# Patient Record
Sex: FEMALE | Race: BLACK OR AFRICAN AMERICAN | NOT HISPANIC OR LATINO | Employment: UNEMPLOYED | URBAN - METROPOLITAN AREA
[De-identification: names, ages, dates, MRNs, and addresses within clinical notes are randomized per-mention and may not be internally consistent; named-entity substitution may affect disease eponyms.]

---

## 2014-06-06 LAB — HM COLONOSCOPY: NORMAL

## 2020-01-30 ENCOUNTER — TELEPHONE (OUTPATIENT)
Dept: FAMILY MEDICINE | Facility: CLINIC | Age: 60
End: 2020-01-30

## 2020-01-30 NOTE — TELEPHONE ENCOUNTER
----- Message from Liat Eamon sent at 1/30/2020  8:46 AM CST -----  Contact: pt  Type: Patient Call Back    Who called:pt    What is the request in detail:pt requesting to speak to nurse regarding rescheduled office. Please call pt    Can the clinic reply by [facility]?    Would the patient rather a call back or a response via [facility]? call    Best call back number:[phone]    Additional Information:

## 2020-01-30 NOTE — TELEPHONE ENCOUNTER
Pt called stating she is new to [facility] and her appointment today had to be rescheduled, she is wanting to know if this happens frequently and if she could see someone else today; I informed her this was emergency for the doctor having to cancel today and unfortunately we don't have anything available at this location today; she verbalized understanding

## 2020-02-07 ENCOUNTER — LAB VISIT (OUTPATIENT)
Dept: LAB | Facility: HOSPITAL | Age: 60
End: 2020-02-07
Attending: FAMILY MEDICINE
Payer: MEDICARE

## 2020-02-07 ENCOUNTER — OFFICE VISIT (OUTPATIENT)
Dept: FAMILY MEDICINE | Facility: CLINIC | Age: 60
End: 2020-02-07
Payer: MEDICARE

## 2020-02-07 VITALS
BODY MASS INDEX: 32.26 KG/M2 | TEMPERATURE: 98 F | DIASTOLIC BLOOD PRESSURE: 88 MMHG | WEIGHT: 188.94 LBS | HEART RATE: 60 BPM | RESPIRATION RATE: 18 BRPM | HEIGHT: 64 IN | SYSTOLIC BLOOD PRESSURE: 128 MMHG

## 2020-02-07 DIAGNOSIS — Z98.890 HISTORY OF LUMBAR DISCECTOMY: ICD-10-CM

## 2020-02-07 DIAGNOSIS — Z13.220 ENCOUNTER FOR LIPID SCREENING FOR CARDIOVASCULAR DISEASE: ICD-10-CM

## 2020-02-07 DIAGNOSIS — Z00.00 ANNUAL PHYSICAL EXAM: Primary | ICD-10-CM

## 2020-02-07 DIAGNOSIS — R79.9 ABNORMAL FINDING OF BLOOD CHEMISTRY, UNSPECIFIED: ICD-10-CM

## 2020-02-07 DIAGNOSIS — L65.9 HAIR LOSS: ICD-10-CM

## 2020-02-07 DIAGNOSIS — Z11.59 NEED FOR HEPATITIS C SCREENING TEST: ICD-10-CM

## 2020-02-07 DIAGNOSIS — Z11.4 ENCOUNTER FOR SCREENING FOR HIV: ICD-10-CM

## 2020-02-07 DIAGNOSIS — Z13.6 ENCOUNTER FOR LIPID SCREENING FOR CARDIOVASCULAR DISEASE: ICD-10-CM

## 2020-02-07 DIAGNOSIS — M48.061 SPINAL STENOSIS OF LUMBAR REGION, UNSPECIFIED WHETHER NEUROGENIC CLAUDICATION PRESENT: ICD-10-CM

## 2020-02-07 LAB
ALBUMIN SERPL BCP-MCNC: 4.2 G/DL (ref 3.5–5.2)
ALP SERPL-CCNC: 79 U/L (ref 55–135)
ALT SERPL W/O P-5'-P-CCNC: 17 U/L (ref 10–44)
ANION GAP SERPL CALC-SCNC: 10 MMOL/L (ref 8–16)
AST SERPL-CCNC: 24 U/L (ref 10–40)
BASOPHILS # BLD AUTO: 0.08 K/UL (ref 0–0.2)
BASOPHILS NFR BLD: 1.6 % (ref 0–1.9)
BILIRUB SERPL-MCNC: 0.6 MG/DL (ref 0.1–1)
BUN SERPL-MCNC: 12 MG/DL (ref 6–20)
CALCIUM SERPL-MCNC: 10.5 MG/DL (ref 8.7–10.5)
CHLORIDE SERPL-SCNC: 106 MMOL/L (ref 95–110)
CHOLEST SERPL-MCNC: 320 MG/DL (ref 120–199)
CHOLEST/HDLC SERPL: 4.6 {RATIO} (ref 2–5)
CO2 SERPL-SCNC: 27 MMOL/L (ref 23–29)
CREAT SERPL-MCNC: 1 MG/DL (ref 0.5–1.4)
DIFFERENTIAL METHOD: ABNORMAL
EOSINOPHIL # BLD AUTO: 0.2 K/UL (ref 0–0.5)
EOSINOPHIL NFR BLD: 4.1 % (ref 0–8)
ERYTHROCYTE [DISTWIDTH] IN BLOOD BY AUTOMATED COUNT: 13.8 % (ref 11.5–14.5)
EST. GFR  (AFRICAN AMERICAN): >60 ML/MIN/1.73 M^2
EST. GFR  (NON AFRICAN AMERICAN): >60 ML/MIN/1.73 M^2
GLUCOSE SERPL-MCNC: 91 MG/DL (ref 70–110)
HCT VFR BLD AUTO: 44.4 % (ref 37–48.5)
HDLC SERPL-MCNC: 69 MG/DL (ref 40–75)
HDLC SERPL: 21.6 % (ref 20–50)
HGB BLD-MCNC: 13.5 G/DL (ref 12–16)
IMM GRANULOCYTES # BLD AUTO: 0 K/UL (ref 0–0.04)
IMM GRANULOCYTES NFR BLD AUTO: 0 % (ref 0–0.5)
LDLC SERPL CALC-MCNC: 226.4 MG/DL (ref 63–159)
LYMPHOCYTES # BLD AUTO: 2.6 K/UL (ref 1–4.8)
LYMPHOCYTES NFR BLD: 49.5 % (ref 18–48)
MCH RBC QN AUTO: 29 PG (ref 27–31)
MCHC RBC AUTO-ENTMCNC: 30.4 G/DL (ref 32–36)
MCV RBC AUTO: 95 FL (ref 82–98)
MONOCYTES # BLD AUTO: 0.3 K/UL (ref 0.3–1)
MONOCYTES NFR BLD: 6.6 % (ref 4–15)
NEUTROPHILS # BLD AUTO: 2 K/UL (ref 1.8–7.7)
NEUTROPHILS NFR BLD: 38.2 % (ref 38–73)
NONHDLC SERPL-MCNC: 251 MG/DL
NRBC BLD-RTO: 0 /100 WBC
PLATELET # BLD AUTO: 265 K/UL (ref 150–350)
PMV BLD AUTO: 12.1 FL (ref 9.2–12.9)
POTASSIUM SERPL-SCNC: 4.4 MMOL/L (ref 3.5–5.1)
PROT SERPL-MCNC: 8.6 G/DL (ref 6–8.4)
RBC # BLD AUTO: 4.66 M/UL (ref 4–5.4)
SODIUM SERPL-SCNC: 143 MMOL/L (ref 136–145)
TRIGL SERPL-MCNC: 123 MG/DL (ref 30–150)
TSH SERPL DL<=0.005 MIU/L-ACNC: 1.63 UIU/ML (ref 0.4–4)
WBC # BLD AUTO: 5.15 K/UL (ref 3.9–12.7)

## 2020-02-07 PROCEDURE — 84443 ASSAY THYROID STIM HORMONE: CPT | Mod: HCNC

## 2020-02-07 PROCEDURE — 86703 HIV-1/HIV-2 1 RESULT ANTBDY: CPT | Mod: HCNC

## 2020-02-07 PROCEDURE — 99386 PREV VISIT NEW AGE 40-64: CPT | Mod: HCNC,S$GLB,, | Performed by: FAMILY MEDICINE

## 2020-02-07 PROCEDURE — 99999 PR PBB SHADOW E&M-EST. PATIENT-LVL IV: CPT | Mod: PBBFAC,HCNC,, | Performed by: FAMILY MEDICINE

## 2020-02-07 PROCEDURE — 80061 LIPID PANEL: CPT | Mod: HCNC

## 2020-02-07 PROCEDURE — 99999 PR PBB SHADOW E&M-EST. PATIENT-LVL IV: ICD-10-PCS | Mod: PBBFAC,HCNC,, | Performed by: FAMILY MEDICINE

## 2020-02-07 PROCEDURE — 80053 COMPREHEN METABOLIC PANEL: CPT | Mod: HCNC

## 2020-02-07 PROCEDURE — 83036 HEMOGLOBIN GLYCOSYLATED A1C: CPT | Mod: HCNC

## 2020-02-07 PROCEDURE — 36415 COLL VENOUS BLD VENIPUNCTURE: CPT | Mod: HCNC,PO

## 2020-02-07 PROCEDURE — 86803 HEPATITIS C AB TEST: CPT | Mod: HCNC

## 2020-02-07 PROCEDURE — 85025 COMPLETE CBC W/AUTO DIFF WBC: CPT | Mod: HCNC

## 2020-02-07 PROCEDURE — 99386 PR PREVENTIVE VISIT,NEW,40-64: ICD-10-PCS | Mod: HCNC,S$GLB,, | Performed by: FAMILY MEDICINE

## 2020-02-07 RX ORDER — PANTOPRAZOLE SODIUM 40 MG/1
40 TABLET, DELAYED RELEASE ORAL
COMMUNITY
Start: 2018-12-20 | End: 2020-02-07 | Stop reason: SDUPTHER

## 2020-02-07 RX ORDER — ACETAMINOPHEN AND PHENYLEPHRINE HCL 325; 5 MG/1; MG/1
TABLET ORAL
COMMUNITY
End: 2022-12-16

## 2020-02-07 RX ORDER — PANTOPRAZOLE SODIUM 40 MG/1
40 TABLET, DELAYED RELEASE ORAL DAILY
Qty: 90 TABLET | Refills: 1 | Status: SHIPPED | OUTPATIENT
Start: 2020-02-07 | End: 2021-04-20

## 2020-02-07 RX ORDER — MELOXICAM 15 MG/1
15 TABLET ORAL DAILY
Qty: 90 TABLET | Refills: 1 | Status: SHIPPED | OUTPATIENT
Start: 2020-02-07 | End: 2020-07-02

## 2020-02-07 RX ORDER — MELOXICAM 15 MG/1
15 TABLET ORAL
COMMUNITY
Start: 2019-04-03 | End: 2020-02-07 | Stop reason: SDUPTHER

## 2020-02-07 RX ORDER — CHOLECALCIFEROL (VITAMIN D3) 10 MCG
TABLET ORAL DAILY
COMMUNITY

## 2020-02-07 RX ORDER — GABAPENTIN 300 MG/1
300 CAPSULE ORAL 3 TIMES DAILY PRN
Qty: 180 CAPSULE | Refills: 1 | Status: SHIPPED | OUTPATIENT
Start: 2020-02-07 | End: 2020-12-08

## 2020-02-07 RX ORDER — GABAPENTIN 300 MG/1
CAPSULE ORAL
COMMUNITY
Start: 2020-01-08 | End: 2020-02-07 | Stop reason: SDUPTHER

## 2020-02-07 NOTE — Clinical Note
Has elevated chol as pt thought she would. Continue fish oil supplements but pt would benefit from statins. Please ask if pt is willing to try the medication again. Otherwise labs are wnl.

## 2020-02-08 LAB
ESTIMATED AVG GLUCOSE: 114 MG/DL (ref 68–131)
HBA1C MFR BLD HPLC: 5.6 % (ref 4–5.6)

## 2020-02-10 LAB
HCV AB SERPL QL IA: NEGATIVE
HIV 1+2 AB+HIV1 P24 AG SERPL QL IA: NEGATIVE

## 2020-02-11 NOTE — PROGRESS NOTES
Subjective:       Patient ID: Jia Ugalde is a 59 y.o. female.    Chief Complaint: Annual Exam      HPI  59-year-old female presents for annual exam.  Complains of hair loss.  States she tried Rogaine and other methods but has not helped.  Has a history of spinal stenosis status post diskectomy.  States the surgery was done and was chest.  Patient states she is on disability and would like a 2nd opinion.  Would like refills on her medications.  Denies any other issues.      Review of Systems   Constitutional: Negative.    HENT: Negative.         Hair loss   Respiratory: Negative.    Cardiovascular: Negative.    Gastrointestinal: Negative.    Endocrine: Negative.    Genitourinary: Negative.    Musculoskeletal: Negative.    Neurological: Negative.    Psychiatric/Behavioral: Negative.           Past Medical History:   Diagnosis Date    GERD (gastroesophageal reflux disease)     Hyperlipidemia      Past Surgical History:   Procedure Laterality Date    BREAST SURGERY      HEMORRHOID SURGERY      HYSTERECTOMY      SPINE SURGERY      TUBAL LIGATION       History reviewed. No pertinent family history.  Social History     Socioeconomic History    Marital status: Single     Spouse name: Not on file    Number of children: Not on file    Years of education: Not on file    Highest education level: Not on file   Occupational History    Not on file   Social Needs    Financial resource strain: Not on file    Food insecurity:     Worry: Not on file     Inability: Not on file    Transportation needs:     Medical: Not on file     Non-medical: Not on file   Tobacco Use    Smoking status: Never Smoker    Smokeless tobacco: Never Used   Substance and Sexual Activity    Alcohol use: Never     Frequency: Never    Drug use: Never    Sexual activity: Not Currently   Lifestyle    Physical activity:     Days per week: Not on file     Minutes per session: Not on file    Stress: Not on file   Relationships    Social  "connections:     Talks on phone: Not on file     Gets together: Not on file     Attends Jain service: Not on file     Active member of club or organization: Not on file     Attends meetings of clubs or organizations: Not on file     Relationship status: Not on file   Other Topics Concern    Not on file   Social History Narrative    Not on file       Current Outpatient Medications:     biotin 10,000 mcg Cap, Take by mouth., Disp: , Rfl:     cholecalciferol, vitamin D3, capsule/tablet, Take by mouth once daily., Disp: , Rfl:     gabapentin (NEURONTIN) 300 MG capsule, Take 1 capsule (300 mg total) by mouth 3 (three) times daily as needed., Disp: 180 capsule, Rfl: 1    meloxicam (MOBIC) 15 MG tablet, Take 1 tablet (15 mg total) by mouth once daily., Disp: 90 tablet, Rfl: 1    multivit/folic acid/vit K1 (ONE-A-DAY WOMEN'S 50 PLUS ORAL), Take by mouth., Disp: , Rfl:     pantoprazole (PROTONIX) 40 MG tablet, Take 1 tablet (40 mg total) by mouth once daily., Disp: 90 tablet, Rfl: 1    TURMERIC ORAL, Take 500 mg by mouth., Disp: , Rfl:    Objective:      Vitals:    02/07/20 1348   BP: 128/88   BP Location: Left arm   Patient Position: Sitting   BP Method: Large (Manual)   Pulse: 60   Resp: 18   Temp: 98.2 °F (36.8 °C)   TempSrc: Oral   Weight: 85.7 kg (188 lb 15 oz)   Height: 5' 4" (1.626 m)       Physical Exam   Constitutional: She is oriented to person, place, and time. No distress.   HENT:   Head: Normocephalic and atraumatic.   Eyes: Conjunctivae are normal.   Neck: Neck supple.   Cardiovascular: Normal rate, regular rhythm and normal heart sounds. Exam reveals no gallop and no friction rub.   No murmur heard.  Pulmonary/Chest: Effort normal and breath sounds normal. She has no wheezes. She has no rales.   Neurological: She is alert and oriented to person, place, and time.   Skin: Skin is warm and dry.   Psychiatric: She has a normal mood and affect. Her behavior is normal. Judgment and thought content " normal.          Assessment:       1. Annual physical exam    2. Need for hepatitis C screening test    3. Encounter for lipid screening for cardiovascular disease    4. History of lumbar discectomy    5. Spinal stenosis of lumbar region, unspecified whether neurogenic claudication present    6. Hair loss    7. Encounter for screening for HIV        Plan:       Annual physical exam  F/u labs    Need for hepatitis C screening test  -     Hepatitis C antibody; Future; Expected date: 02/07/2020    Encounter for lipid screening for cardiovascular disease  - Pt stated he has a hx of HLD but is unable to tolerate statins.  -     Lipid panel; Future; Expected date: 02/07/2020    History of lumbar discectomy  - Referred to neurosurgery  \-     meloxicam (MOBIC) 15 MG tablet; Take 1 tablet (15 mg total) by mouth once daily.  Dispense: 90 tablet; Refill: 1  -     gabapentin (NEURONTIN) 300 MG capsule; Take 1 capsule (300 mg total) by mouth 3 (three) times daily as needed.  Dispense: 180 capsule; Refill: 1  -     pantoprazole (PROTONIX) 40 MG tablet; Take 1 tablet (40 mg total) by mouth once daily.  Dispense: 90 tablet; Refill: 1  -     Ambulatory referral/consult to Neurosurgery; Future; Expected date: 02/14/2020    Spinal stenosis of lumbar region, unspecified whether neurogenic claudication present  -     meloxicam (MOBIC) 15 MG tablet; Take 1 tablet (15 mg total) by mouth once daily.  Dispense: 90 tablet; Refill: 1  -     gabapentin (NEURONTIN) 300 MG capsule; Take 1 capsule (300 mg total) by mouth 3 (three) times daily as needed.  Dispense: 180 capsule; Refill: 1  -     pantoprazole (PROTONIX) 40 MG tablet; Take 1 tablet (40 mg total) by mouth once daily.  Dispense: 90 tablet; Refill: 1  -     Ambulatory referral/consult to Neurosurgery; Future; Expected date: 02/14/2020    Hair loss  -     CBC auto differential; Future; Expected date: 02/07/2020  -     Comprehensive metabolic panel; Future; Expected date: 02/07/2020  -      TSH; Future; Expected date: 02/07/2020  -     Hemoglobin A1c; Future; Expected date: 02/07/2020  -     Ambulatory referral/consult to Dermatology; Future; Expected date: 02/18/2020    Encounter for screening for HIV  -     HIV 1/2 Ag/Ab (4th Gen); Future; Expected date: 02/07/2020    Nevi  Referred to derm    Follow up in about 6 months (around 8/7/2020).            Alexis Washington MD      Patient note was created using [employer].  Any errors in syntax or even information may not have been identified and edited on initial review prior to signing this note.

## 2020-02-12 ENCOUNTER — PATIENT MESSAGE (OUTPATIENT)
Dept: FAMILY MEDICINE | Facility: CLINIC | Age: 60
End: 2020-02-12

## 2020-02-12 ENCOUNTER — TELEPHONE (OUTPATIENT)
Dept: FAMILY MEDICINE | Facility: CLINIC | Age: 60
End: 2020-02-12

## 2020-02-12 NOTE — TELEPHONE ENCOUNTER
Patient notified of results as noted below by MD, patient verbalized understanding. Patient stated she will not take any medication but will continue taking the fish oil.

## 2020-02-12 NOTE — TELEPHONE ENCOUNTER
----- Message from Alexis Washington MD sent at 2/11/2020 10:04 AM CST -----  Has elevated chol as pt thought she would. Continue fish oil supplements but pt would benefit from statins. Please ask if pt is willing to try the medication again. Otherwise labs are wnl.

## 2020-02-14 ENCOUNTER — PATIENT OUTREACH (OUTPATIENT)
Dept: ADMINISTRATIVE | Facility: HOSPITAL | Age: 60
End: 2020-02-14

## 2020-02-14 DIAGNOSIS — Z12.11 COLON CANCER SCREENING: ICD-10-CM

## 2020-02-17 ENCOUNTER — TELEPHONE (OUTPATIENT)
Dept: FAMILY MEDICINE | Facility: CLINIC | Age: 60
End: 2020-02-17

## 2020-02-19 ENCOUNTER — PATIENT OUTREACH (OUTPATIENT)
Dept: ADMINISTRATIVE | Facility: HOSPITAL | Age: 60
End: 2020-02-19

## 2020-03-02 ENCOUNTER — TELEPHONE (OUTPATIENT)
Dept: FAMILY MEDICINE | Facility: CLINIC | Age: 60
End: 2020-03-02

## 2020-03-02 ENCOUNTER — OFFICE VISIT (OUTPATIENT)
Dept: URGENT CARE | Facility: CLINIC | Age: 60
End: 2020-03-02
Payer: MEDICARE

## 2020-03-02 VITALS
SYSTOLIC BLOOD PRESSURE: 136 MMHG | DIASTOLIC BLOOD PRESSURE: 80 MMHG | HEIGHT: 64 IN | HEART RATE: 70 BPM | BODY MASS INDEX: 32.1 KG/M2 | TEMPERATURE: 97 F | OXYGEN SATURATION: 98 % | WEIGHT: 188 LBS

## 2020-03-02 DIAGNOSIS — B96.89 BACTERIAL SINUSITIS: Primary | ICD-10-CM

## 2020-03-02 DIAGNOSIS — J32.9 BACTERIAL SINUSITIS: Primary | ICD-10-CM

## 2020-03-02 PROCEDURE — 99214 PR OFFICE/OUTPT VISIT, EST, LEVL IV, 30-39 MIN: ICD-10-PCS | Mod: S$GLB,,, | Performed by: PHYSICIAN ASSISTANT

## 2020-03-02 PROCEDURE — 99214 OFFICE O/P EST MOD 30 MIN: CPT | Mod: S$GLB,,, | Performed by: PHYSICIAN ASSISTANT

## 2020-03-02 RX ORDER — AMOXICILLIN AND CLAVULANATE POTASSIUM 875; 125 MG/1; MG/1
1 TABLET, FILM COATED ORAL 2 TIMES DAILY
Qty: 14 TABLET | Refills: 0 | Status: SHIPPED | OUTPATIENT
Start: 2020-03-02 | End: 2020-03-09

## 2020-03-02 NOTE — TELEPHONE ENCOUNTER
----- Message from Belgica Crump sent at 3/2/2020  9:56 AM CST -----  Contact: CORDELIA WOO [[id number]]  Name of Who is Calling : CORDELIA WOO [[id number]]    Patient is requesting a call from staff in regards to being seen today for cold symptoms. Next available appointment not until tomorrow and patient would like to be seen today  .....Please contact to further discuss and advise.    Can the clinic reply by [facility] : No    What Number to Call Back :  [phone]

## 2020-03-02 NOTE — PROGRESS NOTES
"Subjective:       Patient ID: Jia Ugalde is a 59 y.o. female.    Vitals:  height is 5' 4" (1.626 m) and weight is 85.3 kg (188 lb). Her temperature is 97.1 °F (36.2 °C). Her blood pressure is 136/80 and her pulse is 70. Her oxygen saturation is 98%.     Chief Complaint: URI    59-year-old female presents complaining of sinus pressure, congestion, postnasal drip, sneezing, sinus headache for the past 7 days and worsening.  She denies any fevers, shortness of breath.  She does report chest congestion and coughing as well.      URI    This is a new problem. The current episode started 1 to 4 weeks ago. The problem has been gradually worsening. There has been no fever. Associated symptoms include congestion, coughing, headaches, sinus pain and sneezing. Pertinent negatives include no ear pain, nausea, rash, sore throat, vomiting or wheezing. Treatments tried: cherie [place]        Constitution: Negative for chills, sweating, fatigue and fever.   HENT: Positive for congestion, postnasal drip, sinus pain and sinus pressure. Negative for ear pain, sore throat and voice change.    Neck: Negative for painful lymph nodes.   Eyes: Negative for eye redness.   Respiratory: Positive for cough. Negative for chest tightness, sputum production, bloody sputum, COPD, shortness of breath, stridor, wheezing and asthma.    Gastrointestinal: Negative for nausea and vomiting.   Musculoskeletal: Negative for muscle ache.   Skin: Negative for rash.   Allergic/Immunologic: Positive for sneezing. Negative for seasonal allergies and asthma.   Neurological: Positive for headaches.   Hematologic/Lymphatic: Negative for swollen lymph nodes.       Objective:      Physical Exam   Constitutional: She is oriented to person, place, and time. She appears well-developed and well-nourished. She is cooperative.  Non-toxic appearance. She does not have a sickly appearance. She does not appear ill. No distress.   HENT:   Head: Normocephalic and " atraumatic.   Right Ear: Hearing, tympanic membrane, external ear and ear canal normal.   Left Ear: Hearing, tympanic membrane, external ear and ear canal normal.   Nose: Mucosal edema and rhinorrhea present. No nasal deformity. No epistaxis. Right sinus exhibits maxillary sinus tenderness and frontal sinus tenderness. Left sinus exhibits maxillary sinus tenderness and frontal sinus tenderness.   Mouth/Throat: Uvula is midline, oropharynx is clear and moist and mucous membranes are normal. No trismus in the jaw. Normal dentition. No uvula swelling. No oropharyngeal exudate, posterior oropharyngeal edema or posterior oropharyngeal erythema.   Eyes: Conjunctivae and lids are normal. No scleral icterus.   Neck: Trachea normal, full passive range of motion without pain and phonation normal. Neck supple. No neck rigidity. No edema and no erythema present.   Cardiovascular: Normal rate, regular rhythm, normal heart sounds, intact distal pulses and normal pulses.   Pulmonary/Chest: Effort normal and breath sounds normal. No respiratory distress. She has no decreased breath sounds. She has no rhonchi.   Abdominal: Normal appearance.   Musculoskeletal: Normal range of motion. She exhibits no edema or deformity.   Neurological: She is alert and oriented to person, place, and time. She exhibits normal muscle tone. Coordination normal.   Skin: Skin is warm, dry, intact, not diaphoretic and not pale.   Psychiatric: She has a normal mood and affect. Her speech is normal and behavior is normal. Judgment and thought content normal. Cognition and memory are normal.   Nursing note and vitals reviewed.        Assessment:       1. Bacterial sinusitis        Plan:       Abx, flonase, mucinex D, return for fevers or worsening  F/u pcp 1-2 wks    Labs reviewed, pertinent imaging reviewed, previous medical records, medical history, surgical history, social history, family history reviewed.      Bacterial sinusitis  -      amoxicillin-clavulanate 875-125mg (AUGMENTIN) 875-125 mg per tablet; Take 1 tablet by mouth 2 (two) times daily. for 7 days  Dispense: 14 tablet; Refill: 0         Patient Instructions     Antibiotics  flonase  mucinex D  Return for any worsening or fevers  Follow up primary care 1-2 wks    Please drink plenty of fluids.  Please get plenty of rest.  Please return here or go to the Emergency Department for any concerns or worsening of condition.  If you were given wait & see antibiotics, please wait 3-5 days before taking them, and only take them if your symptoms have worsened or not improved.  If you do begin taking the antibiotics, please take them to completion.  If you were prescribed antibiotics, please take them to completion.  If you were prescribed a narcotic medication, do not drive or operate heavy equipment or machinery while taking these medications.  If you do not have Hypertension or any history of palpitations, it is ok to take over the counter Sudafed or Mucinex D or Allegra-D or Claritin-D or Zyrtec-D.  If you do take one of the above, it is ok to combine that with plain over the counter Mucinex or Allegra or Claritin or Zyrtec.  If for example you are taking Zyrtec -D, you can combine that with Mucinex, but not Mucinex-D.  If you are taking Mucinex-D, you can combine that with plain Allegra or Claritin or Zyrtec.   If you do have Hypertension or palpitations, it is safe to take Coricidin HBP for relief of sinus symptoms.  We recommend you take over the counter Flonase (Fluticasone) or another nasally inhaled steroid unless you are already taking one.  Nasal irrigation with a saline spray or Netti Pot like device per their directions is also recommended.  If not allergic, please take over the counter Tylenol (Acetaminophen) and/or Motrin (Ibuprofen) as directed for control of pain and/or fever.  Please follow up with your primary care doctor or specialist as needed.    If you  smoke, please stop  smoking.      Acute Bacterial Rhinosinusitis (ABRS)    Acute bacterial rhinosinusitis (ABRS) is an infection of your nasal cavity and sinuses. Its caused by bacteria. Acute means that youve had symptoms for less than 12 weeks.  Understanding your sinuses  The nasal cavity is the large air-filled space behind your nose. The sinuses are a group of spaces formed by the bones of your face. They connect with your nasal cavity. ABRS causes the tissue lining these spaces to become inflamed. Mucus may not drain normally. This leads to facial pain and other symptoms.  What causes ABRS?  ABRS most often follows an upper respiratory infection caused by a virus. Bacteria then infect the lining of your nasal cavity and sinuses. But you can also get ABRS if you have:  · Nasal allergies  · Long-term nasal swelling and congestion not caused by allergies  · Blockage in the nose  Symptoms of ABRS  The symptoms of ABRS may be different for each person, and can include:  · Nasal congestion  · Runny nose  · Fluid draining from the nose down the throat (postnasal drip)  · Headache  · Cough  · Pain in the sinuses  · Thick, colored fluid from the nose (mucus)  · Fever  Diagnosing ABRS  ABRS may be diagnosed if youve had an upper respiratory infection like a cold and cough for longer than 10 to 14 days. Your health care provider will ask about your symptoms and your medical history. The provider will check your vital signs, including your temperature. Youll have a physical exam. The health care provider will check your ears, nose, and throat. You likely wont need any tests. If ABRS comes back, you may have a culture or other tests.  Treatment for ABRS  Treatment may include:  · Antibiotic medicine. This is for symptoms that last for at least 10 to 14 days.  · Nasal corticosteroid medicine. Drops or spray used in the nose can lessen swelling and congestion.  · Over-the-counter pain medicine. This is to lessen sinus pain and  pressure.  · Nasal decongestant medicine. Spray or drops may help to lessen congestion. Do not use them for more than a few days.  · Salt wash (saline irrigation). This can help to loosen mucus.  Possible complications of ABRS  ABRS may come back or become long-term (chronic).  In rare cases, ABRS may cause complications such as:   · Inflamed tissue around the brain and spinal cord (meningitis)  · Inflamed tissue around the eyes (orbital cellulitis)  · Inflamed bones around the sinuses (osteitis)  These problems may need to be treated in a hospital with intravenous (IV) antibiotic medicine or surgery.  When to call the health care provider  Call your health care provider if you have any of the following:  · Symptoms that dont get better, or get worse  · Symptoms that dont get better after 3 to 5 days on antibiotics  · Trouble seeing  · Swelling around your eyes  · Confusion or trouble staying awake   Date Last Reviewed: 3/3/2015  © 6239-8675 [employer]. [address] [place] [address], [place] [address]. All rights reserved. This information is not intended as a substitute for professional medical care. Always follow your healthcare professional's instructions.

## 2020-03-02 NOTE — PATIENT INSTRUCTIONS
Antibiotics  flonase  mucinex D  Return for any worsening or fevers  Follow up primary care 1-2 wks    Please drink plenty of fluids.  Please get plenty of rest.  Please return here or go to the Emergency Department for any concerns or worsening of condition.  If you were given wait & see antibiotics, please wait 3-5 days before taking them, and only take them if your symptoms have worsened or not improved.  If you do begin taking the antibiotics, please take them to completion.  If you were prescribed antibiotics, please take them to completion.  If you were prescribed a narcotic medication, do not drive or operate heavy equipment or machinery while taking these medications.  If you do not have Hypertension or any history of palpitations, it is ok to take over the counter Sudafed or Mucinex D or Allegra-D or Claritin-D or Zyrtec-D.  If you do take one of the above, it is ok to combine that with plain over the counter Mucinex or Allegra or Claritin or Zyrtec.  If for example you are taking Zyrtec -D, you can combine that with Mucinex, but not Mucinex-D.  If you are taking Mucinex-D, you can combine that with plain Allegra or Claritin or Zyrtec.   If you do have Hypertension or palpitations, it is safe to take Coricidin HBP for relief of sinus symptoms.  We recommend you take over the counter Flonase (Fluticasone) or another nasally inhaled steroid unless you are already taking one.  Nasal irrigation with a saline spray or Netti Pot like device per their directions is also recommended.  If not allergic, please take over the counter Tylenol (Acetaminophen) and/or Motrin (Ibuprofen) as directed for control of pain and/or fever.  Please follow up with your primary care doctor or specialist as needed.    If you  smoke, please stop smoking.      Acute Bacterial Rhinosinusitis (ABRS)    Acute bacterial rhinosinusitis (ABRS) is an infection of your nasal cavity and sinuses. Its caused by bacteria. Acute means that youve  had symptoms for less than 12 weeks.  Understanding your sinuses  The nasal cavity is the large air-filled space behind your nose. The sinuses are a group of spaces formed by the bones of your face. They connect with your nasal cavity. ABRS causes the tissue lining these spaces to become inflamed. Mucus may not drain normally. This leads to facial pain and other symptoms.  What causes ABRS?  ABRS most often follows an upper respiratory infection caused by a virus. Bacteria then infect the lining of your nasal cavity and sinuses. But you can also get ABRS if you have:  · Nasal allergies  · Long-term nasal swelling and congestion not caused by allergies  · Blockage in the nose  Symptoms of ABRS  The symptoms of ABRS may be different for each person, and can include:  · Nasal congestion  · Runny nose  · Fluid draining from the nose down the throat (postnasal drip)  · Headache  · Cough  · Pain in the sinuses  · Thick, colored fluid from the nose (mucus)  · Fever  Diagnosing ABRS  ABRS may be diagnosed if youve had an upper respiratory infection like a cold and cough for longer than 10 to 14 days. Your health care provider will ask about your symptoms and your medical history. The provider will check your vital signs, including your temperature. Youll have a physical exam. The health care provider will check your ears, nose, and throat. You likely wont need any tests. If ABRS comes back, you may have a culture or other tests.  Treatment for ABRS  Treatment may include:  · Antibiotic medicine. This is for symptoms that last for at least 10 to 14 days.  · Nasal corticosteroid medicine. Drops or spray used in the nose can lessen swelling and congestion.  · Over-the-counter pain medicine. This is to lessen sinus pain and pressure.  · Nasal decongestant medicine. Spray or drops may help to lessen congestion. Do not use them for more than a few days.  · Salt wash (saline irrigation). This can help to loosen mucus.  Possible  complications of ABRS  ABRS may come back or become long-term (chronic).  In rare cases, ABRS may cause complications such as:   · Inflamed tissue around the brain and spinal cord (meningitis)  · Inflamed tissue around the eyes (orbital cellulitis)  · Inflamed bones around the sinuses (osteitis)  These problems may need to be treated in a hospital with intravenous (IV) antibiotic medicine or surgery.  When to call the health care provider  Call your health care provider if you have any of the following:  · Symptoms that dont get better, or get worse  · Symptoms that dont get better after 3 to 5 days on antibiotics  · Trouble seeing  · Swelling around your eyes  · Confusion or trouble staying awake   Date Last Reviewed: 3/3/2015  © 3295-0189 [employer]. [address] [place] [address], [place] [address]. All rights reserved. This information is not intended as a substitute for professional medical care. Always follow your healthcare professional's instructions.

## 2020-03-02 NOTE — TELEPHONE ENCOUNTER
Pt came in to office and was informed no appointments available but she can be seen at urgent care; pt verbalized understanding

## 2020-03-04 ENCOUNTER — TELEPHONE (OUTPATIENT)
Dept: FAMILY MEDICINE | Facility: CLINIC | Age: 60
End: 2020-03-04

## 2020-03-04 NOTE — TELEPHONE ENCOUNTER
----- Message from Sayra Schwab sent at 3/4/2020  3:12 PM CST -----  Contact: self  Type: Patient Call Back    Who called: self    What is the request in detail:amoxicillin-clavulanate 875-125mg (AUGMENTIN) 875-125 mg per tablet. Patient having watery stools since beginning this medication on Monday. Please call to discuss  Can the clinic reply by [facility]? no    Would the patient rather a call back or a response via [facility]? Call     Best call back number: [phone]

## 2020-03-05 NOTE — TELEPHONE ENCOUNTER
----- Message from Sandra Marie sent at 3/5/2020 12:13 PM CST -----  Contact: Self   Type:  Patient Returning Call    Who Called: Self     Who Left Message for Patient:  Margie     Does the patient know what this is regarding?: yes, regarding her previous call     Would the patient rather a call back or a response via [facility]?  Call     Best Call Back Number: [phone]

## 2020-03-11 ENCOUNTER — OFFICE VISIT (OUTPATIENT)
Dept: FAMILY MEDICINE | Facility: CLINIC | Age: 60
End: 2020-03-11
Payer: MEDICARE

## 2020-03-11 VITALS
TEMPERATURE: 98 F | BODY MASS INDEX: 32.37 KG/M2 | WEIGHT: 189.63 LBS | DIASTOLIC BLOOD PRESSURE: 78 MMHG | HEIGHT: 64 IN | HEART RATE: 90 BPM | SYSTOLIC BLOOD PRESSURE: 130 MMHG

## 2020-03-11 DIAGNOSIS — J06.9 ACUTE URI: Primary | ICD-10-CM

## 2020-03-11 DIAGNOSIS — E78.5 HYPERLIPIDEMIA, UNSPECIFIED HYPERLIPIDEMIA TYPE: ICD-10-CM

## 2020-03-11 PROCEDURE — 3008F PR BODY MASS INDEX (BMI) DOCUMENTED: ICD-10-PCS | Mod: HCNC,CPTII,S$GLB, | Performed by: FAMILY MEDICINE

## 2020-03-11 PROCEDURE — 3008F BODY MASS INDEX DOCD: CPT | Mod: HCNC,CPTII,S$GLB, | Performed by: FAMILY MEDICINE

## 2020-03-11 PROCEDURE — 99999 PR PBB SHADOW E&M-EST. PATIENT-LVL III: ICD-10-PCS | Mod: PBBFAC,HCNC,, | Performed by: FAMILY MEDICINE

## 2020-03-11 PROCEDURE — 99214 OFFICE O/P EST MOD 30 MIN: CPT | Mod: HCNC,S$GLB,, | Performed by: FAMILY MEDICINE

## 2020-03-11 PROCEDURE — 99999 PR PBB SHADOW E&M-EST. PATIENT-LVL III: CPT | Mod: PBBFAC,HCNC,, | Performed by: FAMILY MEDICINE

## 2020-03-11 PROCEDURE — 99214 PR OFFICE/OUTPT VISIT, EST, LEVL IV, 30-39 MIN: ICD-10-PCS | Mod: HCNC,S$GLB,, | Performed by: FAMILY MEDICINE

## 2020-03-11 RX ORDER — METHYLPREDNISOLONE SODIUM SUCCINATE 125 MG/2ML
125 INJECTION INTRAMUSCULAR; INTRAVENOUS
Status: DISCONTINUED | OUTPATIENT
Start: 2020-03-11 | End: 2020-03-11

## 2020-03-11 NOTE — PROGRESS NOTES
Subjective:       Patient ID: Jia Ugalde is a 59 y.o. female.    Chief Complaint: Sinus Problem      HPI  59-year-old female presents for her sinus issues.  Patient was given antibiotics had urgent care.  States she still has congestion, chest pressure, cough, and rhinorrhea.  Denies any fever chills.  States she only took her antibiotics and stopped her Mucinex DM and Flonase.  States she also has a nasal sore in her right nostril.      Review of Systems   Constitutional: Negative.    HENT: Positive for congestion, postnasal drip, rhinorrhea, sinus pressure and sinus pain.    Respiratory: Positive for cough and chest tightness.    Cardiovascular: Negative.    Gastrointestinal: Negative.    Endocrine: Negative.    Genitourinary: Negative.    Musculoskeletal: Negative.    Neurological: Negative.    Psychiatric/Behavioral: Negative.           Past Medical History:   Diagnosis Date    GERD (gastroesophageal reflux disease)     Hyperlipidemia      Past Surgical History:   Procedure Laterality Date    BREAST SURGERY      HEMORRHOID SURGERY      HYSTERECTOMY      SPINE SURGERY      TUBAL LIGATION       History reviewed. No pertinent family history.  Social History     Socioeconomic History    Marital status: Single     Spouse name: Not on file    Number of children: Not on file    Years of education: Not on file    Highest education level: Not on file   Occupational History    Not on file   Social Needs    Financial resource strain: Not on file    Food insecurity:     Worry: Not on file     Inability: Not on file    Transportation needs:     Medical: Not on file     Non-medical: Not on file   Tobacco Use    Smoking status: Never Smoker    Smokeless tobacco: Never Used   Substance and Sexual Activity    Alcohol use: Never     Frequency: Never    Drug use: Never    Sexual activity: Not Currently   Lifestyle    Physical activity:     Days per week: Not on file     Minutes per session: Not on file  "   Stress: Not on file   Relationships    Social connections:     Talks on phone: Not on file     Gets together: Not on file     Attends Jehovah's witness service: Not on file     Active member of club or organization: Not on file     Attends meetings of clubs or organizations: Not on file     Relationship status: Not on file   Other Topics Concern    Not on file   Social History Narrative    Not on file       Current Outpatient Medications:     biotin 10,000 mcg Cap, Take by mouth., Disp: , Rfl:     cholecalciferol, vitamin D3, capsule/tablet, Take by mouth once daily., Disp: , Rfl:     gabapentin (NEURONTIN) 300 MG capsule, Take 1 capsule (300 mg total) by mouth 3 (three) times daily as needed., Disp: 180 capsule, Rfl: 1    meloxicam (MOBIC) 15 MG tablet, Take 1 tablet (15 mg total) by mouth once daily., Disp: 90 tablet, Rfl: 1    multivit/folic acid/vit K1 (ONE-A-DAY WOMEN'S 50 PLUS ORAL), Take by mouth., Disp: , Rfl:     pantoprazole (PROTONIX) 40 MG tablet, Take 1 tablet (40 mg total) by mouth once daily., Disp: 90 tablet, Rfl: 1    TURMERIC ORAL, Take 500 mg by mouth., Disp: , Rfl:   No current facility-administered medications for this visit.    Objective:      Vitals:    03/11/20 1546   BP: 130/78   BP Location: Left arm   Patient Position: Sitting   BP Method: Large (Manual)   Pulse: 90   Temp: 98.2 °F (36.8 °C)   TempSrc: Oral   Weight: 86 kg (189 lb 9.5 oz)   Height: 5' 4" (1.626 m)       Physical Exam   Constitutional: She is oriented to person, place, and time. No distress.   HENT:   Head: Normocephalic and atraumatic.   B/l nasal turbinate swelling. Has a nonbleeding nasal sore on R nostril   Eyes: Conjunctivae are normal.   Neck: Neck supple.   Cardiovascular: Normal rate, regular rhythm and normal heart sounds. Exam reveals no gallop and no friction rub.   No murmur heard.  Pulmonary/Chest: Effort normal and breath sounds normal. She has no wheezes. She has no rales.   Neurological: She is alert " and oriented to person, place, and time.   Skin: Skin is warm and dry.   Psychiatric: She has a normal mood and affect. Her behavior is normal. Judgment and thought content normal.          Assessment:       1. Acute URI    2. Hyperlipidemia, unspecified hyperlipidemia type        Plan:       Acute URI  - Advised pt to call if symptoms last a total of 10-14 days. Advised pt about otc meds to use. Advised pt about honey and saltwater gargling PRN.    Hyperlipidemia, unspecified hyperlipidemia type  -     Lipid panel; Future; Expected date: 03/11/2020      Follow up if symptoms worsen or fail to improve.            Alexis Washington MD      Patient note was created using [employer].  Any errors in syntax or even information may not have been identified and edited on initial review prior to signing this note.

## 2020-03-30 ENCOUNTER — PATIENT MESSAGE (OUTPATIENT)
Dept: FAMILY MEDICINE | Facility: CLINIC | Age: 60
End: 2020-03-30

## 2020-03-30 DIAGNOSIS — J30.9 ALLERGIC RHINITIS, UNSPECIFIED SEASONALITY, UNSPECIFIED TRIGGER: Primary | ICD-10-CM

## 2020-03-31 ENCOUNTER — PATIENT MESSAGE (OUTPATIENT)
Dept: FAMILY MEDICINE | Facility: CLINIC | Age: 60
End: 2020-03-31

## 2020-03-31 DIAGNOSIS — J30.9 ALLERGIC RHINITIS, UNSPECIFIED SEASONALITY, UNSPECIFIED TRIGGER: ICD-10-CM

## 2020-03-31 RX ORDER — FLUTICASONE PROPIONATE 50 MCG
1 SPRAY, SUSPENSION (ML) NASAL DAILY
Qty: 16 G | Refills: 3 | Status: SHIPPED | OUTPATIENT
Start: 2020-03-31 | End: 2020-03-31 | Stop reason: SDUPTHER

## 2020-03-31 NOTE — TELEPHONE ENCOUNTER
----- Message from Liat Knutson sent at 3/31/2020  9:11 AM CDT -----  Contact: natalie ling [phone]  Type: RX Refill Request    Who Called:natalie ling [phone]    Have you contacted your pharmacy:    Refill or New Rx:fluticasone 50 mcg    RX Name and Strength:    How is the patient currently taking it? (ex. 1XDay):    Is this a 30 day or 90 day RX:    Preferred Pharmacy with phone number:    Local or Mail Order:    Ordering Provider:    Would the patient rather a call back or a response via [facility]?     Best Call Back Number:    Additional Information:

## 2020-04-01 RX ORDER — FLUTICASONE PROPIONATE 50 MCG
1 SPRAY, SUSPENSION (ML) NASAL DAILY
Qty: 16 G | Refills: 3 | Status: SHIPPED | OUTPATIENT
Start: 2020-04-01 | End: 2021-04-19 | Stop reason: SDUPTHER

## 2020-05-21 ENCOUNTER — PATIENT OUTREACH (OUTPATIENT)
Dept: ADMINISTRATIVE | Facility: HOSPITAL | Age: 60
End: 2020-05-21

## 2020-06-02 ENCOUNTER — TELEPHONE (OUTPATIENT)
Dept: FAMILY MEDICINE | Facility: CLINIC | Age: 60
End: 2020-06-02

## 2020-06-02 DIAGNOSIS — Z20.822 EXPOSURE TO COVID-19 VIRUS: Primary | ICD-10-CM

## 2020-06-03 ENCOUNTER — LAB VISIT (OUTPATIENT)
Dept: LAB | Facility: HOSPITAL | Age: 60
End: 2020-06-03
Attending: FAMILY MEDICINE
Payer: MEDICARE

## 2020-06-03 DIAGNOSIS — E78.5 HYPERLIPIDEMIA, UNSPECIFIED HYPERLIPIDEMIA TYPE: ICD-10-CM

## 2020-06-03 DIAGNOSIS — Z20.822 EXPOSURE TO COVID-19 VIRUS: ICD-10-CM

## 2020-06-03 LAB
CHOLEST SERPL-MCNC: 317 MG/DL (ref 120–199)
CHOLEST/HDLC SERPL: 4.4 {RATIO} (ref 2–5)
HDLC SERPL-MCNC: 72 MG/DL (ref 40–75)
HDLC SERPL: 22.7 % (ref 20–50)
LDLC SERPL CALC-MCNC: 214.8 MG/DL (ref 63–159)
NONHDLC SERPL-MCNC: 245 MG/DL
SARS-COV-2 IGG SERPLBLD QL IA.RAPID: NEGATIVE
TRIGL SERPL-MCNC: 151 MG/DL (ref 30–150)

## 2020-06-03 PROCEDURE — 36415 COLL VENOUS BLD VENIPUNCTURE: CPT | Mod: HCNC,PO

## 2020-06-03 PROCEDURE — 80061 LIPID PANEL: CPT | Mod: HCNC

## 2020-06-03 PROCEDURE — 86769 SARS-COV-2 COVID-19 ANTIBODY: CPT | Mod: HCNC

## 2020-06-16 ENCOUNTER — TELEPHONE (OUTPATIENT)
Dept: FAMILY MEDICINE | Facility: CLINIC | Age: 60
End: 2020-06-16

## 2020-09-09 DIAGNOSIS — M54.9 DORSALGIA, UNSPECIFIED: ICD-10-CM

## 2020-09-09 DIAGNOSIS — M54.16 LUMBAR RADICULOPATHY: Primary | ICD-10-CM

## 2020-09-18 ENCOUNTER — CLINICAL SUPPORT (OUTPATIENT)
Dept: FAMILY MEDICINE | Facility: CLINIC | Age: 60
End: 2020-09-18
Payer: MEDICARE

## 2020-09-18 DIAGNOSIS — Z23 NEEDS FLU SHOT: Primary | ICD-10-CM

## 2020-09-18 PROCEDURE — 90686 FLU VACCINE (QUAD) GREATER THAN OR EQUAL TO 3YO PRESERVATIVE FREE IM: ICD-10-PCS | Mod: HCNC,S$GLB,, | Performed by: FAMILY MEDICINE

## 2020-09-18 PROCEDURE — 90686 IIV4 VACC NO PRSV 0.5 ML IM: CPT | Mod: HCNC,S$GLB,, | Performed by: FAMILY MEDICINE

## 2020-09-18 PROCEDURE — G0008 FLU VACCINE (QUAD) GREATER THAN OR EQUAL TO 3YO PRESERVATIVE FREE IM: ICD-10-PCS | Mod: HCNC,S$GLB,, | Performed by: FAMILY MEDICINE

## 2020-09-18 PROCEDURE — G0008 ADMIN INFLUENZA VIRUS VAC: HCPCS | Mod: HCNC,S$GLB,, | Performed by: FAMILY MEDICINE

## 2020-10-02 ENCOUNTER — PATIENT OUTREACH (OUTPATIENT)
Dept: ADMINISTRATIVE | Facility: HOSPITAL | Age: 60
End: 2020-10-02

## 2020-10-02 DIAGNOSIS — Z12.31 ENCOUNTER FOR SCREENING MAMMOGRAM FOR MALIGNANT NEOPLASM OF BREAST: Primary | ICD-10-CM

## 2020-10-05 ENCOUNTER — PATIENT MESSAGE (OUTPATIENT)
Dept: ADMINISTRATIVE | Facility: HOSPITAL | Age: 60
End: 2020-10-05

## 2020-10-08 ENCOUNTER — CLINICAL SUPPORT (OUTPATIENT)
Dept: FAMILY MEDICINE | Facility: CLINIC | Age: 60
End: 2020-10-08
Payer: MEDICARE

## 2020-10-08 DIAGNOSIS — Z23 NEED FOR SHINGLES VACCINE: Primary | ICD-10-CM

## 2020-10-08 PROCEDURE — 90471 IMMUNIZATION ADMIN: CPT | Mod: HCNC,S$GLB,, | Performed by: FAMILY MEDICINE

## 2020-10-08 PROCEDURE — 90736 HZV VACCINE LIVE SUBQ: CPT | Mod: HCNC,S$GLB,, | Performed by: FAMILY MEDICINE

## 2020-10-08 PROCEDURE — 90736 ZOSTER VACCINE - LIVE: ICD-10-PCS | Mod: HCNC,S$GLB,, | Performed by: FAMILY MEDICINE

## 2020-10-08 PROCEDURE — 90471 ZOSTER VACCINE - LIVE: ICD-10-PCS | Mod: HCNC,S$GLB,, | Performed by: FAMILY MEDICINE

## 2020-10-08 NOTE — PROGRESS NOTES
Pt received influenza vaccine. Tolerated well. Pt instructed to wait 15mins to be assessed for adverse reactions. verbalized understanding.

## 2020-10-14 ENCOUNTER — PATIENT MESSAGE (OUTPATIENT)
Dept: FAMILY MEDICINE | Facility: CLINIC | Age: 60
End: 2020-10-14

## 2020-10-16 NOTE — TELEPHONE ENCOUNTER
----- Message from Jennifer Rivero sent at 10/16/2020  1:37 PM CDT -----  Type: Patient Call Back       What is the request in detail:  pt returning call     Can the clinic reply by [facility]? No       Would the patient rather a call back or a response via [facility]? Call back       Best call back number: [phone]        Thank you.

## 2020-10-20 ENCOUNTER — LAB VISIT (OUTPATIENT)
Dept: LAB | Facility: HOSPITAL | Age: 60
End: 2020-10-20
Attending: FAMILY MEDICINE
Payer: MEDICARE

## 2020-10-20 ENCOUNTER — OFFICE VISIT (OUTPATIENT)
Dept: FAMILY MEDICINE | Facility: CLINIC | Age: 60
End: 2020-10-20
Payer: MEDICARE

## 2020-10-20 VITALS
BODY MASS INDEX: 33.64 KG/M2 | DIASTOLIC BLOOD PRESSURE: 80 MMHG | HEIGHT: 64 IN | OXYGEN SATURATION: 98 % | SYSTOLIC BLOOD PRESSURE: 120 MMHG | WEIGHT: 197.06 LBS | HEART RATE: 76 BPM | TEMPERATURE: 98 F | RESPIRATION RATE: 17 BRPM

## 2020-10-20 DIAGNOSIS — Z01.818 PREOPERATIVE CLEARANCE: ICD-10-CM

## 2020-10-20 DIAGNOSIS — Z01.818 PREOPERATIVE CLEARANCE: Primary | ICD-10-CM

## 2020-10-20 LAB
ALBUMIN SERPL BCP-MCNC: 4 G/DL (ref 3.5–5.2)
ALP SERPL-CCNC: 68 U/L (ref 55–135)
ALT SERPL W/O P-5'-P-CCNC: 17 U/L (ref 10–44)
ANION GAP SERPL CALC-SCNC: 7 MMOL/L (ref 8–16)
AST SERPL-CCNC: 22 U/L (ref 10–40)
BASOPHILS # BLD AUTO: 0.09 K/UL (ref 0–0.2)
BASOPHILS NFR BLD: 1.7 % (ref 0–1.9)
BILIRUB SERPL-MCNC: 0.4 MG/DL (ref 0.1–1)
BUN SERPL-MCNC: 11 MG/DL (ref 6–20)
CALCIUM SERPL-MCNC: 9.9 MG/DL (ref 8.7–10.5)
CHLORIDE SERPL-SCNC: 104 MMOL/L (ref 95–110)
CO2 SERPL-SCNC: 29 MMOL/L (ref 23–29)
CREAT SERPL-MCNC: 0.9 MG/DL (ref 0.5–1.4)
DIFFERENTIAL METHOD: ABNORMAL
EOSINOPHIL # BLD AUTO: 0.2 K/UL (ref 0–0.5)
EOSINOPHIL NFR BLD: 4.3 % (ref 0–8)
ERYTHROCYTE [DISTWIDTH] IN BLOOD BY AUTOMATED COUNT: 13.9 % (ref 11.5–14.5)
EST. GFR  (AFRICAN AMERICAN): >60 ML/MIN/1.73 M^2
EST. GFR  (NON AFRICAN AMERICAN): >60 ML/MIN/1.73 M^2
GLUCOSE SERPL-MCNC: 84 MG/DL (ref 70–110)
HCT VFR BLD AUTO: 42.3 % (ref 37–48.5)
HGB BLD-MCNC: 13.1 G/DL (ref 12–16)
IMM GRANULOCYTES # BLD AUTO: 0.02 K/UL (ref 0–0.04)
IMM GRANULOCYTES NFR BLD AUTO: 0.4 % (ref 0–0.5)
LYMPHOCYTES # BLD AUTO: 2.7 K/UL (ref 1–4.8)
LYMPHOCYTES NFR BLD: 50.8 % (ref 18–48)
MCH RBC QN AUTO: 29 PG (ref 27–31)
MCHC RBC AUTO-ENTMCNC: 31 G/DL (ref 32–36)
MCV RBC AUTO: 94 FL (ref 82–98)
MONOCYTES # BLD AUTO: 0.4 K/UL (ref 0.3–1)
MONOCYTES NFR BLD: 6.8 % (ref 4–15)
NEUTROPHILS # BLD AUTO: 1.9 K/UL (ref 1.8–7.7)
NEUTROPHILS NFR BLD: 36 % (ref 38–73)
NRBC BLD-RTO: 0 /100 WBC
PLATELET # BLD AUTO: 272 K/UL (ref 150–350)
PMV BLD AUTO: 11.3 FL (ref 9.2–12.9)
POTASSIUM SERPL-SCNC: 4.2 MMOL/L (ref 3.5–5.1)
PROT SERPL-MCNC: 8.2 G/DL (ref 6–8.4)
RBC # BLD AUTO: 4.52 M/UL (ref 4–5.4)
SODIUM SERPL-SCNC: 140 MMOL/L (ref 136–145)
WBC # BLD AUTO: 5.31 K/UL (ref 3.9–12.7)

## 2020-10-20 PROCEDURE — 99214 OFFICE O/P EST MOD 30 MIN: CPT | Mod: HCNC,S$GLB,, | Performed by: FAMILY MEDICINE

## 2020-10-20 PROCEDURE — 93005 EKG 12-LEAD: ICD-10-PCS | Mod: HCNC,S$GLB,, | Performed by: FAMILY MEDICINE

## 2020-10-20 PROCEDURE — 36415 COLL VENOUS BLD VENIPUNCTURE: CPT | Mod: HCNC,PO

## 2020-10-20 PROCEDURE — 85025 COMPLETE CBC W/AUTO DIFF WBC: CPT | Mod: HCNC

## 2020-10-20 PROCEDURE — 3008F PR BODY MASS INDEX (BMI) DOCUMENTED: ICD-10-PCS | Mod: HCNC,CPTII,S$GLB, | Performed by: FAMILY MEDICINE

## 2020-10-20 PROCEDURE — 80053 COMPREHEN METABOLIC PANEL: CPT | Mod: HCNC

## 2020-10-20 PROCEDURE — 93010 ELECTROCARDIOGRAM REPORT: CPT | Mod: HCNC,S$GLB,, | Performed by: INTERNAL MEDICINE

## 2020-10-20 PROCEDURE — 99999 PR PBB SHADOW E&M-EST. PATIENT-LVL III: CPT | Mod: PBBFAC,HCNC,, | Performed by: FAMILY MEDICINE

## 2020-10-20 PROCEDURE — 93010 EKG 12-LEAD: ICD-10-PCS | Mod: HCNC,S$GLB,, | Performed by: INTERNAL MEDICINE

## 2020-10-20 PROCEDURE — 93005 ELECTROCARDIOGRAM TRACING: CPT | Mod: HCNC,S$GLB,, | Performed by: FAMILY MEDICINE

## 2020-10-20 PROCEDURE — 3008F BODY MASS INDEX DOCD: CPT | Mod: HCNC,CPTII,S$GLB, | Performed by: FAMILY MEDICINE

## 2020-10-20 PROCEDURE — 99999 PR PBB SHADOW E&M-EST. PATIENT-LVL III: ICD-10-PCS | Mod: PBBFAC,HCNC,, | Performed by: FAMILY MEDICINE

## 2020-10-20 PROCEDURE — 99214 PR OFFICE/OUTPT VISIT, EST, LEVL IV, 30-39 MIN: ICD-10-PCS | Mod: HCNC,S$GLB,, | Performed by: FAMILY MEDICINE

## 2020-10-20 RX ORDER — BUDESONIDE 1 MG/2ML
INHALANT ORAL
COMMUNITY
Start: 2020-08-25

## 2020-10-20 RX ORDER — CYCLOSPORINE 0.5 MG/ML
EMULSION OPHTHALMIC
COMMUNITY
Start: 2020-08-21 | End: 2021-05-04

## 2020-10-20 RX ORDER — SOD CHLOR,BICARB/SQUEEZ BOTTLE
PACKET, WITH RINSE DEVICE NASAL
COMMUNITY
Start: 2020-08-25

## 2020-10-20 RX ORDER — TOBRAMYCIN INHALATION SOLUTION 300 MG/5ML
INHALANT RESPIRATORY (INHALATION)
COMMUNITY
Start: 2020-08-25

## 2020-10-21 ENCOUNTER — HOSPITAL ENCOUNTER (OUTPATIENT)
Dept: RADIOLOGY | Facility: HOSPITAL | Age: 60
Discharge: HOME | End: 2020-10-21
Attending: FAMILY MEDICINE
Payer: MEDICARE

## 2020-10-21 ENCOUNTER — PATIENT MESSAGE (OUTPATIENT)
Dept: FAMILY MEDICINE | Facility: CLINIC | Age: 60
End: 2020-10-21

## 2020-10-21 ENCOUNTER — TELEPHONE (OUTPATIENT)
Dept: FAMILY MEDICINE | Facility: CLINIC | Age: 60
End: 2020-10-21

## 2020-10-21 VITALS — WEIGHT: 197 LBS | BODY MASS INDEX: 33.63 KG/M2 | HEIGHT: 64 IN

## 2020-10-21 DIAGNOSIS — Z12.31 ENCOUNTER FOR SCREENING MAMMOGRAM FOR MALIGNANT NEOPLASM OF BREAST: ICD-10-CM

## 2020-10-21 PROCEDURE — 77067 SCR MAMMO BI INCL CAD: CPT | Mod: 26,HCNC,, | Performed by: RADIOLOGY

## 2020-10-21 PROCEDURE — 77067 SCR MAMMO BI INCL CAD: CPT | Mod: TC,HCNC

## 2020-10-21 PROCEDURE — 77067 MAMMO DIGITAL SCREENING BILAT WITH TOMO: ICD-10-PCS | Mod: 26,HCNC,, | Performed by: RADIOLOGY

## 2020-10-21 PROCEDURE — 77063 MAMMO DIGITAL SCREENING BILAT WITH TOMO: ICD-10-PCS | Mod: 26,HCNC,, | Performed by: RADIOLOGY

## 2020-10-21 PROCEDURE — 77063 BREAST TOMOSYNTHESIS BI: CPT | Mod: 26,HCNC,, | Performed by: RADIOLOGY

## 2020-10-21 NOTE — TELEPHONE ENCOUNTER
----- Message from Alexis Washington MD sent at 10/21/2020  8:51 AM CDT -----  Please let patient know she is cleared for her procedure.

## 2020-10-21 NOTE — PROGRESS NOTES
Subjective:       Patient ID: Jia Ugalde is a 59 y.o. female.    Chief Complaint: Pre-op Exam (back surgery 11/02/2020)      HPI  59-year-old female presents for preop exam.  Patient states she is going to have a procedure for her back.  Patient states she will a possible cyst drained.  Patient was told she will be under anesthesia.  Denies any previous issues with anesthesia for postop.    Has questions about pneumonia vaccine.  Patient states she was given a pneumonia vaccine about 6-7 years ago.  Is wondering if she needs another pneumonia vaccine.    Review of Systems   Constitutional: Negative.    HENT: Negative.    Respiratory: Negative.    Cardiovascular: Negative.    Gastrointestinal: Negative.    Endocrine: Negative.    Genitourinary: Negative.    Musculoskeletal: Negative.    Neurological: Negative.    Psychiatric/Behavioral: Negative.           Past Medical History:   Diagnosis Date    GERD (gastroesophageal reflux disease)     Hyperlipidemia      Past Surgical History:   Procedure Laterality Date    BREAST BIOPSY      BREAST SURGERY      HEMORRHOID SURGERY      HYSTERECTOMY      SPINE SURGERY      TUBAL LIGATION       No family history on file.  Social History     Socioeconomic History    Marital status: Single     Spouse name: Not on file    Number of children: Not on file    Years of education: Not on file    Highest education level: Not on file   Occupational History    Not on file   Social Needs    Financial resource strain: Not on file    Food insecurity     Worry: Not on file     Inability: Not on file    Transportation needs     Medical: Not on file     Non-medical: Not on file   Tobacco Use    Smoking status: Never Smoker    Smokeless tobacco: Never Used   Substance and Sexual Activity    Alcohol use: Never     Frequency: Never    Drug use: Never    Sexual activity: Not Currently   Lifestyle    Physical activity     Days per week: Not on file     Minutes per session:  Not on file    Stress: Not on file   Relationships    Social connections     Talks on phone: Not on file     Gets together: Not on file     Attends Yazidism service: Not on file     Active member of club or organization: Not on file     Attends meetings of clubs or organizations: Not on file     Relationship status: Not on file   Other Topics Concern    Not on file   Social History Narrative    Not on file       Current Outpatient Medications:     biotin 10,000 mcg Cap, Take by mouth., Disp: , Rfl:     cholecalciferol, vitamin D3, capsule/tablet, Take by mouth once daily., Disp: , Rfl:     gabapentin (NEURONTIN) 300 MG capsule, Take 1 capsule (300 mg total) by mouth 3 (three) times daily as needed., Disp: 180 capsule, Rfl: 1    meloxicam (MOBIC) 15 MG tablet, TAKE 1 TABLET BY MOUTH once DAILY, Disp: 90 tablet, Rfl: 1    multivit/folic acid/vit K1 (ONE-A-DAY WOMEN'S 50 PLUS ORAL), Take by mouth., Disp: , Rfl:     NEILMED SINUS RINSE COMPLETE pkdv, use as directed, Disp: , Rfl:     pantoprazole (PROTONIX) 40 MG tablet, Take 1 tablet (40 mg total) by mouth once daily., Disp: 90 tablet, Rfl: 1    TURMERIC ORAL, Take 500 mg by mouth., Disp: , Rfl:     budesonide 1 mg/2 mL NbSp, EMPTY CONTENTS OF 1 RESPULE INTO NASAL IRRIGATION SYSTEM, ADD DISTILLED WATER, SALT PACK, MIX & IRRIGATE. PERFORM TWICE DAILY, Disp: , Rfl:     fluticasone propionate (FLONASE) 50 mcg/actuation nasal spray, 1 spray (50 mcg total) by Each Nostril route once daily. (Patient not taking: Reported on 10/20/2020), Disp: 16 g, Rfl: 3    RESTASIS 0.05 % ophthalmic emulsion, , Disp: , Rfl:     tobramycin, PF, (OPHELIA) 300 mg/5 mL nebulizer solution, EMPTY CONTENTS OF 1 AMPULE INTO NASAL IRRIGATION SYSTEM, ADD DISTILLED WATER, SALT PACK, MIX & IRRIGATE. PERFORM 2 TIMES DAILY, Disp: , Rfl:    Objective:      Vitals:    10/20/20 1128   BP: 120/80   BP Location: Left arm   Patient Position: Sitting   BP Method: Large (Manual)   Pulse: 76   Resp:  "17   Temp: 98.3 °F (36.8 °C)   TempSrc: Oral   SpO2: 98%   Weight: 89.4 kg (197 lb 1.5 oz)   Height: 5' 4" (1.626 m)       Physical Exam  Constitutional:       General: She is not in acute distress.  HENT:      Head: Normocephalic and atraumatic.   Eyes:      Conjunctiva/sclera: Conjunctivae normal.   Neck:      Musculoskeletal: Neck supple.   Cardiovascular:      Rate and Rhythm: Normal rate and regular rhythm.      Heart sounds: Normal heart sounds. No murmur. No friction rub. No gallop.    Pulmonary:      Effort: Pulmonary effort is normal.      Breath sounds: Normal breath sounds. No wheezing or rales.   Skin:     General: Skin is warm and dry.   Neurological:      Mental Status: She is alert and oriented to person, place, and time.   Psychiatric:         Behavior: Behavior normal.         Thought Content: Thought content normal.         Judgment: Judgment normal.            Assessment:       1. Preoperative clearance        Plan:       Preoperative clearance  -     IN OFFICE EKG 12-LEAD (to Muse)  -     CBC auto differential; Future; Expected date: 10/20/2020  -     Comprehensive Metabolic Panel; Future; Expected date: 10/20/2020    pt is cleared for her procedure.    Not due for pneumonia vaccines until 67.            Patient note was created using [employer].  Any errors in syntax or even information may not have been identified and edited on initial review prior to signing this note.    "

## 2020-10-23 ENCOUNTER — TELEPHONE (OUTPATIENT)
Dept: FAMILY MEDICINE | Facility: CLINIC | Age: 60
End: 2020-10-23

## 2020-10-23 DIAGNOSIS — Z20.822 COVID-19 RULED OUT: Primary | ICD-10-CM

## 2020-10-23 DIAGNOSIS — Z01.812 ENCOUNTER FOR SCREENING LABORATORY TESTING FOR COVID-19 VIRUS IN ASYMPTOMATIC PATIENT: ICD-10-CM

## 2020-10-23 DIAGNOSIS — Z11.52 ENCOUNTER FOR SCREENING LABORATORY TESTING FOR COVID-19 VIRUS IN ASYMPTOMATIC PATIENT: ICD-10-CM

## 2020-10-23 NOTE — TELEPHONE ENCOUNTER
----- Message from Catalina Solomon sent at 10/23/2020  2:13 PM CDT -----  Regarding: Self/  [phone]  Type: Patient Call Back    Who called:  Patient    What is the request in detail:  Pt is having surgery at \[facility]\"" on the [facility], she needing orders for rapid covid orders placed in the system.  She's needing test done Tues or Wed.   She would like staff to give her a call.  Thank you    Would the patient rather a call back or a response via [facility]?   Call back    Best call back number:  [phone] (home)     Thank you

## 2020-10-25 ENCOUNTER — PATIENT MESSAGE (OUTPATIENT)
Dept: FAMILY MEDICINE | Facility: CLINIC | Age: 60
End: 2020-10-25

## 2020-10-27 ENCOUNTER — LAB VISIT (OUTPATIENT)
Dept: FAMILY MEDICINE | Facility: CLINIC | Age: 60
End: 2020-10-27
Payer: MEDICARE

## 2020-10-27 DIAGNOSIS — Z11.52 ENCOUNTER FOR SCREENING LABORATORY TESTING FOR COVID-19 VIRUS IN ASYMPTOMATIC PATIENT: ICD-10-CM

## 2020-10-27 DIAGNOSIS — Z01.812 ENCOUNTER FOR SCREENING LABORATORY TESTING FOR COVID-19 VIRUS IN ASYMPTOMATIC PATIENT: ICD-10-CM

## 2020-10-27 PROCEDURE — U0003 INFECTIOUS AGENT DETECTION BY NUCLEIC ACID (DNA OR RNA); SEVERE ACUTE RESPIRATORY SYNDROME CORONAVIRUS 2 (SARS-COV-2) (CORONAVIRUS DISEASE [COVID-19]), AMPLIFIED PROBE TECHNIQUE, MAKING USE OF HIGH THROUGHPUT TECHNOLOGIES AS DESCRIBED BY CMS-2020-01-R: HCPCS | Mod: HCNC

## 2020-10-28 ENCOUNTER — TELEPHONE (OUTPATIENT)
Dept: FAMILY MEDICINE | Facility: CLINIC | Age: 60
End: 2020-10-28

## 2020-10-28 LAB — SARS-COV-2 RNA RESP QL NAA+PROBE: NOT DETECTED

## 2020-10-28 NOTE — TELEPHONE ENCOUNTER
----- Message from Octavio Young sent at 10/28/2020 11:30 AM CDT -----  Type:  Test Results    Who Called:  pt     Name of Test (Lab/Mammo/Etc): Covid    Date of Test: 10-    Ordering Provider:     Where the test was performed: [facility]     Would the patient rather a call back or a response via [facility]? Call Back     Best Call Back Number: [phone]    Additional Information:  Pt [facility] test [phone],   and phone [phone]    For Clinical Team:Has the provider reviewed the results?

## 2020-11-10 ENCOUNTER — HOSPITAL ENCOUNTER (EMERGENCY)
Facility: HOSPITAL | Age: 60
Discharge: HOME | End: 2020-11-10
Attending: EMERGENCY MEDICINE
Payer: MEDICARE

## 2020-11-10 VITALS
OXYGEN SATURATION: 99 % | RESPIRATION RATE: 20 BRPM | BODY MASS INDEX: 32.1 KG/M2 | TEMPERATURE: 100 F | SYSTOLIC BLOOD PRESSURE: 149 MMHG | DIASTOLIC BLOOD PRESSURE: 73 MMHG | HEART RATE: 98 BPM | WEIGHT: 187 LBS

## 2020-11-10 DIAGNOSIS — R60.0 PERIPHERAL EDEMA: Primary | ICD-10-CM

## 2020-11-10 DIAGNOSIS — N30.01 ACUTE CYSTITIS WITH HEMATURIA: ICD-10-CM

## 2020-11-10 LAB
BILIRUBIN, POC UA: NEGATIVE
BLOOD, POC UA: ABNORMAL
CLARITY, POC UA: ABNORMAL
COLOR, POC UA: YELLOW
GLUCOSE, POC UA: NEGATIVE
KETONES, POC UA: ABNORMAL
LEUKOCYTE EST, POC UA: ABNORMAL
NITRITE, POC UA: NEGATIVE
PH UR STRIP: 7 [PH]
PROTEIN, POC UA: ABNORMAL
SPECIFIC GRAVITY, POC UA: 1.01
UROBILINOGEN, POC UA: 0.2 E.U./DL

## 2020-11-10 PROCEDURE — 81003 URINALYSIS AUTO W/O SCOPE: CPT | Mod: HCNC,ER

## 2020-11-10 PROCEDURE — 99284 EMERGENCY DEPT VISIT MOD MDM: CPT | Mod: 25,HCNC,ER

## 2020-11-10 RX ORDER — NITROFURANTOIN 25; 75 MG/1; MG/1
100 CAPSULE ORAL 2 TIMES DAILY
Qty: 14 CAPSULE | Refills: 0 | Status: SHIPPED | OUTPATIENT
Start: 2020-11-10 | End: 2020-11-17

## 2020-11-11 NOTE — ED PROVIDER NOTES
"Encounter Date: 11/10/2020    SCRIBE #1 NOTE: I, Sandy Hall , am scribing for, and in the presence of,  Dr. Renteria . I have scribed the following portions of the note - Other sections scribed: HPI, ROS, PE .       History     Chief Complaint   Patient presents with    Leg Swelling     Pt states," I had Sx a week ago. I am having leg swelling in both my legs. My Dr. wants me checked for a DVT."     Jia Ugalde is a 59 y.o. female who presents to the ED complaining of bilateral leg swelling. Patient states she had a laminectomy on 11/2/2020. T2-T4 was removed due sciatica. Patient had a fever upon arrival to the ED today. Patient denies cough, urinary symptoms, sore throat, nasal congestion, or pain at the site of her incision. Patient contacted her doctor. He instructed her to be evaluated for possible DVT.  There are no other complaints at this time.     The history is provided by the patient. No  was used.     Review of patient's allergies indicates:  No Known Allergies  Past Medical History:   Diagnosis Date    GERD (gastroesophageal reflux disease)     Hyperlipidemia      Past Surgical History:   Procedure Laterality Date    BREAST BIOPSY      BREAST SURGERY      HEMORRHOID SURGERY      HYSTERECTOMY      SPINE SURGERY      TUBAL LIGATION       History reviewed. No pertinent family history.  Social History     Tobacco Use    Smoking status: Never Smoker    Smokeless tobacco: Never Used   Substance Use Topics    Alcohol use: Never     Frequency: Never    Drug use: Never     Review of Systems   Constitutional: Positive for fever.   HENT: Negative.  Negative for congestion and sore throat.    Eyes: Negative.    Respiratory: Negative.  Negative for cough and shortness of breath.    Cardiovascular: Positive for leg swelling. Negative for chest pain.   Gastrointestinal: Negative.  Negative for nausea and vomiting.   Endocrine: Negative.    Genitourinary: Negative.  Negative for " dysuria.   Musculoskeletal: Negative.  Negative for myalgias.   Skin: Negative.  Negative for rash.   Allergic/Immunologic: Negative.    Neurological: Negative.  Negative for headaches.   Hematological: Negative.  Negative for adenopathy.   Psychiatric/Behavioral: Negative.  Negative for behavioral problems.   All other systems reviewed and are negative.      Physical Exam     Initial Vitals [11/10/20 1714]   BP Pulse Resp Temp SpO2   (!) 153/78 107 16 100.3 °F (37.9 °C) 100 %      MAP       --         Physical Exam    Nursing note and vitals reviewed.  Constitutional: She appears well-developed and well-nourished.   HENT:   Head: Normocephalic and atraumatic.   Right Ear: External ear normal.   Left Ear: External ear normal.   Nose: Nose normal.   Eyes: Conjunctivae are normal.   Neck: Normal range of motion. Neck supple.   Cardiovascular: Normal rate and intact distal pulses.   Pulmonary/Chest: Effort normal. No respiratory distress.   Abdominal: Soft. There is no abdominal tenderness.   Musculoskeletal: Normal range of motion. Edema (minimal swelling to the BLE, non-pittiing) present.   Neurological: She is alert and oriented to person, place, and time.   Skin: Skin is warm and dry. Capillary refill takes less than 2 seconds.   Psychiatric: She has a normal mood and affect. Her behavior is normal.         ED Course   Procedures  Labs Reviewed   POCT URINALYSIS W/O SCOPE - Abnormal; Notable for the following components:       Result Value    Ketones, UA 2+ (*)     Blood, UA 1+ (*)     Protein, UA Trace (*)     Leukocytes, UA Trace (*)     All other components within normal limits   POCT URINALYSIS W/O SCOPE          Imaging Results          US Lower Extremity Veins Bilateral (Final result)  Result time 11/10/20 19:18:59    Final result by Jeniffer Welsh MD (11/10/20 19:18:59)                 Impression:      No evidence of deep venous thrombosis in either lower extremity.      Electronically signed by: Jeniffer  Blaise  Date:    11/10/2020  Time:    19:18             Narrative:    EXAMINATION:  US LOWER EXTREMITY VEINS BILATERAL    CLINICAL HISTORY:  leg swelling;    TECHNIQUE:  Duplex and color flow Doppler and dynamic compression was performed of the bilateral lower extremity veins was performed.    COMPARISON:  None    FINDINGS:  Right thigh veins: The common femoral, femoral, popliteal, upper greater saphenous, and deep femoral veins are patent and free of thrombus. The veins are normally compressible and have normal phasic flow and augmentation response.    Right calf veins: The visualized calf veins are patent.    Left thigh veins: The common femoral, femoral, popliteal, upper greater saphenous, and deep femoral veins are patent and free of thrombus. The veins are normally compressible and have normal phasic flow and augmentation response.    Left calf veins: The visualized calf veins are patent.    Miscellaneous: Is no popliteal cyst bilaterally.                                 Medical Decision Making:   History:   Old Medical Records: I decided to obtain old medical records.  Clinical Tests:   Lab Tests: Ordered and Reviewed  Radiological Study: Ordered and Reviewed            Scribe Attestation:   Scribe #1: I performed the above scribed service and the documentation accurately describes the services I performed. I attest to the accuracy of the note.    This document was produced by a scribe under my direction and in my presence. I agree with the content of the note and have made any necessary edits.     Keron Renteria MD    11/11/2020 12:29 AM                    Clinical Impression:     ICD-10-CM ICD-9-CM   1. Peripheral edema  R60.9 782.3   2. Acute cystitis with hematuria  N30.01 595.0                    1. Peripheral edema    2. Acute cystitis with hematuria                 ED Disposition Condition    Discharge Stable        ED Prescriptions     Medication Sig Dispense Start Date End Date Auth. Provider     nitrofurantoin, macrocrystal-monohydrate, (MACROBID) 100 MG capsule Take 1 capsule (100 mg total) by mouth 2 (two) times daily. for 7 days 14 capsule 11/10/2020 11/17/2020 Keron Renteria MD        Follow-up Information     Follow up With Specialties Details Why Contact Info    Your orthopedic surgeon  Schedule an appointment as soon as possible for a visit in 2 days                                         Keron Renteria MD  11/11/20 0029

## 2020-11-14 ENCOUNTER — NURSE TRIAGE (OUTPATIENT)
Dept: ADMINISTRATIVE | Facility: CLINIC | Age: 60
End: 2020-11-14

## 2020-11-15 NOTE — TELEPHONE ENCOUNTER
Reason for Disposition   New or worsening leg (calf, thigh) pain   [1] Thigh or calf pain AND [2] only 1 side AND [3] present > 1 hour    Additional Information   Negative: Sounds like a life-threatening emergency to the triager   Negative: Chest pain   Negative: Difficulty breathing   Negative: Acting confused (e.g., disoriented, slurred speech) or excessively sleepy   Negative: [1] Discomfort (pain, burning or stinging) when passing urine AND [2] male   Negative: [1] Discomfort (pain, burning or stinging) when passing urine AND [2] female   Negative: Constipation   Negative: Looks like a broken bone or dislocated joint (e.g., crooked or deformed)   Negative: Sounds like a life-threatening emergency to the triager   Negative: Followed a leg injury   Negative: Leg swelling is main symptom   Negative: Back pain radiating (shooting) into leg(s)   Negative: Knee pain is main symptom   Negative: Ankle pain is main symptom   Negative: Pregnant   Negative: Postpartum (from 0 to 6 weeks after delivery)   Negative: Chest pain   Negative: Difficulty breathing   Negative: Entire foot is cool or blue in comparison to other side   Negative: Unable to walk   Negative: [1] Red area or streak AND [2] fever   Negative: [1] Swollen joint AND [2] fever   Negative: [1] Cast on leg or ankle AND [2] now increased pain   Negative: Patient sounds very sick or weak to the triager   Negative: [1] SEVERE pain (e.g., excruciating, unable to do any normal activities) AND [2] not improved after 2 hours of pain medicine    Protocols used: POST-OP SYMPTOMS AND [id number], LEG PAIN-A-AH    Pt stated she had a Laminectomy by Dr. Greene at [facility] on 11/02/20. Stated she went to the ED on 11/10/20 for left thigh pain. Pt stated she has 7/10 left thigh pain that has not improved since her ED visit. Pt advised to see a MD within 4 hours at ED. Pt also advised to call her Surgeon or the Provider on call. Pt verbalized  understanding.

## 2020-12-02 NOTE — TELEPHONE ENCOUNTER
Patient stated the abx gave her diarrhea asking if she can continue to take it or can she take something else. Please advise, thank you   caffeine

## 2020-12-09 ENCOUNTER — CLINICAL SUPPORT (OUTPATIENT)
Dept: FAMILY MEDICINE | Facility: CLINIC | Age: 60
End: 2020-12-09
Payer: MEDICARE

## 2020-12-09 VITALS — SYSTOLIC BLOOD PRESSURE: 124 MMHG | DIASTOLIC BLOOD PRESSURE: 82 MMHG | HEART RATE: 76 BPM

## 2020-12-09 DIAGNOSIS — M48.061 SPINAL STENOSIS OF LUMBAR REGION, UNSPECIFIED WHETHER NEUROGENIC CLAUDICATION PRESENT: ICD-10-CM

## 2020-12-09 DIAGNOSIS — Z98.890 HISTORY OF LUMBAR DISCECTOMY: ICD-10-CM

## 2020-12-09 DIAGNOSIS — Z23 NEED FOR SHINGLES VACCINE: Primary | ICD-10-CM

## 2020-12-09 PROCEDURE — 90750 ZOSTER RECOMBINANT VACCINE: ICD-10-PCS | Mod: HCNC,S$GLB,, | Performed by: FAMILY MEDICINE

## 2020-12-09 PROCEDURE — 90471 ZOSTER RECOMBINANT VACCINE: ICD-10-PCS | Mod: HCNC,S$GLB,, | Performed by: FAMILY MEDICINE

## 2020-12-09 PROCEDURE — 99999 PR PBB SHADOW E&M-EST. PATIENT-LVL I: ICD-10-PCS | Mod: PBBFAC,HCNC,,

## 2020-12-09 PROCEDURE — 90471 IMMUNIZATION ADMIN: CPT | Mod: HCNC,S$GLB,, | Performed by: FAMILY MEDICINE

## 2020-12-09 PROCEDURE — 99999 PR PBB SHADOW E&M-EST. PATIENT-LVL I: CPT | Mod: PBBFAC,HCNC,,

## 2020-12-09 PROCEDURE — 90750 HZV VACC RECOMBINANT IM: CPT | Mod: HCNC,S$GLB,, | Performed by: FAMILY MEDICINE

## 2020-12-09 RX ORDER — MELOXICAM 15 MG/1
15 TABLET ORAL DAILY
Qty: 90 TABLET | Refills: 1 | Status: SHIPPED | OUTPATIENT
Start: 2020-12-09 | End: 2021-06-14 | Stop reason: SDUPTHER

## 2020-12-09 NOTE — PROGRESS NOTES
Jia Ugalde 60 y.o. female is here today for Blood Pressure check.   History of HTN no.    Review of patient's allergies indicates:  No Known Allergies  Creatinine   Date Value Ref Range Status   10/20/2020 0.9 0.5 - 1.4 mg/dL Final     Sodium   Date Value Ref Range Status   10/20/2020 140 136 - 145 mmol/L Final     Potassium   Date Value Ref Range Status   10/20/2020 4.2 3.5 - 5.1 mmol/L Final   ]  Patient denies taking blood pressure medications on a regular basis at the same time of the day.     Current Outpatient Medications:     biotin 10,000 mcg Cap, Take by mouth., Disp: , Rfl:     budesonide 1 mg/2 mL NbSp, EMPTY CONTENTS OF 1 RESPULE INTO NASAL IRRIGATION SYSTEM, ADD DISTILLED WATER, SALT PACK, MIX & IRRIGATE. PERFORM TWICE DAILY, Disp: , Rfl:     cholecalciferol, vitamin D3, capsule/tablet, Take by mouth once daily., Disp: , Rfl:     fluticasone propionate (FLONASE) 50 mcg/actuation nasal spray, 1 spray (50 mcg total) by Each Nostril route once daily. (Patient not taking: Reported on 10/20/2020), Disp: 16 g, Rfl: 3    gabapentin (NEURONTIN) 300 MG capsule, TAKE 1 TABLET BY MOUTH 3 TIMES A DAY AS NEEDED, Disp: 180 capsule, Rfl: 1    meloxicam (MOBIC) 15 MG tablet, TAKE 1 TABLET BY MOUTH once DAILY, Disp: 90 tablet, Rfl: 1    multivit/folic acid/vit K1 (ONE-A-DAY WOMEN'S 50 PLUS ORAL), Take by mouth., Disp: , Rfl:     NEILMED SINUS RINSE COMPLETE pkdv, use as directed, Disp: , Rfl:     pantoprazole (PROTONIX) 40 MG tablet, Take 1 tablet (40 mg total) by mouth once daily., Disp: 90 tablet, Rfl: 1    RESTASIS 0.05 % ophthalmic emulsion, , Disp: , Rfl:     tobramycin, PF, (OPHELIA) 300 mg/5 mL nebulizer solution, EMPTY CONTENTS OF 1 AMPULE INTO NASAL IRRIGATION SYSTEM, ADD DISTILLED WATER, SALT PACK, MIX & IRRIGATE. PERFORM 2 TIMES DAILY, Disp: , Rfl:     TURMERIC ORAL, Take 500 mg by mouth., Disp: , Rfl:   Does patient have record of home blood pressure readings no.   Last dose of blood pressure  medication was taken at ? Pt denies taking bp meds.  Patient is asymptomatic.   Complains of ? No compliants.    BP: 124/82 , Pulse: 76 .    Dr. Washington notified.

## 2020-12-09 NOTE — PROGRESS NOTES
Pt tolerated shingles vaccine without difficulty. No adverse reaction noted.      Chandana Arredondo(Attending)

## 2021-01-07 ENCOUNTER — TELEPHONE (OUTPATIENT)
Dept: FAMILY MEDICINE | Facility: CLINIC | Age: 61
End: 2021-01-07

## 2021-01-07 ENCOUNTER — PATIENT MESSAGE (OUTPATIENT)
Dept: FAMILY MEDICINE | Facility: CLINIC | Age: 61
End: 2021-01-07

## 2021-01-07 DIAGNOSIS — Z20.822 EXPOSURE TO COVID-19 VIRUS: ICD-10-CM

## 2021-01-07 DIAGNOSIS — J34.89 RHINORRHEA: Primary | ICD-10-CM

## 2021-01-08 ENCOUNTER — LAB VISIT (OUTPATIENT)
Dept: FAMILY MEDICINE | Facility: CLINIC | Age: 61
End: 2021-01-08
Payer: MEDICARE

## 2021-01-08 DIAGNOSIS — Z20.822 EXPOSURE TO COVID-19 VIRUS: ICD-10-CM

## 2021-01-08 DIAGNOSIS — J34.89 RHINORRHEA: ICD-10-CM

## 2021-01-08 PROCEDURE — U0003 INFECTIOUS AGENT DETECTION BY NUCLEIC ACID (DNA OR RNA); SEVERE ACUTE RESPIRATORY SYNDROME CORONAVIRUS 2 (SARS-COV-2) (CORONAVIRUS DISEASE [COVID-19]), AMPLIFIED PROBE TECHNIQUE, MAKING USE OF HIGH THROUGHPUT TECHNOLOGIES AS DESCRIBED BY CMS-2020-01-R: HCPCS

## 2021-01-09 LAB — SARS-COV-2 RNA RESP QL NAA+PROBE: NOT DETECTED

## 2021-01-15 ENCOUNTER — PATIENT MESSAGE (OUTPATIENT)
Dept: FAMILY MEDICINE | Facility: CLINIC | Age: 61
End: 2021-01-15

## 2021-03-05 ENCOUNTER — TELEPHONE (OUTPATIENT)
Dept: FAMILY MEDICINE | Facility: CLINIC | Age: 61
End: 2021-03-05

## 2021-03-05 DIAGNOSIS — Z12.11 ENCOUNTER FOR COLORECTAL CANCER SCREENING: Primary | ICD-10-CM

## 2021-03-05 DIAGNOSIS — Z12.12 ENCOUNTER FOR COLORECTAL CANCER SCREENING: Primary | ICD-10-CM

## 2021-03-09 ENCOUNTER — PATIENT MESSAGE (OUTPATIENT)
Dept: FAMILY MEDICINE | Facility: CLINIC | Age: 61
End: 2021-03-09

## 2021-03-10 ENCOUNTER — TELEPHONE (OUTPATIENT)
Dept: FAMILY MEDICINE | Facility: CLINIC | Age: 61
End: 2021-03-10

## 2021-03-10 DIAGNOSIS — G44.89 OTHER HEADACHE SYNDROME: ICD-10-CM

## 2021-03-10 RX ORDER — BUTALBITAL, ACETAMINOPHEN AND CAFFEINE 300; 40; 50 MG/1; MG/1; MG/1
CAPSULE ORAL
Qty: 40 CAPSULE | Refills: 4 | Status: SHIPPED | OUTPATIENT
Start: 2021-03-10 | End: 2021-03-12

## 2021-03-12 ENCOUNTER — OFFICE VISIT (OUTPATIENT)
Dept: FAMILY MEDICINE | Facility: CLINIC | Age: 61
End: 2021-03-12
Payer: MEDICARE

## 2021-03-12 ENCOUNTER — LAB VISIT (OUTPATIENT)
Dept: LAB | Facility: HOSPITAL | Age: 61
End: 2021-03-12
Attending: INTERNAL MEDICINE
Payer: MEDICARE

## 2021-03-12 VITALS
SYSTOLIC BLOOD PRESSURE: 120 MMHG | DIASTOLIC BLOOD PRESSURE: 82 MMHG | HEART RATE: 75 BPM | WEIGHT: 197.06 LBS | OXYGEN SATURATION: 97 % | BODY MASS INDEX: 33.83 KG/M2 | TEMPERATURE: 99 F

## 2021-03-12 DIAGNOSIS — Z00.00 ANNUAL PHYSICAL EXAM: ICD-10-CM

## 2021-03-12 DIAGNOSIS — E78.2 MIXED HYPERLIPIDEMIA: ICD-10-CM

## 2021-03-12 DIAGNOSIS — R73.9 ELEVATED BLOOD SUGAR: ICD-10-CM

## 2021-03-12 DIAGNOSIS — R07.1 CHEST PAIN ON BREATHING: ICD-10-CM

## 2021-03-12 DIAGNOSIS — Z00.00 ANNUAL PHYSICAL EXAM: Primary | ICD-10-CM

## 2021-03-12 DIAGNOSIS — G44.89 OTHER HEADACHE SYNDROME: Primary | ICD-10-CM

## 2021-03-12 LAB
ALBUMIN SERPL BCP-MCNC: 3.7 G/DL (ref 3.5–5.2)
ALP SERPL-CCNC: 71 U/L (ref 55–135)
ALT SERPL W/O P-5'-P-CCNC: 14 U/L (ref 10–44)
ANION GAP SERPL CALC-SCNC: 5 MMOL/L (ref 8–16)
AST SERPL-CCNC: 21 U/L (ref 10–40)
BILIRUB SERPL-MCNC: 0.4 MG/DL (ref 0.1–1)
BUN SERPL-MCNC: 14 MG/DL (ref 6–20)
CALCIUM SERPL-MCNC: 9.1 MG/DL (ref 8.7–10.5)
CHLORIDE SERPL-SCNC: 106 MMOL/L (ref 95–110)
CHOLEST SERPL-MCNC: 297 MG/DL (ref 120–199)
CHOLEST/HDLC SERPL: 4.6 {RATIO} (ref 2–5)
CO2 SERPL-SCNC: 30 MMOL/L (ref 23–29)
CREAT SERPL-MCNC: 1 MG/DL (ref 0.5–1.4)
EST. GFR  (AFRICAN AMERICAN): >60 ML/MIN/1.73 M^2
EST. GFR  (NON AFRICAN AMERICAN): >60 ML/MIN/1.73 M^2
ESTIMATED AVG GLUCOSE: 111 MG/DL (ref 68–131)
GLUCOSE SERPL-MCNC: 98 MG/DL (ref 70–110)
HBA1C MFR BLD: 5.5 % (ref 4–5.6)
HDLC SERPL-MCNC: 65 MG/DL (ref 40–75)
HDLC SERPL: 21.9 % (ref 20–50)
LDLC SERPL CALC-MCNC: 209.6 MG/DL (ref 63–159)
NONHDLC SERPL-MCNC: 232 MG/DL
POTASSIUM SERPL-SCNC: 4.4 MMOL/L (ref 3.5–5.1)
PROT SERPL-MCNC: 7.8 G/DL (ref 6–8.4)
SODIUM SERPL-SCNC: 141 MMOL/L (ref 136–145)
TRIGL SERPL-MCNC: 112 MG/DL (ref 30–150)

## 2021-03-12 PROCEDURE — 80053 COMPREHEN METABOLIC PANEL: CPT | Performed by: INTERNAL MEDICINE

## 2021-03-12 PROCEDURE — 1126F PR PAIN SEVERITY QUANTIFIED, NO PAIN PRESENT: ICD-10-PCS | Mod: S$GLB,,, | Performed by: INTERNAL MEDICINE

## 2021-03-12 PROCEDURE — 99396 PR PREVENTIVE VISIT,EST,40-64: ICD-10-PCS | Mod: S$GLB,,, | Performed by: INTERNAL MEDICINE

## 2021-03-12 PROCEDURE — 1126F AMNT PAIN NOTED NONE PRSNT: CPT | Mod: S$GLB,,, | Performed by: INTERNAL MEDICINE

## 2021-03-12 PROCEDURE — 36415 COLL VENOUS BLD VENIPUNCTURE: CPT | Mod: PO | Performed by: INTERNAL MEDICINE

## 2021-03-12 PROCEDURE — 80061 LIPID PANEL: CPT | Performed by: INTERNAL MEDICINE

## 2021-03-12 PROCEDURE — 3008F BODY MASS INDEX DOCD: CPT | Mod: CPTII,S$GLB,, | Performed by: INTERNAL MEDICINE

## 2021-03-12 PROCEDURE — 99999 PR PBB SHADOW E&M-EST. PATIENT-LVL IV: CPT | Mod: PBBFAC,,, | Performed by: INTERNAL MEDICINE

## 2021-03-12 PROCEDURE — 3008F PR BODY MASS INDEX (BMI) DOCUMENTED: ICD-10-PCS | Mod: CPTII,S$GLB,, | Performed by: INTERNAL MEDICINE

## 2021-03-12 PROCEDURE — 99999 PR PBB SHADOW E&M-EST. PATIENT-LVL IV: ICD-10-PCS | Mod: PBBFAC,,, | Performed by: INTERNAL MEDICINE

## 2021-03-12 PROCEDURE — 83036 HEMOGLOBIN GLYCOSYLATED A1C: CPT | Performed by: INTERNAL MEDICINE

## 2021-03-12 PROCEDURE — 99396 PREV VISIT EST AGE 40-64: CPT | Mod: S$GLB,,, | Performed by: INTERNAL MEDICINE

## 2021-03-12 RX ORDER — BUTALBITAL, ACETAMINOPHEN AND CAFFEINE 50; 325; 40 MG/1; MG/1; MG/1
1 TABLET ORAL 2 TIMES DAILY PRN
Qty: 40 TABLET | Refills: 4 | Status: SHIPPED | OUTPATIENT
Start: 2021-03-12 | End: 2021-04-11

## 2021-04-07 ENCOUNTER — PATIENT OUTREACH (OUTPATIENT)
Dept: ADMINISTRATIVE | Facility: OTHER | Age: 61
End: 2021-04-07

## 2021-04-19 DIAGNOSIS — J30.9 ALLERGIC RHINITIS, UNSPECIFIED SEASONALITY, UNSPECIFIED TRIGGER: ICD-10-CM

## 2021-04-20 RX ORDER — FLUTICASONE PROPIONATE 50 MCG
1 SPRAY, SUSPENSION (ML) NASAL DAILY
Qty: 16 G | Refills: 3 | Status: SHIPPED | OUTPATIENT
Start: 2021-04-20

## 2021-05-04 ENCOUNTER — OFFICE VISIT (OUTPATIENT)
Dept: OBSTETRICS AND GYNECOLOGY | Facility: CLINIC | Age: 61
End: 2021-05-04
Payer: MEDICARE

## 2021-05-04 VITALS
DIASTOLIC BLOOD PRESSURE: 68 MMHG | SYSTOLIC BLOOD PRESSURE: 128 MMHG | BODY MASS INDEX: 34.28 KG/M2 | WEIGHT: 199.75 LBS

## 2021-05-04 DIAGNOSIS — Z00.00 ANNUAL PHYSICAL EXAM: ICD-10-CM

## 2021-05-04 DIAGNOSIS — Z01.419 WELL WOMAN EXAM WITH ROUTINE GYNECOLOGICAL EXAM: Primary | ICD-10-CM

## 2021-05-04 PROCEDURE — G0101 CA SCREEN;PELVIC/BREAST EXAM: HCPCS | Mod: S$GLB,,, | Performed by: OBSTETRICS & GYNECOLOGY

## 2021-05-04 PROCEDURE — G0101 PR CA SCREEN;PELVIC/BREAST EXAM: ICD-10-PCS | Mod: S$GLB,,, | Performed by: OBSTETRICS & GYNECOLOGY

## 2021-05-04 PROCEDURE — 99999 PR PBB SHADOW E&M-EST. PATIENT-LVL III: ICD-10-PCS | Mod: PBBFAC,,, | Performed by: OBSTETRICS & GYNECOLOGY

## 2021-05-04 PROCEDURE — 3008F PR BODY MASS INDEX (BMI) DOCUMENTED: ICD-10-PCS | Mod: CPTII,S$GLB,, | Performed by: OBSTETRICS & GYNECOLOGY

## 2021-05-04 PROCEDURE — 99999 PR PBB SHADOW E&M-EST. PATIENT-LVL III: CPT | Mod: PBBFAC,,, | Performed by: OBSTETRICS & GYNECOLOGY

## 2021-05-04 PROCEDURE — 1126F PR PAIN SEVERITY QUANTIFIED, NO PAIN PRESENT: ICD-10-PCS | Mod: S$GLB,,, | Performed by: OBSTETRICS & GYNECOLOGY

## 2021-05-04 PROCEDURE — 1126F AMNT PAIN NOTED NONE PRSNT: CPT | Mod: S$GLB,,, | Performed by: OBSTETRICS & GYNECOLOGY

## 2021-05-04 PROCEDURE — 3008F BODY MASS INDEX DOCD: CPT | Mod: CPTII,S$GLB,, | Performed by: OBSTETRICS & GYNECOLOGY

## 2021-06-14 DIAGNOSIS — Z98.890 HISTORY OF LUMBAR DISCECTOMY: ICD-10-CM

## 2021-06-14 DIAGNOSIS — M48.061 SPINAL STENOSIS OF LUMBAR REGION, UNSPECIFIED WHETHER NEUROGENIC CLAUDICATION PRESENT: ICD-10-CM

## 2021-06-14 RX ORDER — GABAPENTIN 300 MG/1
CAPSULE ORAL
Qty: 180 CAPSULE | Refills: 0 | Status: SHIPPED | OUTPATIENT
Start: 2021-06-14 | End: 2022-03-22

## 2021-06-14 RX ORDER — MELOXICAM 15 MG/1
15 TABLET ORAL DAILY
Qty: 90 TABLET | Refills: 1 | Status: SHIPPED | OUTPATIENT
Start: 2021-06-14 | End: 2022-03-22

## 2021-06-15 ENCOUNTER — TELEPHONE (OUTPATIENT)
Dept: ENDOSCOPY | Facility: HOSPITAL | Age: 61
End: 2021-06-15

## 2021-07-02 ENCOUNTER — TELEPHONE (OUTPATIENT)
Dept: FAMILY MEDICINE | Facility: CLINIC | Age: 61
End: 2021-07-02

## 2021-08-05 ENCOUNTER — PATIENT MESSAGE (OUTPATIENT)
Dept: FAMILY MEDICINE | Facility: CLINIC | Age: 61
End: 2021-08-05

## 2021-08-26 ENCOUNTER — HOSPITAL ENCOUNTER (OUTPATIENT)
Dept: RADIOLOGY | Facility: HOSPITAL | Age: 61
Discharge: HOME | End: 2021-08-26
Attending: PHYSICIAN ASSISTANT
Payer: MEDICARE

## 2021-08-26 ENCOUNTER — TELEPHONE (OUTPATIENT)
Dept: FAMILY MEDICINE | Facility: CLINIC | Age: 61
End: 2021-08-26

## 2021-08-26 ENCOUNTER — OFFICE VISIT (OUTPATIENT)
Dept: FAMILY MEDICINE | Facility: CLINIC | Age: 61
End: 2021-08-26
Payer: MEDICARE

## 2021-08-26 VITALS
SYSTOLIC BLOOD PRESSURE: 138 MMHG | HEART RATE: 72 BPM | TEMPERATURE: 99 F | HEIGHT: 64 IN | OXYGEN SATURATION: 99 % | DIASTOLIC BLOOD PRESSURE: 88 MMHG | WEIGHT: 202.81 LBS | RESPIRATION RATE: 17 BRPM | BODY MASS INDEX: 34.62 KG/M2

## 2021-08-26 DIAGNOSIS — M54.16 LUMBAR RADICULOPATHY: ICD-10-CM

## 2021-08-26 DIAGNOSIS — M79.604 BILATERAL LOWER EXTREMITY PAIN: ICD-10-CM

## 2021-08-26 DIAGNOSIS — M79.605 BILATERAL LOWER EXTREMITY PAIN: ICD-10-CM

## 2021-08-26 DIAGNOSIS — M51.36 DDD (DEGENERATIVE DISC DISEASE), LUMBAR: ICD-10-CM

## 2021-08-26 DIAGNOSIS — M79.604 BILATERAL LOWER EXTREMITY PAIN: Primary | ICD-10-CM

## 2021-08-26 DIAGNOSIS — M79.605 BILATERAL LOWER EXTREMITY PAIN: Primary | ICD-10-CM

## 2021-08-26 PROCEDURE — 93970 EXTREMITY STUDY: CPT | Mod: TC

## 2021-08-26 PROCEDURE — 1160F RVW MEDS BY RX/DR IN RCRD: CPT | Mod: CPTII,S$GLB,, | Performed by: PHYSICIAN ASSISTANT

## 2021-08-26 PROCEDURE — 99999 PR PBB SHADOW E&M-EST. PATIENT-LVL V: CPT | Mod: PBBFAC,,, | Performed by: PHYSICIAN ASSISTANT

## 2021-08-26 PROCEDURE — 3044F HG A1C LEVEL LT 7.0%: CPT | Mod: CPTII,S$GLB,, | Performed by: PHYSICIAN ASSISTANT

## 2021-08-26 PROCEDURE — 1159F PR MEDICATION LIST DOCUMENTED IN MEDICAL RECORD: ICD-10-PCS | Mod: CPTII,S$GLB,, | Performed by: PHYSICIAN ASSISTANT

## 2021-08-26 PROCEDURE — 3079F DIAST BP 80-89 MM HG: CPT | Mod: CPTII,S$GLB,, | Performed by: PHYSICIAN ASSISTANT

## 2021-08-26 PROCEDURE — 99213 OFFICE O/P EST LOW 20 MIN: CPT | Mod: S$GLB,,, | Performed by: PHYSICIAN ASSISTANT

## 2021-08-26 PROCEDURE — 3008F PR BODY MASS INDEX (BMI) DOCUMENTED: ICD-10-PCS | Mod: CPTII,S$GLB,, | Performed by: PHYSICIAN ASSISTANT

## 2021-08-26 PROCEDURE — 3075F SYST BP GE 130 - 139MM HG: CPT | Mod: CPTII,S$GLB,, | Performed by: PHYSICIAN ASSISTANT

## 2021-08-26 PROCEDURE — 3044F PR MOST RECENT HEMOGLOBIN A1C LEVEL <7.0%: ICD-10-PCS | Mod: CPTII,S$GLB,, | Performed by: PHYSICIAN ASSISTANT

## 2021-08-26 PROCEDURE — 93970 EXTREMITY STUDY: CPT | Mod: 26,,, | Performed by: RADIOLOGY

## 2021-08-26 PROCEDURE — 3008F BODY MASS INDEX DOCD: CPT | Mod: CPTII,S$GLB,, | Performed by: PHYSICIAN ASSISTANT

## 2021-08-26 PROCEDURE — 99213 PR OFFICE/OUTPT VISIT, EST, LEVL III, 20-29 MIN: ICD-10-PCS | Mod: S$GLB,,, | Performed by: PHYSICIAN ASSISTANT

## 2021-08-26 PROCEDURE — 1160F PR REVIEW ALL MEDS BY PRESCRIBER/CLIN PHARMACIST DOCUMENTED: ICD-10-PCS | Mod: CPTII,S$GLB,, | Performed by: PHYSICIAN ASSISTANT

## 2021-08-26 PROCEDURE — 3079F PR MOST RECENT DIASTOLIC BLOOD PRESSURE 80-89 MM HG: ICD-10-PCS | Mod: CPTII,S$GLB,, | Performed by: PHYSICIAN ASSISTANT

## 2021-08-26 PROCEDURE — 3075F PR MOST RECENT SYSTOLIC BLOOD PRESS GE 130-139MM HG: ICD-10-PCS | Mod: CPTII,S$GLB,, | Performed by: PHYSICIAN ASSISTANT

## 2021-08-26 PROCEDURE — 1159F MED LIST DOCD IN RCRD: CPT | Mod: CPTII,S$GLB,, | Performed by: PHYSICIAN ASSISTANT

## 2021-08-26 PROCEDURE — 93970 US LOWER EXTREMITY VEINS BILATERAL: ICD-10-PCS | Mod: 26,,, | Performed by: RADIOLOGY

## 2021-08-26 PROCEDURE — 99999 PR PBB SHADOW E&M-EST. PATIENT-LVL V: ICD-10-PCS | Mod: PBBFAC,,, | Performed by: PHYSICIAN ASSISTANT

## 2021-08-26 RX ORDER — CHLORHEXIDINE GLUCONATE ORAL RINSE 1.2 MG/ML
SOLUTION DENTAL
COMMUNITY
Start: 2021-03-29 | End: 2021-09-22

## 2021-09-08 ENCOUNTER — PATIENT MESSAGE (OUTPATIENT)
Dept: PAIN MEDICINE | Facility: CLINIC | Age: 61
End: 2021-09-08

## 2021-09-08 ENCOUNTER — TELEPHONE (OUTPATIENT)
Dept: PAIN MEDICINE | Facility: CLINIC | Age: 61
End: 2021-09-08

## 2021-09-13 ENCOUNTER — TELEPHONE (OUTPATIENT)
Dept: PAIN MEDICINE | Facility: CLINIC | Age: 61
End: 2021-09-13

## 2021-09-21 ENCOUNTER — PATIENT OUTREACH (OUTPATIENT)
Dept: ADMINISTRATIVE | Facility: OTHER | Age: 61
End: 2021-09-21

## 2021-09-21 ENCOUNTER — TELEPHONE (OUTPATIENT)
Dept: PAIN MEDICINE | Facility: CLINIC | Age: 61
End: 2021-09-21

## 2021-09-22 ENCOUNTER — OFFICE VISIT (OUTPATIENT)
Dept: PAIN MEDICINE | Facility: CLINIC | Age: 61
End: 2021-09-22
Payer: MEDICARE

## 2021-09-22 ENCOUNTER — APPOINTMENT (OUTPATIENT)
Dept: RADIOLOGY | Facility: HOSPITAL | Age: 61
End: 2021-09-22
Attending: PAIN MEDICINE
Payer: MEDICARE

## 2021-09-22 ENCOUNTER — TELEPHONE (OUTPATIENT)
Dept: PAIN MEDICINE | Facility: CLINIC | Age: 61
End: 2021-09-22

## 2021-09-22 VITALS
OXYGEN SATURATION: 99 % | HEIGHT: 64 IN | HEART RATE: 99 BPM | DIASTOLIC BLOOD PRESSURE: 81 MMHG | SYSTOLIC BLOOD PRESSURE: 129 MMHG | BODY MASS INDEX: 34.28 KG/M2 | TEMPERATURE: 97 F | WEIGHT: 200.81 LBS

## 2021-09-22 DIAGNOSIS — M51.36 DDD (DEGENERATIVE DISC DISEASE), LUMBAR: ICD-10-CM

## 2021-09-22 DIAGNOSIS — M48.061 SPINAL STENOSIS OF LUMBAR REGION, UNSPECIFIED WHETHER NEUROGENIC CLAUDICATION PRESENT: ICD-10-CM

## 2021-09-22 DIAGNOSIS — M47.816 LUMBAR SPONDYLOSIS: Primary | ICD-10-CM

## 2021-09-22 DIAGNOSIS — M47.816 LUMBAR SPONDYLOSIS: ICD-10-CM

## 2021-09-22 DIAGNOSIS — M96.1 POSTLAMINECTOMY SYNDROME OF LUMBAR REGION: ICD-10-CM

## 2021-09-22 DIAGNOSIS — M54.16 LUMBAR RADICULOPATHY: ICD-10-CM

## 2021-09-22 PROBLEM — M51.369 DDD (DEGENERATIVE DISC DISEASE), LUMBAR: Status: ACTIVE | Noted: 2021-09-22

## 2021-09-22 PROCEDURE — 3074F SYST BP LT 130 MM HG: CPT | Mod: CPTII,S$GLB,, | Performed by: PAIN MEDICINE

## 2021-09-22 PROCEDURE — 1159F PR MEDICATION LIST DOCUMENTED IN MEDICAL RECORD: ICD-10-PCS | Mod: CPTII,S$GLB,, | Performed by: PAIN MEDICINE

## 2021-09-22 PROCEDURE — 3079F PR MOST RECENT DIASTOLIC BLOOD PRESSURE 80-89 MM HG: ICD-10-PCS | Mod: CPTII,S$GLB,, | Performed by: PAIN MEDICINE

## 2021-09-22 PROCEDURE — 3044F HG A1C LEVEL LT 7.0%: CPT | Mod: CPTII,S$GLB,, | Performed by: PAIN MEDICINE

## 2021-09-22 PROCEDURE — 3008F BODY MASS INDEX DOCD: CPT | Mod: CPTII,S$GLB,, | Performed by: PAIN MEDICINE

## 2021-09-22 PROCEDURE — 3044F PR MOST RECENT HEMOGLOBIN A1C LEVEL <7.0%: ICD-10-PCS | Mod: CPTII,S$GLB,, | Performed by: PAIN MEDICINE

## 2021-09-22 PROCEDURE — 72110 X-RAY EXAM L-2 SPINE 4/>VWS: CPT | Mod: 26,,, | Performed by: INTERNAL MEDICINE

## 2021-09-22 PROCEDURE — 99999 PR PBB SHADOW E&M-EST. PATIENT-LVL IV: ICD-10-PCS | Mod: PBBFAC,,, | Performed by: PAIN MEDICINE

## 2021-09-22 PROCEDURE — 72110 X-RAY EXAM L-2 SPINE 4/>VWS: CPT | Mod: TC,FY,PN

## 2021-09-22 PROCEDURE — 3008F PR BODY MASS INDEX (BMI) DOCUMENTED: ICD-10-PCS | Mod: CPTII,S$GLB,, | Performed by: PAIN MEDICINE

## 2021-09-22 PROCEDURE — 1159F MED LIST DOCD IN RCRD: CPT | Mod: CPTII,S$GLB,, | Performed by: PAIN MEDICINE

## 2021-09-22 PROCEDURE — 72110 XR LUMBAR SPINE AP AND LAT WITH FLEX/EXT: ICD-10-PCS | Mod: 26,,, | Performed by: INTERNAL MEDICINE

## 2021-09-22 PROCEDURE — 1160F RVW MEDS BY RX/DR IN RCRD: CPT | Mod: CPTII,S$GLB,, | Performed by: PAIN MEDICINE

## 2021-09-22 PROCEDURE — 99204 OFFICE O/P NEW MOD 45 MIN: CPT | Mod: S$GLB,,, | Performed by: PAIN MEDICINE

## 2021-09-22 PROCEDURE — 99999 PR PBB SHADOW E&M-EST. PATIENT-LVL IV: CPT | Mod: PBBFAC,,, | Performed by: PAIN MEDICINE

## 2021-09-22 PROCEDURE — 3079F DIAST BP 80-89 MM HG: CPT | Mod: CPTII,S$GLB,, | Performed by: PAIN MEDICINE

## 2021-09-22 PROCEDURE — 99204 PR OFFICE/OUTPT VISIT, NEW, LEVL IV, 45-59 MIN: ICD-10-PCS | Mod: S$GLB,,, | Performed by: PAIN MEDICINE

## 2021-09-22 PROCEDURE — 1160F PR REVIEW ALL MEDS BY PRESCRIBER/CLIN PHARMACIST DOCUMENTED: ICD-10-PCS | Mod: CPTII,S$GLB,, | Performed by: PAIN MEDICINE

## 2021-09-22 PROCEDURE — 3074F PR MOST RECENT SYSTOLIC BLOOD PRESSURE < 130 MM HG: ICD-10-PCS | Mod: CPTII,S$GLB,, | Performed by: PAIN MEDICINE

## 2021-09-23 ENCOUNTER — TELEPHONE (OUTPATIENT)
Dept: PAIN MEDICINE | Facility: CLINIC | Age: 61
End: 2021-09-23

## 2021-10-04 ENCOUNTER — TELEPHONE (OUTPATIENT)
Dept: PAIN MEDICINE | Facility: CLINIC | Age: 61
End: 2021-10-04

## 2021-10-05 ENCOUNTER — OFFICE VISIT (OUTPATIENT)
Dept: PAIN MEDICINE | Facility: CLINIC | Age: 61
End: 2021-10-05
Payer: MEDICARE

## 2021-10-05 VITALS
WEIGHT: 200.13 LBS | BODY MASS INDEX: 34.17 KG/M2 | OXYGEN SATURATION: 100 % | SYSTOLIC BLOOD PRESSURE: 136 MMHG | DIASTOLIC BLOOD PRESSURE: 75 MMHG | TEMPERATURE: 98 F | HEART RATE: 71 BPM | HEIGHT: 64 IN

## 2021-10-05 DIAGNOSIS — M48.062 SPINAL STENOSIS OF LUMBAR REGION WITH NEUROGENIC CLAUDICATION: ICD-10-CM

## 2021-10-05 DIAGNOSIS — M47.816 LUMBAR SPONDYLOSIS: ICD-10-CM

## 2021-10-05 DIAGNOSIS — M51.36 DDD (DEGENERATIVE DISC DISEASE), LUMBAR: Primary | ICD-10-CM

## 2021-10-05 DIAGNOSIS — M54.16 LUMBAR RADICULOPATHY: ICD-10-CM

## 2021-10-05 PROCEDURE — 3044F PR MOST RECENT HEMOGLOBIN A1C LEVEL <7.0%: ICD-10-PCS | Mod: HCNC,CPTII,S$GLB, | Performed by: REGISTERED NURSE

## 2021-10-05 PROCEDURE — 3008F PR BODY MASS INDEX (BMI) DOCUMENTED: ICD-10-PCS | Mod: HCNC,CPTII,S$GLB, | Performed by: REGISTERED NURSE

## 2021-10-05 PROCEDURE — 3078F PR MOST RECENT DIASTOLIC BLOOD PRESSURE < 80 MM HG: ICD-10-PCS | Mod: HCNC,CPTII,S$GLB, | Performed by: REGISTERED NURSE

## 2021-10-05 PROCEDURE — 99214 PR OFFICE/OUTPT VISIT, EST, LEVL IV, 30-39 MIN: ICD-10-PCS | Mod: HCNC,S$GLB,, | Performed by: REGISTERED NURSE

## 2021-10-05 PROCEDURE — 3008F BODY MASS INDEX DOCD: CPT | Mod: HCNC,CPTII,S$GLB, | Performed by: REGISTERED NURSE

## 2021-10-05 PROCEDURE — 99999 PR PBB SHADOW E&M-EST. PATIENT-LVL III: ICD-10-PCS | Mod: PBBFAC,HCNC,, | Performed by: REGISTERED NURSE

## 2021-10-05 PROCEDURE — 99999 PR PBB SHADOW E&M-EST. PATIENT-LVL III: CPT | Mod: PBBFAC,HCNC,, | Performed by: REGISTERED NURSE

## 2021-10-05 PROCEDURE — 3044F HG A1C LEVEL LT 7.0%: CPT | Mod: HCNC,CPTII,S$GLB, | Performed by: REGISTERED NURSE

## 2021-10-05 PROCEDURE — 3075F PR MOST RECENT SYSTOLIC BLOOD PRESS GE 130-139MM HG: ICD-10-PCS | Mod: HCNC,CPTII,S$GLB, | Performed by: REGISTERED NURSE

## 2021-10-05 PROCEDURE — 3075F SYST BP GE 130 - 139MM HG: CPT | Mod: HCNC,CPTII,S$GLB, | Performed by: REGISTERED NURSE

## 2021-10-05 PROCEDURE — 99214 OFFICE O/P EST MOD 30 MIN: CPT | Mod: HCNC,S$GLB,, | Performed by: REGISTERED NURSE

## 2021-10-05 PROCEDURE — 3078F DIAST BP <80 MM HG: CPT | Mod: HCNC,CPTII,S$GLB, | Performed by: REGISTERED NURSE

## 2021-10-20 ENCOUNTER — PATIENT MESSAGE (OUTPATIENT)
Dept: FAMILY MEDICINE | Facility: CLINIC | Age: 61
End: 2021-10-20

## 2021-10-20 ENCOUNTER — PATIENT MESSAGE (OUTPATIENT)
Dept: FAMILY MEDICINE | Facility: CLINIC | Age: 61
End: 2021-10-20
Payer: MEDICARE

## 2021-10-20 ENCOUNTER — PATIENT MESSAGE (OUTPATIENT)
Dept: PAIN MEDICINE | Facility: CLINIC | Age: 61
End: 2021-10-20
Payer: MEDICARE

## 2021-10-20 DIAGNOSIS — Z12.31 ENCOUNTER FOR SCREENING MAMMOGRAM FOR BREAST CANCER: ICD-10-CM

## 2021-10-20 DIAGNOSIS — Z12.12 ENCOUNTER FOR COLORECTAL CANCER SCREENING: Primary | ICD-10-CM

## 2021-10-20 DIAGNOSIS — Z12.11 ENCOUNTER FOR COLORECTAL CANCER SCREENING: Primary | ICD-10-CM

## 2021-11-01 ENCOUNTER — TELEPHONE (OUTPATIENT)
Dept: PAIN MEDICINE | Facility: CLINIC | Age: 61
End: 2021-11-01
Payer: MEDICARE

## 2021-11-02 ENCOUNTER — TELEPHONE (OUTPATIENT)
Dept: PAIN MEDICINE | Facility: CLINIC | Age: 61
End: 2021-11-02
Payer: MEDICARE

## 2021-11-02 ENCOUNTER — TELEPHONE (OUTPATIENT)
Dept: ENDOSCOPY | Facility: HOSPITAL | Age: 61
End: 2021-11-02
Payer: MEDICARE

## 2021-11-02 DIAGNOSIS — Z12.11 SCREENING FOR COLON CANCER: Primary | ICD-10-CM

## 2021-11-04 RX ORDER — SODIUM, POTASSIUM,MAG SULFATES 17.5-3.13G
1 SOLUTION, RECONSTITUTED, ORAL ORAL DAILY
Qty: 1 KIT | Refills: 0 | Status: SHIPPED | OUTPATIENT
Start: 2021-11-04 | End: 2021-11-06

## 2021-11-08 ENCOUNTER — HOSPITAL ENCOUNTER (OUTPATIENT)
Facility: HOSPITAL | Age: 61
Discharge: HOME | End: 2021-11-08
Attending: PAIN MEDICINE | Admitting: PAIN MEDICINE
Payer: MEDICARE

## 2021-11-08 VITALS
DIASTOLIC BLOOD PRESSURE: 74 MMHG | RESPIRATION RATE: 18 BRPM | HEART RATE: 80 BPM | TEMPERATURE: 98 F | SYSTOLIC BLOOD PRESSURE: 127 MMHG | OXYGEN SATURATION: 98 %

## 2021-11-08 DIAGNOSIS — M51.36 DDD (DEGENERATIVE DISC DISEASE), LUMBAR: Primary | ICD-10-CM

## 2021-11-08 PROCEDURE — 63600175 PHARM REV CODE 636 W HCPCS: Mod: HCNC | Performed by: PAIN MEDICINE

## 2021-11-08 PROCEDURE — 64483 NJX AA&/STRD TFRM EPI L/S 1: CPT | Mod: 50,HCNC,, | Performed by: PAIN MEDICINE

## 2021-11-08 PROCEDURE — 64483 NJX AA&/STRD TFRM EPI L/S 1: CPT | Mod: 50,HCNC | Performed by: PAIN MEDICINE

## 2021-11-08 PROCEDURE — 64483 PR EPIDURAL INJ, ANES/STEROID, TRANSFORAMINAL, LUMB/SACR, SNGL LEVL: ICD-10-PCS | Mod: 50,HCNC,, | Performed by: PAIN MEDICINE

## 2021-11-08 PROCEDURE — 25000003 PHARM REV CODE 250: Mod: HCNC

## 2021-11-08 PROCEDURE — 25000003 PHARM REV CODE 250: Mod: HCNC | Performed by: PAIN MEDICINE

## 2021-11-08 PROCEDURE — 25500020 PHARM REV CODE 255: Mod: HCNC | Performed by: PAIN MEDICINE

## 2021-11-08 RX ORDER — LIDOCAINE HYDROCHLORIDE 10 MG/ML
INJECTION, SOLUTION EPIDURAL; INFILTRATION; INTRACAUDAL; PERINEURAL
Status: DISCONTINUED
Start: 2021-11-08 | End: 2021-11-08 | Stop reason: HOSPADM

## 2021-11-08 RX ORDER — DEXAMETHASONE SODIUM PHOSPHATE 10 MG/ML
10 INJECTION INTRAMUSCULAR; INTRAVENOUS ONCE
Status: COMPLETED | OUTPATIENT
Start: 2021-11-08 | End: 2021-11-08

## 2021-11-08 RX ORDER — ALPRAZOLAM 0.5 MG/1
1 TABLET, ORALLY DISINTEGRATING ORAL ONCE AS NEEDED
Status: COMPLETED | OUTPATIENT
Start: 2021-11-08 | End: 2021-11-08

## 2021-11-08 RX ORDER — LIDOCAINE HYDROCHLORIDE 10 MG/ML
1 INJECTION, SOLUTION EPIDURAL; INFILTRATION; INTRACAUDAL; PERINEURAL ONCE
Status: COMPLETED | OUTPATIENT
Start: 2021-11-08 | End: 2021-11-08

## 2021-11-08 RX ORDER — ALPRAZOLAM 0.5 MG/1
TABLET, ORALLY DISINTEGRATING ORAL
Status: COMPLETED
Start: 2021-11-08 | End: 2021-11-08

## 2021-11-08 RX ORDER — LIDOCAINE HYDROCHLORIDE 20 MG/ML
10 INJECTION, SOLUTION INFILTRATION; PERINEURAL ONCE
Status: COMPLETED | OUTPATIENT
Start: 2021-11-08 | End: 2021-11-08

## 2021-11-08 RX ORDER — LIDOCAINE HYDROCHLORIDE 20 MG/ML
INJECTION, SOLUTION INFILTRATION; PERINEURAL
Status: DISCONTINUED
Start: 2021-11-08 | End: 2021-11-08 | Stop reason: HOSPADM

## 2021-11-08 RX ORDER — DEXAMETHASONE SODIUM PHOSPHATE 10 MG/ML
INJECTION INTRAMUSCULAR; INTRAVENOUS
Status: DISCONTINUED
Start: 2021-11-08 | End: 2021-11-08 | Stop reason: HOSPADM

## 2021-11-08 RX ADMIN — ALPRAZOLAM 1 MG: 0.5 TABLET, ORALLY DISINTEGRATING ORAL at 10:11

## 2021-11-09 ENCOUNTER — PATIENT MESSAGE (OUTPATIENT)
Dept: PAIN MEDICINE | Facility: CLINIC | Age: 61
End: 2021-11-09
Payer: MEDICARE

## 2021-11-10 ENCOUNTER — HOSPITAL ENCOUNTER (OUTPATIENT)
Dept: RADIOLOGY | Facility: HOSPITAL | Age: 61
Discharge: HOME | End: 2021-11-10
Attending: FAMILY MEDICINE
Payer: MEDICARE

## 2021-11-10 VITALS — HEIGHT: 64 IN | BODY MASS INDEX: 34.18 KG/M2 | WEIGHT: 200.19 LBS

## 2021-11-10 DIAGNOSIS — Z12.31 ENCOUNTER FOR SCREENING MAMMOGRAM FOR BREAST CANCER: ICD-10-CM

## 2021-11-10 PROCEDURE — 77063 MAMMO DIGITAL SCREENING BILAT WITH TOMO: ICD-10-PCS | Mod: 26,,, | Performed by: RADIOLOGY

## 2021-11-10 PROCEDURE — 77067 MAMMO DIGITAL SCREENING BILAT WITH TOMO: ICD-10-PCS | Mod: 26,,, | Performed by: RADIOLOGY

## 2021-11-10 PROCEDURE — 77063 BREAST TOMOSYNTHESIS BI: CPT | Mod: 26,,, | Performed by: RADIOLOGY

## 2021-11-10 PROCEDURE — 77067 SCR MAMMO BI INCL CAD: CPT | Mod: 26,,, | Performed by: RADIOLOGY

## 2021-11-10 PROCEDURE — 77067 SCR MAMMO BI INCL CAD: CPT | Mod: TC

## 2021-11-12 ENCOUNTER — IMMUNIZATION (OUTPATIENT)
Dept: FAMILY MEDICINE | Facility: CLINIC | Age: 61
End: 2021-11-12
Payer: MEDICARE

## 2021-11-12 DIAGNOSIS — Z23 NEEDS FLU SHOT: Primary | ICD-10-CM

## 2021-11-12 PROCEDURE — 90686 IIV4 VACC NO PRSV 0.5 ML IM: CPT | Mod: HCNC,S$GLB,, | Performed by: FAMILY MEDICINE

## 2021-11-12 PROCEDURE — G0008 FLU VACCINE (QUAD) GREATER THAN OR EQUAL TO 3YO PRESERVATIVE FREE IM: ICD-10-PCS | Mod: HCNC,S$GLB,, | Performed by: FAMILY MEDICINE

## 2021-11-12 PROCEDURE — G0008 ADMIN INFLUENZA VIRUS VAC: HCPCS | Mod: HCNC,S$GLB,, | Performed by: FAMILY MEDICINE

## 2021-11-12 PROCEDURE — 90686 FLU VACCINE (QUAD) GREATER THAN OR EQUAL TO 3YO PRESERVATIVE FREE IM: ICD-10-PCS | Mod: HCNC,S$GLB,, | Performed by: FAMILY MEDICINE

## 2021-12-01 ENCOUNTER — OFFICE VISIT (OUTPATIENT)
Dept: FAMILY MEDICINE | Facility: CLINIC | Age: 61
End: 2021-12-01
Payer: MEDICARE

## 2021-12-01 ENCOUNTER — TELEPHONE (OUTPATIENT)
Dept: FAMILY MEDICINE | Facility: CLINIC | Age: 61
End: 2021-12-01

## 2021-12-01 VITALS
TEMPERATURE: 98 F | SYSTOLIC BLOOD PRESSURE: 130 MMHG | OXYGEN SATURATION: 99 % | BODY MASS INDEX: 33.88 KG/M2 | DIASTOLIC BLOOD PRESSURE: 86 MMHG | HEART RATE: 69 BPM | RESPIRATION RATE: 16 BRPM | WEIGHT: 198.44 LBS | HEIGHT: 64 IN

## 2021-12-01 DIAGNOSIS — E89.40 POST-HYSTERECTOMY MENOPAUSE: ICD-10-CM

## 2021-12-01 DIAGNOSIS — M48.062 SPINAL STENOSIS OF LUMBAR REGION WITH NEUROGENIC CLAUDICATION: Primary | ICD-10-CM

## 2021-12-01 DIAGNOSIS — Z90.710 POST-HYSTERECTOMY MENOPAUSE: ICD-10-CM

## 2021-12-01 DIAGNOSIS — E78.2 MIXED HYPERLIPIDEMIA: ICD-10-CM

## 2021-12-01 PROBLEM — R07.1 CHEST PAIN ON BREATHING: Status: RESOLVED | Noted: 2021-03-12 | Resolved: 2021-12-01

## 2021-12-01 PROBLEM — K64.8 BLEEDING INTERNAL HEMORRHOIDS: Status: ACTIVE | Noted: 2021-12-01

## 2021-12-01 PROBLEM — N63.0 BREAST LUMP OR MASS: Status: ACTIVE | Noted: 2021-12-01

## 2021-12-01 PROCEDURE — 99999 PR PBB SHADOW E&M-EST. PATIENT-LVL V: CPT | Mod: PBBFAC,HCNC,, | Performed by: NURSE PRACTITIONER

## 2021-12-01 PROCEDURE — 99999 PR PBB SHADOW E&M-EST. PATIENT-LVL V: ICD-10-PCS | Mod: PBBFAC,HCNC,, | Performed by: NURSE PRACTITIONER

## 2021-12-01 PROCEDURE — 99214 OFFICE O/P EST MOD 30 MIN: CPT | Mod: HCNC,S$GLB,, | Performed by: NURSE PRACTITIONER

## 2021-12-01 PROCEDURE — 99214 PR OFFICE/OUTPT VISIT, EST, LEVL IV, 30-39 MIN: ICD-10-PCS | Mod: HCNC,S$GLB,, | Performed by: NURSE PRACTITIONER

## 2021-12-01 NOTE — TELEPHONE ENCOUNTER
----- Message from Gaurav Arthur MA sent at 12/1/2021  9:05 AM CST -----  Type:  Patient Returning Call    Who Called: Self    Who Left Message for Patient: LUZ ALBA    Does the patient know what this is regarding?:no    Would the patient rather a call back or a response via [facility]? yes    Best Call Back Number:[phone] (home)

## 2021-12-01 NOTE — TELEPHONE ENCOUNTER
Left message for patient to call regarding appointment. It states mobility chair and bone strengthing meds. Trying to find out if the patient need mobility chair accessment. That need to be a referral to physical medicine.

## 2021-12-02 ENCOUNTER — HOSPITAL ENCOUNTER (OUTPATIENT)
Dept: RADIOLOGY | Facility: CLINIC | Age: 61
Discharge: HOME | End: 2021-12-02
Attending: NURSE PRACTITIONER
Payer: MEDICARE

## 2021-12-02 DIAGNOSIS — E89.40 POST-HYSTERECTOMY MENOPAUSE: ICD-10-CM

## 2021-12-02 DIAGNOSIS — Z90.710 POST-HYSTERECTOMY MENOPAUSE: ICD-10-CM

## 2021-12-02 PROCEDURE — 77080 DXA BONE DENSITY AXIAL: CPT | Mod: TC,HCNC,PO

## 2021-12-02 PROCEDURE — 77080 DEXA BONE DENSITY SPINE HIP: ICD-10-PCS | Mod: 26,HCNC,, | Performed by: INTERNAL MEDICINE

## 2021-12-02 PROCEDURE — 77080 DXA BONE DENSITY AXIAL: CPT | Mod: 26,HCNC,, | Performed by: INTERNAL MEDICINE

## 2021-12-03 ENCOUNTER — TELEPHONE (OUTPATIENT)
Dept: FAMILY MEDICINE | Facility: CLINIC | Age: 61
End: 2021-12-03
Payer: MEDICARE

## 2021-12-03 DIAGNOSIS — E78.00 PURE HYPERCHOLESTEROLEMIA: Primary | ICD-10-CM

## 2021-12-03 RX ORDER — ATORVASTATIN CALCIUM 20 MG/1
20 TABLET, FILM COATED ORAL DAILY
Qty: 90 TABLET | Refills: 3 | Status: SHIPPED | OUTPATIENT
Start: 2021-12-03 | End: 2022-01-12

## 2021-12-08 ENCOUNTER — PATIENT MESSAGE (OUTPATIENT)
Dept: FAMILY MEDICINE | Facility: CLINIC | Age: 61
End: 2021-12-08
Payer: MEDICARE

## 2021-12-08 ENCOUNTER — ANESTHESIA EVENT (OUTPATIENT)
Dept: ENDOSCOPY | Facility: HOSPITAL | Age: 61
End: 2021-12-08
Payer: MEDICARE

## 2021-12-08 ENCOUNTER — HOSPITAL ENCOUNTER (OUTPATIENT)
Facility: HOSPITAL | Age: 61
Discharge: HOME | End: 2021-12-08
Attending: STUDENT IN AN ORGANIZED HEALTH CARE EDUCATION/TRAINING PROGRAM | Admitting: STUDENT IN AN ORGANIZED HEALTH CARE EDUCATION/TRAINING PROGRAM
Payer: MEDICARE

## 2021-12-08 ENCOUNTER — ANESTHESIA (OUTPATIENT)
Dept: ENDOSCOPY | Facility: HOSPITAL | Age: 61
End: 2021-12-08
Payer: MEDICARE

## 2021-12-08 VITALS
OXYGEN SATURATION: 98 % | RESPIRATION RATE: 18 BRPM | TEMPERATURE: 98 F | DIASTOLIC BLOOD PRESSURE: 78 MMHG | HEART RATE: 78 BPM | SYSTOLIC BLOOD PRESSURE: 138 MMHG

## 2021-12-08 DIAGNOSIS — Z12.11 COLON CANCER SCREENING: ICD-10-CM

## 2021-12-08 PROCEDURE — G0105 COLORECTAL SCRN; HI RISK IND: HCPCS | Mod: HCNC | Performed by: STUDENT IN AN ORGANIZED HEALTH CARE EDUCATION/TRAINING PROGRAM

## 2021-12-08 PROCEDURE — G0105 COLORECTAL SCRN; HI RISK IND: ICD-10-PCS | Mod: HCNC,,, | Performed by: STUDENT IN AN ORGANIZED HEALTH CARE EDUCATION/TRAINING PROGRAM

## 2021-12-08 PROCEDURE — G0105 COLORECTAL SCRN; HI RISK IND: HCPCS | Mod: HCNC,,, | Performed by: STUDENT IN AN ORGANIZED HEALTH CARE EDUCATION/TRAINING PROGRAM

## 2021-12-08 PROCEDURE — D9220A PRA ANESTHESIA: Mod: HCNC,ANES,, | Performed by: ANESTHESIOLOGY

## 2021-12-08 PROCEDURE — 25000003 PHARM REV CODE 250: Mod: HCNC | Performed by: NURSE ANESTHETIST, CERTIFIED REGISTERED

## 2021-12-08 PROCEDURE — 63600175 PHARM REV CODE 636 W HCPCS: Mod: HCNC | Performed by: NURSE ANESTHETIST, CERTIFIED REGISTERED

## 2021-12-08 PROCEDURE — D9220A PRA ANESTHESIA: ICD-10-PCS | Mod: HCNC,ANES,, | Performed by: ANESTHESIOLOGY

## 2021-12-08 PROCEDURE — D9220A PRA ANESTHESIA: ICD-10-PCS | Mod: HCNC,CRNA,, | Performed by: NURSE ANESTHETIST, CERTIFIED REGISTERED

## 2021-12-08 PROCEDURE — D9220A PRA ANESTHESIA: Mod: HCNC,CRNA,, | Performed by: NURSE ANESTHETIST, CERTIFIED REGISTERED

## 2021-12-08 PROCEDURE — 37000009 HC ANESTHESIA EA ADD 15 MINS: Mod: HCNC | Performed by: STUDENT IN AN ORGANIZED HEALTH CARE EDUCATION/TRAINING PROGRAM

## 2021-12-08 PROCEDURE — 37000008 HC ANESTHESIA 1ST 15 MINUTES: Mod: HCNC | Performed by: STUDENT IN AN ORGANIZED HEALTH CARE EDUCATION/TRAINING PROGRAM

## 2021-12-08 RX ORDER — SODIUM CHLORIDE 9 MG/ML
INJECTION, SOLUTION INTRAVENOUS ONCE
Status: DISCONTINUED | OUTPATIENT
Start: 2021-12-08 | End: 2021-12-08 | Stop reason: HOSPADM

## 2021-12-08 RX ORDER — PROPOFOL 10 MG/ML
VIAL (ML) INTRAVENOUS
Status: DISCONTINUED | OUTPATIENT
Start: 2021-12-08 | End: 2021-12-08

## 2021-12-08 RX ORDER — PROPOFOL 10 MG/ML
INJECTION, EMULSION INTRAVENOUS
Status: DISCONTINUED
Start: 2021-12-08 | End: 2021-12-08 | Stop reason: HOSPADM

## 2021-12-08 RX ORDER — LIDOCAINE HYDROCHLORIDE 20 MG/ML
INJECTION INTRAVENOUS
Status: DISCONTINUED | OUTPATIENT
Start: 2021-12-08 | End: 2021-12-08

## 2021-12-08 RX ORDER — LIDOCAINE HYDROCHLORIDE 20 MG/ML
INJECTION, SOLUTION EPIDURAL; INFILTRATION; INTRACAUDAL; PERINEURAL
Status: DISCONTINUED
Start: 2021-12-08 | End: 2021-12-08 | Stop reason: HOSPADM

## 2021-12-08 RX ORDER — SODIUM CHLORIDE 9 MG/ML
INJECTION, SOLUTION INTRAVENOUS CONTINUOUS
Status: DISCONTINUED | OUTPATIENT
Start: 2021-12-08 | End: 2021-12-08 | Stop reason: HOSPADM

## 2021-12-08 RX ADMIN — LIDOCAINE HYDROCHLORIDE 60 MG: 20 INJECTION, SOLUTION INTRAVENOUS at 10:12

## 2021-12-08 RX ADMIN — PROPOFOL 20 MG: 10 INJECTION, EMULSION INTRAVENOUS at 10:12

## 2021-12-08 RX ADMIN — PROPOFOL 50 MG: 10 INJECTION, EMULSION INTRAVENOUS at 10:12

## 2021-12-08 RX ADMIN — SODIUM CHLORIDE: 0.9 INJECTION, SOLUTION INTRAVENOUS at 09:12

## 2021-12-08 RX ADMIN — PROPOFOL 80 MG: 10 INJECTION, EMULSION INTRAVENOUS at 10:12

## 2021-12-16 ENCOUNTER — PATIENT MESSAGE (OUTPATIENT)
Dept: FAMILY MEDICINE | Facility: CLINIC | Age: 61
End: 2021-12-16
Payer: MEDICARE

## 2021-12-16 DIAGNOSIS — K64.9 HEMORRHOIDS, UNSPECIFIED HEMORRHOID TYPE: Primary | ICD-10-CM

## 2021-12-17 ENCOUNTER — PATIENT MESSAGE (OUTPATIENT)
Dept: FAMILY MEDICINE | Facility: CLINIC | Age: 61
End: 2021-12-17
Payer: MEDICARE

## 2021-12-17 ENCOUNTER — TELEPHONE (OUTPATIENT)
Dept: FAMILY MEDICINE | Facility: CLINIC | Age: 61
End: 2021-12-17
Payer: MEDICARE

## 2021-12-17 DIAGNOSIS — K64.9 HEMORRHOIDS, UNSPECIFIED HEMORRHOID TYPE: Primary | ICD-10-CM

## 2021-12-17 DIAGNOSIS — K64.9 HEMORRHOIDS, UNSPECIFIED HEMORRHOID TYPE: ICD-10-CM

## 2021-12-17 RX ORDER — HYDROCORTISONE 25 MG/G
CREAM TOPICAL 2 TIMES DAILY
Qty: 30 G | Refills: 1 | Status: SHIPPED | OUTPATIENT
Start: 2021-12-17 | End: 2024-03-01

## 2021-12-21 RX ORDER — HYDROCORTISONE ACETATE 25 MG/1
25 SUPPOSITORY RECTAL 2 TIMES DAILY
Qty: 20 SUPPOSITORY | Refills: 0 | Status: SHIPPED | OUTPATIENT
Start: 2021-12-21 | End: 2021-12-31

## 2021-12-27 ENCOUNTER — PATIENT OUTREACH (OUTPATIENT)
Dept: ADMINISTRATIVE | Facility: OTHER | Age: 61
End: 2021-12-27
Payer: MEDICARE

## 2021-12-28 ENCOUNTER — OFFICE VISIT (OUTPATIENT)
Dept: PAIN MEDICINE | Facility: CLINIC | Age: 61
End: 2021-12-28
Payer: MEDICARE

## 2021-12-28 VITALS
RESPIRATION RATE: 16 BRPM | BODY MASS INDEX: 33.63 KG/M2 | DIASTOLIC BLOOD PRESSURE: 67 MMHG | HEIGHT: 64 IN | SYSTOLIC BLOOD PRESSURE: 143 MMHG | TEMPERATURE: 99 F | OXYGEN SATURATION: 100 % | WEIGHT: 197 LBS | HEART RATE: 64 BPM

## 2021-12-28 DIAGNOSIS — M48.061 SPINAL STENOSIS OF LUMBAR REGION, UNSPECIFIED WHETHER NEUROGENIC CLAUDICATION PRESENT: Primary | ICD-10-CM

## 2021-12-28 DIAGNOSIS — M54.16 LUMBAR RADICULOPATHY: ICD-10-CM

## 2021-12-28 DIAGNOSIS — M51.36 DDD (DEGENERATIVE DISC DISEASE), LUMBAR: ICD-10-CM

## 2021-12-28 PROCEDURE — 3078F DIAST BP <80 MM HG: CPT | Mod: HCNC,CPTII,S$GLB, | Performed by: REGISTERED NURSE

## 2021-12-28 PROCEDURE — 3008F BODY MASS INDEX DOCD: CPT | Mod: HCNC,CPTII,S$GLB, | Performed by: REGISTERED NURSE

## 2021-12-28 PROCEDURE — 3044F PR MOST RECENT HEMOGLOBIN A1C LEVEL <7.0%: ICD-10-PCS | Mod: HCNC,CPTII,S$GLB, | Performed by: REGISTERED NURSE

## 2021-12-28 PROCEDURE — 3008F PR BODY MASS INDEX (BMI) DOCUMENTED: ICD-10-PCS | Mod: HCNC,CPTII,S$GLB, | Performed by: REGISTERED NURSE

## 2021-12-28 PROCEDURE — 3078F PR MOST RECENT DIASTOLIC BLOOD PRESSURE < 80 MM HG: ICD-10-PCS | Mod: HCNC,CPTII,S$GLB, | Performed by: REGISTERED NURSE

## 2021-12-28 PROCEDURE — 99214 OFFICE O/P EST MOD 30 MIN: CPT | Mod: HCNC,S$GLB,, | Performed by: REGISTERED NURSE

## 2021-12-28 PROCEDURE — 1159F MED LIST DOCD IN RCRD: CPT | Mod: HCNC,CPTII,S$GLB, | Performed by: REGISTERED NURSE

## 2021-12-28 PROCEDURE — 1159F PR MEDICATION LIST DOCUMENTED IN MEDICAL RECORD: ICD-10-PCS | Mod: HCNC,CPTII,S$GLB, | Performed by: REGISTERED NURSE

## 2021-12-28 PROCEDURE — 3044F HG A1C LEVEL LT 7.0%: CPT | Mod: HCNC,CPTII,S$GLB, | Performed by: REGISTERED NURSE

## 2021-12-28 PROCEDURE — 3077F PR MOST RECENT SYSTOLIC BLOOD PRESSURE >= 140 MM HG: ICD-10-PCS | Mod: HCNC,CPTII,S$GLB, | Performed by: REGISTERED NURSE

## 2021-12-28 PROCEDURE — 99999 PR PBB SHADOW E&M-EST. PATIENT-LVL V: ICD-10-PCS | Mod: PBBFAC,HCNC,, | Performed by: REGISTERED NURSE

## 2021-12-28 PROCEDURE — 3077F SYST BP >= 140 MM HG: CPT | Mod: HCNC,CPTII,S$GLB, | Performed by: REGISTERED NURSE

## 2021-12-28 PROCEDURE — 99214 PR OFFICE/OUTPT VISIT, EST, LEVL IV, 30-39 MIN: ICD-10-PCS | Mod: HCNC,S$GLB,, | Performed by: REGISTERED NURSE

## 2021-12-28 PROCEDURE — 99999 PR PBB SHADOW E&M-EST. PATIENT-LVL V: CPT | Mod: PBBFAC,HCNC,, | Performed by: REGISTERED NURSE

## 2021-12-30 ENCOUNTER — PATIENT MESSAGE (OUTPATIENT)
Dept: ADMINISTRATIVE | Facility: OTHER | Age: 61
End: 2021-12-30
Payer: MEDICARE

## 2021-12-31 PROBLEM — M54.16 LUMBAR RADICULOPATHY: Status: ACTIVE | Noted: 2021-12-31

## 2022-01-03 ENCOUNTER — PATIENT MESSAGE (OUTPATIENT)
Dept: FAMILY MEDICINE | Facility: CLINIC | Age: 62
End: 2022-01-03
Payer: MEDICARE

## 2022-01-10 ENCOUNTER — TELEPHONE (OUTPATIENT)
Dept: REHABILITATION | Facility: HOSPITAL | Age: 62
End: 2022-01-10
Payer: MEDICARE

## 2022-01-11 NOTE — PROGRESS NOTES
Subjective:      Patient ID: Jia Ugalde is a 61 y.o. female.    Chief Complaint: Leg Pain    Ms Ugalde is a 60 yo female sent in consultation by Desiree ramirez for evaluation for the healthy back lumbar program.  she has had low back pain for 24 years, but worse the past 10 months.  The pain is leg dominant.  The pain is the worst in front of thighs but goes to the back of the legs, but does not go past the knee.  The pain is like a punch that knocks her to her knees, she has a tired pain in the back of the thigh.  There is numbness the medial knee.  The pain can be in both legs but sometimes right and sometimes left.  The pain is intermittent 0-9/10.  It is worse with standing, and walking, getting up from sitting and lying down, climbing stairs, lying on her stomach and the morning.  It is better sitting and lying on her back.  She did not get relief from back surgery.  She had PT in 2018 with no relief.  She has not been to chiropractor recently.  She has had multiple injections over the years.  The last injection helped some.  The goals walking, standing, getting up from a chair.  Pattern 4    Interventional Therapies:   11/08/2021- bilateral L5 TESI     Relevant Surgeries:   2 lumbar fusion surgeries with hardware, 2018, and 11/2020    MRI lumbar 10/2021  1. L2-3 disc herniation slightly larger than prior exam.  Spinal stenosis with left hemilaminotomy at this level.  Moderate bilateral foraminal restriction, left greater than right secondary to disc and facet arthropathy with contact along the undersurface of exiting L2 nerve roots  2.  L3-4 annular bulge with contact on ventral sac and exiting L3 nerve roots.  Progression of foraminal restriction due to progression of facet arthropathy  3.  L4-5 postoperative fusion  4.  L5-S1 2 mm annular bulge and bilateral facet arthropathy right greater than left.  There is contact along undersurface of exiting right L5 nerve root which remains stable    MRI lumbar  2018  The cord ends around L1-L2. There is a somewhat clumped appearance of the nerve roots and a somewhat hazy indistinct appearance of the nerve roots at some levels which could be due to motion artifact or thecal sac narrowing although a superimposed arachnoiditis is conceivable.     Vertebral body heights are maintained.   No evidence of acute fracture. . Patient has undergone interval L4-5 interbody fusion with L4 laminectomies and transvesical screws and interconnecting rods at L4 and L5.   Sagittal alignment is grossly maintained. Suggested slight dextrocurvature centered about L3 which could be partly positional. No subluxation.   There are scattered disc dehydration changes. Subtle posterior annular fissure at L2-L3 may be present.   Edema within the surgical bed along with developing muscle atrophy as expected. No significant loculated fluid is identified. No incidental paraspinal mass or adenopathy. Visible aorta is normal in size.     T12-L1: There is no significant disc bulge or canal or foraminal narrowing.     L1-2: There may be very minor disc bulge without significant thecal sac or foraminal narrowing. Facet and ligament flavum hypertrophy.     L2-3: Mild loss of disc height posteriorly with similar-appearing mild annular disc bulge along with endplate osteophytes and facet arthropathy results and similar mild to moderate left and mild right foraminal narrowing. Grossly similar at least mild thecal sac narrowing with cauda equina crowding due to a combination of diffuse disc bulge with tiny central disc protrusion and posterior epidural fat and facet and ligament flavum hypertrophy similar-appearing configuration to prior. Abutment of the transitioning L3 nerve roots in the recess are possible.     L3-4: Suggested slight interval progressive thecal sac narrowing at this level which is at most mild due to a combination of mild annular disc bulge, ligamentous and facet hypertrophy, and posterior  epidural fat. There may be new subtle abutment of the transitioning L4 nerve roots in the recess although without significant displacement. Mild cauda equina nerve root crowding. Similar mild to moderate left and mild right foraminal narrowing due to combination of degenerative disc changes and facet arthropathy.     L4-5: Interval interbody fusion changes. There is marked improved patency of the thecal sac following interval surgery without evidence for significant residual narrowing. There is obliteration of the neural foraminal fat inferiorly bilaterally which could be due to singly or in combination degenerative disc and facet changes versus scar granulation tissue related to prior surgery. Perineural fibrosis not excluded with assessment limited without contrast. Facet arthrosis.     L5-S1: Moderate loss of disc height as before with disc bulge and endplate osteophytes along with facet arthrosis resulting in similar-appearing mild-to-moderate bilateral foraminal narrowing. There is some slight encroachment with slight abutment of the transitioning S1 nerve roots in the recess more evident on the right similar prior although without significant displacement however. Slight attenuation minimal attenuation of the thecal sac ventrally without significant narrowing.     IMPRESSION:   1.   Interval L4-5 interbody fusion and posterior decompression changes with markedly improved patency of the thecal sac at this level.   2.   Partial obliteration of the neural foraminal fat bilaterally at L4-L5 which could be due to degenerative changes versus perineural granulation tissue or scarring.   3.   Suggested slight progressive thecal sac narrowing at L3-4 over the interim as detailed above. Remaining levels appear essentially similar to prior as discussed in detail by level above.   4.   Question slight clumping of some of the cauda equina nerve roots and slight indistinctness of some of the nerve roots at various levels.  This could be due to a combination of thecal sac restriction and motion artifact although an element of arachnoiditis could be considered in the proper clinical context.   5.   Additional findings as discussed above.          Review of Systems   Constitutional: Negative for weight gain and weight loss.   Cardiovascular: Negative for chest pain.   Respiratory: Negative for shortness of breath.    Musculoskeletal: Positive for back pain (bilateral thigh not back). Negative for joint pain and joint swelling.   Gastrointestinal: Negative for abdominal pain, bowel incontinence, nausea and vomiting.   Genitourinary: Negative for bladder incontinence.   Neurological: Positive for numbness (medial left knee). Negative for paresthesias.         Objective:        General: Jia Vila is well-developed, well-nourished, appears stated age, in no acute distress, alert and oriented to time, place and person.     General    Vitals reviewed.  Constitutional: She is oriented to person, place, and time. She appears well-developed and well-nourished.   HENT:   Head: Normocephalic and atraumatic.   Pulmonary/Chest: Effort normal.   Neurological: She is alert and oriented to person, place, and time.   Psychiatric: She has a normal mood and affect. Her behavior is normal. Judgment and thought content normal.     General Musculoskeletal Exam   Gait: abnormal (flat footed)     Right Ankle/Foot Exam     Tests   Heel Walk: able to perform  Tiptoe Walk: able to perform    Left Ankle/Foot Exam     Tests   Heel Walk: able to perform  Tiptoe Walk: able to perform  Back (L-Spine & T-Spine) / Neck (C-Spine) Exam     Back (L-Spine & T-Spine) Range of Motion   Extension: 20   Flexion: 90   Lateral bend right: 20   Lateral bend left: 20   Rotation right: 40   Rotation left: 40     Spinal Sensation   Right Side Sensation  C-Spine Level: normal   L-Spine Level: normal  S-Spine Level: normal  Left Side Sensation  C-Spine Level: normal  L-Spine Level:  normal  S-Spine Level: normal    Back (L-Spine & T-Spine) Tests   Right Side Tests  Straight leg raise:      Sitting SLR: > 70 degrees      Left Side Tests  Straight leg raise:     Sitting SLR: > 70 degrees          Other She has no scoliosis .  Spinal Kyphosis:  Absent    Comments:  Neg IKER bilaterally  No pain with hip or back ROM      Muscle Strength   Right Upper Extremity   Biceps: 5/5   Deltoid:  5/5  Triceps:  5/5  Wrist extension: 5/5   Finger Flexors:  5/5  Left Upper Extremity  Biceps: 5/5   Deltoid:  5/5  Triceps:  5/5  Wrist extension: 5/5   Finger Flexors:  5/5  Right Lower Extremity   Hip Flexion: 5/5   Quadriceps:  5/5   Anterior tibial:  5/5   EHL:  5/5  Left Lower Extremity   Hip Flexion: 5/5   Quadriceps:  5/5   Anterior tibial:  5/5   EHL:  5/5    Reflexes     Left Side  Biceps:  2+  Triceps:  2+  Brachioradialis:  2+  Achilles:  2+  Left Monroy's Sign:  Absent  Babinski Sign:  absent  Quadriceps:  2+    Right Side   Biceps:  2+  Triceps:  2+  Brachioradialis:  2+  Achilles:  2+  Right Monroy's Sign:  absent  Babinski Sign:  absent  Quadriceps:  2+    Vascular Exam     Right Pulses        Carotid:                  2+    Left Pulses        Carotid:                  2+              Assessment:       1. Spinal stenosis of lumbar region with neurogenic claudication    2. Lumbar radiculopathy    3. DDD (degenerative disc disease), lumbar           Plan:       Orders Placed This Encounter    Ambulatory referral/consult to [facility]     The patient has had a history of low back pain with limitations in there activities of Daily living    Previous treatment has not provided relief.    The situation was discussed at length with the patient.  More than 50% of the total time of 45 minutes was spent in counseling.  We discussed different causes of back pain and different treatment options.  We discussed the importance of stretching and strengthening.  We discussed posture sitting and trying to  sit better and not sit so much.  She feels better sitting.  We discussed the pros and cons of further diagnostic testing, alternative treatment and Medications.  We discussed pain better not pain gone.  We discussed her back/leg pain has been present for a long time    Based on the history, physical exam, and functional index, an active physical therapy program is recommended.  The goal is to restore the patients function and reduce pain.  A program of progressive resistance exercises, biomechanical, and mobility maneuvers, instructions in proper body mechanics, aerobic conditioning and HEP will be utilized. The program will continue as long as making improvements.    An assessment of patients progress will be made at each visit to document change in status.    The patient will be actively involved in there own treatment, and responsible for appointments and home program    The patient's lumbar isometric strength will be tested and they will be placed in a program of isolated strength training based on 50% of their total functional torque and advanced as clinically appropriate.      Directional preference of pain will further influence the patients active rehabilitation program    The patient was instructed there might be an initial increase in discomfort    They are enrolled with fair prognosis  Pattern 4       Follow-up: No follow-ups on file. If there are any questions prior to this, the patient was instructed to contact the office.

## 2022-01-12 ENCOUNTER — CLINICAL SUPPORT (OUTPATIENT)
Dept: REHABILITATION | Facility: HOSPITAL | Age: 62
End: 2022-01-12
Attending: PHYSICAL MEDICINE & REHABILITATION
Payer: MEDICARE

## 2022-01-12 ENCOUNTER — CLINICAL SUPPORT (OUTPATIENT)
Dept: REHABILITATION | Facility: HOSPITAL | Age: 62
End: 2022-01-12
Payer: MEDICARE

## 2022-01-12 VITALS
WEIGHT: 197 LBS | HEART RATE: 60 BPM | DIASTOLIC BLOOD PRESSURE: 70 MMHG | HEIGHT: 64 IN | SYSTOLIC BLOOD PRESSURE: 140 MMHG | BODY MASS INDEX: 33.63 KG/M2

## 2022-01-12 DIAGNOSIS — M54.16 LUMBAR RADICULOPATHY: ICD-10-CM

## 2022-01-12 DIAGNOSIS — M51.36 DDD (DEGENERATIVE DISC DISEASE), LUMBAR: ICD-10-CM

## 2022-01-12 DIAGNOSIS — M48.062 SPINAL STENOSIS OF LUMBAR REGION WITH NEUROGENIC CLAUDICATION: ICD-10-CM

## 2022-01-12 PROCEDURE — 99204 PR OFFICE/OUTPT VISIT, NEW, LEVL IV, 45-59 MIN: ICD-10-PCS | Mod: HCNC,,, | Performed by: PHYSICAL MEDICINE & REHABILITATION

## 2022-01-12 PROCEDURE — 99204 OFFICE O/P NEW MOD 45 MIN: CPT | Mod: HCNC,,, | Performed by: PHYSICAL MEDICINE & REHABILITATION

## 2022-01-12 NOTE — PROGRESS NOTES
Health  met with patient to complete initial outcomes for the Healthy Back lumbar program.  Questions were reviewed with patient and discussed with Dr. Zamora. The patient will meet with Dr. Zamora to determine program enrollment.       Healthy[facility] Questionnaire  1/6/2022   Please select the location of your worst pain:  Back or Legs   Please select the location of any additional pain: (hold down the control key, and click to select multiple responses) Low back, Seat- Left, Seat- Right, Leg- Left, Leg- Right    Did the pain begin after an event, injury, or accident? No   How long has this pain been going on?  24 years   Please indicate how the pain is changing.  Worsening   What is the WORST level of pain that you have experienced in the last week?  9   What is the LEAST level of pain that you have experienced in the past week?  0   If you have any comments about your pain level, please provide below:  I just want this pain to go away so I can walk through the airport, walk through the [facility], just walk without any pain. My pain is at its worst when I am walking or standing up. Im in pain when I stand for more than thirty seconds   What can you NOT do not that you used to be able to do?  Walk pain free   Are your limitations mainly due to your pain?  Yes   What are your additional complaints, if any? Burning, Numbness, Tingling, Weakness   Is there ever a time during the day when your pain stops, even for a brief moment, before returning? Yes   If yes, when your pain stops, does it disappear completely? Is it totally gone? No   Does bending forward make your typical pain worse? No   Morning: Worse during   Afternoon: Same   Evening:  Same   Nighttime: Same   Standing:  Worse   Lying on stomach: Worse   Lying on back: Better   Sitting:  Better   Walking: Worse   Climbing stairs: Worse   Emergency department  Yes   Health care providers (hold down the control key, and click to select multiple  responses) Pain management, Orthopedic, Neurosurgeon, Neurologist, Chiropractor, Physical therapist   Investigations done (hold down the control key, and click to select multiple responses) X-ray, MRI, Bone scan, CAT scan, Lab tests   Procedures (hold down the control key, and click to select multiple responses) Injections, Other   Have you had any surgery on your back?  Yes   Please jose what you are experiencing. (hold down the control key, and click to select multiple responses) Muscle pain in arms or legs   First activity you would like to perform better:  Walking   Second activity you would like to perform better: Standing   Third activity you would like to perform better: Getting up from a chair   What is your occupation?  N/A   What is your highest level of education? College   What is your work status? Disabled   How did you hear about the Healthyback program?  Physician

## 2022-01-26 ENCOUNTER — OFFICE VISIT (OUTPATIENT)
Dept: CARDIOLOGY | Facility: CLINIC | Age: 62
End: 2022-01-26
Payer: MEDICARE

## 2022-01-26 ENCOUNTER — OFFICE VISIT (OUTPATIENT)
Dept: FAMILY MEDICINE | Facility: CLINIC | Age: 62
End: 2022-01-26
Payer: MEDICARE

## 2022-01-26 VITALS
DIASTOLIC BLOOD PRESSURE: 84 MMHG | HEIGHT: 64 IN | WEIGHT: 200.19 LBS | HEART RATE: 69 BPM | TEMPERATURE: 98 F | BODY MASS INDEX: 34.18 KG/M2 | SYSTOLIC BLOOD PRESSURE: 130 MMHG | RESPIRATION RATE: 16 BRPM | OXYGEN SATURATION: 99 %

## 2022-01-26 VITALS
SYSTOLIC BLOOD PRESSURE: 130 MMHG | HEIGHT: 64 IN | WEIGHT: 200 LBS | DIASTOLIC BLOOD PRESSURE: 84 MMHG | BODY MASS INDEX: 34.15 KG/M2

## 2022-01-26 DIAGNOSIS — Z00.00 ANNUAL PHYSICAL EXAM: Primary | ICD-10-CM

## 2022-01-26 DIAGNOSIS — M54.9 UPPER BACK PAIN: ICD-10-CM

## 2022-01-26 DIAGNOSIS — E78.2 MIXED HYPERLIPIDEMIA: ICD-10-CM

## 2022-01-26 DIAGNOSIS — R13.10 DYSPHAGIA, UNSPECIFIED TYPE: ICD-10-CM

## 2022-01-26 DIAGNOSIS — M79.10 MYALGIA DUE TO STATIN: ICD-10-CM

## 2022-01-26 DIAGNOSIS — R07.1 CHEST PAIN ON BREATHING: ICD-10-CM

## 2022-01-26 DIAGNOSIS — R07.89 OTHER CHEST PAIN: Primary | ICD-10-CM

## 2022-01-26 DIAGNOSIS — E66.09 CLASS 1 OBESITY DUE TO EXCESS CALORIES WITH SERIOUS COMORBIDITY AND BODY MASS INDEX (BMI) OF 34.0 TO 34.9 IN ADULT: ICD-10-CM

## 2022-01-26 DIAGNOSIS — T46.6X5A MYALGIA DUE TO STATIN: ICD-10-CM

## 2022-01-26 PROCEDURE — 99999 PR PBB SHADOW E&M-EST. PATIENT-LVL V: CPT | Mod: PBBFAC,HCNC,, | Performed by: INTERNAL MEDICINE

## 2022-01-26 PROCEDURE — 3008F BODY MASS INDEX DOCD: CPT | Mod: CPTII,S$GLB,, | Performed by: INTERNAL MEDICINE

## 2022-01-26 PROCEDURE — 99999 PR PBB SHADOW E&M-EST. PATIENT-LVL IV: CPT | Mod: PBBFAC,,, | Performed by: INTERNAL MEDICINE

## 2022-01-26 PROCEDURE — 3008F PR BODY MASS INDEX (BMI) DOCUMENTED: ICD-10-PCS | Mod: HCNC,CPTII,S$GLB, | Performed by: INTERNAL MEDICINE

## 2022-01-26 PROCEDURE — 3008F BODY MASS INDEX DOCD: CPT | Mod: HCNC,CPTII,S$GLB, | Performed by: INTERNAL MEDICINE

## 2022-01-26 PROCEDURE — 3079F DIAST BP 80-89 MM HG: CPT | Mod: CPTII,S$GLB,, | Performed by: INTERNAL MEDICINE

## 2022-01-26 PROCEDURE — 99204 PR OFFICE/OUTPT VISIT, NEW, LEVL IV, 45-59 MIN: ICD-10-PCS | Mod: S$GLB,,, | Performed by: INTERNAL MEDICINE

## 2022-01-26 PROCEDURE — 1159F MED LIST DOCD IN RCRD: CPT | Mod: CPTII,S$GLB,, | Performed by: INTERNAL MEDICINE

## 2022-01-26 PROCEDURE — 3079F DIAST BP 80-89 MM HG: CPT | Mod: HCNC,CPTII,S$GLB, | Performed by: INTERNAL MEDICINE

## 2022-01-26 PROCEDURE — 99204 OFFICE O/P NEW MOD 45 MIN: CPT | Mod: S$GLB,,, | Performed by: INTERNAL MEDICINE

## 2022-01-26 PROCEDURE — 1159F PR MEDICATION LIST DOCUMENTED IN MEDICAL RECORD: ICD-10-PCS | Mod: HCNC,CPTII,S$GLB, | Performed by: INTERNAL MEDICINE

## 2022-01-26 PROCEDURE — 99999 PR PBB SHADOW E&M-EST. PATIENT-LVL V: ICD-10-PCS | Mod: PBBFAC,HCNC,, | Performed by: INTERNAL MEDICINE

## 2022-01-26 PROCEDURE — 93000 ELECTROCARDIOGRAM COMPLETE: CPT | Mod: S$GLB,,, | Performed by: INTERNAL MEDICINE

## 2022-01-26 PROCEDURE — 3075F SYST BP GE 130 - 139MM HG: CPT | Mod: HCNC,CPTII,S$GLB, | Performed by: INTERNAL MEDICINE

## 2022-01-26 PROCEDURE — 1159F MED LIST DOCD IN RCRD: CPT | Mod: HCNC,CPTII,S$GLB, | Performed by: INTERNAL MEDICINE

## 2022-01-26 PROCEDURE — 3075F PR MOST RECENT SYSTOLIC BLOOD PRESS GE 130-139MM HG: ICD-10-PCS | Mod: HCNC,CPTII,S$GLB, | Performed by: INTERNAL MEDICINE

## 2022-01-26 PROCEDURE — 1160F PR REVIEW ALL MEDS BY PRESCRIBER/CLIN PHARMACIST DOCUMENTED: ICD-10-PCS | Mod: HCNC,CPTII,S$GLB, | Performed by: INTERNAL MEDICINE

## 2022-01-26 PROCEDURE — 1160F RVW MEDS BY RX/DR IN RCRD: CPT | Mod: CPTII,S$GLB,, | Performed by: INTERNAL MEDICINE

## 2022-01-26 PROCEDURE — 99396 PR PREVENTIVE VISIT,EST,40-64: ICD-10-PCS | Mod: HCNC,S$GLB,, | Performed by: INTERNAL MEDICINE

## 2022-01-26 PROCEDURE — 1160F RVW MEDS BY RX/DR IN RCRD: CPT | Mod: HCNC,CPTII,S$GLB, | Performed by: INTERNAL MEDICINE

## 2022-01-26 PROCEDURE — 99214 OFFICE O/P EST MOD 30 MIN: CPT | Mod: PBBFAC | Performed by: INTERNAL MEDICINE

## 2022-01-26 PROCEDURE — 99999 PR PBB SHADOW E&M-EST. PATIENT-LVL IV: ICD-10-PCS | Mod: PBBFAC,,, | Performed by: INTERNAL MEDICINE

## 2022-01-26 PROCEDURE — 93005 ELECTROCARDIOGRAM TRACING: CPT | Mod: PBBFAC | Performed by: INTERNAL MEDICINE

## 2022-01-26 PROCEDURE — 99396 PREV VISIT EST AGE 40-64: CPT | Mod: HCNC,S$GLB,, | Performed by: INTERNAL MEDICINE

## 2022-01-26 PROCEDURE — 3079F PR MOST RECENT DIASTOLIC BLOOD PRESSURE 80-89 MM HG: ICD-10-PCS | Mod: CPTII,S$GLB,, | Performed by: INTERNAL MEDICINE

## 2022-01-26 PROCEDURE — 3079F PR MOST RECENT DIASTOLIC BLOOD PRESSURE 80-89 MM HG: ICD-10-PCS | Mod: HCNC,CPTII,S$GLB, | Performed by: INTERNAL MEDICINE

## 2022-01-26 PROCEDURE — 3008F PR BODY MASS INDEX (BMI) DOCUMENTED: ICD-10-PCS | Mod: CPTII,S$GLB,, | Performed by: INTERNAL MEDICINE

## 2022-01-26 PROCEDURE — 1160F PR REVIEW ALL MEDS BY PRESCRIBER/CLIN PHARMACIST DOCUMENTED: ICD-10-PCS | Mod: CPTII,S$GLB,, | Performed by: INTERNAL MEDICINE

## 2022-01-26 PROCEDURE — 3075F PR MOST RECENT SYSTOLIC BLOOD PRESS GE 130-139MM HG: ICD-10-PCS | Mod: CPTII,S$GLB,, | Performed by: INTERNAL MEDICINE

## 2022-01-26 PROCEDURE — 93000 EKG 12-LEAD: ICD-10-PCS | Mod: S$GLB,,, | Performed by: INTERNAL MEDICINE

## 2022-01-26 PROCEDURE — 1159F PR MEDICATION LIST DOCUMENTED IN MEDICAL RECORD: ICD-10-PCS | Mod: CPTII,S$GLB,, | Performed by: INTERNAL MEDICINE

## 2022-01-26 PROCEDURE — 3075F SYST BP GE 130 - 139MM HG: CPT | Mod: CPTII,S$GLB,, | Performed by: INTERNAL MEDICINE

## 2022-01-26 RX ORDER — ALIROCUMAB 75 MG/ML
75 INJECTION, SOLUTION SUBCUTANEOUS
Qty: 2 EACH | Refills: 11 | OUTPATIENT
Start: 2022-01-26 | End: 2022-02-25 | Stop reason: ALTCHOICE

## 2022-01-26 RX ORDER — FLUOCINONIDE TOPICAL SOLUTION USP, 0.05% 0.5 MG/ML
SOLUTION TOPICAL
COMMUNITY
Start: 2021-12-21

## 2022-01-26 RX ORDER — INFLUENZA VIRUS VACCINE 15; 15; 15; 15 UG/.5ML; UG/.5ML; UG/.5ML; UG/.5ML
SUSPENSION INTRAMUSCULAR
COMMUNITY
Start: 2021-11-12 | End: 2022-02-25 | Stop reason: ALTCHOICE

## 2022-01-26 RX ORDER — PROPOFOL 10 MG/ML
INJECTION, EMULSION INTRAVENOUS
COMMUNITY
Start: 2021-12-08 | End: 2022-02-25 | Stop reason: ALTCHOICE

## 2022-01-26 RX ORDER — IOHEXOL 300 MG/ML
INJECTION, SOLUTION INTRAVENOUS
COMMUNITY
Start: 2021-11-08 | End: 2022-02-25 | Stop reason: ALTCHOICE

## 2022-01-26 RX ORDER — DEXAMETHASONE SODIUM PHOSPHATE 10 MG/ML
INJECTION INTRAMUSCULAR; INTRAVENOUS
COMMUNITY
Start: 2021-11-08 | End: 2022-02-25 | Stop reason: ALTCHOICE

## 2022-01-26 NOTE — PROGRESS NOTES
SUBJECTIVE     Chief Complaint   Patient presents with    Annual Exam    Establish Care       HPI  Jia Ugalde is a 61 y.o. female with multiple medical diagnoses as listed in the medical history and problem list that presents for annual exam. Pt has been doing well since her last visit. She has a good appetite and eats well. She does not exercise. She sleeps for ~8 hours nightly. Pt does take OTC supplements, which are a MVI, fish oil, and turmeric. She does not have any current stressors. Pt is UTD on age appropriate CA screening.    PAST MEDICAL HISTORY:  Past Medical History:   Diagnosis Date    GERD (gastroesophageal reflux disease)     Hyperlipidemia        PAST SURGICAL HISTORY:  Past Surgical History:   Procedure Laterality Date    BREAST BIOPSY      BREAST SURGERY      COLONOSCOPY N/A 12/8/2021    Procedure: COLONOSCOPY;  Surgeon: Caitlin Joseph MD;  Location: [facility];  Service: Endoscopy;  Laterality: N/A;  fully vaccinated-GT    pt states had polyps    EPIDURAL STEROID INJECTION Bilateral 11/8/2021    Procedure: Bilateral L5 Transforaminal Epidural Steroid Injection;  Surgeon: Keron Cristina Jr., MD;  Location: [facility];  Service: Pain Management;  Laterality: Bilateral;  Arrive @ 1030 (requested 10); No ATC or DM; Vacc.    HEMORRHOID SURGERY      HYSTERECTOMY      SPINE SURGERY      TUBAL LIGATION         SOCIAL HISTORY:  Social History     Socioeconomic History    Marital status: Single   Tobacco Use    Smoking status: Never Smoker    Smokeless tobacco: Never Used   Substance and Sexual Activity    Alcohol use: Never    Drug use: Never    Sexual activity: Not Currently       FAMILY HISTORY:  History reviewed. No pertinent family history.    ALLERGIES AND MEDICATIONS: updated and reviewed.  Review of patient's allergies indicates:  No Known Allergies  Current Outpatient Medications   Medication Sig Dispense Refill    biotin 10,000 mcg Cap Take by mouth.      budesonide  1 mg/2 mL NbSp EMPTY CONTENTS OF 1 RESPULE INTO NASAL IRRIGATION SYSTEM, ADD DISTILLED WATER, SALT PACK, MIX & IRRIGATE. PERFORM TWICE DAILY      cholecalciferol, vitamin D3, capsule/tablet Take by mouth once daily.      fluticasone propionate (FLONASE) 50 mcg/actuation nasal spray 1 spray (50 mcg total) by Each Nostril route once daily. 16 g 3    gabapentin (NEURONTIN) 300 MG capsule TAKE 1 TABLET BY MOUTH 3 TIMES A DAY AS NEEDED 180 capsule 0    meloxicam (MOBIC) 15 MG tablet Take 1 tablet (15 mg total) by mouth once daily. 90 tablet 1    multivit/folic acid/vit K1 (ONE-A-DAY WOMEN'S 50 PLUS ORAL) Take by mouth.      NEILMED SINUS RINSE COMPLETE pkdv use as directed      pantoprazole (PROTONIX) 40 MG tablet TAKE 1 TABLET BY MOUTH once DAILY 90 tablet 1    tobramycin, PF, (OPHELIA) 300 mg/5 mL nebulizer solution EMPTY CONTENTS OF 1 AMPULE INTO NASAL IRRIGATION SYSTEM, ADD DISTILLED WATER, SALT PACK, MIX & IRRIGATE. PERFORM 2 TIMES DAILY      TURMERIC ORAL Take 500 mg by mouth.      dexamethasone sodium phosp/PF (DEXAMETHASONE SODIUM PHOS, PF,) 10 mg/mL Soln       FLUARIX QUAD 0309-2921, PF, 60 mcg (15 mcg x 4)/0.5 mL Syrg       fluocinonide (LIDEX) 0.05 % external solution apply 1ml TO SCALP ONCE TO twice daily AS DIRECTED      hydrocortisone (ANUSOL-HC) 2.5 % rectal cream Place rectally 2 (two) times daily. (Patient not taking: Reported on 1/26/2022) 30 g 1    OMNIPAQUE 300 300 mg iodine/mL injection       propofoL (DIPRIVAN) 10 mg/mL infusion        No current facility-administered medications for this visit.       ROS  Review of Systems   Constitutional: Negative for chills and fever.   HENT: Negative for hearing loss and sore throat.    Eyes: Negative for visual disturbance.   Respiratory: Negative for cough and shortness of breath.    Cardiovascular: Positive for chest pain (beneath the L breast with inhalation). Negative for palpitations and leg swelling.   Gastrointestinal: Positive for anal  "bleeding (from hemorrhoids). Negative for abdominal pain, constipation, diarrhea, nausea and vomiting.   Genitourinary: Negative for dysuria, frequency and urgency.   Musculoskeletal: Positive for back pain (upper back with inhalation). Negative for arthralgias, joint swelling and myalgias.   Skin: Negative for rash and wound.   Neurological: Negative for headaches.   Psychiatric/Behavioral: Negative for agitation and confusion. The patient is not nervous/anxious.          OBJECTIVE     Physical Exam  Vitals:    01/26/22 0834   BP: 130/84   Pulse: 69   Resp: 16   Temp: 98.1 °F (36.7 °C)    Body mass index is 34.36 kg/m².  Weight: 90.8 kg (200 lb 2.8 oz)   Height: 5' 4" (162.6 cm)     Physical Exam  Constitutional:       General: She is not in acute distress.     Appearance: She is well-developed and well-nourished.   HENT:      Head: Normocephalic and atraumatic.      Right Ear: External ear normal.      Left Ear: External ear normal.      Nose: Nose normal.      Mouth/Throat:      Mouth: Oropharynx is clear and moist.   Eyes:      General: No scleral icterus.        Right eye: No discharge.         Left eye: No discharge.      Extraocular Movements: EOM normal.      Conjunctiva/sclera: Conjunctivae normal.   Neck:      Vascular: No JVD.      Trachea: No tracheal deviation.   Cardiovascular:      Rate and Rhythm: Normal rate and regular rhythm.      Pulses: Intact distal pulses.      Heart sounds: No murmur heard.  No friction rub. No gallop.    Pulmonary:      Effort: Pulmonary effort is normal. No respiratory distress.      Breath sounds: Normal breath sounds. No wheezing.   Abdominal:      General: Bowel sounds are normal. There is no distension.      Palpations: Abdomen is soft. There is no mass.      Tenderness: There is no abdominal tenderness. There is no guarding or rebound.   Musculoskeletal:         General: No tenderness, deformity or edema. Normal range of motion.      Cervical back: Normal range of " motion and neck supple.   Skin:     General: Skin is warm and dry.      Findings: No erythema or rash.   Neurological:      Mental Status: She is alert and oriented to person, place, and time.      Motor: No abnormal muscle tone.      Coordination: Coordination normal.   Psychiatric:         Mood and Affect: Mood and affect normal.         Behavior: Behavior normal.         Thought Content: Thought content normal.         Judgment: Judgment normal.           Health Maintenance       Date Due Completion Date    TETANUS VACCINE Never done ---    Mammogram 11/10/2022 11/10/2021    DEXA SCAN 12/02/2023 12/2/2021    Lipid Panel 12/02/2026 12/2/2021    Colorectal Cancer Screening 12/08/2028 12/8/2021            ASSESSMENT     61 y.o. female with     1. Annual physical exam    2. Dysphagia, unspecified type    3. Chest pain on breathing    4. Upper back pain    5. Mixed hyperlipidemia        PLAN:     1. Annual physical exam  - Counseled on age appropriate medical preventative services, including age appropriate cancer screenings, over all nutritional health, need for a consistent exercise regimen and an over all push towards maintaining a vigorous and active lifestyle.  Counseled on age appropriate vaccines and discussed upcoming health care needs based on age/gender.  Spent time with patient counseling on need for a good patient/doctor relationship moving forward.  Discussed use of common OTC medications and supplements.  Discussed common dietary aids and use of caffeine and the need for good sleep hygiene and stress management.    2. Dysphagia, unspecified type  - FL Esophagram Complete; Future    3. Chest pain on breathing  - Likely 2/2 pleurisy; advised to use a heating pad and take OTC NSAIDs prn    - Ambulatory referral/consult to Cardiology; Future  - EKG 12-lead; Future; NSR, rate 60, axis 10, normal intervals, no LVH/L atrial enlargement, no ST elevation/depression    4. Upper back pain  - Ambulatory  referral/consult to Cardiology; Future  - EKG 12-lead; Future    5. Mixed hyperlipidemia  - Pt advised on need for cholesterol med given increased risk for cardiovascular disease, but declined at this time  - Ambulatory referral/consult to Cardiology; Future        RTC in 6 months     Reyna Umanzor MD  01/26/2022 8:51 AM        No follow-ups on file.

## 2022-01-26 NOTE — PROGRESS NOTES
CARDIOLOGY CONSULTATION    REASON FOR CONSULT:   Jia Ugalde is a 61 y.o. female who presents for evaluation of chest pain.      HISTORY OF PRESENT ILLNESS:     Jia Ugalde presents for evaluation of chest pain. Referred by Dr. Umanzor.  Over the past few weeks has noted some left-sided chest discomfort.  Located under her left breast.  Worse with inspiration.  Episodes last less than 1 minute.  The EKG shows sinus rhythm.  History of hypercholesterolemia.  Was on Lipitor in the 90s.  Unable to tolerate secondary to myalgias.  Her last LDL was 227. No recent cardiovascular evaluation.    Chest Pain:  Jia Ugalde complains of chest pain. Onset was 4 weeks ago. Symptoms have been stable since that time. The patient's pain is intermittent. The patient describes the pain as pressure and radiates to left upper back. Patient rates pain as a 4/10 in intensity. Associated symptoms are: chest pressure/discomfort and shortness of breath. Aggravating factors are: deep inspiration. Alleviating factors are: none. Patient's cardiac risk factors are: dyslipidemia.  Previous cardiac testing: echocardiogram many years ago.    CARDIOVASCULAR HISTORY:     None    PAST MEDICAL HISTORY:     Past Medical History:   Diagnosis Date    GERD (gastroesophageal reflux disease)     Hyperlipidemia        PAST SURGICAL HISTORY:     Past Surgical History:   Procedure Laterality Date    BREAST BIOPSY      BREAST SURGERY      COLONOSCOPY N/A 12/8/2021    Procedure: COLONOSCOPY;  Surgeon: Caitlin Joseph MD;  Location: [facility] ENDO;  Service: Endoscopy;  Laterality: N/A;  fully vaccinated-GT    pt states had polyps    EPIDURAL STEROID INJECTION Bilateral 11/8/2021    Procedure: Bilateral L5 Transforaminal Epidural Steroid Injection;  Surgeon: Keron Cristina Jr., MD;  Location: [facility];  Service: Pain Management;  Laterality: Bilateral;  Arrive @ 1030 (requested 10); No ATC or DM; Vacc.    HEMORRHOID SURGERY      HYSTERECTOMY       SPINE SURGERY      TUBAL LIGATION         ALLERGIES AND MEDICATION:   Review of patient's allergies indicates:  No Known Allergies     Medication List          Accurate as of January 26, 2022 10:25 AM. If you have any questions, ask your nurse or doctor.            START taking these medications    PRALUENT PEN 75 mg/mL Pnij  Generic drug: alirocumab  Inject 1 mL (75 mg total) into the skin every 14 (fourteen) days.  Started by: Jc Rogers MD        CONTINUE taking these medications    biotin 10,000 mcg Cap     budesonide 1 mg/2 mL Nbsp     cholecalciferol (vitamin D3) capsule/tablet     dexAMETHasone sodium phos (PF) 10 mg/mL Soln     FLUARIX QUAD 2485-9921 (PF) 60 mcg (15 mcg x 4)/0.5 mL Syrg  Generic drug: flu vacc [id number] 6mos up(PF)     fluocinonide 0.05 % external solution  Commonly known as: LIDEX     fluticasone propionate 50 mcg/actuation nasal spray  Commonly known as: FLONASE  1 spray (50 mcg total) by Each Nostril route once daily.     gabapentin 300 MG capsule  Commonly known as: NEURONTIN  TAKE 1 TABLET BY MOUTH 3 TIMES A DAY AS NEEDED     hydrocortisone 2.5 % rectal cream  Commonly known as: ANUSOL-HC  Place rectally 2 (two) times daily.     meloxicam 15 MG tablet  Commonly known as: MOBIC  Take 1 tablet (15 mg total) by mouth once daily.     NEILMED SINUS RINSE COMPLETE Pkdv  Generic drug: sod chlor-bicarb-squeez bottle     OMNIPAQUE 300 300 mg iodine/mL injection  Generic drug: iohexoL     ONE-A-DAY WOMEN'S 50 PLUS ORAL     pantoprazole 40 MG tablet  Commonly known as: PROTONIX  TAKE 1 TABLET BY MOUTH once DAILY     propofoL 10 mg/mL infusion  Commonly known as: DIPRIVAN     tobramycin (PF) 300 mg/5 mL nebulizer solution  Commonly known as: OPHELIA     TURMERIC ORAL           Where to Get Your Medications      Information about where to get these medications is not yet available    Ask your nurse or doctor about these medications  · PRALUENT PEN 75 mg/mL Pnij         SOCIAL HISTORY:  "    Social History     Socioeconomic History    Marital status: Single   Tobacco Use    Smoking status: Never Smoker    Smokeless tobacco: Never Used   Substance and Sexual Activity    Alcohol use: Never    Drug use: Never    Sexual activity: Not Currently       FAMILY HISTORY:   History reviewed. No pertinent family history.    REVIEW OF SYSTEMS:   Review of Systems   Respiratory: Negative for sputum production, shortness of breath and wheezing.    Cardiovascular: Positive for chest pain. Negative for palpitations, orthopnea, claudication, leg swelling and PND.   Gastrointestinal: Negative for abdominal pain, heartburn, nausea and vomiting.   Neurological: Negative for dizziness, speech change, focal weakness, loss of consciousness, weakness and headaches.       PHYSICAL EXAM:     Vitals:    01/26/22 0953   BP: 130/84    Body mass index is 34.33 kg/m².  Weight: 90.7 kg (200 lb)   Height: 5' 4" (162.6 cm)     Physical Exam  Constitutional:       General: She is not in acute distress.     Appearance: She is well-developed and well-nourished. She is not diaphoretic.   HENT:      Head: Normocephalic.   Neck:      Vascular: No carotid bruit or JVD.   Cardiovascular:      Rate and Rhythm: Normal rate and regular rhythm.      Pulses: Normal pulses.      Heart sounds: Normal heart sounds.   Pulmonary:      Effort: Pulmonary effort is normal.      Breath sounds: Normal breath sounds.   Abdominal:      General: Bowel sounds are normal.      Palpations: Abdomen is soft.      Tenderness: There is no abdominal tenderness.   Skin:     General: Skin is warm and dry.   Neurological:      Mental Status: She is alert and oriented to person, place, and time.   Psychiatric:         Mood and Affect: Mood and affect normal.         Speech: Speech normal.         Behavior: Behavior normal.         Thought Content: Thought content normal.         DATA:   EKG: (personally reviewed tracing)  01/26/2022-sinus " rhythm  Laboratory:  CBC:  Recent Labs   Lab 02/07/20  1442 10/20/20  1211 12/02/21  0857   WBC 5.15 5.31 5.49   Hemoglobin 13.5 13.1 12.8   Hematocrit 44.4 42.3 40.5   Platelets 265 272 255       CHEMISTRIES:  Recent Labs   Lab 03/12/21  0836 08/26/21  1005 12/02/21  0857   Glucose 98 99 92   Sodium 141 140 139   Potassium 4.4 4.1 4.2   BUN 14 12 13   Creatinine 1.0 0.9 0.9   eGFR if African American >60 >60.0 >60.0   eGFR if non African American >60 >60.0 >60.0   Calcium 9.1 9.9 9.9   Magnesium  --  1.9  --        CARDIAC BIOMARKERS:  Recent Labs   Lab 08/26/21  1005          COAGS:        LIPIDS/LFTS:  Recent Labs   Lab 02/07/20  1442 06/03/20  0721 10/20/20  1211 03/12/21  0836 12/02/21  0857   Cholesterol  --  317 H  --  297 H 307 H   Triglycerides  --  151 H  --  112 88   HDL  --  72  --  65 62   LDL Cholesterol  --  214.8 H  --  209.6 H 227.4 H   Non-HDL Cholesterol  --  245  --  232 245   AST   < >  --  22 21 22   ALT   < >  --  17 14 17    < > = values in this interval not displayed.       Cardiovascular Testing:      ASSESSMENT:     1. Chest pain  2. Hypercholesterolemia  3. Class 1 obesity  4. Statin myalgia     PLAN:     1. Chest pain:  EKG nonischemic.  Cardiovascular risk factor hypercholesterolemia.  Secondary risk factor obesity.  Exercise stress echocardiogram for evaluation.  2. Hypercholesterolemia:  History of statin myalgia.  . Prescribed PCSK9.    3. Return to clinic 1 month.            Jc Rogers MD, MPH, FACC, [facility]

## 2022-02-02 ENCOUNTER — TELEPHONE (OUTPATIENT)
Dept: FAMILY MEDICINE | Facility: CLINIC | Age: 62
End: 2022-02-02
Payer: MEDICARE

## 2022-02-02 ENCOUNTER — PATIENT MESSAGE (OUTPATIENT)
Dept: FAMILY MEDICINE | Facility: CLINIC | Age: 62
End: 2022-02-02
Payer: MEDICARE

## 2022-02-02 NOTE — TELEPHONE ENCOUNTER
----- Message from Humaira Rivera NP sent at 12/3/2021  9:03 AM CST -----  Cholesterol high, start atorvastatin daily, keep scheduled follow up with

## 2022-02-02 NOTE — TELEPHONE ENCOUNTER
Call placed to Pt,and she states that she didn't  the medication. Pt states that she is not taking a statin medication. That it doesn't work for her. It made her joint ache. That she is not willing to take statin. She states that she went see , and going to be having several test done. Pt transferred to referral team for assistance with scheduling.

## 2022-02-03 ENCOUNTER — CLINICAL SUPPORT (OUTPATIENT)
Dept: REHABILITATION | Facility: HOSPITAL | Age: 62
End: 2022-02-03
Attending: PHYSICAL MEDICINE & REHABILITATION
Payer: MEDICARE

## 2022-02-03 DIAGNOSIS — M48.062 SPINAL STENOSIS OF LUMBAR REGION WITH NEUROGENIC CLAUDICATION: ICD-10-CM

## 2022-02-03 DIAGNOSIS — M51.36 DDD (DEGENERATIVE DISC DISEASE), LUMBAR: ICD-10-CM

## 2022-02-03 DIAGNOSIS — R29.898 DECREASED STRENGTH OF TRUNK AND BACK: ICD-10-CM

## 2022-02-03 DIAGNOSIS — R29.3 BAD POSTURE: ICD-10-CM

## 2022-02-03 DIAGNOSIS — M25.69 DECREASED ROM OF TRUNK AND BACK: ICD-10-CM

## 2022-02-03 PROCEDURE — 97163 PT EVAL HIGH COMPLEX 45 MIN: CPT | Mod: HCNC,PN | Performed by: PHYSICAL MEDICINE & REHABILITATION

## 2022-02-03 PROCEDURE — 97110 THERAPEUTIC EXERCISES: CPT | Mod: HCNC,PN | Performed by: PHYSICAL MEDICINE & REHABILITATION

## 2022-02-03 NOTE — PATIENT INSTRUCTIONS
Slouch Correct          Practice using the postural muscles by slouching and then correcting.  Correct and hold 3 sec.  Slouch again and correct, hold 3 sec.  Do 10 times.  On the last one, over correct into very erect posture and then back off 10 % and maintain this.   Use a lumbar roll when done the exercise to make sure you are sitting tall.   Do this exercise until it is natural for you to sit tall.                Knee-to-Chest Stretch: Bilateral  ( can be done with ball under knees)        With hands behind knees, pull both knees in to chest until a comfortable stretch is felt in lower back and buttocks. Keep back relaxed. Hold __3__ seconds.  Repeat __10__ times per set. Do ___1_ sets per session. Do ___3_ sessions per day.     [email or web address]     Copyright © [employer]. All rights reserved.              Lumbar Rotation (Non-Weight Bearing)        Feet on floor, slowly rock knees from side to side in small, pain-free range of motion. Allow lower back to rotate slightly.  Repeat _10___ times per set. Do __1__ sets per session. Do _3___ sessions per day.     [email or web address]     Copyright © [employer]. All rights reserved.

## 2022-02-03 NOTE — PLAN OF CARE
[facility] - PHYSICAL THERAPY EVALUATION         Name: Jia Ugalde  Clinic Number: [id number]    Therapy Diagnosis:   Encounter Diagnoses   Name Primary?    Spinal stenosis of lumbar region with neurogenic claudication     DDD (degenerative disc disease), lumbar     Bad posture     Decreased ROM of trunk and back     Decreased strength of trunk and back      Physician: Ghada Zamora, *    Physician Orders: PT Eval and Treat    Medical Diagnosis from Referral:   M48.062 (ICD-10-CM) - Spinal stenosis of lumbar region with neurogenic claudication   M51.36 (ICD-10-CM) - DDD (degenerative disc disease), lumbar       Evaluation Date: 2/3/2022  Authorization Period Expiration: 2/22/22  Plan of Care Expiration: 5/2/22  Reassessment Due: 3/3/22  Visit # / Visits authorized: 1/ 1    Time In: 9:00  Time Out: 10:30  Total Billable Time: 90 minutes    Precautions:    2 lumbar fusion surgeries with hardware, 2018, and 11/2020    Pattern of pain determined:  4 PEN leg dominant, intermittent, worse standing/walking, better sitting      Subjective   Date of onset: 2 back surgeries for back pain, last one 2020, and 1 year after she developed leg symptoms, now numb and weak in left leg with shooting pains in legs  History of current condition - Jia reports: Ms Ugalde is a 60 yo female sent in consultation by Desiree ramirez for evaluation for the healthy back lumbar program.  she has had low back pain for 24 years, but worse the past 10 months.     She has back pain but it comes down the front and back of legs into thighs.  Her left knee area is numb since 2019.   She feels the worst pain is in her legs.  The left leg is the worst.  She reports her left knee feels weak.  The pain is the worst in front of thighs but goes to the back of the legs, but does not go past the knee.  There is numbness the medial knee.  The pain can be in both legs but sometimes right and sometimes left.   Pain is worst in left leg.   The pain is intermittent 0-9/10.  It is worse with standing, and walking, getting up from sitting and lying down, climbing stairs, lying on her stomach and the morning.  It is better sitting and lying on her back.   She can sit without pain.  Sitting is good, no problem.   She didn't have leg pain until after her second surgery, about 1 year after surgery she developed leg pain.  She had the back pain which led to surgery and her leg pain started 1 year  after the second surgery in 2020.  She has not been to chiropractor in many years.   She has had multiple injections over the years.  The last injection helped some.  The goals walking, standing, getting up from a chair.  Pattern 4     Interventional Therapies:   11/08/2021- bilateral L5 TESI     Relevant Surgeries:   2 lumbar fusion surgeries with hardware, 2018, and 11/2020     MRI lumbar 10/2021  1. L2-3 disc herniation slightly larger than prior exam.  Spinal stenosis with left hemilaminotomy at this level.  Moderate bilateral foraminal restriction, left greater than right secondary to disc and facet arthropathy with contact along the undersurface of exiting L2 nerve roots  2.  L3-4 annular bulge with contact on ventral sac and exiting L3 nerve roots.  Progression of foraminal restriction due to progression of facet arthropathy  3.  L4-5 postoperative fusion  4.  L5-S1 2 mm annular bulge and bilateral facet arthropathy right greater than left.  There is contact along undersurface of exiting right L5 nerve root which remains stable     MRI lumbar 2018  The cord ends around L1-L2. There is a somewhat clumped appearance of the nerve roots and a somewhat hazy indistinct appearance of the nerve roots at some levels which could be due to motion artifact or thecal sac narrowing although a superimposed arachnoiditis is conceivable.      Vertebral body heights are maintained.   No evidence of acute fracture. . Patient has undergone interval L4-5 interbody fusion with L4  laminectomies and transvesical screws and interconnecting rods at L4 and L5.   Sagittal alignment is grossly maintained. Suggested slight dextrocurvature centered about L3 which could be partly positional. No subluxation.   There are scattered disc dehydration changes. Subtle posterior annular fissure at L2-L3 may be present.   Edema within the surgical bed along with developing muscle atrophy as expected. No significant loculated fluid is identified. No incidental paraspinal mass or adenopathy. Visible aorta is normal in size.      T12-L1: There is no significant disc bulge or canal or foraminal narrowing.      L1-2: There may be very minor disc bulge without significant thecal sac or foraminal narrowing. Facet and ligament flavum hypertrophy.      L2-3: Mild loss of disc height posteriorly with similar-appearing mild annular disc bulge along with endplate osteophytes and facet arthropathy results and similar mild to moderate left and mild right foraminal narrowing. Grossly similar at least mild thecal sac narrowing with cauda equina crowding due to a combination of diffuse disc bulge with tiny central disc protrusion and posterior epidural fat and facet and ligament flavum hypertrophy similar-appearing configuration to prior. Abutment of the transitioning L3 nerve roots in the recess are possible.      L3-4: Suggested slight interval progressive thecal sac narrowing at this level which is at most mild due to a combination of mild annular disc bulge, ligamentous and facet hypertrophy, and posterior epidural fat. There may be new subtle abutment of the transitioning L4 nerve roots in the recess although without significant displacement. Mild cauda equina nerve root crowding. Similar mild to moderate left and mild right foraminal narrowing due to combination of degenerative disc changes and facet arthropathy.      L4-5: Interval interbody fusion changes. There is marked improved patency of the thecal sac following  interval surgery without evidence for significant residual narrowing. There is obliteration of the neural foraminal fat inferiorly bilaterally which could be due to singly or in combination degenerative disc and facet changes versus scar granulation tissue related to prior surgery. Perineural fibrosis not excluded with assessment limited without contrast. Facet arthrosis.      L5-S1: Moderate loss of disc height as before with disc bulge and endplate osteophytes along with facet arthrosis resulting in similar-appearing mild-to-moderate bilateral foraminal narrowing. There is some slight encroachment with slight abutment of the transitioning S1 nerve roots in the recess more evident on the right similar prior although without significant displacement however. Slight attenuation minimal attenuation of the thecal sac ventrally without significant narrowing.      IMPRESSION:   1.   Interval L4-5 interbody fusion and posterior decompression changes with markedly improved patency of the thecal sac at this level.   2.   Partial obliteration of the neural foraminal fat bilaterally at L4-L5 which could be due to degenerative changes versus perineural granulation tissue or scarring.   3.   Suggested slight progressive thecal sac narrowing at L3-4 over the interim as detailed above. Remaining levels appear essentially similar to prior as discussed in detail by level above.   4.   Question slight clumping of some of the cauda equina nerve roots and slight indistinctness of some of the nerve roots at various levels. This could be due to a combination of thecal sac restriction and motion artifact although an element of arachnoiditis could be considered in the proper clinical context.   5.   Additional findings as discussed above.              Pain:  Current  0/10 at best as long as she is sitting,  10/10 at worst  Location: both legs in front and back of legs, numbness left knee and weakness left knee  Description: Aching, Dull,  Burning and Throbbing  Aggravating Factors:  Standing less than 5 min.  Difficulty standing to shower, has to sit in tub, cooking, must sit often, shopping-must have cart, can't go to the mall, housework - has to stop and take time.   Walking more than distance from waiting room to eval room without needing to sit right away 7/10 with that walk, anything on feet, getting up from sitting  Easing Factors: sitting  Or lying down  Disturbed Sleep: sleeping is good    Pattern of pain questions:  1.  Where is your pain the worst? Left leg  2.  Is your pain constant or intermittent? intermittent  3.  Does bending forward make your typical pain worse? no  4.  Since the start of your back pain, has there been a change in your bowel or bladder? no  5.  What can't you do now that you use to be able to do?  Stand/walk/shop/function at home without many breaks  Pts goals: walk with family, use to walk [place], walk for 10-15 min as a starting goal without support      Social History: 1 story, alone  Occupation: on disability  Leisure:  Meet with friends, eat out, not doing this much, sits and watch tv      Prior Level of Function: full  Current Level of Function: very limited  DME owned/used: stationary bike, 2 elliptical (one she can sit on), uses bars in tub, and always gets up using right leg    Medical History:   Past Medical History:   Diagnosis Date    GERD (gastroesophageal reflux disease)     Hyperlipidemia        Surgical History:   Jia Ugalde  has a past surgical history that includes Spine surgery; Hysterectomy; Tubal ligation; Breast surgery; Hemorrhoid surgery; Breast biopsy; Epidural steroid injection (Bilateral, 11/8/2021); and Colonoscopy (N/A, 12/8/2021).    Medications:   Jia Vila has a current medication list which includes the following prescription(s): praluent pen, biotin, budesonide, cholecalciferol (vitamin d3), dexamethasone sodium phos (pf), fluarix quad [id number] (pf), fluocinonide,  fluticasone propionate, gabapentin, hydrocortisone, meloxicam, multivit/folic acid/vit k1, neilmed sinus rinse complete, omnipaque 300, pantoprazole, propofol, tobramycin (pf), and turmeric.    Allergies:   Review of patient's allergies indicates:  No Known Allergies               Red Flag Screening:   Cough  Sneeze  Strain: (--)  Bladder/ bowel: (--)  Falls: (--)  Night pain: (--)  Unexplained weight loss: (--)  General health: good    OBJECTIVE     Postural examination/scapula alignment: Rounded shoulder and Head forward  Sitting: poor  Standing: stands slightly flexed and the longer she stands, leans to left  Correction of posture: better with lumbar roll    MOVEMENT LOSS    ROM Loss   Flexion within functional limits touches toes and good curve reversal, reduced pain in legs   Extension moderate loss   Side glide Right moderate loss   Side glide Left moderate loss   Rotation Right moderate loss   Rotation Left moderate loss     Lower Extremity Strength  Right LE  Left LE    Hip flexion: 4+/5 Hip flexion: 4-/5   Hip extension:  4/5 Hip extension: 4/5   Hip abduction: 4/5 Hip abduction: 4/5   Hip adduction:  4/5 Hip adduction:  4-/5   Knee Flexion 4/5 Knee Flexion 3+/5   Knee Extension 4/5 Knee Extension 3+/5   Ankle dorsiflexion: 4/5 Ankle dorsiflexion: 4-/5   Ankle plantarflexion: 4+/5 Ankle plantarflexion: 4-/5       GAIT:  Assistive Device used: none  Level of Assistance: independent  Patient displays the following gait deviations:  increased base of support.     Functional tests:  Sit to stand 8 times without hands  Single leg stance 2 sec right, 6 sec on left  Standing without hands:  0/10 pain initially and at 2 min back and leg pain 5/10    Hips:  SLR 90 degrees bilat  Mobile hips for flexion and rotation    Special Tests:   Test Name  Test Result   Prone Instability Test (+)   SI Joint Provocation Test (--)   Straight Leg Raise (--)   Neural Tension Test (--)   Crossed Straight Leg Raise (--)   Walking on  toes  able with hand held   Walking on heels  Able with hand held       NEUROLOGICAL SCREENING     Sensory deficit: intact    Reflexes:    Left Right   Patella Tendon absent 2+   Achilles Tendon 2+ 2+   Clonus (--) (--)     REPEATED TEST MOVEMENTS:    Baseline symptoms:  No pain at rest  Repeated Flexion in Standing no effect   Repeated Extension in Standing no effect   Repeated Flexion in lying no effect   Repeated Extension in lying  end range pain  pain during motion  no worse back       [facility] 2/3/2022   Visit Number 1   VAS Pain Rating 7   Lumbar Stretches - Slouch Overcorrection 10   Flexion in Lying 10   Lumbar Extension Seat Pad 0   Femur Restraint 5   Top Dead Center 24   Counterweight 160   Lumbar Flexion 36   Lumbar Extension 0   Lumbar Peak Torque 86   Min Torque 27   Test Percent Below Normative Data 49   Ice - Z Lie (in min.) 10       Baseline Isometric Testing on Med X equipment: Testing administered by PT  Date of testin/3/22  ROM 0-36 deg   Max Peak Torque 86    Min Peak Torque 27    Flex/Ext Ratio 3/1   % below normative data -49 %         Limitation/Restriction for FOTO 2/3/22 Survey    Therapist reviewed FOTO scores for Jia Ugalde on 2/3/2022.   FOTO documents entered into [employer] - see Media section.    Limitation Score: 48% limited  Goal:  <40 % limited             Treatment   Treatment Time In: 10:00 am  Treatment Time Out: 10:30 am  Total Treatment time separate from Evaluation: 30 minutes      Jia Vila received therapeutic exercises to develop/improve posture, lumbar ROM, strength and muscular endurance for 30 minutes including the following exercises:     Med x dynamic exercise and baseline IM test        Written Home Exercises Provided:   Stretches:  Flexion in lie 10 reps and lower trunk rotation 10 reps, she may buy ball, taught her with and without ball  Slouch correct 10 reps  Strengthening:  Bridging 10 reps  Sit to stand 10 reps      yes.  Exercises were reviewed  and Jia was able to demonstrate them prior to the end of the session.  Jia demonstrated good  understanding of the education provided.     See EMR under Patient Instructions for exercises provided 2/3/2022.      Education provided:   - Patient received education regarding proper posture and body mechanics.  Patient was given top [facility] Visit 1 handouts which discuss what to expect in therapy, the purpose and opportunity for health coaching, the program,  wellness when discharged from therapy, back education and care specifically for posture seated, standing, lifting correctly, components of exercise, importance of nutrition and hydration, and importance of sleep.   Information on lumbar rolls provided.  - Steven roll tried, recommended, and purchase information was provided.    - Patient received a handout regarding anticipated muscular soreness following the isometric test and strategies for management were reviewed with patient including stretching, using ice and scheduled rest.   - Patient received education on the [facility] program, purpose of the isometric test, progression of back strengthening as well as wellness approach and systemic strengthening.  Details of the program were discussed.  Reviewed that patient should feel support/pressure from med ex restraints but no pain or discomfort and patient expressed understanding.    Jia received cold pack for 10 minutes to low back.    Assessment   Jia Vila is a 61 y.o. female referred to [facility] with a medical diagnosis of   M48.062 (ICD-10-CM) - Spinal stenosis of lumbar region with neurogenic claudication   M51.36 (ICD-10-CM) - DDD (degenerative disc disease), lumbar     Pt presents with back and leg pain, leg dominant, clear flexion/stenosis  pattern with leg symptoms worse with standing, walking and better sitting.  Her function is significantly limited to standing and walking < 5 min before symptoms start and interfere with  function.   She will benefit from therapy and isolated spinal strengthening to assist with recovery of function for ADL's.  We did discuss health coaching to assist with well being and she will consider this.    Pain Pattern: 4 PEN        Pt prognosis is Good.   Pt will benefit from skilled outpatient Physical Therapy to address the deficits stated above and in the chart below, provide pt/family education, and to maximize pt's level of independence. Based on the above history and physical examination an active physical therapy program is recommended.  Pt will continue to benefit from skilled outpatient physical therapy to address the deficits listed below in the chart, provide pt/family education and to maximize pt's level of independence in the home and community environment. .       Plan of care discussed with patient: Yes  Pt's spiritual, cultural and educational needs considered and patient is agreeable to the plan of care and goals as stated below:     Anticipated Barriers for therapy: 2 back surgeries, significant loss of function    PT Evaluation Completed? Yes    Medical necessity is demonstrated by the following problem list.    Pt presents with the following impairments:     History  Co-morbidities and personal factors that may impact the plan of care Co-morbidities:    Back surgery X2  Personal Factors:    Inactive  Unable to stand/walk   high   Examination  Body Structures and Functions, activity limitations and participation restrictions that may impact the plan of care Body Regions:   back  lower extremities    Body Systems:    ROM  strength  motor control  motor learning    Participation Restrictions:        Activity limitations:   Learning and applying knowledge     General Tasks and Commands     Communication  no deficits    Mobility  lifting and carrying objects  fine hand use (grasping/picking up)  walking  moving around using equipment (WC)  using transportation (bus, train, plane, car)  driving  (bike, car, motorcycle)    Self care  washing oneself (bathing, drying, washing hands)  caring for body parts (brushing teeth, shaving, grooming)  toileting  dressing  looking after one's health    Domestic Life  shopping  cooking  doing house work (cleaning house, washing dishes, laundry)    Interactions/Relationships  no deficits    Life Areas  no deficits    Community and Social Life  no deficits         high   Clinical Presentation unstable clinical presentation with unpredictable characteristics high   Decision Making/ Complexity Score: high       GOALS: Pt is in agreement with the following goals.    Short term goals:  6 weeks or 10 visits   1.  Pt will demonstrate increased lumbar ROM by at least 6 degrees from the initial ROM value with improvements noted in functional ROM and ability to perform ADLs.  (approp and ongoing)  2.  Pt will demonstrate increased MedX average isometric strength value  by 20% from initial test resulting in improved ability to perform bending, lifting, and carrying activities safely, confidently.  (approp and ongoing)  3.  Patient report a reduction in worst pain score by 1-2 points for improved tolerance for 8/10 at worst for standing to cook.  (approp and ongoing)  4.  Pt able to perform HEP correctly with minimal cueing or supervision from therapist to encourage independent management of symptoms. (approp and ongoing)      Long term goals: 10 weeks or 20 visits   1. Pt will demonstrate increased lumbar ROM by at least 12 degrees from initial ROM value, resulting in improved ability to perform functional fwd bending while standing and sitting. (approp and ongoing)  2. Pt will demonstrate increased MedX average isometric strength value  by 35% from initial test resulting in improved ability to perform bending, lifting, and carrying activities safely, confidently.  (approp and ongoing)  3. Pt to demonstrate ability to independently control and reduce their pain through posture  "positioning and mechanical movements throughout a typical day.  (approp and ongoing)  4.  Pt will demonstrate reduced pain and improved functional outcomes as reported on the FOTO by reaching a limitation score of < or = 40% or less in order to demonstrate subjective improvement in pt's condition.    (approp and ongoing)  5. Pt will demonstrate independence with the HEP at discharge  (approp and ongoing)  6.  sit to stand 10 times without hands, Single leg stance 5 sec bilat (approp and ongoing)  7. Stand and walk to cook, and grocery shop with pain <6/10  8. Walk from waiting room to healthy back with  Pain < 4/10, walk 10 min in community (approp and ongoing)      Plan   Outpatient physical therapy 2x week for 10 weeks or 20 visits to include the following:   - Patient education  - Therapeutic exercise  - Manual therapy  - Performance testing   - Neuromuscular Re-education  - Therapeutic activity   - Modalities    Pt may be seen by PTA as part of the rehabilitation team.     Therapist: Paty Rodriguez, PT  2/3/2022    "I certify the need for these services furnished under this plan of treatment and while under my care."    ____________________________________  Physician/Referring Practitioner    _______________  Date of Signature            "

## 2022-02-07 ENCOUNTER — SPECIALTY PHARMACY (OUTPATIENT)
Dept: PHARMACY | Facility: CLINIC | Age: 62
End: 2022-02-07
Payer: MEDICARE

## 2022-02-07 NOTE — TELEPHONE ENCOUNTER
[place]oman, this is Ilene Ibarra with [facility].  We are working on your prescription that your doctor has sent us. We will be working with your insurance to get this approved for you. We will be calling you along the way with updates on your medication.  If you have any questions, you can reach us at [phone].    Welcome call outcome: Patient/caregiver reached

## 2022-02-09 ENCOUNTER — HOSPITAL ENCOUNTER (OUTPATIENT)
Dept: CARDIOLOGY | Facility: HOSPITAL | Age: 62
Discharge: HOME | End: 2022-02-09
Attending: INTERNAL MEDICINE
Payer: MEDICARE

## 2022-02-09 DIAGNOSIS — E78.2 MIXED HYPERLIPIDEMIA: ICD-10-CM

## 2022-02-09 DIAGNOSIS — R07.1 CHEST PAIN ON BREATHING: ICD-10-CM

## 2022-02-09 DIAGNOSIS — R07.89 OTHER CHEST PAIN: ICD-10-CM

## 2022-02-09 LAB
AORTIC ROOT ANNULUS: 2.44 CM
AORTIC VALVE CUSP SEPERATION: 1.89 CM
ASCENDING AORTA: 3.11 CM
AV INDEX (PROSTH): 0.78
AV MEAN GRADIENT: 4 MMHG
AV PEAK GRADIENT: 7 MMHG
AV VALVE AREA: 2.33 CM2
AV VELOCITY RATIO: 0.86
CV ECHO LV RWT: 0.56 CM
CV STRESS BASE HR: 62 BPM
DIASTOLIC BLOOD PRESSURE: 87 MMHG
DOP CALC AO PEAK VEL: 1.33 M/S
DOP CALC AO VTI: 32.7 CM
DOP CALC LVOT AREA: 3 CM2
DOP CALC LVOT DIAMETER: 1.95 CM
DOP CALC LVOT PEAK VEL: 1.14 M/S
DOP CALC LVOT STROKE VOLUME: 76.15 CM3
DOP CALCLVOT PEAK VEL VTI: 25.51 CM
E WAVE DECELERATION TIME: 193.97 MSEC
E/A RATIO: 0.82
E/E' RATIO: 15.23 M/S
ECHO LV POSTERIOR WALL: 1.08 CM (ref 0.6–1.1)
EJECTION FRACTION: 55 %
FRACTIONAL SHORTENING: 37 % (ref 28–44)
INTERVENTRICULAR SEPTUM: 1.09 CM (ref 0.6–1.1)
IVRT: 200.69 MSEC
LA MAJOR: 5 CM
LA MINOR: 4.93 CM
LA WIDTH: 4.19 CM
LEFT ATRIUM SIZE: 4.13 CM
LEFT ATRIUM VOLUME: 73.03 CM3
LEFT INTERNAL DIMENSION IN SYSTOLE: 2.42 CM (ref 2.1–4)
LEFT VENTRICLE DIASTOLIC VOLUME: 63.05 ML
LEFT VENTRICLE SYSTOLIC VOLUME: 20.54 ML
LEFT VENTRICULAR INTERNAL DIMENSION IN DIASTOLE: 3.83 CM (ref 3.5–6)
LEFT VENTRICULAR MASS: 133.57 G
LV LATERAL E/E' RATIO: 12.38 M/S
LV SEPTAL E/E' RATIO: 19.8 M/S
MV PEAK A VEL: 1.21 M/S
MV PEAK E VEL: 0.99 M/S
MV STENOSIS PRESSURE HALF TIME: 56.25 MS
MV VALVE AREA P 1/2 METHOD: 3.91 CM2
OHS CV CPX 1 MINUTE RECOVERY HEART RATE: 134 BPM
OHS CV CPX 85 PERCENT MAX PREDICTED HEART RATE MALE: 131
OHS CV CPX ESTIMATED METS: 6
OHS CV CPX MAX PREDICTED HEART RATE: 154
OHS CV CPX PATIENT IS FEMALE: 1
OHS CV CPX PATIENT IS MALE: 0
OHS CV CPX PEAK DIASTOLIC BLOOD PRESSURE: 118 MMHG
OHS CV CPX PEAK HEAR RATE: 166 BPM
OHS CV CPX PEAK RATE PRESSURE PRODUCT: NORMAL
OHS CV CPX PEAK SYSTOLIC BLOOD PRESSURE: 208 MMHG
OHS CV CPX PERCENT MAX PREDICTED HEART RATE ACHIEVED: 108
OHS CV CPX RATE PRESSURE PRODUCT PRESENTING: 8990
PISA TR MAX VEL: 2.46 M/S
PULM VEIN S/D RATIO: 1.54
PV PEAK D VEL: 0.28 M/S
PV PEAK S VEL: 0.43 M/S
PV PEAK VELOCITY: 1.06 CM/S
RA MAJOR: 4.31 CM
RA PRESSURE: 3 MMHG
RA WIDTH: 3.58 CM
RIGHT VENTRICULAR END-DIASTOLIC DIMENSION: 3.18 CM
RV TISSUE DOPPLER FREE WALL SYSTOLIC VELOCITY 1 (APICAL 4 CHAMBER VIEW): 13.62 CM/S
SINUS: 2.87 CM
STJ: 2.08 CM
STRESS ECHO POST EXERCISE DUR MIN: 4 MINUTES
STRESS ECHO POST EXERCISE DUR SEC: 34 SECONDS
SYSTOLIC BLOOD PRESSURE: 145 MMHG
TDI LATERAL: 0.08 M/S
TDI SEPTAL: 0.05 M/S
TDI: 0.07 M/S
TR MAX PG: 24 MMHG
TRICUSPID ANNULAR PLANE SYSTOLIC EXCURSION: 2.12 CM
TV REST PULMONARY ARTERY PRESSURE: 27 MMHG

## 2022-02-09 PROCEDURE — 93325 DOPPLER ECHO COLOR FLOW MAPG: CPT | Mod: 26,HCNC,, | Performed by: INTERNAL MEDICINE

## 2022-02-09 PROCEDURE — 93351 STRESS TTE COMPLETE: CPT | Mod: HCNC

## 2022-02-09 PROCEDURE — 93320 STRESS ECHO (CUPID ONLY): ICD-10-PCS | Mod: 26,HCNC,, | Performed by: INTERNAL MEDICINE

## 2022-02-09 PROCEDURE — 93320 DOPPLER ECHO COMPLETE: CPT | Mod: 26,HCNC,, | Performed by: INTERNAL MEDICINE

## 2022-02-09 PROCEDURE — 93351 STRESS TTE COMPLETE: CPT | Mod: 26,HCNC,, | Performed by: INTERNAL MEDICINE

## 2022-02-09 PROCEDURE — 93325 STRESS ECHO (CUPID ONLY): ICD-10-PCS | Mod: 26,HCNC,, | Performed by: INTERNAL MEDICINE

## 2022-02-09 PROCEDURE — 93351 STRESS ECHO (CUPID ONLY): ICD-10-PCS | Mod: 26,HCNC,, | Performed by: INTERNAL MEDICINE

## 2022-02-10 ENCOUNTER — HOSPITAL ENCOUNTER (OUTPATIENT)
Dept: RADIOLOGY | Facility: HOSPITAL | Age: 62
Discharge: HOME | End: 2022-02-10
Attending: INTERNAL MEDICINE
Payer: MEDICARE

## 2022-02-10 DIAGNOSIS — R13.10 DYSPHAGIA, UNSPECIFIED TYPE: ICD-10-CM

## 2022-02-10 DIAGNOSIS — K22.4 ESOPHAGEAL DYSMOTILITY: ICD-10-CM

## 2022-02-10 DIAGNOSIS — K21.9 GASTROESOPHAGEAL REFLUX DISEASE WITHOUT ESOPHAGITIS: Primary | ICD-10-CM

## 2022-02-10 PROCEDURE — 74220 FL ESOPHAGRAM COMPLETE: ICD-10-PCS | Mod: 26,HCNC,, | Performed by: RADIOLOGY

## 2022-02-10 PROCEDURE — 74220 X-RAY XM ESOPHAGUS 1CNTRST: CPT | Mod: TC,HCNC

## 2022-02-10 PROCEDURE — 74220 X-RAY XM ESOPHAGUS 1CNTRST: CPT | Mod: 26,HCNC,, | Performed by: RADIOLOGY

## 2022-02-10 PROCEDURE — 25500020 PHARM REV CODE 255: Mod: HCNC | Performed by: INTERNAL MEDICINE

## 2022-02-10 PROCEDURE — A9698 NON-RAD CONTRAST MATERIALNOC: HCPCS | Mod: HCNC | Performed by: INTERNAL MEDICINE

## 2022-02-10 RX ADMIN — BARIUM SULFATE 255 ML: 0.6 SUSPENSION ORAL at 08:02

## 2022-02-10 NOTE — PROGRESS NOTES
[facility]      Name: Jia Ugalde  Clinic Number: [id number]    Therapy Diagnosis:   Encounter Diagnoses   Name Primary?    Bad posture Yes    Decreased ROM of trunk and back     Decreased strength of trunk and back      Physician: Ghada Zamora, *    Visit Date: 2022    Physician Orders: PT Eval and Treat    Medical Diagnosis from Referral:   M48.062 (ICD-10-CM) - Spinal stenosis of lumbar region with neurogenic claudication   M51.36 (ICD-10-CM) - DDD (degenerative disc disease), lumbar         Evaluation Date: 2/3/2022  Authorization Period Expiration: 22  Plan of Care Expiration: 22  Reassessment Due: 3/3/22  Visit # / Visits authorized:       Time In: 9:56 am   Time Out: 10:52 am   Total Billable Time: 47 minutes    Precautions:    2 lumbar fusion surgeries with hardware, , and 2020     Pattern of pain determined:  4 PEN leg dominant, intermittent, worse standing/walking, better sitting         Subjective   Jia Vila reports she is hurting today. Her low back and down the back of her legs.      Patient reports tolerating previous visit fairly. She was in pain the day after  Patient reports their pain to be 6/10 on a 0-10 scale with 0 being no pain and 10 being the worst pain imaginable.  Pain Location:Low back and into posterior LE       Pts goals: walk with family, use to walk in  [place], walk for 10-15 min as a starting goal without support     Objective     Baseline Isometric Testing on Med X equipment: Testing administered by PT  Date of testin/3/22  ROM 0-36 deg   Max Peak Torque 86    Min Peak Torque 27    Flex/Ext Ratio 3/1   % below normative data -49 %            Limitation/Restriction for FOTO 2/3/22 Survey     Therapist reviewed FOTO scores for Jia Ugalde on 2/3/2022.   FOTO documents entered into [employer] - see Media section.     Limitation Score: 48% limited  Goal:  <40 % limited             Treatment    Pt was  "instructed in and performed the following:     Jia Vila received therapeutic exercises to develop/improved posture, cardiovascular endurance, muscular endurance, lumbar/cervical ROM, strength and muscular endurance for 56 minutes including the following exercises:     +treadmill 8' 1.2     +standing extension x 10 (feels good)  SOC x 10   DKTC 10 x 10" hold  LTR x 2'  +Supine hip flexor stretch x 1' ea  +Piriformis 2 x 30"  Bridges 2 x 10  SL clams 2 x 10      [facility] 2/11/2022   Visit Number 2   VAS Pain Rating 6   Treadmill Time (in min.) 8   Speed 1.2   Lumbar Stretches - Slouch Overcorrection 10   Extension in Standing 10   Flexion in Lying 10   Lumbar Extension Seat Pad -   Femur Restraint -   Top Dead Center -   Counterweight -   Lumbar Flexion -   Lumbar Extension -   Lumbar Peak Torque -   Min Torque -   Test Percent Below Normative Data -   Lumbar Weight 43   Repetitions 15   Rating of Perceived Exertion 2   Ice - Z Lie (in min.) -         Peripheral muscle strengthening which included 1 set of 15-20 repetitions at a slow, controlled 10-13 second per rep pace focused on strengthening supporting musculature for improved body mechanics and functional mobility.  Pt and therapist focused on proper form during treatment to ensure optimal strengthening of each targeted muscle group.  Machines were utilized including torso rotation, chest press, rowing, biceps, and triceps. Leg extension, leg curl, hip abd, hip add, and leg press added visit 3         Home Exercises Provided and Patient Education Provided   Stretching: SOC, Standing extension, piriformis, hip flexor, DKTC, LTR  Strengthening: teresa dial  Cardio program (V5):  V 5  Lifting education (V11):v 11  Using Lumbar Roll: Pt reports she has one.     Education provided:   -DOMS  - Program progression    Written Home Exercises Provided: Patient instructed to cont prior HEP.  Exercises were reviewed and Jia was able to demonstrate them prior " to the end of the session.  Jia demonstrated good  understanding of the education provided.       See EMR under Patient Instructions for exercises provided 2/3/2022.    Assessment   Jia tolerated first treatment session well. Reviewed HEP and encouraged pt to use lumbar roll.  MedX was performed at 43ft lbs with 15 reps performed at an exertion rating of 2/10. Recommend an increase in resistance next visit. Introduced UE Matrix strengthening without issue.       Patient is making good progress towards established goals.  Pt will continue to benefit from skilled outpatient physical therapy to address the deficits stated in the impairment chart, provide pt/family education and to maximize pt's level of independence in the home and community environment.     Anticipated Barriers for therapy: 2 back surgeries, significant loss of function     Pt's spiritual, cultural and educational needs considered and pt agreeable to plan of care and goals as stated below:         GOALS: Pt is in agreement with the following goals.     Short term goals:  6 weeks or 10 visits   1.  Pt will demonstrate increased lumbar ROM by at least 6 degrees from the initial ROM value with improvements noted in functional ROM and ability to perform ADLs.  (approp and ongoing)  2.  Pt will demonstrate increased MedX average isometric strength value  by 20% from initial test resulting in improved ability to perform bending, lifting, and carrying activities safely, confidently.  (approp and ongoing)  3.  Patient report a reduction in worst pain score by 1-2 points for improved tolerance for 8/10 at worst for standing to cook.  (approp and ongoing)  4.  Pt able to perform HEP correctly with minimal cueing or supervision from therapist to encourage independent management of symptoms. (approp and ongoing)        Long term goals: 10 weeks or 20 visits   1. Pt will demonstrate increased lumbar ROM by at least 12 degrees from initial ROM value, resulting in  improved ability to perform functional fwd bending while standing and sitting. (approp and ongoing)  2. Pt will demonstrate increased MedX average isometric strength value  by 35% from initial test resulting in improved ability to perform bending, lifting, and carrying activities safely, confidently.  (approp and ongoing)  3. Pt to demonstrate ability to independently control and reduce their pain through posture positioning and mechanical movements throughout a typical day.  (approp and ongoing)  4.  Pt will demonstrate reduced pain and improved functional outcomes as reported on the FOTO by reaching a limitation score of < or = 40% or less in order to demonstrate subjective improvement in pt's condition.    (approp and ongoing)  5. Pt will demonstrate independence with the HEP at discharge  (approp and ongoing)  6.  sit to stand 10 times without hands, Single leg stance 5 sec bilat (approp and ongoing)  7. Stand and walk to cook, and grocery shop with pain <6/10  8. Walk from waiting room to healthy back with  Pain < 4/10, walk 10 min in community (approp and ongoing)      Plan   Continue with established Plan of Care towards established PT goals.     Reno Nash, PTA  02/11/2022

## 2022-02-11 ENCOUNTER — CLINICAL SUPPORT (OUTPATIENT)
Dept: REHABILITATION | Facility: HOSPITAL | Age: 62
End: 2022-02-11
Attending: PHYSICAL MEDICINE & REHABILITATION
Payer: MEDICARE

## 2022-02-11 DIAGNOSIS — R29.3 BAD POSTURE: Primary | ICD-10-CM

## 2022-02-11 DIAGNOSIS — R29.898 DECREASED STRENGTH OF TRUNK AND BACK: ICD-10-CM

## 2022-02-11 DIAGNOSIS — M25.69 DECREASED ROM OF TRUNK AND BACK: ICD-10-CM

## 2022-02-11 PROCEDURE — 97110 THERAPEUTIC EXERCISES: CPT | Mod: HCNC,PN,CQ

## 2022-02-14 ENCOUNTER — PATIENT OUTREACH (OUTPATIENT)
Dept: ADMINISTRATIVE | Facility: OTHER | Age: 62
End: 2022-02-14
Payer: MEDICARE

## 2022-02-14 NOTE — PROGRESS NOTES
Health Maintenance Due   Topic Date Due    TETANUS VACCINE  Never done     Updates were requested from care everywhere.  Chart was reviewed for overdue Proactive [facility] ([facility]) topics (CRS, Breast Cancer Screening, Eye exam)  Health Maintenance has been updated.  [facility] immunization registry triggered.  Immunizations were reconciled.

## 2022-02-14 NOTE — PROGRESS NOTES
[facility]      Name: Jia Ugalde  Clinic Number: [id number]    Therapy Diagnosis:   Encounter Diagnoses   Name Primary?    Bad posture Yes    Decreased ROM of trunk and back     Decreased strength of trunk and back      Physician: Ghada Zamora, *    Visit Date: 2/15/2022    Physician Orders: PT Eval and Treat    Medical Diagnosis from Referral:   M48.062 (ICD-10-CM) - Spinal stenosis of lumbar region with neurogenic claudication   M51.36 (ICD-10-CM) - DDD (degenerative disc disease), lumbar         Evaluation Date: 2/3/2022  Authorization Period Expiration: 22  Plan of Care Expiration: 22  Reassessment Due: 3/3/22  Visit # / Visits authorized: 3/ 20      Time In: 7:00 am   Time Out: 8:18 am   Total Billable Time: 40 minutes    Precautions:    2 lumbar fusion surgeries with hardware, , and 2020     Pattern of pain determined:  4 PEN leg dominant, intermittent, worse standing/walking, better sitting         Subjective   Jia Vila reports she is feeling pretty good. She does have pain in her right leg    Patient reports tolerating previous visit fairly. She was in pain the day after  Patient reports their pain to be 6/10 on a 0-10 scale with 0 being no pain and 10 being the worst pain imaginable.  Pain Location:Low back and into posterior LE       Pts goals: walk with family, [place], walk for 10-15 min as a starting goal without support     Objective     Baseline Isometric Testing on Med X equipment: Testing administered by PT  Date of testin/3/22  ROM 0-36 deg   Max Peak Torque 86    Min Peak Torque 27    Flex/Ext Ratio 3/1   % below normative data -49 %            Limitation/Restriction for FOTO 2/3/22 Survey     Therapist reviewed FOTO scores for Jia Ugalde on 2/3/2022.   FOTO documents entered into [employer] - see Media section.     Limitation Score: 48% limited  Goal:  <40 % limited             Treatment    Pt was  "instructed in and performed the following:     Jia Vila received therapeutic exercises to develop/improved posture, cardiovascular endurance, muscular endurance, lumbar/cervical ROM, strength and muscular endurance for 78 minutes including the following exercises:     treadmill 10' 1.2     standing extension x 10   SOC x 10   DKTC 10 x 10" hold  LTR x 2'  Supine hip flexor stretch x 1' ea  Piriformis 2 x 30"  Bridges 2 x 10 + RTB to increase glut activation  SL clams 2 x 10 RTB  +STS 5lb KB x 12    [facility] 2/15/2022   Visit Number 3   VAS Pain Rating 6   Treadmill Time (in min.) 10   Speed 1.2   Lumbar Stretches - Slouch Overcorrection 10   Extension in Standing 10   Flexion in Lying 10   Lumbar Extension Seat Pad -   Femur Restraint -   Top Dead Center -   Counterweight -   Lumbar Flexion 39   Lumbar Extension -   Lumbar Peak Torque -   Min Torque -   Test Percent Below Normative Data -   Lumbar Weight 47   Repetitions 15   Rating of Perceived Exertion 3   Ice - Z Lie (in min.) -       Peripheral muscle strengthening which included 1 set of 15-20 repetitions at a slow, controlled 10-13 second per rep pace focused on strengthening supporting musculature for improved body mechanics and functional mobility.  Pt and therapist focused on proper form during treatment to ensure optimal strengthening of each targeted muscle group.  Machines were utilized including torso rotation, chest press, rowing, biceps, and triceps. Leg extension, leg curl, hip abd, hip add, and leg press added visit 3         Home Exercises Provided and Patient Education Provided   Stretching: SOC, Standing extension, piriformis, hip flexor, DKTC, LTR  Strengthening: teresa dial  Cardio program (V5):  V 5  Lifting education (V11):v 11  Using Lumbar Roll: Pt reports she has one.     Education provided:   -DOMS  - Program progression    Written Home Exercises Provided: Patient instructed to cont prior HEP.  Exercises were reviewed and " Jia was able to demonstrate them prior to the end of the session.  Jia demonstrated good  understanding of the education provided.       See EMR under Patient Instructions for exercises provided 2/3/2022.    Assessment    Jia tolerated session well. Increased resistance wit glut exercises with appropriate muscle fatigue noted. MedX was performed at 47ft lbs with 15 reps performed at an exertion rating of 3/10. Pt was able to increase flexion from 36 to 39 degrees. Matrix LE strengthening introduced without issue.       Patient is making good progress towards established goals.  Pt will continue to benefit from skilled outpatient physical therapy to address the deficits stated in the impairment chart, provide pt/family education and to maximize pt's level of independence in the home and community environment.     Anticipated Barriers for therapy: 2 back surgeries, significant loss of function     Pt's spiritual, cultural and educational needs considered and pt agreeable to plan of care and goals as stated below:       GOALS: Pt is in agreement with the following goals.     Short term goals:  6 weeks or 10 visits   1.  Pt will demonstrate increased lumbar ROM by at least 6 degrees from the initial ROM value with improvements noted in functional ROM and ability to perform ADLs.  (approp and ongoing)  2.  Pt will demonstrate increased MedX average isometric strength value  by 20% from initial test resulting in improved ability to perform bending, lifting, and carrying activities safely, confidently.  (approp and ongoing)  3.  Patient report a reduction in worst pain score by 1-2 points for improved tolerance for 8/10 at worst for standing to cook.  (approp and ongoing)  4.  Pt able to perform HEP correctly with minimal cueing or supervision from therapist to encourage independent management of symptoms. (approp and ongoing)        Long term goals: 10 weeks or 20 visits   1. Pt will demonstrate increased lumbar ROM by  at least 12 degrees from initial ROM value, resulting in improved ability to perform functional fwd bending while standing and sitting. (approp and ongoing)  2. Pt will demonstrate increased MedX average isometric strength value  by 35% from initial test resulting in improved ability to perform bending, lifting, and carrying activities safely, confidently.  (approp and ongoing)  3. Pt to demonstrate ability to independently control and reduce their pain through posture positioning and mechanical movements throughout a typical day.  (approp and ongoing)  4.  Pt will demonstrate reduced pain and improved functional outcomes as reported on the FOTO by reaching a limitation score of < or = 40% or less in order to demonstrate subjective improvement in pt's condition.    (approp and ongoing)  5. Pt will demonstrate independence with the HEP at discharge  (approp and ongoing)  6.  sit to stand 10 times without hands, Single leg stance 5 sec bilat (approp and ongoing)  7. Stand and walk to cook, and grocery shop with pain <6/10  8. Walk from waiting room to healthy back with  Pain < 4/10, walk 10 min in community (approp and ongoing)      Plan   Continue with established Plan of Care towards established PT goals.     Reno Nash, PTA  02/15/2022

## 2022-02-15 ENCOUNTER — OFFICE VISIT (OUTPATIENT)
Dept: GASTROENTEROLOGY | Facility: CLINIC | Age: 62
End: 2022-02-15
Payer: MEDICARE

## 2022-02-15 ENCOUNTER — CLINICAL SUPPORT (OUTPATIENT)
Dept: REHABILITATION | Facility: HOSPITAL | Age: 62
End: 2022-02-15
Attending: PHYSICAL MEDICINE & REHABILITATION
Payer: MEDICARE

## 2022-02-15 VITALS
HEART RATE: 76 BPM | HEIGHT: 64 IN | WEIGHT: 202.25 LBS | SYSTOLIC BLOOD PRESSURE: 166 MMHG | BODY MASS INDEX: 34.53 KG/M2 | DIASTOLIC BLOOD PRESSURE: 87 MMHG

## 2022-02-15 DIAGNOSIS — K22.4 ESOPHAGEAL DYSMOTILITY: ICD-10-CM

## 2022-02-15 DIAGNOSIS — N93.9 VAGINAL BLEEDING: Primary | ICD-10-CM

## 2022-02-15 DIAGNOSIS — M25.69 DECREASED ROM OF TRUNK AND BACK: ICD-10-CM

## 2022-02-15 DIAGNOSIS — R29.898 DECREASED STRENGTH OF TRUNK AND BACK: ICD-10-CM

## 2022-02-15 DIAGNOSIS — K21.9 GASTROESOPHAGEAL REFLUX DISEASE WITHOUT ESOPHAGITIS: ICD-10-CM

## 2022-02-15 DIAGNOSIS — R29.3 BAD POSTURE: Primary | ICD-10-CM

## 2022-02-15 DIAGNOSIS — R13.10 DYSPHAGIA, UNSPECIFIED TYPE: ICD-10-CM

## 2022-02-15 PROCEDURE — 99999 PR PBB SHADOW E&M-EST. PATIENT-LVL V: ICD-10-PCS | Mod: PBBFAC,,, | Performed by: STUDENT IN AN ORGANIZED HEALTH CARE EDUCATION/TRAINING PROGRAM

## 2022-02-15 PROCEDURE — 97110 THERAPEUTIC EXERCISES: CPT | Mod: HCNC,PN,CQ

## 2022-02-15 PROCEDURE — 3008F PR BODY MASS INDEX (BMI) DOCUMENTED: ICD-10-PCS | Mod: CPTII,S$GLB,, | Performed by: STUDENT IN AN ORGANIZED HEALTH CARE EDUCATION/TRAINING PROGRAM

## 2022-02-15 PROCEDURE — 1159F PR MEDICATION LIST DOCUMENTED IN MEDICAL RECORD: ICD-10-PCS | Mod: CPTII,S$GLB,, | Performed by: STUDENT IN AN ORGANIZED HEALTH CARE EDUCATION/TRAINING PROGRAM

## 2022-02-15 PROCEDURE — 1159F MED LIST DOCD IN RCRD: CPT | Mod: CPTII,S$GLB,, | Performed by: STUDENT IN AN ORGANIZED HEALTH CARE EDUCATION/TRAINING PROGRAM

## 2022-02-15 PROCEDURE — 99215 OFFICE O/P EST HI 40 MIN: CPT | Mod: PBBFAC | Performed by: STUDENT IN AN ORGANIZED HEALTH CARE EDUCATION/TRAINING PROGRAM

## 2022-02-15 PROCEDURE — 99204 OFFICE O/P NEW MOD 45 MIN: CPT | Mod: S$GLB,,, | Performed by: STUDENT IN AN ORGANIZED HEALTH CARE EDUCATION/TRAINING PROGRAM

## 2022-02-15 PROCEDURE — 3008F BODY MASS INDEX DOCD: CPT | Mod: CPTII,S$GLB,, | Performed by: STUDENT IN AN ORGANIZED HEALTH CARE EDUCATION/TRAINING PROGRAM

## 2022-02-15 PROCEDURE — 99204 PR OFFICE/OUTPT VISIT, NEW, LEVL IV, 45-59 MIN: ICD-10-PCS | Mod: S$GLB,,, | Performed by: STUDENT IN AN ORGANIZED HEALTH CARE EDUCATION/TRAINING PROGRAM

## 2022-02-15 PROCEDURE — 99999 PR PBB SHADOW E&M-EST. PATIENT-LVL V: CPT | Mod: PBBFAC,,, | Performed by: STUDENT IN AN ORGANIZED HEALTH CARE EDUCATION/TRAINING PROGRAM

## 2022-02-15 NOTE — PATIENT INSTRUCTIONS
Take pantoprazole 40mg, 30 mins before meals     Take pepcid (famotidine) at bedtime     Stop mobic if possible

## 2022-02-15 NOTE — PROGRESS NOTES
[facility] Consultation Note    Reason for Consult:  Dysphagia     PCP:   Reyna Umanzor   [address] / [place] [id number]    Referring MD:  Reyna Umanzor Md  [address]  [address]  [place] [address]    HPI:  This is a 61 y.o. female here for evaluation of GERD and Dysphagia to pills. Patient has a long history of reflux and was on PPI 40mg daily for a few years. She was recommended to try to come off of this medication due to concerns for long term side effects and currently takes this as needed. She has noted for the past 3-4 weeks worsening burning throat pain, nocturnal regurgitation and trouble swallowing pills. She has not noticed any trouble with solids or liquids. She does not eat a lot of meat and has not noted any issues with course solids. No hematemesis or melena. Weight and appetite are stable. Takes a Rolaids when the symptoms are bad. She has been having to cut her pills in 1/2 for past few weeks or she has issues swallowing them. She has had a few episodes of pain after swallowing her pills which lasted several minutes. Episode of emesis was associated with this which was NBNB. She takes mobic daily at night. She had an esophagram 2/10 which showed evidence of reflux to the distal thoracic esophagus and esophageal dysmotility. Also noted minimal indentations from cervical osteophytes. Most recent blood work from 12/20201 showed a normal TSH, CBC and CMP. She is on PPI 40mg daily and noted to have mobic on her med list. No prior EGDs.     Last cscope 12/2021 for CRC surveillance showed hemorrhoids. Recommended to repeat in 7 years. No FH of gastric or colon cancer. She does admit to prolonged sitting on the toilet on her phone. Denied that stools are hard to pass or require straining. Denied blood in stool. She has noted vaginal bleeding however and is post hysterectomy. She is requesting a referral to GYN.      Of note, she was seen in cardiology  clinic 1/26 for chest pain. She had an exercise stress test 2/10 which showed EF 55% and test was stopped due to fatigue. However, no arrthymias noted. Negative for myocardial ischemia . She states that the pain she has felt on occasion with swallowing pills is different from this pain.     ROS:  Constitutional: No fevers, chills, No weight loss  ENT: No allergies  CV: No chest pain  Pulm: No cough, No shortness of breath  Ophtho: No vision changes  GI: see HPI  Derm: No rash  Heme: No lymphadenopathy, No bruising  MSK: No arthritis  : No dysuria, No hematuria  Endo: No hot or cold intolerance  Neuro: No syncope, No seizure  Psych: No anxiety, No depression    Medical History:  has a past medical history of GERD (gastroesophageal reflux disease) and Hyperlipidemia.    Surgical History:  has a past surgical history that includes Spine surgery; Hysterectomy; Tubal ligation; Breast surgery; Hemorrhoid surgery; Breast biopsy; Epidural steroid injection (Bilateral, 11/8/2021); and Colonoscopy (N/A, 12/8/2021).    Family History: family history is not on file..        Social History:  reports that she has never smoked. She has never used smokeless tobacco. She reports that she does not drink alcohol and does not use drugs.    Review of patient's allergies indicates:  No Known Allergies    Current Outpatient Rx   Medication Sig Dispense Refill    biotin 10,000 mcg Cap Take by mouth.      budesonide 1 mg/2 mL NbSp EMPTY CONTENTS OF 1 RESPULE INTO NASAL IRRIGATION SYSTEM, ADD DISTILLED WATER, SALT PACK, MIX & IRRIGATE. PERFORM TWICE DAILY      cholecalciferol, vitamin D3, capsule/tablet Take by mouth once daily.      fluocinonide (LIDEX) 0.05 % external solution apply 1ml TO SCALP ONCE TO twice daily AS DIRECTED      fluticasone propionate (FLONASE) 50 mcg/actuation nasal spray 1 spray (50 mcg total) by Each Nostril route once daily. 16 g 3    gabapentin (NEURONTIN) 300 MG capsule TAKE 1 TABLET BY MOUTH 3 TIMES A DAY  "AS NEEDED 180 capsule 0    meloxicam (MOBIC) 15 MG tablet Take 1 tablet (15 mg total) by mouth once daily. 90 tablet 1    multivit/folic acid/vit K1 (ONE-A-DAY WOMEN'S 50 PLUS ORAL) Take by mouth.      NEILMED SINUS RINSE COMPLETE pkdv use as directed      pantoprazole (PROTONIX) 40 MG tablet TAKE 1 TABLET BY MOUTH once DAILY 90 tablet 1    tobramycin, PF, (OPHELIA) 300 mg/5 mL nebulizer solution EMPTY CONTENTS OF 1 AMPULE INTO NASAL IRRIGATION SYSTEM, ADD DISTILLED WATER, SALT PACK, MIX & IRRIGATE. PERFORM 2 TIMES DAILY      TURMERIC ORAL Take 500 mg by mouth.      alirocumab (PRALUENT PEN) 75 mg/mL PnIj Inject 1 mL (75 mg total) into the skin every 14 (fourteen) days. (Patient not taking: Reported on 2/15/2022) 2 each 11    dexamethasone sodium phosp/PF (DEXAMETHASONE SODIUM PHOS, PF,) 10 mg/mL Soln       FLUARIX QUAD [id number], PF, 60 mcg (15 mcg x 4)/0.5 mL Syrg       hydrocortisone (ANUSOL-HC) 2.5 % rectal cream Place rectally 2 (two) times daily. (Patient not taking: No sig reported) 30 g 1    OMNIPAQUE 300 300 mg iodine/mL injection       propofoL (DIPRIVAN) 10 mg/mL infusion          Objective Findings:    Vital Signs:  Pulse 76   Ht 5' 4" (1.626 m)   Wt 91.7 kg (202 lb 4.4 oz)   BMI 34.72 kg/m²   Body mass index is 34.72 kg/m².    Physical Exam:  General Appearance: Well appearing in no acute distress  Head:   Normocephalic, without obvious abnormality  Eyes:    No scleral icterus, EOMI  ENT: Neck supple, Lips, mucosa, and tongue normal; teeth and gums normal  Lungs: CTA bilaterally in anterior and posterior fields, no wheezes, no crackles.  Heart:  Regular rate and rhythm, S1, S2 normal, no murmurs heard  Abdomen: Soft, non tender, non distended with positive bowel sounds in all four quadrants. No hepatosplenomegaly, ascites, or mass  Extremities: 2+ pulses, no clubbing, cyanosis or edema  Skin: No rash  Neurologic: CN II-XII intact      Labs:  Lab Results   Component Value Date    WBC 5.49 " 12/02/2021    HGB 12.8 12/02/2021    HCT 40.5 12/02/2021     12/02/2021    CHOL 307 (H) 12/02/2021    TRIG 88 12/02/2021    HDL 62 12/02/2021    ALT 17 12/02/2021    AST 22 12/02/2021     12/02/2021    K 4.2 12/02/2021     12/02/2021    CREATININE 0.9 12/02/2021    BUN 13 12/02/2021    CO2 25 12/02/2021    TSH 2.270 12/02/2021    HGBA1C 5.5 03/12/2021       Imaging:    Esophagram 2/10/22   FINDINGS:  There are small cervical osteophytes minimally indenting the posterior cervical esophagus.  Cervical esophagus otherwise appears grossly unremarkable.  The thoracic esophagus is normal in caliber without evidence for ulceration or stricture formation.  There is mild esophageal dysmotility present with proximal escape of a small amount of contrast material during primary peristalsis with mild tertiary contractions present.  There is no evidence for hiatal hernia.  There was gastroesophageal reflux elicited into the mid and distal thirds of the thoracic esophagus.  The patient swallowed a 12 mm barium tablet without difficulty.     Impression:     Mild esophageal dysmotility.     Small amount of gastroesophageal reflux elicited extending to the mid to distal thirds of the thoracic esophagus.     Minimal indentations upon the posterior cervical esophagus by small cervical osteophytes.     Endoscopy:      Cscope 12/2021   Findings:        Hemorrhoids were found on perianal exam.        The colon (entire examined portion) appeared normal.        Internal hemorrhoids were found during retroflexion. The hemorrhoids        were Grade I (internal hemorrhoids that do not prolapse).     Assessment:    1. Dysphagia to pills   2. GERD   3. NSAID use   4. Hx of colon polyps   5. Hemorrhoids     Patient with worsening reflux symptoms and dysphagia to pills over the past one month in the setting of chronic NSAID use. Likely esophagitis, NSAID related gastropathy. Plan for EGD for further evaluation with biopsies.  Recommended to resume daily PPI use and we discussed appropriate admin. Start pepcid at night. Further plan based on results of EGD. May need manometry. Recent esophagram showed no mass or stenosis but did show reflux and esophageal dysmotility.      Recommendations:    - Resume PPI 40mg daily, taken 30 mins before meals   - Pepcid at night   - Avoid NSAIDs, she will stop taking mobic at night   - EGD with biopsies   - Further plan based on results of above   - Cscope in 2028 for CRC surveillance     Follow up for after endoscopy.      Order summary:  Orders Placed This Encounter    Ambulatory referral/consult to Gynecology    Case Request Endoscopy: EGD (ESOPHAGOGASTRODUODENOSCOPY)         Thank you so much for allowing me to participate in the care of Jia Joseph MD  Gastroenterology   [facility]

## 2022-02-16 ENCOUNTER — PATIENT MESSAGE (OUTPATIENT)
Dept: ENDOSCOPY | Facility: HOSPITAL | Age: 62
End: 2022-02-16
Payer: MEDICARE

## 2022-02-16 ENCOUNTER — TELEPHONE (OUTPATIENT)
Dept: ENDOSCOPY | Facility: HOSPITAL | Age: 62
End: 2022-02-16
Payer: MEDICARE

## 2022-02-16 NOTE — TELEPHONE ENCOUNTER
----- Message from Danelle Hamilton sent at 2/16/2022  2:04 PM CST -----  Type: Patient Call Back    Who called: self    What is the request in detail: patient would like to know if she need to stop taking the gabapentin (NEURONTIN) 300 MG capsule or meloxicam (MOBIC) 15 MG tablet. Please call    Can the clinic reply by [facility]? no     Would the patient rather a call back or a response via [facility]?  call    Best call back number: .[phone] (home)

## 2022-02-17 ENCOUNTER — PATIENT MESSAGE (OUTPATIENT)
Dept: ENDOSCOPY | Facility: HOSPITAL | Age: 62
End: 2022-02-17
Payer: MEDICARE

## 2022-02-17 ENCOUNTER — CLINICAL SUPPORT (OUTPATIENT)
Dept: REHABILITATION | Facility: HOSPITAL | Age: 62
End: 2022-02-17
Attending: PHYSICAL MEDICINE & REHABILITATION
Payer: MEDICARE

## 2022-02-17 DIAGNOSIS — M25.69 DECREASED ROM OF TRUNK AND BACK: ICD-10-CM

## 2022-02-17 DIAGNOSIS — R29.3 BAD POSTURE: Primary | ICD-10-CM

## 2022-02-17 DIAGNOSIS — R29.898 DECREASED STRENGTH OF TRUNK AND BACK: ICD-10-CM

## 2022-02-17 PROCEDURE — 97110 THERAPEUTIC EXERCISES: CPT | Mod: HCNC,PN,CQ

## 2022-02-17 NOTE — TELEPHONE ENCOUNTER
MD Tammi Krishnamurthy MA  Caller: Unspecified (Yesterday,  3:12 PM)  Yes stop mobic but gabapentin is ok

## 2022-02-17 NOTE — TELEPHONE ENCOUNTER
----- Message from Danelle Hamilton sent at 2/16/2022  2:04 PM CST -----  Type: Patient Call Back    Who called: self    What is the request in detail: patient would like to know if she need to stop taking the gabapentin (NEURONTIN) 300 MG capsule or meloxicam (MOBIC) 15 MG tablet. Please call    Can the clinic reply by [facility]? no     Would the patient rather a call back or a response via [facility]?  call    Best call back number: .[phone] (home)

## 2022-02-17 NOTE — PROGRESS NOTES
"[facility]      Name: Jia Ugalde  Clinic Number: [id number]    Therapy Diagnosis:   Encounter Diagnoses   Name Primary?    Bad posture Yes    Decreased ROM of trunk and back     Decreased strength of trunk and back      Physician: Ghada Zamora, *    Visit Date: 2022    Physician Orders: PT Eval and Treat    Medical Diagnosis from Referral:   M48.062 (ICD-10-CM) - Spinal stenosis of lumbar region with neurogenic claudication   M51.36 (ICD-10-CM) - DDD (degenerative disc disease), lumbar         Evaluation Date: 2/3/2022  Authorization Period Expiration: 22  Plan of Care Expiration: 22  Reassessment Due: 3/3/22  Visit # / Visits authorized:       Time In: 0900 am   Time Out: 1030am   Total Billable Time: 90 minutes    Precautions:    2 lumbar fusion surgeries with hardware, , and 2020     Pattern of pain determined:  4 PEN leg dominant, intermittent, worse standing/walking, better sitting         Subjective   Jia Vila reports she's had a "lasting" pain since the start of physical therapy. She describes pain as sore and aching.     Patient reports tolerating previous visit fairly. She was in pain the day after  Patient reports their pain to be 10/10 on a 0-10 scale with 0 being no pain and 10 being the worst pain imaginable.  Pain Location:Low back and into posterior LE       Pts goals: walk with family, use to walk in [place], walk for 10-15 min as a starting goal without support     Objective     Baseline Isometric Testing on Med X equipment: Testing administered by PT  Date of testin/3/22  ROM 0-36 deg   Max Peak Torque 86    Min Peak Torque 27    Flex/Ext Ratio 3/1   % below normative data -49 %            Limitation/Restriction for FOTO 2/3/22 Survey     Therapist reviewed FOTO scores for Jia Ugalde on 2/3/2022.   FOTO documents entered into [employer] - see Media section.     Limitation Score: 48% limited  Goal:  <40 % " "limited             Treatment    Pt was instructed in and performed the following:     Jia Vila received therapeutic exercises to develop/improved posture, cardiovascular endurance, muscular endurance, lumbar/cervical ROM, strength and muscular endurance for 78 minutes including the following exercises:     Treadmill 10' 1.2     Standing extension x 10   SOC x 10   SKTC 10x/ea  DKTC 10 x 10" hold-NP  LTR x 2'  Supine hip flexor stretch w/ strap x 1' ea  Piriformis 2 x 30"  Bridges 2 x 10 + RTB to increase glut activation  SL clams 2 x 10 RTB   STS 5lb KB x 12  [facility] 2/17/2022   Visit Number -   VAS Pain Rating -   Treadmill Time (in min.) -   Speed 1.2   Lumbar Stretches - Slouch Overcorrection 10   Extension in Standing 10   Flexion in Lying 10   Lumbar Extension Seat Pad -   Femur Restraint -   Top Dead Center -   Counterweight -   Lumbar Flexion -   Lumbar Extension -   Lumbar Peak Torque -   Min Torque -   Test Percent Below Normative Data -   Lumbar Weight 47   Repetitions 18   Rating of Perceived Exertion 4   Ice - Z Lie (in min.) 10     Peripheral muscle strengthening which included 1 set of 15-20 repetitions at a slow, controlled 10-13 second per rep pace focused on strengthening supporting musculature for improved body mechanics and functional mobility.  Pt and therapist focused on proper form during treatment to ensure optimal strengthening of each targeted muscle group.  Machines were utilized including torso rotation, chest press, rowing, biceps, and triceps. Leg extension, leg curl, hip abd, hip add, and leg press added visit 3         Home Exercises Provided and Patient Education Provided   Stretching: SOC, Standing extension, piriformis, hip flexor, DKTC, LTR  Strengthening: teresa dial  Cardio program (V5):  V 5  Lifting education (V11):v 11  Using Lumbar Roll: Pt reports she has one.     Education provided:   -DOMS  - Program progression    Written Home Exercises Provided: Patient " instructed to cont prior HEP.  Exercises were reviewed and Jia was able to demonstrate them prior to the end of the session.  Jia demonstrated good  understanding of the education provided.       See EMR under Patient Instructions for exercises provided 2/3/2022.    Assessment    Jia tolerated session well with min to moderate c/o pain. Continued with therex from previous session with the addition of SKTC. MedX was performed at 47ft lbs with 18 reps performed at an exertion rating of 4/10. Pt encouraged to be more compliant with HEP. Will educate pt on and distribute cardio program NV.       Patient is making good progress towards established goals.  Pt will continue to benefit from skilled outpatient physical therapy to address the deficits stated in the impairment chart, provide pt/family education and to maximize pt's level of independence in the home and community environment.     Anticipated Barriers for therapy: 2 back surgeries, significant loss of function     Pt's spiritual, cultural and educational needs considered and pt agreeable to plan of care and goals as stated below:       GOALS: Pt is in agreement with the following goals.     Short term goals:  6 weeks or 10 visits   1.  Pt will demonstrate increased lumbar ROM by at least 6 degrees from the initial ROM value with improvements noted in functional ROM and ability to perform ADLs.  (approp and ongoing)  2.  Pt will demonstrate increased MedX average isometric strength value  by 20% from initial test resulting in improved ability to perform bending, lifting, and carrying activities safely, confidently.  (approp and ongoing)  3.  Patient report a reduction in worst pain score by 1-2 points for improved tolerance for 8/10 at worst for standing to cook.  (approp and ongoing)  4.  Pt able to perform HEP correctly with minimal cueing or supervision from therapist to encourage independent management of symptoms. (approp and ongoing)        Long term  goals: 10 weeks or 20 visits   1. Pt will demonstrate increased lumbar ROM by at least 12 degrees from initial ROM value, resulting in improved ability to perform functional fwd bending while standing and sitting. (approp and ongoing)  2. Pt will demonstrate increased MedX average isometric strength value  by 35% from initial test resulting in improved ability to perform bending, lifting, and carrying activities safely, confidently.  (approp and ongoing)  3. Pt to demonstrate ability to independently control and reduce their pain through posture positioning and mechanical movements throughout a typical day.  (approp and ongoing)  4.  Pt will demonstrate reduced pain and improved functional outcomes as reported on the FOTO by reaching a limitation score of < or = 40% or less in order to demonstrate subjective improvement in pt's condition.    (approp and ongoing)  5. Pt will demonstrate independence with the HEP at discharge  (approp and ongoing)  6.  sit to stand 10 times without hands, Single leg stance 5 sec bilat (approp and ongoing)  7. Stand and walk to cook, and grocery shop with pain <6/10  8. Walk from waiting room to healthy back with  Pain < 4/10, walk 10 min in community (approp and ongoing)      Plan   Continue with established Plan of Care towards established PT goals.     Diana Walsh, PTA  02/17/2022

## 2022-02-17 NOTE — TELEPHONE ENCOUNTER
MD Tammi Krishnamurthy MA  Caller: Unspecified (Yesterday,  3:12 PM)  Yes stop mobic but gabapentin is ok   Patient informed.

## 2022-02-18 ENCOUNTER — ANESTHESIA EVENT (OUTPATIENT)
Dept: ENDOSCOPY | Facility: HOSPITAL | Age: 62
End: 2022-02-18
Payer: MEDICARE

## 2022-02-18 ENCOUNTER — ANESTHESIA (OUTPATIENT)
Dept: ENDOSCOPY | Facility: HOSPITAL | Age: 62
End: 2022-02-18
Payer: MEDICARE

## 2022-02-18 ENCOUNTER — HOSPITAL ENCOUNTER (OUTPATIENT)
Facility: HOSPITAL | Age: 62
Discharge: HOME | End: 2022-02-18
Attending: INTERNAL MEDICINE | Admitting: INTERNAL MEDICINE
Payer: MEDICARE

## 2022-02-18 VITALS
DIASTOLIC BLOOD PRESSURE: 94 MMHG | HEART RATE: 82 BPM | WEIGHT: 200 LBS | TEMPERATURE: 97 F | RESPIRATION RATE: 18 BRPM | OXYGEN SATURATION: 98 % | BODY MASS INDEX: 35.44 KG/M2 | HEIGHT: 63 IN | SYSTOLIC BLOOD PRESSURE: 158 MMHG

## 2022-02-18 DIAGNOSIS — K21.9 GASTROESOPHAGEAL REFLUX DISEASE WITHOUT ESOPHAGITIS: Primary | ICD-10-CM

## 2022-02-18 DIAGNOSIS — R13.10 DYSPHAGIA: ICD-10-CM

## 2022-02-18 PROBLEM — K44.9 HIATAL HERNIA: Status: ACTIVE | Noted: 2022-02-18

## 2022-02-18 LAB
CTP QC/QA: YES
SARS-COV-2 AG RESP QL IA.RAPID: NEGATIVE

## 2022-02-18 PROCEDURE — 27201012 HC FORCEPS, HOT/COLD, DISP: Mod: HCNC | Performed by: INTERNAL MEDICINE

## 2022-02-18 PROCEDURE — 25000003 PHARM REV CODE 250: Mod: HCNC | Performed by: INTERNAL MEDICINE

## 2022-02-18 PROCEDURE — 88305 TISSUE EXAM BY PATHOLOGIST: ICD-10-PCS | Mod: 26,HCNC,, | Performed by: STUDENT IN AN ORGANIZED HEALTH CARE EDUCATION/TRAINING PROGRAM

## 2022-02-18 PROCEDURE — 37000009 HC ANESTHESIA EA ADD 15 MINS: Mod: HCNC | Performed by: INTERNAL MEDICINE

## 2022-02-18 PROCEDURE — 63600175 PHARM REV CODE 636 W HCPCS: Mod: HCNC | Performed by: STUDENT IN AN ORGANIZED HEALTH CARE EDUCATION/TRAINING PROGRAM

## 2022-02-18 PROCEDURE — 88305 TISSUE EXAM BY PATHOLOGIST: CPT | Mod: 26,HCNC,, | Performed by: STUDENT IN AN ORGANIZED HEALTH CARE EDUCATION/TRAINING PROGRAM

## 2022-02-18 PROCEDURE — 25000003 PHARM REV CODE 250: Mod: HCNC | Performed by: STUDENT IN AN ORGANIZED HEALTH CARE EDUCATION/TRAINING PROGRAM

## 2022-02-18 PROCEDURE — 43239 EGD BIOPSY SINGLE/MULTIPLE: CPT | Mod: HCNC,GC,, | Performed by: INTERNAL MEDICINE

## 2022-02-18 PROCEDURE — E9220 PRA ENDO ANESTHESIA: HCPCS | Mod: HCNC,,, | Performed by: STUDENT IN AN ORGANIZED HEALTH CARE EDUCATION/TRAINING PROGRAM

## 2022-02-18 PROCEDURE — 43239 PR EGD, FLEX, W/BIOPSY, SGL/MULTI: ICD-10-PCS | Mod: HCNC,GC,, | Performed by: INTERNAL MEDICINE

## 2022-02-18 PROCEDURE — E9220 PRA ENDO ANESTHESIA: ICD-10-PCS | Mod: HCNC,,, | Performed by: STUDENT IN AN ORGANIZED HEALTH CARE EDUCATION/TRAINING PROGRAM

## 2022-02-18 PROCEDURE — 43239 EGD BIOPSY SINGLE/MULTIPLE: CPT | Mod: HCNC | Performed by: INTERNAL MEDICINE

## 2022-02-18 PROCEDURE — 88305 TISSUE EXAM BY PATHOLOGIST: CPT | Mod: HCNC | Performed by: STUDENT IN AN ORGANIZED HEALTH CARE EDUCATION/TRAINING PROGRAM

## 2022-02-18 PROCEDURE — 37000008 HC ANESTHESIA 1ST 15 MINUTES: Mod: HCNC | Performed by: INTERNAL MEDICINE

## 2022-02-18 RX ORDER — SODIUM CHLORIDE 9 MG/ML
INJECTION, SOLUTION INTRAVENOUS CONTINUOUS
Status: DISCONTINUED | OUTPATIENT
Start: 2022-02-18 | End: 2022-02-18 | Stop reason: HOSPADM

## 2022-02-18 RX ORDER — FENTANYL CITRATE 50 UG/ML
INJECTION, SOLUTION INTRAMUSCULAR; INTRAVENOUS
Status: DISCONTINUED | OUTPATIENT
Start: 2022-02-18 | End: 2022-02-18

## 2022-02-18 RX ORDER — PROPOFOL 10 MG/ML
VIAL (ML) INTRAVENOUS CONTINUOUS PRN
Status: DISCONTINUED | OUTPATIENT
Start: 2022-02-18 | End: 2022-02-18

## 2022-02-18 RX ORDER — PROPOFOL 10 MG/ML
VIAL (ML) INTRAVENOUS
Status: DISCONTINUED | OUTPATIENT
Start: 2022-02-18 | End: 2022-02-18

## 2022-02-18 RX ORDER — LIDOCAINE HCL/PF 100 MG/5ML
SYRINGE (ML) INTRAVENOUS
Status: DISCONTINUED | OUTPATIENT
Start: 2022-02-18 | End: 2022-02-18

## 2022-02-18 RX ADMIN — FENTANYL CITRATE 25 MCG: 50 INJECTION, SOLUTION INTRAMUSCULAR; INTRAVENOUS at 02:02

## 2022-02-18 RX ADMIN — FENTANYL CITRATE 50 MCG: 50 INJECTION, SOLUTION INTRAMUSCULAR; INTRAVENOUS at 02:02

## 2022-02-18 RX ADMIN — Medication 100 MG: at 02:02

## 2022-02-18 RX ADMIN — PROPOFOL 125 MCG/KG/MIN: 10 INJECTION, EMULSION INTRAVENOUS at 02:02

## 2022-02-18 RX ADMIN — PROPOFOL 40 MG: 10 INJECTION, EMULSION INTRAVENOUS at 02:02

## 2022-02-18 RX ADMIN — SODIUM CHLORIDE: 0.9 INJECTION, SOLUTION INTRAVENOUS at 01:02

## 2022-02-18 RX ADMIN — GLYCOPYRROLATE 0.2 MG: 0.2 INJECTION, SOLUTION INTRAMUSCULAR; INTRAVITREAL at 02:02

## 2022-02-18 RX ADMIN — PROPOFOL 20 MG: 10 INJECTION, EMULSION INTRAVENOUS at 02:02

## 2022-02-18 NOTE — ANESTHESIA PREPROCEDURE EVALUATION
02/18/2022  Jia Ugalde is a 61 y.o., female.  Past Medical History:   Diagnosis Date    GERD (gastroesophageal reflux disease)     Hyperlipidemia      Past Surgical History:   Procedure Laterality Date    BREAST BIOPSY      BREAST SURGERY      COLONOSCOPY N/A 12/8/2021    Procedure: COLONOSCOPY;  Surgeon: Caitlin Joseph MD;  Location: [facility] ENDO;  Service: Endoscopy;  Laterality: N/A;  fully vaccinated-GT    pt states had polyps    EPIDURAL STEROID INJECTION Bilateral 11/8/2021    Procedure: Bilateral L5 Transforaminal Epidural Steroid Injection;  Surgeon: Keron Cristina Jr., MD;  Location: [facility] ENDO;  Service: Pain Management;  Laterality: Bilateral;  Arrive @ 1030 (requested 10); No ATC or DM; Vacc.    HEMORRHOID SURGERY      HYSTERECTOMY      SPINE SURGERY      TUBAL LIGATION           Anesthesia Evaluation    I have reviewed the Patient Summary Reports.   I have reviewed the NPO Status.   I have reviewed the Medications.     Review of Systems  Anesthesia Hx:  No problems with previous Anesthesia Denies Hx of Anesthetic complications  Neg history of prior surgery. Denies Family Hx of Anesthesia complications.   Denies Personal Hx of Anesthesia complications.   Social:  Non-Smoker, Social Alcohol Use    Hematology/Oncology:  Hematology Normal   Oncology Normal     EENT/Dental:EENT/Dental Normal   Cardiovascular:   Exercise tolerance: good  Denies Angina. hyperlipidemia        Pulmonary:  Pulmonary Normal    Hepatic/GI:   GERD    Musculoskeletal:   Arthritis     Neurological:   Neuromuscular Disease, DDD, lumbar radiculopathy   Endocrine:  Endocrine Normal    Dermatological:  Skin Normal    Psych:  Psychiatric Normal           Physical Exam  General:  Well nourished    Airway/Jaw/Neck:  Airway Findings: Mouth Opening: Normal Tongue: Normal  General Airway Assessment: Adult  Mallampati:  III  Improves to II with phonation.  TM Distance: Normal, at least 6 cm        Eyes/Ears/Nose:  EYES/EARS/NOSE FINDINGS: Normal   Dental:  Dental Findings: In tact    Chest/Lungs:  Chest/Lungs Clear    Heart/Vascular:  Heart Findings: Normal       Mental Status:  Mental Status Findings: Normal        Anesthesia Plan  Type of Anesthesia, risks & benefits discussed:  Anesthesia Type:  general    Patient's Preference:   Plan Factors:          Intra-op Monitoring Plan: standard ASA monitors  Intra-op Monitoring Plan Comments:   Post Op Pain Control Plan: multimodal analgesia and per primary service following discharge from PACU  Post Op Pain Control Plan Comments:     Induction:   IV  Beta Blocker:  Patient is not currently on a Beta-Blocker (No further documentation required).       Informed Consent: Patient understands risks and agrees with Anesthesia plan.  Questions answered. Anesthesia consent signed with patient.  ASA Score: 2     Day of Surgery Review of History & Physical:    H&P update referred to the surgeon.         Ready For Surgery From Anesthesia Perspective.

## 2022-02-18 NOTE — H&P
Short Stay Endoscopy History and Physical    PCP - Reyna Umanzor MD  Referring Physician - Caitlin Joseph MD  [address] [place] [address]    Procedure - Endoscopy  ASA - per anesthesia  Mallampati - per anesthesia  History of Anesthesia problems - no  Family history Anesthesia problems -  no   Plan of anesthesia - General    HPI  61 y.o. female  Reason for procedure:   Dysphagia  GERD  Esophageal motility on esophagram  EGD with biopsies     ROS:  Constitutional: No fevers, chills, No weight loss  CV: No chest pain  Pulm: No cough, No shortness of breath  GI: see HPI    Medical History:  has a past medical history of GERD (gastroesophageal reflux disease) and Hyperlipidemia.    Surgical History:  has a past surgical history that includes Spine surgery; Hysterectomy; Tubal ligation; Breast surgery; Hemorrhoid surgery; Breast biopsy; Epidural steroid injection (Bilateral, 11/8/2021); and Colonoscopy (N/A, 12/8/2021).    Family History: family history is not on file..    Social History:  reports that she has never smoked. She has never used smokeless tobacco. She reports that she does not drink alcohol and does not use drugs.    Review of patient's allergies indicates:  No Known Allergies    Medications:   Medications Prior to Admission   Medication Sig Dispense Refill Last Dose    alirocumab (PRALUENT PEN) 75 mg/mL PnIj Inject 1 mL (75 mg total) into the skin every 14 (fourteen) days. (Patient not taking: Reported on 2/15/2022) 2 each 11     biotin 10,000 mcg Cap Take by mouth.       budesonide 1 mg/2 mL NbSp EMPTY CONTENTS OF 1 RESPULE INTO NASAL IRRIGATION SYSTEM, ADD DISTILLED WATER, SALT PACK, MIX & IRRIGATE. PERFORM TWICE DAILY       cholecalciferol, vitamin D3, capsule/tablet Take by mouth once daily.       dexamethasone sodium phosp/PF (DEXAMETHASONE SODIUM PHOS, PF,) 10 mg/mL Soln        FLUARIX QUAD 5139-4158, PF, 60 mcg (15 mcg x 4)/0.5 mL Syrg        fluocinonide (LIDEX) 0.05 %  external solution apply 1ml TO SCALP ONCE TO twice daily AS DIRECTED       fluticasone propionate (FLONASE) 50 mcg/actuation nasal spray 1 spray (50 mcg total) by Each Nostril route once daily. 16 g 3     gabapentin (NEURONTIN) 300 MG capsule TAKE 1 TABLET BY MOUTH 3 TIMES A DAY AS NEEDED 180 capsule 0     hydrocortisone (ANUSOL-HC) 2.5 % rectal cream Place rectally 2 (two) times daily. (Patient not taking: No sig reported) 30 g 1     meloxicam (MOBIC) 15 MG tablet Take 1 tablet (15 mg total) by mouth once daily. 90 tablet 1     multivit/folic acid/vit K1 (ONE-A-DAY WOMEN'S 50 PLUS ORAL) Take by mouth.       NEILMED SINUS RINSE COMPLETE pkdv use as directed       OMNIPAQUE 300 300 mg iodine/mL injection        pantoprazole (PROTONIX) 40 MG tablet TAKE 1 TABLET BY MOUTH once DAILY 90 tablet 1     propofoL (DIPRIVAN) 10 mg/mL infusion        tobramycin, PF, (OPHELIA) 300 mg/5 mL nebulizer solution EMPTY CONTENTS OF 1 AMPULE INTO NASAL IRRIGATION SYSTEM, ADD DISTILLED WATER, SALT PACK, MIX & IRRIGATE. PERFORM 2 TIMES DAILY       TURMERIC ORAL Take 500 mg by mouth.          Physical Exam:    Vital Signs: There were no vitals filed for this visit.    General Appearance: Well appearing in no acute distress  Abdomen: Soft, non tender, non distended with normal bowel sounds, no masses    Labs:  Lab Results   Component Value Date    WBC 5.49 12/02/2021    HGB 12.8 12/02/2021    HCT 40.5 12/02/2021     12/02/2021    CHOL 307 (H) 12/02/2021    TRIG 88 12/02/2021    HDL 62 12/02/2021    ALT 17 12/02/2021    AST 22 12/02/2021     12/02/2021    K 4.2 12/02/2021     12/02/2021    CREATININE 0.9 12/02/2021    BUN 13 12/02/2021    CO2 25 12/02/2021    TSH 2.270 12/02/2021    HGBA1C 5.5 03/12/2021       I have explained the risks and benefits of this endoscopic procedure to the patient including but not limited to bleeding, inflammation, infection, perforation, and death.      Jayant Benito MD

## 2022-02-18 NOTE — PROVATION PATIENT INSTRUCTIONS
Discharge Summary/Instructions after an Endoscopic Procedure  Patient Name: Jia Ugalde  Patient MRN: [id number]  Patient YOB: 1960 Friday, February 18, 2022  Lowell Robles MD  Dear patient,  As a result of recent federal legislation (The Federal Cures Act), you may   receive lab or pathology results from your procedure in your [facility]   account before your physician is able to contact you. Your physician or   their representative will relay the results to you with their   recommendations at their soonest availability.  Thank you,  RESTRICTIONS:  During your procedure today, you received medications for sedation.  These   medications may affect your judgment, balance and coordination.  Therefore,   for 24 hours, you have the following restrictions:   - DO NOT drive a car, operate machinery, make legal/financial decisions,   sign important papers or drink alcohol.    ACTIVITY:  Today: no heavy lifting, straining or running due to procedural   sedation/anesthesia.  The following day: return to full activity including work.  DIET:  Eat and drink normally unless instructed otherwise.     TREATMENT FOR COMMON SIDE EFFECTS:  - Mild abdominal pain, nausea, belching, bloating or excessive gas:  rest,   eat lightly and use a heating pad.  - Sore Throat: treat with throat lozenges and/or gargle with warm salt   water.  - Because air was used during the procedure, expelling large amounts of air   from your rectum or belching is normal.  - If a bowel prep was taken, you may not have a bowel movement for 1-3 days.    This is normal.  SYMPTOMS TO WATCH FOR AND REPORT TO YOUR PHYSICIAN:  1. Abdominal pain or bloating, other than gas cramps.  2. Chest pain.  3. Back pain.  4. Signs of infection such as: chills or fever occurring within 24 hours   after the procedure.  5. Rectal bleeding, which would show as bright red, maroon, or black stools.   (A tablespoon of blood from the rectum is not serious, especially if    hemorrhoids are present.)  6. Vomiting.  7. Weakness or dizziness.  GO DIRECTLY TO THE NEAREST EMERGENCY ROOM IF YOU HAVE ANY OF THE FOLLOWING:      Difficulty breathing              Chills and/or fever over 101 F   Persistent vomiting and/or vomiting blood   Severe abdominal pain   Severe chest pain   Black, tarry stools   Bleeding- more than one tablespoon   Any other symptom or condition that you feel may need urgent attention  Your doctor recommends these additional instructions:  If any biopsies were taken, your doctors clinic will contact you in 1 to 2   weeks with any results.  - Patient has a contact number available for emergencies.  The signs and   symptoms of potential delayed complications were discussed with the   patient.  Return to normal activities tomorrow.  Written discharge   instructions were provided to the patient.   - Discharge patient to home.   - Await pathology results.   - Return to referring physician as previously scheduled.   - The findings and recommendations were discussed with the designated   responsible adult.  For questions, problems or results please call your physician - Lowell Robles MD at Work:  [phone].  [facility], EMERGENCY ROOM PHONE NUMBER: [phone]  IF A COMPLICATION OR EMERGENCY SITUATION ARISES AND YOU ARE UNABLE TO REACH   YOUR PHYSICIAN - GO DIRECTLY TO THE EMERGENCY ROOM.  Lowell Robles MD  2/18/2022 2:58:29 PM  This report has been verified and signed electronically.  Dear patient,  As a result of recent federal legislation (The Federal Cures Act), you may   receive lab or pathology results from your procedure in your [facility]   account before your physician is able to contact you. Your physician or   their representative will relay the results to you with their   recommendations at their soonest availability.  Thank you,  PROVATION

## 2022-02-18 NOTE — ANESTHESIA POSTPROCEDURE EVALUATION
Anesthesia Post Evaluation    Patient: Jia Ugalde    Procedure(s) Performed: Procedure(s) (LRB):  EGD (ESOPHAGOGASTRODUODENOSCOPY) (N/A)    Final Anesthesia Type: general      Patient location during evaluation: PACU  Patient participation: Yes- Able to Participate  Level of consciousness: awake and alert  Post-procedure vital signs: reviewed and stable  Pain management: adequate  Airway patency: patent    PONV status at discharge: No PONV  Anesthetic complications: no      Cardiovascular status: blood pressure returned to baseline  Respiratory status: unassisted, room air and spontaneous ventilation  Hydration status: euvolemic  Follow-up not needed.          Vitals Value Taken Time   /94 02/18/22 1528   Temp ** 02/18/22 1613   Pulse 82 02/18/22 1528   Resp 18 02/18/22 1528   SpO2 98 % 02/18/22 1528         No case tracking events are documented in the log.      Pain/Rebekah Score: Rebekah Score: 10 (2/18/2022  3:33 PM)

## 2022-02-18 NOTE — TELEPHONE ENCOUNTER
Patient called for update on praluent rx. She received a letter saying it was denied by her insurance. Informed her that her insurance plan prefers repatha, and that the pharmacist has reached out to her MD to see if it can be changed. Will continue to follow up.

## 2022-02-18 NOTE — TRANSFER OF CARE
"Anesthesia Transfer of Care Note    Patient: Jia Ugalde    Procedure(s) Performed: Procedure(s) (LRB):  EGD (ESOPHAGOGASTRODUODENOSCOPY) (N/A)    Patient location: GI    Anesthesia Type: general    Transport from OR: Transported from OR on room air with adequate spontaneous ventilation    Post pain: adequate analgesia    Post assessment: no apparent anesthetic complications and tolerated procedure well    Post vital signs: stable    Level of consciousness: awake, alert and oriented    Nausea/Vomiting: no nausea/vomiting    Complications: none    Transfer of care protocol was followed      Last vitals:   Visit Vitals  /81   Pulse 93   Temp 36.3 °C (97.3 °F) (Temporal)   Resp 18   Ht 5' 3" (1.6 m)   Wt 90.7 kg (200 lb)   SpO2 98%   Breastfeeding No   BMI 35.43 kg/m²     "

## 2022-02-18 NOTE — DISCHARGE INSTRUCTIONS
Patient Education       Upper GI Endoscopy Discharge Instructions   About this topic   This procedure is done to view your upper gastrointestinal (GI) tract. This includes your throat and food pipe (esophagus). It also includes your stomach and the first part of the small bowel. Some people have this test for problems like coughing or throwing up blood. Other people may be having bad belly pain or blood in their stool. You may be having trouble swallowing or problems with acid reflux.  Doctors often use this test to look for problems like:  · Ulcers  · Cancer or tumor growths  · Internal bleeding  · Swelling  · Inflammation  · Infection  · Alvarez's esophagus  · Gastroesophageal reflux disease or GERD  · Swallowing problems     What care is needed at home?   · Ask your doctor what you need to do when you go home. Make sure you ask questions if you do not understand what the doctor says. This way you will know what you need to do.  · Your throat may feel sore. Your belly may also feel bloated. Your doctor may give you drugs to help with pain.  · Talk to your doctor about when it is safe for you to go back to eating your normal diet and taking your drugs. You can drink fluid once the numbing drugs in your throat wear off.  What follow-up care is needed?   · Your doctor may ask you to make visits to the office to check on your progress. Be sure to keep these visits.  · The results of this test may help your doctor understand what kind of problem you have with your upper GI tract. Together you can make a plan for more care.  What drugs may be needed?   The doctor may order drugs to:  · Help with pain  · Decrease the acid in your stomach  Will physical activity be limited?   You may need to limit your activity for the rest of the day. After that, you will likely be able to go back to your normal activities.  What changes to diet are needed?   Soft foods like pudding or soups may be easier to eat at first. Ask your doctor  if you need to make any changes to your diet.  What problems could happen?   · Painful swallowing  · Upset stomach  · Injury to food pipe   · Throwing up  · Tear in the esophagus  When do I need to call the doctor?   · Signs of infection. These include a fever of 100.4°F (38°C) or higher, chills.  · Very bad belly pain  · Throwing up blood  · Your belly is hard and swollen  · Trouble swallowing or breathing  · Upset stomach and throwing up  · Bloody or black tarry stools  · Cough, shortness of breath, or chest pain  · A crunching feeling under the skin in your neck  · You are not feeling better in 2 to 3 days or you are feeling worse  Teach Back: Helping You Understand   The Teach Back Method helps you understand the information we are giving you. After you talk with the staff, tell them in your own words what you learned. This helps to make sure the staff has described each thing clearly. It also helps to explain things that may have been confusing. Before going home, make sure you can do these:  · I can tell you about my procedure.  · I can tell you what changes I need to make with my diet or drugs.  · I can tell you what I will do if I have very bad belly pain, throwing up blood, or my belly is hard and swollen.  Where can I learn more?   [employer]  https://www.gastro.org/practice-guidance/gi-patient-center/topic/upper-gi-endoscopy   Last Reviewed Date   2021-10-05  Consumer Information Use and Disclaimer   This information is not specific medical advice and does not replace information you receive from your health care provider. This is only a brief summary of general information. It does NOT include all information about conditions, illnesses, injuries, tests, procedures, treatments, therapies, discharge instructions or life-style choices that may apply to you. You must talk with your health care provider for complete information about your health and treatment options. This  information should not be used to decide whether or not to accept your health care providers advice, instructions or recommendations. Only your health care provider has the knowledge and training to provide advice that is right for you.  Copyright   Copyright © 2021 [employer] and its affiliates and/or licensors. All rights reserved.

## 2022-02-21 NOTE — PROGRESS NOTES
[facility]      Name: Jia Uglade  Clinic Number: [id number]    Therapy Diagnosis:   Encounter Diagnoses   Name Primary?    Bad posture Yes    Decreased ROM of trunk and back     Decreased strength of trunk and back      Physician: Ghada Zamora, *    Visit Date: 2022    Physician Orders: PT Eval and Treat    Medical Diagnosis from Referral:   M48.062 (ICD-10-CM) - Spinal stenosis of lumbar region with neurogenic claudication   M51.36 (ICD-10-CM) - DDD (degenerative disc disease), lumbar         Evaluation Date: 2/3/2022  Authorization Period Expiration: 22  Plan of Care Expiration: 22  Reassessment Due: 3/3/22  Visit # / Visits authorized:       Time In: 7:00 am   Time Out: 8:15 am   Total Billable Time: 48 minutes    Precautions:    2 lumbar fusion surgeries with hardware, , and 2020     Pattern of pain determined:  4 PEN leg dominant, intermittent, worse standing/walking, better sitting         Subjective   Jia Vila reports she is feeling pretty good this morning. She did her stretches this morning.     Patient reports tolerating previous visit with some soreness.   Patient reports their pain to be 0/10 on a 0-10 scale with 0 being no pain and 10 being the worst pain imaginable.  Pain Location:Low back and into posterior LE       Pts goals: walk with family, use to walk in [place], walk for 10-15 min as a starting goal without support     Objective     Baseline Isometric Testing on Med X equipment: Testing administered by PT  Date of testin/3/22  ROM 0-36 deg   Max Peak Torque 86    Min Peak Torque 27    Flex/Ext Ratio 3/1   % below normative data -49 %            Limitation/Restriction for FOTO 2/3/22 Survey     Therapist reviewed FOTO scores for Jia Ugalde on 2/3/2022.   FOTO documents entered into [employer] - see Media section.     Limitation Score: 48% limited  Goal:  <40 % limited             Treatment    Pt was  "instructed in and performed the following:     Jia Vila received therapeutic exercises to develop/improved posture, cardiovascular endurance, muscular endurance, lumbar/cervical ROM, strength and muscular endurance for 75 minutes including the following exercises:     Treadmill 10' 2.0    Standing extension x 10   SOC x 10   SKTC 10x/ea  DKTC 10 x 10" hold-NP  LTR x 2'  Supine hip flexor stretch w/ strap x 1' ea  Piriformis 2 x 30"  Bridges 3 x 10 RTB to increase glut activation  SL clams 3 x 10 RTB   STS 7.5lb KB 2 x 10      [facility] 2/22/2022   Visit Number 5   VAS Pain Rating 0   Treadmill Time (in min.) 10   Speed 2   Lumbar Stretches - Slouch Overcorrection 10   Extension in Standing 10   Flexion in Lying 10   Lumbar Extension Seat Pad -   Femur Restraint -   Top Dead Center -   Counterweight -   Lumbar Flexion -   Lumbar Extension -   Lumbar Peak Torque -   Min Torque -   Test Percent Below Normative Data -   Lumbar Weight 47   Repetitions 20   Rating of Perceived Exertion 3   Ice - Z Lie (in min.) 10       Peripheral muscle strengthening which included 1 set of 15-20 repetitions at a slow, controlled 10-13 second per rep pace focused on strengthening supporting musculature for improved body mechanics and functional mobility.  Pt and therapist focused on proper form during treatment to ensure optimal strengthening of each targeted muscle group.  Machines were utilized including torso rotation, chest press, rowing, biceps, and triceps. Leg extension, leg curl, hip abd, hip add, and leg press added visit 3         Home Exercises Provided and Patient Education Provided   Stretching: SOC, Standing extension, piriformis, hip flexor, DKTC, LTR  Strengthening: teresa dial  Cardio program (V5): 2/22/22  Lifting education (V11):v 11  Using Lumbar Roll: Pt reports she has one.     Education provided:       Written Home Exercises Provided: Patient instructed to cont prior HEP.  Exercises were reviewed and " Jia was able to demonstrate them prior to the end of the session.  Jia demonstrated good  understanding of the education provided.       See EMR under Patient Instructions for exercises provided 2/3/2022.    Assessment     Jia tolerated session well today.She reports to session without c/o pain. Increased LE strengthening without issue. Pt was given the cardio handout and pt expressed understanding. MedX was performed at 47ft lbs with 20 reps performed at an exertion rating of 3/10.         Patient is making good progress towards established goals.  Pt will continue to benefit from skilled outpatient physical therapy to address the deficits stated in the impairment chart, provide pt/family education and to maximize pt's level of independence in the home and community environment.     Anticipated Barriers for therapy: 2 back surgeries, significant loss of function     Pt's spiritual, cultural and educational needs considered and pt agreeable to plan of care and goals as stated below:       GOALS: Pt is in agreement with the following goals.     Short term goals:  6 weeks or 10 visits   1.  Pt will demonstrate increased lumbar ROM by at least 6 degrees from the initial ROM value with improvements noted in functional ROM and ability to perform ADLs.  (approp and ongoing)  2.  Pt will demonstrate increased MedX average isometric strength value  by 20% from initial test resulting in improved ability to perform bending, lifting, and carrying activities safely, confidently.  (approp and ongoing)  3.  Patient report a reduction in worst pain score by 1-2 points for improved tolerance for 8/10 at worst for standing to cook.  (approp and ongoing)  4.  Pt able to perform HEP correctly with minimal cueing or supervision from therapist to encourage independent management of symptoms. (approp and ongoing)        Long term goals: 10 weeks or 20 visits   1. Pt will demonstrate increased lumbar ROM by at least 12 degrees from  initial ROM value, resulting in improved ability to perform functional fwd bending while standing and sitting. (approp and ongoing)  2. Pt will demonstrate increased MedX average isometric strength value  by 35% from initial test resulting in improved ability to perform bending, lifting, and carrying activities safely, confidently.  (approp and ongoing)  3. Pt to demonstrate ability to independently control and reduce their pain through posture positioning and mechanical movements throughout a typical day.  (approp and ongoing)  4.  Pt will demonstrate reduced pain and improved functional outcomes as reported on the FOTO by reaching a limitation score of < or = 40% or less in order to demonstrate subjective improvement in pt's condition.    (approp and ongoing)  5. Pt will demonstrate independence with the HEP at discharge  (approp and ongoing)  6.  sit to stand 10 times without hands, Single leg stance 5 sec bilat (approp and ongoing)  7. Stand and walk to cook, and grocery shop with pain <6/10  8. Walk from waiting room to [facility] with  Pain < 4/10, walk 10 min in community (approp and ongoing)      Plan   Continue with established Plan of Care towards established PT goals.     Reno Nash, PTA  02/22/2022

## 2022-02-22 ENCOUNTER — CLINICAL SUPPORT (OUTPATIENT)
Dept: REHABILITATION | Facility: HOSPITAL | Age: 62
End: 2022-02-22
Attending: PHYSICAL MEDICINE & REHABILITATION
Payer: MEDICARE

## 2022-02-22 ENCOUNTER — OFFICE VISIT (OUTPATIENT)
Dept: PAIN MEDICINE | Facility: CLINIC | Age: 62
End: 2022-02-22
Payer: MEDICARE

## 2022-02-22 VITALS
SYSTOLIC BLOOD PRESSURE: 139 MMHG | OXYGEN SATURATION: 100 % | HEART RATE: 68 BPM | TEMPERATURE: 98 F | BODY MASS INDEX: 35.23 KG/M2 | WEIGHT: 198.88 LBS | DIASTOLIC BLOOD PRESSURE: 85 MMHG

## 2022-02-22 DIAGNOSIS — M48.062 SPINAL STENOSIS OF LUMBAR REGION WITH NEUROGENIC CLAUDICATION: Primary | ICD-10-CM

## 2022-02-22 DIAGNOSIS — M54.16 LUMBAR RADICULOPATHY: ICD-10-CM

## 2022-02-22 DIAGNOSIS — R29.898 DECREASED STRENGTH OF TRUNK AND BACK: ICD-10-CM

## 2022-02-22 DIAGNOSIS — M47.816 LUMBAR SPONDYLOSIS: ICD-10-CM

## 2022-02-22 DIAGNOSIS — M51.36 DDD (DEGENERATIVE DISC DISEASE), LUMBAR: ICD-10-CM

## 2022-02-22 DIAGNOSIS — M25.69 DECREASED ROM OF TRUNK AND BACK: ICD-10-CM

## 2022-02-22 DIAGNOSIS — R29.3 BAD POSTURE: Primary | ICD-10-CM

## 2022-02-22 PROCEDURE — 99999 PR PBB SHADOW E&M-EST. PATIENT-LVL IV: ICD-10-PCS | Mod: PBBFAC,,, | Performed by: REGISTERED NURSE

## 2022-02-22 PROCEDURE — 1159F MED LIST DOCD IN RCRD: CPT | Mod: CPTII,S$GLB,, | Performed by: REGISTERED NURSE

## 2022-02-22 PROCEDURE — 99999 PR PBB SHADOW E&M-EST. PATIENT-LVL IV: CPT | Mod: PBBFAC,,, | Performed by: REGISTERED NURSE

## 2022-02-22 PROCEDURE — 3075F SYST BP GE 130 - 139MM HG: CPT | Mod: CPTII,S$GLB,, | Performed by: REGISTERED NURSE

## 2022-02-22 PROCEDURE — 99214 OFFICE O/P EST MOD 30 MIN: CPT | Mod: PBBFAC,PN | Performed by: REGISTERED NURSE

## 2022-02-22 PROCEDURE — 3079F PR MOST RECENT DIASTOLIC BLOOD PRESSURE 80-89 MM HG: ICD-10-PCS | Mod: CPTII,S$GLB,, | Performed by: REGISTERED NURSE

## 2022-02-22 PROCEDURE — 3079F DIAST BP 80-89 MM HG: CPT | Mod: CPTII,S$GLB,, | Performed by: REGISTERED NURSE

## 2022-02-22 PROCEDURE — 3008F PR BODY MASS INDEX (BMI) DOCUMENTED: ICD-10-PCS | Mod: CPTII,S$GLB,, | Performed by: REGISTERED NURSE

## 2022-02-22 PROCEDURE — 3075F PR MOST RECENT SYSTOLIC BLOOD PRESS GE 130-139MM HG: ICD-10-PCS | Mod: CPTII,S$GLB,, | Performed by: REGISTERED NURSE

## 2022-02-22 PROCEDURE — 99214 PR OFFICE/OUTPT VISIT, EST, LEVL IV, 30-39 MIN: ICD-10-PCS | Mod: S$GLB,,, | Performed by: REGISTERED NURSE

## 2022-02-22 PROCEDURE — 3008F BODY MASS INDEX DOCD: CPT | Mod: CPTII,S$GLB,, | Performed by: REGISTERED NURSE

## 2022-02-22 PROCEDURE — 99214 OFFICE O/P EST MOD 30 MIN: CPT | Mod: S$GLB,,, | Performed by: REGISTERED NURSE

## 2022-02-22 PROCEDURE — 97110 THERAPEUTIC EXERCISES: CPT | Mod: HCNC,PN,CQ

## 2022-02-22 PROCEDURE — 1159F PR MEDICATION LIST DOCUMENTED IN MEDICAL RECORD: ICD-10-PCS | Mod: CPTII,S$GLB,, | Performed by: REGISTERED NURSE

## 2022-02-22 NOTE — PROGRESS NOTES
Subjective:     Patient ID: Jia Ugalde is a 61 y.o. female    Chief Complaint: Follow-up, Hip Pain (Bilateral hip), Leg Pain (Bilateral ), and Low-back Pain      Referred by: No ref. provider found      HPI:    Disclaimer: This note was generated using voice recognition software.  There may be typographical errors that were missed during proofreading.        Interval History NP (02/22/22):    Pt returns today for follow up. She reports an improvement in pain since starting [facility]. Stretching in the morning is most helpful. She does continue gabapentin 900 mg nightly without adverse effects. She denies any new or worsening symptoms at this time.     Interval History NP (12/28/21):    Pt returns today for follow up of bilateral L5 TESI. She reports mild-moderate relief from the injection. She continues gabapentin daily and meloxicam only when needed. There are no adverse reactions with these medications. She does not wish to discuss additional interventional options at this time.       Interval History NP (10/5/21):    Pt returns today for follow up and imaging review. Her pain remains unchanged in quality and location. Bilateral lower extremity radicular pain most bothersome at this time. She denies any new or worsening symptoms since last encounter.     Initial Encounter (9/22/21):  Jia Ugalde is a 61 y.o. female who presents today with chronic bilateral low back, hips, thigh pain. The pain has been present for years. She has undergone 2 low back surgeries within the past 3 years. She denies any improvement in her symptoms following surgery. She feels that her pain has actually worsened. The pain is located mainly in the bilateral lower lumbar region and will radiate to the bilateral buttocks and posterior thighs. She denies  Any pain below the knees. She does reports chronic numbness in the medial aspect of her left thigh and weakness of the right knee. She denies any b.b dysfunction. She think  she recently underwent bilateral SIJ injections that were not helpful. She states that pain is only plresent while active. She can only walk for short distances before needing to rest. The pain resolves completely upon sitting. .   This pain is described in detail below.    Physical Therapy: Yes. Performs daily HEP    Non-pharmacologic Treatment: Rest helps         · TENS? No    Pain Medications:         · Currently taking: gabapentin, meloxicam    · Has tried in the past:  Tylenol    · Has not tried: Opioids, Muscle relaxants, TCAs, SNRIs, topical creams    Blood thinners: None    Interventional Therapies:   11/08/2021- bilateral L5 TESI    Relevant Surgeries:   2 lumbar fusion surgeries with hardware    Affecting sleep? Yes    Affecting daily activities? yes    Depressive symptoms? no          · SI/HI? No    Work status: Unemployed    Pain Scores:    Best:       0/10  Worst:     10/10  Usually:   5/10  Today:    0/10    Review of Systems   Constitutional: Negative.    HENT: Negative.    Respiratory: Negative.  Negative for shortness of breath.    Cardiovascular: Negative.  Negative for chest pain and palpitations.   Gastrointestinal: Negative.    Genitourinary: Negative.    Musculoskeletal: Positive for back pain and myalgias.   Skin: Negative.    Neurological: Negative for weakness.   Psychiatric/Behavioral: Negative.         Past Medical History:   Diagnosis Date    GERD (gastroesophageal reflux disease)     Hyperlipidemia        Past Surgical History:   Procedure Laterality Date    BREAST BIOPSY      BREAST SURGERY      COLONOSCOPY N/A 12/8/2021    Procedure: COLONOSCOPY;  Surgeon: Caitlin Joseph MD;  Location: [facility];  Service: Endoscopy;  Laterality: N/A;  fully vaccinated-GT    pt states had polyps    EPIDURAL STEROID INJECTION Bilateral 11/8/2021    Procedure: Bilateral L5 Transforaminal Epidural Steroid Injection;  Surgeon: Keron Cristina Jr., MD;  Location: [facility];  Service: Pain  Management;  Laterality: Bilateral;  Arrive @ 1030 (requested 10); No ATC or DM; Vacc.    ESOPHAGOGASTRODUODENOSCOPY N/A 2/18/2022    Procedure: EGD (ESOPHAGOGASTRODUODENOSCOPY);  Surgeon: Lowell Robles MD;  Location: [address]);  Service: Endoscopy;  Laterality: N/A;  fully vaccinated  Pt requesting earlier date with any scoping MD - ERW  RAPID - add on / prep ins. emailed and reviewed- ERW    HEMORRHOID SURGERY      HYSTERECTOMY      SPINE SURGERY      TUBAL LIGATION         Social History     Socioeconomic History    Marital status: Single   Tobacco Use    Smoking status: Never Smoker    Smokeless tobacco: Never Used   Substance and Sexual Activity    Alcohol use: Never    Drug use: Never    Sexual activity: Not Currently       Review of patient's allergies indicates:  No Known Allergies    Current Outpatient Medications on File Prior to Visit   Medication Sig Dispense Refill    alirocumab (PRALUENT PEN) 75 mg/mL PnIj Inject 1 mL (75 mg total) into the skin every 14 (fourteen) days. 2 each 11    biotin 10,000 mcg Cap Take by mouth.      budesonide 1 mg/2 mL NbSp EMPTY CONTENTS OF 1 RESPULE INTO NASAL IRRIGATION SYSTEM, ADD DISTILLED WATER, SALT PACK, MIX & IRRIGATE. PERFORM TWICE DAILY      cholecalciferol, vitamin D3, capsule/tablet Take by mouth once daily.      dexamethasone sodium phosp/PF (DEXAMETHASONE SODIUM PHOS, PF,) 10 mg/mL Soln       FLUARIX QUAD 0800-0307, PF, 60 mcg (15 mcg x 4)/0.5 mL Syrg       fluocinonide (LIDEX) 0.05 % external solution apply 1ml TO SCALP ONCE TO twice daily AS DIRECTED      fluticasone propionate (FLONASE) 50 mcg/actuation nasal spray 1 spray (50 mcg total) by Each Nostril route once daily. 16 g 3    gabapentin (NEURONTIN) 300 MG capsule TAKE 1 TABLET BY MOUTH 3 TIMES A DAY AS NEEDED 180 capsule 0    hydrocortisone (ANUSOL-HC) 2.5 % rectal cream Place rectally 2 (two) times daily. 30 g 1    meloxicam (MOBIC) 15 MG tablet Take 1 tablet (15 mg total)  by mouth once daily. 90 tablet 1    multivit/folic acid/vit K1 (ONE-A-DAY WOMEN'S 50 PLUS ORAL) Take by mouth.      NEILMED SINUS RINSE COMPLETE pkdv use as directed      OMNIPAQUE 300 300 mg iodine/mL injection       pantoprazole (PROTONIX) 40 MG tablet TAKE 1 TABLET BY MOUTH once DAILY 90 tablet 1    propofoL (DIPRIVAN) 10 mg/mL infusion       tobramycin, PF, (OPHELIA) 300 mg/5 mL nebulizer solution EMPTY CONTENTS OF 1 AMPULE INTO NASAL IRRIGATION SYSTEM, ADD DISTILLED WATER, SALT PACK, MIX & IRRIGATE. PERFORM 2 TIMES DAILY      TURMERIC ORAL Take 500 mg by mouth.       No current facility-administered medications on file prior to visit.       Objective:      /85 (BP Location: Left arm, Patient Position: Sitting, BP Method: Medium (Automatic))   Pulse 68   Temp 98.4 °F (36.9 °C) (Oral)   Wt 90.2 kg (198 lb 14.4 oz)   SpO2 100%   BMI 35.23 kg/m²     Exam:  GEN:  Well developed, well nourished.  No acute distress. Normal pain behavior.  HEENT:  No trauma.  Mucous membranes moist.  Nares patent bilaterally.  PSYCH: Normal affect. Thought content appropriate.  CHEST:  Breathing symmetric.  No audible wheezing.  ABD: Soft, non-distended.  SKIN:  Warm, pink, dry.  No rash on exposed areas.    EXT:  No cyanosis, clubbing, or edema.  No color change or changes in nail or hair growth.  NEURO/MUSCULOSKELETAL:  Fully alert, oriented, and appropriate. Speech normal marin. No cranial nerve deficits.   Gait: Normal.  No trendelenburg sign bilaterally.           Imaging:  Imaging and report uploaded to media.     Assessment:       Encounter Diagnoses   Name Primary?    Spinal stenosis of lumbar region with neurogenic claudication Yes    Lumbar spondylosis     DDD (degenerative disc disease), lumbar     Lumbar radiculopathy          Plan:       Jia Vila was seen today for follow-up, hip pain, leg pain and low-back pain.    Diagnoses and all orders for this visit:    Spinal stenosis of lumbar region with  neurogenic claudication    Lumbar spondylosis    DDD (degenerative disc disease), lumbar    Lumbar radiculopathy        Jia Ugalde is a 61 y.o. female with chronic bilateral low abck and lower extremity pain. Symptoms most consistent with neurogenic claudication. No iamging at this time. No overt signs or symptoms of radiculopathy. Likely has some degree of lumbar facet pain and possibly SIJ pain..    1. Continue PT for ROM, strengthening, stretching and HEP.   2. Continue gabapentin nightly.   3. Return to clinic once [facility] is completed. Will discuss progress with therapy at this time. May also be a candidate for MBB/RFA and SCS trial in the future.           The above plan and management options were discussed at length with patient. Patient is in agreement with the above and verbalized understanding.    KARLIE Leigh, FNP-C  [facility]  Interventional Pain Management

## 2022-02-24 LAB
FINAL PATHOLOGIC DIAGNOSIS: NORMAL
Lab: NORMAL

## 2022-02-24 NOTE — TELEPHONE ENCOUNTER
Provider states that it is okay to switch [employer] per insurance formulary. [employer] PA submitted and approved via [facility]. PA Case: [id number], Status: Approved, Coverage Starts on: 2/23/2022 12:00:00 AM, Coverage Ends on: 8/22/2022 12:00:00 AM. Questions? Contact [phone].

## 2022-02-25 ENCOUNTER — CLINICAL SUPPORT (OUTPATIENT)
Dept: REHABILITATION | Facility: HOSPITAL | Age: 62
End: 2022-02-25
Attending: PHYSICAL MEDICINE & REHABILITATION
Payer: MEDICARE

## 2022-02-25 ENCOUNTER — PATIENT MESSAGE (OUTPATIENT)
Dept: GASTROENTEROLOGY | Facility: CLINIC | Age: 62
End: 2022-02-25
Payer: MEDICARE

## 2022-02-25 ENCOUNTER — SPECIALTY PHARMACY (OUTPATIENT)
Dept: PHARMACY | Facility: CLINIC | Age: 62
End: 2022-02-25
Payer: MEDICARE

## 2022-02-25 ENCOUNTER — OFFICE VISIT (OUTPATIENT)
Dept: CARDIOLOGY | Facility: CLINIC | Age: 62
End: 2022-02-25
Payer: MEDICARE

## 2022-02-25 VITALS
OXYGEN SATURATION: 100 % | WEIGHT: 199.5 LBS | BODY MASS INDEX: 35.34 KG/M2 | SYSTOLIC BLOOD PRESSURE: 177 MMHG | DIASTOLIC BLOOD PRESSURE: 83 MMHG | RESPIRATION RATE: 16 BRPM | HEART RATE: 61 BPM

## 2022-02-25 DIAGNOSIS — E78.2 MIXED HYPERLIPIDEMIA: ICD-10-CM

## 2022-02-25 DIAGNOSIS — R29.3 BAD POSTURE: Primary | ICD-10-CM

## 2022-02-25 DIAGNOSIS — M25.69 DECREASED ROM OF TRUNK AND BACK: ICD-10-CM

## 2022-02-25 DIAGNOSIS — E66.01 SEVERE OBESITY (BMI 35.0-39.9) WITH COMORBIDITY: ICD-10-CM

## 2022-02-25 DIAGNOSIS — M79.10 MYALGIA DUE TO STATIN: ICD-10-CM

## 2022-02-25 DIAGNOSIS — T46.6X5A MYALGIA DUE TO STATIN: ICD-10-CM

## 2022-02-25 DIAGNOSIS — R03.0 ELEVATED BLOOD PRESSURE READING IN OFFICE WITHOUT DIAGNOSIS OF HYPERTENSION: ICD-10-CM

## 2022-02-25 DIAGNOSIS — R29.898 DECREASED STRENGTH OF TRUNK AND BACK: ICD-10-CM

## 2022-02-25 DIAGNOSIS — R07.89 OTHER CHEST PAIN: Primary | ICD-10-CM

## 2022-02-25 DIAGNOSIS — E78.2 MIXED HYPERLIPIDEMIA: Primary | ICD-10-CM

## 2022-02-25 PROCEDURE — 3008F BODY MASS INDEX DOCD: CPT | Mod: CPTII,S$GLB,, | Performed by: INTERNAL MEDICINE

## 2022-02-25 PROCEDURE — 1159F PR MEDICATION LIST DOCUMENTED IN MEDICAL RECORD: ICD-10-PCS | Mod: CPTII,S$GLB,, | Performed by: INTERNAL MEDICINE

## 2022-02-25 PROCEDURE — 3008F PR BODY MASS INDEX (BMI) DOCUMENTED: ICD-10-PCS | Mod: CPTII,S$GLB,, | Performed by: INTERNAL MEDICINE

## 2022-02-25 PROCEDURE — 3079F PR MOST RECENT DIASTOLIC BLOOD PRESSURE 80-89 MM HG: ICD-10-PCS | Mod: CPTII,S$GLB,, | Performed by: INTERNAL MEDICINE

## 2022-02-25 PROCEDURE — 3079F DIAST BP 80-89 MM HG: CPT | Mod: CPTII,S$GLB,, | Performed by: INTERNAL MEDICINE

## 2022-02-25 PROCEDURE — 99214 OFFICE O/P EST MOD 30 MIN: CPT | Mod: S$GLB,,, | Performed by: INTERNAL MEDICINE

## 2022-02-25 PROCEDURE — 99999 PR PBB SHADOW E&M-EST. PATIENT-LVL IV: ICD-10-PCS | Mod: PBBFAC,,, | Performed by: INTERNAL MEDICINE

## 2022-02-25 PROCEDURE — 97110 THERAPEUTIC EXERCISES: CPT | Mod: HCNC,PN

## 2022-02-25 PROCEDURE — 3077F PR MOST RECENT SYSTOLIC BLOOD PRESSURE >= 140 MM HG: ICD-10-PCS | Mod: CPTII,S$GLB,, | Performed by: INTERNAL MEDICINE

## 2022-02-25 PROCEDURE — 3077F SYST BP >= 140 MM HG: CPT | Mod: CPTII,S$GLB,, | Performed by: INTERNAL MEDICINE

## 2022-02-25 PROCEDURE — 99999 PR PBB SHADOW E&M-EST. PATIENT-LVL IV: CPT | Mod: PBBFAC,,, | Performed by: INTERNAL MEDICINE

## 2022-02-25 PROCEDURE — 99214 OFFICE O/P EST MOD 30 MIN: CPT | Mod: PBBFAC | Performed by: INTERNAL MEDICINE

## 2022-02-25 PROCEDURE — 99214 PR OFFICE/OUTPT VISIT, EST, LEVL IV, 30-39 MIN: ICD-10-PCS | Mod: S$GLB,,, | Performed by: INTERNAL MEDICINE

## 2022-02-25 PROCEDURE — 1159F MED LIST DOCD IN RCRD: CPT | Mod: CPTII,S$GLB,, | Performed by: INTERNAL MEDICINE

## 2022-02-25 PROCEDURE — 99499 RISK ADDL DX/OHS AUDIT: ICD-10-PCS | Mod: S$GLB,,, | Performed by: INTERNAL MEDICINE

## 2022-02-25 PROCEDURE — 99499 UNLISTED E&M SERVICE: CPT | Mod: S$GLB,,, | Performed by: INTERNAL MEDICINE

## 2022-02-25 NOTE — PROGRESS NOTES
CARDIOLOGY CLINIC VISIT        HISTORY OF PRESENT ILLNESS:     Jia Ugalde presents for continued care.  Seen 01/26/2022 for evaluation of chest pain.  Patient noted left-sided chest discomfort.  Episodes worse with inspiration.  Usually last less than 1 minute.  Exercise stress echocardiogram showed ejection fraction of 55%.  Normal pulmonary pressure.  No significant valvular abnormalities.  The patient exercised for 4 minutes 34 seconds.  Functional capacity 6 Mets.  Hypertensive response exercise 218/118.  History of statin myopathy.  Last . She has been approved for Repatha.  She states that she had no symptoms during the stress test.  Blood pressure is elevated today.  She says she has values of 120s to 130 systolic at home.    CARDIOVASCULAR HISTORY:     None    PAST MEDICAL HISTORY:     Past Medical History:   Diagnosis Date    GERD (gastroesophageal reflux disease)     Hyperlipidemia        PAST SURGICAL HISTORY:     Past Surgical History:   Procedure Laterality Date    BREAST BIOPSY      BREAST SURGERY      COLONOSCOPY N/A 12/8/2021    Procedure: COLONOSCOPY;  Surgeon: Caitlin Joseph MD;  Location: [facility];  Service: Endoscopy;  Laterality: N/A;  fully vaccinated-GT    pt states had polyps    EPIDURAL STEROID INJECTION Bilateral 11/8/2021    Procedure: Bilateral L5 Transforaminal Epidural Steroid Injection;  Surgeon: Keron Cristina Jr., MD;  Location: [facility];  Service: Pain Management;  Laterality: Bilateral;  Arrive @ 1030 (requested 10); No ATC or DM; Vacc.    ESOPHAGOGASTRODUODENOSCOPY N/A 2/18/2022    Procedure: EGD (ESOPHAGOGASTRODUODENOSCOPY);  Surgeon: Lowell Robles MD;  Location: [facility] ([address] [place] [address]);  Service: Endoscopy;  Laterality: N/A;  fully vaccinated  Pt requesting earlier date with any scoping MD - ERW  RAPID - add on / prep ins. emailed and reviewed- ERW    HEMORRHOID SURGERY      HYSTERECTOMY      SPINE SURGERY      TUBAL LIGATION         ALLERGIES AND  MEDICATION:   Review of patient's allergies indicates:  No Known Allergies     Medication List          Accurate as of February 25, 2022 10:48 AM. If you have any questions, ask your nurse or doctor.            START taking these medications    evolocumab 140 mg/mL Pnij  Commonly known as: REPATHA SURECLICK  Inject 1 mL (140 mg total) into the skin every 14 (fourteen) days.  Started by: Jc Rogers MD        CONTINUE taking these medications    biotin 10,000 mcg Cap     budesonide 1 mg/2 mL Nbsp     cholecalciferol (vitamin D3) capsule/tablet     dexAMETHasone sodium phos (PF) 10 mg/mL Soln     FLUARIX QUAD 2461-1102 (PF) 60 mcg (15 mcg x 4)/0.5 mL Syrg  Generic drug: flu vacc [id number] 6mos up(PF)     fluocinonide 0.05 % external solution  Commonly known as: LIDEX     fluticasone propionate 50 mcg/actuation nasal spray  Commonly known as: FLONASE  1 spray (50 mcg total) by Each Nostril route once daily.     gabapentin 300 MG capsule  Commonly known as: NEURONTIN  TAKE 1 TABLET BY MOUTH 3 TIMES A DAY AS NEEDED     hydrocortisone 2.5 % rectal cream  Commonly known as: ANUSOL-HC  Place rectally 2 (two) times daily.     meloxicam 15 MG tablet  Commonly known as: MOBIC  Take 1 tablet (15 mg total) by mouth once daily.     NEILMED SINUS RINSE COMPLETE Pkdv  Generic drug: sod chlor-bicarb-squeez bottle     OMNIPAQUE 300 300 mg iodine/mL injection  Generic drug: iohexoL     ONE-A-DAY WOMEN'S 50 PLUS ORAL     pantoprazole 40 MG tablet  Commonly known as: PROTONIX  TAKE 1 TABLET BY MOUTH once DAILY     propofoL 10 mg/mL infusion  Commonly known as: DIPRIVAN     tobramycin (PF) 300 mg/5 mL nebulizer solution  Commonly known as: OPHELIA     TURMERIC ORAL        STOP taking these medications    PRALUENT PEN 75 mg/mL Pnij  Generic drug: alirocumab  Stopped by: Jc Rogers MD           Where to Get Your Medications      Information about where to get these medications is not yet available    Ask your nurse or doctor about  these medications  · evolocumab 140 mg/mL Pnij         SOCIAL HISTORY:     Social History     Socioeconomic History    Marital status: Single   Tobacco Use    Smoking status: Never Smoker    Smokeless tobacco: Never Used   Substance and Sexual Activity    Alcohol use: Never    Drug use: Never    Sexual activity: Not Currently       FAMILY HISTORY:     Family History   Problem Relation Age of Onset    Pancreatic cancer Maternal Grandmother     Pancreatic cancer Paternal Grandmother        REVIEW OF SYSTEMS:   Review of Systems   Respiratory: Negative for sputum production, shortness of breath and wheezing.    Cardiovascular: Negative for chest pain, palpitations, orthopnea, claudication, leg swelling and PND.   Gastrointestinal: Negative for abdominal pain, heartburn, nausea and vomiting.   Neurological: Negative for dizziness, speech change, focal weakness, loss of consciousness, weakness and headaches.       PHYSICAL EXAM:     Vitals:    02/25/22 1028   BP: (!) 177/83   Pulse: 61   Resp: 16    Body mass index is 35.34 kg/m².  Weight: 90.5 kg (199 lb 8.3 oz)         Physical Exam  Constitutional:       General: She is not in acute distress.     Appearance: She is well-developed. She is not diaphoretic.   HENT:      Head: Normocephalic.   Neck:      Vascular: No carotid bruit or JVD.   Cardiovascular:      Rate and Rhythm: Normal rate and regular rhythm.      Pulses: Normal pulses.      Heart sounds: Normal heart sounds.   Pulmonary:      Effort: Pulmonary effort is normal.      Breath sounds: Normal breath sounds.   Abdominal:      General: Bowel sounds are normal.      Palpations: Abdomen is soft.      Tenderness: There is no abdominal tenderness.   Skin:     General: Skin is warm and dry.   Neurological:      Mental Status: She is alert and oriented to person, place, and time.   Psychiatric:         Speech: Speech normal.         Behavior: Behavior normal.         Thought Content: Thought content normal.          DATA:   EKG: (personally reviewed tracing)    Laboratory:  CBC:  Recent Labs   Lab 02/07/20  1442 10/20/20  1211 12/02/21  0857   WBC 5.15 5.31 5.49   Hemoglobin 13.5 13.1 12.8   Hematocrit 44.4 42.3 40.5   Platelets 265 272 255       CHEMISTRIES:  Recent Labs   Lab 03/12/21  0836 08/26/21  1005 12/02/21  0857   Glucose 98 99 92   Sodium 141 140 139   Potassium 4.4 4.1 4.2   BUN 14 12 13   Creatinine 1.0 0.9 0.9   eGFR if African American >60 >60.0 >60.0   eGFR if non African American >60 >60.0 >60.0   Calcium 9.1 9.9 9.9   Magnesium  --  1.9  --        CARDIAC BIOMARKERS:  Recent Labs   Lab 08/26/21  1005          COAGS:        LIPIDS/LFTS:  Recent Labs   Lab 06/03/20  0721 10/20/20  1211 03/12/21  0836 12/02/21  0857   Cholesterol 317 H  --  297 H 307 H   Triglycerides 151 H  --  112 88   HDL 72  --  65 62   LDL Cholesterol 214.8 H  --  209.6 H 227.4 H   Non-HDL Cholesterol 245  --  232 245   AST  --  22 21 22   ALT  --  17 14 17       Cardiovascular Testing:    Exercise stress echocardiogram 02/09/2022:    · The estimated ejection fraction is 55%.  · The patient's exercise capacity was below average.  · The test was stopped because the patient experienced fatigue.  · There were no arrhythmias during stress.  · The left ventricle is normal in size with normal systolic function.  · Indeterminate left ventricular diastolic function.  · Normal right ventricular size with normal right ventricular systolic function.  · Moderate left atrial enlargement.  · Mild tricuspid regurgitation.  · Mild right atrial enlargement.  · The estimated PA systolic pressure is 27 mmHg.  · Normal central venous pressure (3 mmHg).  · Mild mitral regurgitation.  · The stress echo portion of this study is negative for myocardial ischemia.  · The ECG portion of this study is negative for myocardial ischemia.      ASSESSMENT:     1. Chest pain  2. Elevated blood pressure without diagnosis of hypertension   3.  Hypercholesterolemia  4. Class 1 obesity  5. Statin myalgia    PLAN:     1. Chest pain:  Exercise stress echocardiogram negative for ischemia.  2. Elevated blood pressure without diagnosis of hypertension:  Monitor.    3. Hypercholesterolemia:   Repatha approved  4. Return to clinic 4 months.           Jc Rogers MD, MPH, FACC, [facility]

## 2022-02-25 NOTE — TELEPHONE ENCOUNTER
Specialty Pharmacy - Initial Clinical Assessment    Specialty Medication Orders Linked to Encounter    Flowsheet Row Most Recent Value   Medication #1 evolocumab (REPATHA SURECLICK) 140 mg/mL PnIj (Order#[id number], Rx#[id number])        Patient Diagnosis   E78.2 - Mixed hyperlipidemia    Subjective    Jia Ugalde is a 61 y.o. female, who is followed by the specialty pharmacy service for management and education.    Recent Encounters     Date Type Provider Description    02/25/2022 Specialty Pharmacy Pedro Castañeda PharmD Initial Clinical Assessment    02/07/2022 Specialty Pharmacy Ilene Ibarra Referral Authorization        Clinical call attempts since last clinical assessment   2/25/2022  9:50 PM - Specialty Pharmacy - Clinical Assessment by Renny Anderson, PharmD     Current Outpatient Medications   Medication Sig    gabapentin (NEURONTIN) 300 MG capsule TAKE 1 TABLET BY MOUTH 3 TIMES A DAY AS NEEDED    meloxicam (MOBIC) 15 MG tablet Take 1 tablet (15 mg total) by mouth once daily.    multivit/folic acid/vit K1 (ONE-A-DAY WOMEN'S 50 PLUS ORAL) Take by mouth.    biotin 10,000 mcg Cap Take by mouth.    budesonide 1 mg/2 mL NbSp EMPTY CONTENTS OF 1 RESPULE INTO NASAL IRRIGATION SYSTEM, ADD DISTILLED WATER, SALT PACK, MIX & IRRIGATE. PERFORM TWICE DAILY    cholecalciferol, vitamin D3, capsule/tablet Take by mouth once daily.    evolocumab (REPATHA SURECLICK) 140 mg/mL PnIj Inject 1 mL (140 mg total) into the skin every 14 (fourteen) days.    fluocinonide (LIDEX) 0.05 % external solution apply 1ml TO SCALP ONCE TO twice daily AS DIRECTED    fluticasone propionate (FLONASE) 50 mcg/actuation nasal spray 1 spray (50 mcg total) by Each Nostril route once daily.    hydrocortisone (ANUSOL-HC) 2.5 % rectal cream Place rectally 2 (two) times daily.    NEILMED SINUS RINSE COMPLETE pkdv use as directed    pantoprazole (PROTONIX) 40 MG tablet TAKE 1 TABLET BY MOUTH once DAILY    tobramycin, PF, (OPHELIA) 300  mg/5 mL nebulizer solution EMPTY CONTENTS OF 1 AMPULE INTO NASAL IRRIGATION SYSTEM, ADD DISTILLED WATER, SALT PACK, MIX & IRRIGATE. PERFORM 2 TIMES DAILY   Last reviewed on 2/25/2022  4:39 PM by Pedro Castañeda, PharmD    Review of patient's allergies indicates:  No Known AllergiesLast reviewed on  2/25/2022 4:34 PM by Pedro Castañeda    Drug Interactions    Drug interactions evaluated: yes  Clinically relevant drug interactions identified: no  Provided the patient with educational material regarding drug interactions: not applicable           Assessment Questions - Documented Responses    Flowsheet Row Most Recent Value   Assessment    Medication Reconciliation completed for patient Yes   During the past 4 weeks, has patient missed any activities due to condition or medication? No   During the past 4 weeks, did patient have any of the following urgent care visits? None   Goals of Therapy Status Discussed (new start)   Status of the patients ability to self-administer: Is Able   All education points have been covered with patient? Yes, supplemental printed education provided   Welcome packet contents reviewed and discussed with patient? Yes   Assesment completed? Yes   Plan Therapy being initiated   Do you need to open a clinical intervention (i-vent)? No   Do you want to schedule first shipment? Yes        Refill Questions - Documented Responses    Flowsheet Row Most Recent Value   Patient Availability and HIPAA Verification    Does patient want to proceed with activity? Yes   HIPAA/medical authority confirmed? Yes   Relationship to patient of person spoken to? Self   Refill Screening Questions    When does the patient need to receive the medication? 03/02/22   Refill Delivery Questions    How will the patient receive the medication? Delivery Guillermina   When does the patient need to receive the medication? 03/02/22   Shipping Address Home   Address in [facility] confirmed and updated if neccessary? Yes  "  Expected Copay ($) 0   Is the patient able to afford the medication copay? Yes   Payment Method zero copay   Days supply of Refill 28   Supplies needed? No supplies needed   Refill activity completed? Yes   Refill activity plan Refill scheduled   Shipment/Pickup Date: 03/02/22          Objective    She has a past medical history of GERD (gastroesophageal reflux disease) and Hyperlipidemia.    Tried/failed medications: Statins    BP Readings from Last 4 Encounters:   02/25/22 (!) 177/83   02/22/22 139/85   02/18/22 (!) 158/94   02/15/22 (!) 166/87     Ht Readings from Last 4 Encounters:   02/18/22 5' 3" (1.6 m)   02/15/22 5' 4" (1.626 m)   01/26/22 5' 4" (1.626 m)   01/26/22 5' 4" (1.626 m)     Wt Readings from Last 4 Encounters:   02/25/22 90.5 kg (199 lb 8.3 oz)   02/22/22 90.2 kg (198 lb 14.4 oz)   02/18/22 90.7 kg (200 lb)   02/15/22 91.7 kg (202 lb 4.4 oz)       The goals of prescribed drug therapy management include:  · Supporting patient to meet the prescriber's medical treatment objectives  · Improving or maintaining quality of life  · Maintaining optimal therapy adherence  · Minimizing and managing side effects      Goals of Therapy Status: Discussed (new start)    Assessment/Plan  Patient plans to start therapy on 03/02/22      Indication, dosage, appropriateness, effectiveness, safety and convenience of her specialty medication(s) were reviewed today.     Patient Education   Patient received education on the following:    Expectations and possible outcomes of therapy   Proper use, timely administration, and missed dose management   Duration of therapy   Side effects, including prevention, minimization, and management   Contraindications and safety precautions   New or changed medications, including prescribe and over the counter medications and supplements   Reviews recommended vaccinations, as appropriate   Storage, safe handling, and disposal      Tasks added this encounter   3/23/2022 - Refill " Call (Auto Added)   Tasks due within next 3 months   No tasks due.     Pedro Castañeda, PharmD  [facility]  [address]  [facility] [address]  Phone: [phone]  Fax: [phone]

## 2022-02-25 NOTE — PROGRESS NOTES
[facility]      Name: Jia Ugalde  Clinic Number: [id number]    Therapy Diagnosis:   Encounter Diagnoses   Name Primary?    Bad posture Yes    Decreased ROM of trunk and back     Decreased strength of trunk and back      Physician: Ghada Zamora, *    Visit Date: 2022    Physician Orders: PT Eval and Treat    Medical Diagnosis from Referral:   M48.062 (ICD-10-CM) - Spinal stenosis of lumbar region with neurogenic claudication   M51.36 (ICD-10-CM) - DDD (degenerative disc disease), lumbar   Evaluation Date: 2/3/2022  Authorization Period Expiration: 22  Plan of Care Expiration: 22  Reassessment Due: 22  Visit # / Visits authorized:       Time In: 0700  Time Out: 0815  Total Billable Time: 75 minutes    Precautions:    2 lumbar fusion surgeries with hardware, , and 2020     Pattern of pain determined:  4 PEN leg dominant, intermittent, worse standing/walking, better sitting         Subjective   Jia Vila reports back is feeling okay this morning. Is having mild pain beneath gluteal folds but other than that is okay. When asked about piriformis and hip flexor stretching, she reports she is doing stretches at home just not those.     Patient reports tolerating previous visit with some soreness.   Patient reports their pain to be 1/10 on a 0-10 scale with 0 being no pain and 10 being the worst pain imaginable.  Pain Location: B buttocks        Pts goals: walk with family, use to walk in [place], walk for 10-15 min as a starting goal without support     Objective     Baseline Isometric Testing on Med X equipment: Testing administered by PT  Date of testin/3/22  ROM 0-36 deg   Max Peak Torque 86    Min Peak Torque 27    Flex/Ext Ratio 3/1   % below normative data -49 %            Limitation/Restriction for FOTO 2/3/22 Survey     Therapist reviewed FOTO scores for Jia Ugalde on 2/3/2022.   FOTO documents entered into EPIC - see  "Media section.     Limitation Score: 48% limited  Goal:  <40 % limited             Treatment    Pt was instructed in and performed the following:     Jia Vila received therapeutic exercises to develop/improved posture, cardiovascular endurance, muscular endurance, lumbar/cervical ROM, strength and muscular endurance for 65 minutes including the following exercises:     Treadmill (2 mph), x10    Standing extension, 5 sec holds x10  +Thoracic ext over chair (1/2 foam beneath shoulder blades)  SOC, x10  +Open books, x10  LTR, x10  SKTC, x10  Supine hip flexor stretch c/ strap, x60" ea   Piriformis, 2x30"  Bridges c/ GTB, 3x10  SL clams c/ GTB, 3x10 ea  STS c/ 7.5# KB, 2x10    [facility] 2/25/2022   Visit Number 6   VAS Pain Rating 1   Treadmill Time (in min.) 10   Speed 2   Lumbar Stretches - Slouch Overcorrection 10   Extension in Standing 10   Flexion in Lying 10   Lumbar Extension Seat Pad -   Femur Restraint -   Top Dead Center -   Counterweight -   Lumbar Flexion 42   Lumbar Extension 0   Lumbar Peak Torque -   Min Torque -   Test Percent Below Normative Data -   Lumbar Weight 50   Repetitions 15   Rating of Perceived Exertion 2   Ice - Z Lie (in min.) 10        Peripheral muscle strengthening which included 1 set of 15-20 repetitions at a slow, controlled 10-13 second per rep pace focused on strengthening supporting musculature for improved body mechanics and functional mobility.  Pt and therapist focused on proper form during treatment to ensure optimal strengthening of each targeted muscle group.  Machines were utilized including torso rotation, chest press, rowing, biceps, and triceps. Leg extension, leg curl, hip abd, hip add, and leg press added visit 3       Home Exercises Provided and Patient Education Provided   Stretching: SOC, standing extension, piriformis, hip flexor, DKTC, LTR  Strengthening: yojana bridges  Cardio program (V5): 2/22/22  Lifting education (V11): v11  Using Lumbar Roll: Pt " reports she has one.     Education provided:   -Rationale for POC and progressions       Written Home Exercises Provided: Patient instructed to cont prior HEP.  Exercises were reviewed and Jia was able to demonstrate them prior to the end of the session.  Jia demonstrated good  understanding of the education provided.       See EMR under Patient Instructions for exercises provided 2/3/2022.    Assessment   Jia tolerated today's session well. Additional exercises to address thoracic mobility incorporated today. MedX resistance increased to 50 ft lbs c/ pt performing 15 reps c/ a reported exertion of 2/10. ROM on MedX was also increased (+3 deg flexion) c/ good response. No exacerbation of pain was noted overall.     Patient is making good progress towards established goals.  Pt will continue to benefit from skilled outpatient physical therapy to address the deficits stated in the impairment chart, provide pt/family education and to maximize pt's level of independence in the home and community environment.     Anticipated Barriers for therapy: 2 back surgeries, significant loss of function     Pt's spiritual, cultural and educational needs considered and pt agreeable to plan of care and goals as stated below:       GOALS: Pt is in agreement with the following goals.     Short term goals:  6 weeks or 10 visits   1.  Pt will demonstrate increased lumbar ROM by at least 6 degrees from the initial ROM value with improvements noted in functional ROM and ability to perform ADLs. GOAL MET 02/25/22  2.  Pt will demonstrate increased MedX average isometric strength value  by 20% from initial test resulting in improved ability to perform bending, lifting, and carrying activities safely, confidently.  (approp and ongoing)  3.  Patient report a reduction in worst pain score by 1-2 points for improved tolerance for 8/10 at worst for standing to cook.  (approp and ongoing)  4.  Pt able to perform HEP correctly with minimal  cueing or supervision from therapist to encourage independent management of symptoms. (approp and ongoing)        Long term goals: 10 weeks or 20 visits   1. Pt will demonstrate increased lumbar ROM by at least 12 degrees from initial ROM value, resulting in improved ability to perform functional fwd bending while standing and sitting. (approp and ongoing)  2. Pt will demonstrate increased MedX average isometric strength value  by 35% from initial test resulting in improved ability to perform bending, lifting, and carrying activities safely, confidently.  (approp and ongoing)  3. Pt to demonstrate ability to independently control and reduce their pain through posture positioning and mechanical movements throughout a typical day.  (approp and ongoing)  4.  Pt will demonstrate reduced pain and improved functional outcomes as reported on the FOTO by reaching a limitation score of < or = 40% or less in order to demonstrate subjective improvement in pt's condition.    (approp and ongoing)  5. Pt will demonstrate independence with the HEP at discharge  (approp and ongoing)  6. Sit to stand 10 times without hands, Single leg stance 5 sec bilat (approp and ongoing)  7. Stand and walk to cook, and grocery shop with pain <6/10  8. Walk from waiting room to healthy back with  Pain < 4/10, walk 10 min in community (approp and ongoing)      Plan   Continue with established Plan of Care towards established PT goals.     Grace Reynolds, PT, DPT  02/25/2022

## 2022-02-25 NOTE — TELEPHONE ENCOUNTER
Incoming call from patient regarding repatha. Informed her PA is approved and script was received from MD. Copay is $4. Patient ok with call back from [facility] for initial consult today. Renny Delgado.

## 2022-03-03 ENCOUNTER — CLINICAL SUPPORT (OUTPATIENT)
Dept: REHABILITATION | Facility: HOSPITAL | Age: 62
End: 2022-03-03
Attending: PHYSICAL MEDICINE & REHABILITATION
Payer: MEDICARE

## 2022-03-03 DIAGNOSIS — R29.3 BAD POSTURE: Primary | ICD-10-CM

## 2022-03-03 DIAGNOSIS — R29.898 DECREASED STRENGTH OF TRUNK AND BACK: ICD-10-CM

## 2022-03-03 DIAGNOSIS — M25.69 DECREASED ROM OF TRUNK AND BACK: ICD-10-CM

## 2022-03-03 PROCEDURE — 97110 THERAPEUTIC EXERCISES: CPT | Mod: PN,CQ

## 2022-03-03 NOTE — PROGRESS NOTES
[facility]      Name: Jia Ugalde  Clinic Number: [id number]    Therapy Diagnosis:   Encounter Diagnoses   Name Primary?    Bad posture Yes    Decreased ROM of trunk and back     Decreased strength of trunk and back      Physician: Ghada Zamora, *    Visit Date: 3/3/2022    Physician Orders: PT Eval and Treat    Medical Diagnosis from Referral:   M48.062 (ICD-10-CM) - Spinal stenosis of lumbar region with neurogenic claudication   M51.36 (ICD-10-CM) - DDD (degenerative disc disease), lumbar   Evaluation Date: 2/3/2022  Authorization Period Expiration: 22  Plan of Care Expiration: 22  Reassessment Due: 22  Visit # / Visits authorized:       Time In: 0838AM  Time Out: 1005AM  Total Billable Time: 80 minutes    Precautions:    2 lumbar fusion surgeries with hardware, , and 2020     Pattern of pain determined:  4 PEN leg dominant, intermittent, worse standing/walking, better sitting         Subjective   Jia Vila reports back and hip pain this morning that's achy in nature.     Patient reports tolerating previous visit with some soreness.   Patient reports their pain to be 1/10 on a 0-10 scale with 0 being no pain and 10 being the worst pain imaginable.  Pain Location: B buttocks        Pts goals: walk with family, use [place], walk for 10-15 min as a starting goal without support     Objective     Baseline Isometric Testing on Med X equipment: Testing administered by PT  Date of testin/3/22  ROM 0-36 deg   Max Peak Torque 86    Min Peak Torque 27    Flex/Ext Ratio 3/1   % below normative data -49 %            Limitation/Restriction for FOTO 2/3/22 Survey     Therapist reviewed FOTO scores for Jia Ugalde on 2/3/2022.   FOTO documents entered into [employer] - see Media section.     Limitation Score: 48% limited  Goal:  <40 % limited             Treatment    Pt was instructed in and performed the following:     Jia Vila  "received therapeutic exercises to develop/improved posture, cardiovascular endurance, muscular endurance, lumbar/cervical ROM, strength and muscular endurance for 80 minutes including the following exercises:     Treadmill (2 mph), x10    Standing extension, 5 sec holds x10  Thoracic ext over chair (1/2 foam beneath shoulder blades)  SOC, x15  Open books, x10  LTR, x10  SKTC, x10  Supine hip flexor stretch c/ strap, 3x30" ea   Piriformis, 2x30"  Bridges c/ GTB, 3x10  SL clams c/ GTB, 3x10 ea  STS c/ 7.5# KB, 2x10    [facility] 3/3/2022   Visit Number 7   VAS Pain Rating 0   Treadmill Time (in min.) 10   Speed 2   Lumbar Stretches - Slouch Overcorrection 15   Extension in Standing 10   Flexion in Lying 10   Lumbar Extension Seat Pad -   Femur Restraint -   Top Dead Center -   Counterweight -   Lumbar Flexion -   Lumbar Extension -   Lumbar Peak Torque -   Min Torque -   Test Percent Below Normative Data -   Lumbar Weight 50   Repetitions 18   Rating of Perceived Exertion 3   Ice - Z Lie (in min.) 10          Peripheral muscle strengthening which included 1 set of 15-20 repetitions at a slow, controlled 10-13 second per rep pace focused on strengthening supporting musculature for improved body mechanics and functional mobility.  Pt and therapist focused on proper form during treatment to ensure optimal strengthening of each targeted muscle group.  Machines were utilized including torso rotation, chest press, rowing, biceps, and triceps. Leg extension, leg curl, hip abd, hip add, and leg press added visit 3       Home Exercises Provided and Patient Education Provided   Stretching: SOC, standing extension, piriformis, hip flexor, DKTC, LTR  Strengthening: teresa dial  Cardio program (V5): 2/22/22  Lifting education (V11): v11  Using Lumbar Roll: Pt reports she has one.     Education provided:   -Rationale for POC and progressions       Written Home Exercises Provided: Patient instructed to cont prior " HEP.  Exercises were reviewed and Jia was able to demonstrate them prior to the end of the session.  Jia demonstrated good  understanding of the education provided.       See EMR under Patient Instructions for exercises provided 2/3/2022.    Assessment   Jia tolerated today's session well with minimal c/o pain. MedX resistance maintained at 50 ft lbs c/ pt performing 18 reps c/ a reported exertion of 3/10. No exacerbation of pain was noted overall. Pt encouraged to incorporated the prescribed HEP into her daily routine as a method of prevention instead of when certain body parts hurt, and pt gave verbal understanding.     Patient is making good progress towards established goals.  Pt will continue to benefit from skilled outpatient physical therapy to address the deficits stated in the impairment chart, provide pt/family education and to maximize pt's level of independence in the home and community environment.     Anticipated Barriers for therapy: 2 back surgeries, significant loss of function     Pt's spiritual, cultural and educational needs considered and pt agreeable to plan of care and goals as stated below:       GOALS: Pt is in agreement with the following goals.     Short term goals:  6 weeks or 10 visits   1.  Pt will demonstrate increased lumbar ROM by at least 6 degrees from the initial ROM value with improvements noted in functional ROM and ability to perform ADLs. GOAL MET 02/25/22  2.  Pt will demonstrate increased MedX average isometric strength value  by 20% from initial test resulting in improved ability to perform bending, lifting, and carrying activities safely, confidently.  (approp and ongoing)  3.  Patient report a reduction in worst pain score by 1-2 points for improved tolerance for 8/10 at worst for standing to cook.  (approp and ongoing)  4.  Pt able to perform HEP correctly with minimal cueing or supervision from therapist to encourage independent management of symptoms. (approp and  ongoing)        Long term goals: 10 weeks or 20 visits   1. Pt will demonstrate increased lumbar ROM by at least 12 degrees from initial ROM value, resulting in improved ability to perform functional fwd bending while standing and sitting. (approp and ongoing)  2. Pt will demonstrate increased MedX average isometric strength value  by 35% from initial test resulting in improved ability to perform bending, lifting, and carrying activities safely, confidently.  (approp and ongoing)  3. Pt to demonstrate ability to independently control and reduce their pain through posture positioning and mechanical movements throughout a typical day.  (approp and ongoing)  4.  Pt will demonstrate reduced pain and improved functional outcomes as reported on the FOTO by reaching a limitation score of < or = 40% or less in order to demonstrate subjective improvement in pt's condition.    (approp and ongoing)  5. Pt will demonstrate independence with the HEP at discharge  (approp and ongoing)  6. Sit to stand 10 times without hands, Single leg stance 5 sec bilat (approp and ongoing)  7. Stand and walk to cook, and grocery shop with pain <6/10  8. Walk from waiting room to healthy back with  Pain < 4/10, walk 10 min in community (approp and ongoing)      Plan   Continue with established Plan of Care towards established PT goals.     Diana Walsh, PTA  03/03/2022

## 2022-03-07 ENCOUNTER — TELEPHONE (OUTPATIENT)
Dept: CARDIOLOGY | Facility: CLINIC | Age: 62
End: 2022-03-07
Payer: MEDICARE

## 2022-03-07 NOTE — TELEPHONE ENCOUNTER
----- Message from Renny Anderson PharmD sent at 2/23/2022  6:14 PM CST -----  Regarding: Repatha  Good evening Dr. Rogers and staff,    I was able to get the prior authorization for Repatha approved for Ms. Ugalde. May you please send a prescription for Repatha to [facility] to replace the previous Praluent prescription. Thank you for your time.     Best regards,    Renny Anderson, Pharm D  Clinical Pharmacist  [facility]  [phone]    Prescription sent on 3/25

## 2022-03-08 ENCOUNTER — CLINICAL SUPPORT (OUTPATIENT)
Dept: REHABILITATION | Facility: HOSPITAL | Age: 62
End: 2022-03-08
Attending: PHYSICAL MEDICINE & REHABILITATION
Payer: MEDICARE

## 2022-03-08 DIAGNOSIS — R29.898 DECREASED STRENGTH OF TRUNK AND BACK: ICD-10-CM

## 2022-03-08 DIAGNOSIS — M25.69 DECREASED ROM OF TRUNK AND BACK: ICD-10-CM

## 2022-03-08 DIAGNOSIS — R29.3 BAD POSTURE: Primary | ICD-10-CM

## 2022-03-08 PROCEDURE — 97110 THERAPEUTIC EXERCISES: CPT | Mod: PN,CQ

## 2022-03-08 NOTE — PROGRESS NOTES
[facility]      Name: Jia Ugalde  Clinic Number: [id number]    Therapy Diagnosis:   Encounter Diagnoses   Name Primary?    Bad posture Yes    Decreased ROM of trunk and back     Decreased strength of trunk and back      Physician: Ghada Zamora, *    Visit Date: 3/8/2022    Physician Orders: PT Eval and Treat    Medical Diagnosis from Referral:   M48.062 (ICD-10-CM) - Spinal stenosis of lumbar region with neurogenic claudication   M51.36 (ICD-10-CM) - DDD (degenerative disc disease), lumbar   Evaluation Date: 2/3/2022  Authorization Period Expiration: 22  Plan of Care Expiration: 22  Reassessment Due: 22  Visit # / Visits authorized:       Time In: 700AM  Time Out: 810 AM  Total Billable Time: 49minutes    Precautions:    2 lumbar fusion surgeries with hardware, , and 2020     Pattern of pain determined:  4 PEN leg dominant, intermittent, worse standing/walking, better sitting         Subjective   Jia Vila reports she is feeling pretty good this morning.     Patient reports tolerating previous visit with some soreness.   Patient reports their pain to be 0/10 on a 0-10 scale with 0 being no pain and 10 being the worst pain imaginable.  Pain Location: B buttocks        Pts goals: walk with family, use to walk in [place], walk for 10-15 min as a starting goal without support     Objective     Baseline Isometric Testing on Med X equipment: Testing administered by PT  Date of testin/3/22  ROM 0-36 deg   Max Peak Torque 86    Min Peak Torque 27    Flex/Ext Ratio 3/1   % below normative data -49 %            Limitation/Restriction for FOTO 2/3/22 Survey     Therapist reviewed FOTO scores for Jia Ugalde on 2/3/2022.   FOTO documents entered into [employer] - see Media section.     Limitation Score: 48% limited  Goal:  <40 % limited             Treatment    Pt was instructed in and performed the following:     Jia Vila amanda  "therapeutic exercises to develop/improved posture, cardiovascular endurance, muscular endurance, lumbar/cervical ROM, strength and muscular endurance for 70 minutes including the following exercises:  Bold Performed    Treadmill (2 mph), x10    Standing extension, 5 sec holds x10  Thoracic ext over chair (1/2 foam beneath shoulder blades) 10 x 3"   SOC, x15  Open books, x10  LTR, x10  SKTC, x10  +Bird dog x 10 (unable on L knee)  +pilldown with march x 20 RTB  Supine hip flexor stretch c/ strap, 3x30" ea   Piriformis, 2x30"  Bridges c/ GTB, 3x10  SL clams c/ GTB, 3x10   STS c/ 7.5# KB, 2x10  +Pallof press RTB x 15 ea    [facility] 3/8/2022   Visit Number 8   VAS Pain Rating 0   Treadmill Time (in min.) 10   Speed 2   Lumbar Stretches - Slouch Overcorrection 15   Extension in Standing 10   Flexion in Lying 10   Lumbar Extension Seat Pad -   Femur Restraint -   Top Dead Center -   Counterweight -   Lumbar Flexion -   Lumbar Extension -   Lumbar Peak Torque -   Min Torque -   Test Percent Below Normative Data -   Lumbar Weight 50   Repetitions 20   Rating of Perceived Exertion 3   Ice - Z Lie (in min.) 10         Peripheral muscle strengthening which included 1 set of 15-20 repetitions at a slow, controlled 10-13 second per rep pace focused on strengthening supporting musculature for improved body mechanics and functional mobility.  Pt and therapist focused on proper form during treatment to ensure optimal strengthening of each targeted muscle group.  Machines were utilized including torso rotation, chest press, rowing, biceps, and triceps. Leg extension, leg curl, hip abd, hip add, and leg press added visit 3       Home Exercises Provided and Patient Education Provided   Stretching: SOC, standing extension, piriformis, hip flexor, DKTC, LTR  Strengthening: teresa dial  Cardio program (V5): 2/22/22  Lifting education (V11): v11  Using Lumbar Roll: Pt reports she has one.     Education provided:   -Rationale " for POC and progressions       Written Home Exercises Provided: Patient instructed to cont prior HEP.  Exercises were reviewed and Jai was able to demonstrate them prior to the end of the session.  Jia demonstrated good  understanding of the education provided.       See EMR under Patient Instructions for exercises provided 2/3/2022.    Assessment   Jia tolerated session well. She reports to session with no c/o pain. Added Core stability exercises with good tolerance. Attempted bird dog with pt unable to perform due to knee numbness. MedX was performed at 50ft lbs with 20 reps performed at an exertion rating of 3/10.       Patient is making good progress towards established goals.  Pt will continue to benefit from skilled outpatient physical therapy to address the deficits stated in the impairment chart, provide pt/family education and to maximize pt's level of independence in the home and community environment.     Anticipated Barriers for therapy: 2 back surgeries, significant loss of function     Pt's spiritual, cultural and educational needs considered and pt agreeable to plan of care and goals as stated below:       GOALS: Pt is in agreement with the following goals.     Short term goals:  6 weeks or 10 visits   1.  Pt will demonstrate increased lumbar ROM by at least 6 degrees from the initial ROM value with improvements noted in functional ROM and ability to perform ADLs. GOAL MET 02/25/22  2.  Pt will demonstrate increased MedX average isometric strength value  by 20% from initial test resulting in improved ability to perform bending, lifting, and carrying activities safely, confidently.  (approp and ongoing)  3.  Patient report a reduction in worst pain score by 1-2 points for improved tolerance for 8/10 at worst for standing to cook.  (approp and ongoing)  4.  Pt able to perform HEP correctly with minimal cueing or supervision from therapist to encourage independent management of symptoms. (approp and  ongoing)        Long term goals: 10 weeks or 20 visits   1. Pt will demonstrate increased lumbar ROM by at least 12 degrees from initial ROM value, resulting in improved ability to perform functional fwd bending while standing and sitting. (approp and ongoing)  2. Pt will demonstrate increased MedX average isometric strength value  by 35% from initial test resulting in improved ability to perform bending, lifting, and carrying activities safely, confidently.  (approp and ongoing)  3. Pt to demonstrate ability to independently control and reduce their pain through posture positioning and mechanical movements throughout a typical day.  (approp and ongoing)  4.  Pt will demonstrate reduced pain and improved functional outcomes as reported on the FOTO by reaching a limitation score of < or = 40% or less in order to demonstrate subjective improvement in pt's condition.    (approp and ongoing)  5. Pt will demonstrate independence with the HEP at discharge  (approp and ongoing)  6. Sit to stand 10 times without hands, Single leg stance 5 sec bilat (approp and ongoing)  7. Stand and walk to cook, and grocery shop with pain <6/10  8. Walk from waiting room to healthy back with  Pain < 4/10, walk 10 min in community (approp and ongoing)      Plan   Continue with established Plan of Care towards established PT goals.     Reno Nash, PTA  03/08/2022

## 2022-03-10 NOTE — PROGRESS NOTES
[facility]      Name: Jia Ugalde  Clinic Number: [id number]    Therapy Diagnosis:   Encounter Diagnoses   Name Primary?    Bad posture Yes    Decreased ROM of trunk and back     Decreased strength of trunk and back      Physician: Ghada Zamora, *    Visit Date: 3/11/2022    Physician Orders: PT Eval and Treat    Medical Diagnosis from Referral:   M48.062 (ICD-10-CM) - Spinal stenosis of lumbar region with neurogenic claudication   M51.36 (ICD-10-CM) - DDD (degenerative disc disease), lumbar   Evaluation Date: 2/3/2022  Authorization Period Expiration: 22  Plan of Care Expiration: 22  Reassessment Due: 22  Visit # / Visits authorized:       Time In: 07  Time Out: 820  Total Billable Time: 80 minutes    Precautions:    2 lumbar fusion surgeries with hardware, , and 2020     Pattern of pain determined:  4 PEN leg dominant, intermittent, worse standing/walking, better sitting         Subjective   Jia Vila reports she is doing okay this morning. Woke up c/ some soreness but feeling a bit better after stretching (lumbar flexion/ext and LTRs performed).     Patient reports tolerating previous visit fine.   Patient reports their pain to be unknown/10 on a 0-10 scale with 0 being no pain and 10 being the worst pain imaginable.  Pain Location: B buttocks        Pts goals: walk with family, use to [place] in [place], walk for 10-15 min as a starting goal without support     Objective     Baseline Isometric Testing on Med X equipment: Testing administered by PT  Date of testin/3/22  ROM 0-36 deg   Max Peak Torque 86    Min Peak Torque 27    Flex/Ext Ratio 3/1   % below normative data -49 %            Limitation/Restriction for FOTO 2/3/22 Survey     Therapist reviewed FOTO scores for Jia Ugalde on 2/3/2022.   FOTO documents entered into [employer] - see Media section.     Limitation Score: 48% limited  Goal:  <40 % limited        "      Treatment    Pt was instructed in and performed the following:     Jia Vila received therapeutic exercises to develop/improved posture, cardiovascular endurance, muscular endurance, lumbar/cervical ROM, strength and muscular endurance for 70 minutes including the following exercises:      Treadmill (2.2 mph), x10 min    Standing extension, 5 sec holds x10   Thoracic ext over chair (1/2 foam beneath shoulder blades), 3 sec holds x10  +Seated flexion stretch (reaching beneath chair), x10  Open books c/ RTB, x10  SKTC, x10  +Lat pulldown c/ march (14# FM), 2x10  +Staggered bridge, 2x10  SL clams c/ BTB, 3x10     [facility] 3/11/2022   Visit Number 9   VAS Pain Rating 2   Treadmill Time (in min.) 10   Speed 2.2   Lumbar Stretches - Slouch Overcorrection -   Extension in Standing 10   Flexion in Lying 10   Lumbar Extension Seat Pad -   Femur Restraint -   Top Dead Center -   Counterweight -   Lumbar Flexion -   Lumbar Extension -   Lumbar Peak Torque -   Min Torque -   Test Percent Below Normative Data -   Lumbar Weight 55   Repetitions 20   Rating of Perceived Exertion 3   Ice - Z Lie (in min.) 10       Peripheral muscle strengthening which included 1 set of 15-20 repetitions at a slow, controlled 10-13 second per rep pace focused on strengthening supporting musculature for improved body mechanics and functional mobility.  Pt and therapist focused on proper form during treatment to ensure optimal strengthening of each targeted muscle group.  Machines were utilized including torso rotation, chest press, rowing, biceps, and triceps. Leg extension, leg curl, hip abd, hip add, and leg press added visit 3       Not performed 3/11/2022 2/2 lack of time:  Supine hip flexor stretch c/ strap, 3x30"   SOC, x15  LTR, x10  STS c/ 7.5# KB, 2x10  Palloff press RTB x 15 ea  Piriformis, 2x30"      Home Exercises Provided and Patient Education Provided   Stretching: SOC, standing extension, piriformis, hip flexor, DKTC, " LTR  Strengthening: teresa dial  Cardio program (V5): 2/22/22  Lifting education (V11): v11  Using Lumbar Roll: Pt reports she has one.     Education provided:   -Rationale for POC and progressions       Written Home Exercises Provided: Patient instructed to cont prior HEP.  Exercises were reviewed and Jia was able to demonstrate them prior to the end of the session.  Jia demonstrated good  understanding of the education provided.       See EMR under Patient Instructions for exercises provided 2/3/2022.    Assessment   Jia performed well in today's session. All stability progressions tolerated without issue nor pain exacerbation. Some cueing required for proper form and understanding. Brief reassessment performed c/ pt demonstrating improved lumbar ROM (+ 6 deg) and lumbar strength. Pt can now tolerate 55 ft lbs on lumbar MedX, performing 20 reps c/ a reported exertion of 3/10. PT reinforced importance of HEP consistency for optimal therapeutic gain. Will continue to progress exercise as functional implicated and appropriate. Midpoint MedX assessment to be performed at next visit.       Patient is making good progress towards established goals.  Pt will continue to benefit from skilled outpatient physical therapy to address the deficits stated in the impairment chart, provide pt/family education and to maximize pt's level of independence in the home and community environment.     Anticipated Barriers for therapy: 2 back surgeries, significant loss of function     Pt's spiritual, cultural and educational needs considered and pt agreeable to plan of care and goals as stated below:       GOALS: Pt is in agreement with the following goals.     Short term goals:  6 weeks or 10 visits   1.  Pt will demonstrate increased lumbar ROM by at least 6 degrees from the initial ROM value with improvements noted in functional ROM and ability to perform ADLs. GOAL MET 02/25/22  2.  Pt will demonstrate increased MedX average  isometric strength value  by 20% from initial test resulting in improved ability to perform bending, lifting, and carrying activities safely, confidently.  (approp and ongoing)  3.  Patient report a reduction in worst pain score by 1-2 points for improved tolerance for 8/10 at worst for standing to cook.  (approp and ongoing)  4.  Pt able to perform HEP correctly with minimal cueing or supervision from therapist to encourage independent management of symptoms. (approp and ongoing)        Long term goals: 10 weeks or 20 visits   1. Pt will demonstrate increased lumbar ROM by at least 12 degrees from initial ROM value, resulting in improved ability to perform functional fwd bending while standing and sitting. (approp and ongoing)  2. Pt will demonstrate increased MedX average isometric strength value  by 35% from initial test resulting in improved ability to perform bending, lifting, and carrying activities safely, confidently.  (approp and ongoing)  3. Pt to demonstrate ability to independently control and reduce their pain through posture positioning and mechanical movements throughout a typical day.  (approp and ongoing)  4.  Pt will demonstrate reduced pain and improved functional outcomes as reported on the FOTO by reaching a limitation score of < or = 40% or less in order to demonstrate subjective improvement in pt's condition.    (approp and ongoing)  5. Pt will demonstrate independence with the HEP at discharge  (approp and ongoing)  6. Sit to stand 10 times without hands, Single leg stance 5 sec bilat (approp and ongoing)  7. Stand and walk to cook, and grocery shop with pain <6/10  8. Walk from waiting room to healthy back with  Pain < 4/10, walk 10 min in community (approp and ongoing)      Plan   Continue with established Plan of Care towards established PT goals.     Grace Reynolds, PT, DPT  03/11/22

## 2022-03-11 ENCOUNTER — PATIENT MESSAGE (OUTPATIENT)
Dept: CARDIOLOGY | Facility: CLINIC | Age: 62
End: 2022-03-11
Payer: MEDICARE

## 2022-03-11 ENCOUNTER — CLINICAL SUPPORT (OUTPATIENT)
Dept: REHABILITATION | Facility: HOSPITAL | Age: 62
End: 2022-03-11
Attending: PHYSICAL MEDICINE & REHABILITATION
Payer: MEDICARE

## 2022-03-11 DIAGNOSIS — M25.69 DECREASED ROM OF TRUNK AND BACK: ICD-10-CM

## 2022-03-11 DIAGNOSIS — R29.3 BAD POSTURE: Primary | ICD-10-CM

## 2022-03-11 DIAGNOSIS — R29.898 DECREASED STRENGTH OF TRUNK AND BACK: ICD-10-CM

## 2022-03-11 PROCEDURE — 97110 THERAPEUTIC EXERCISES: CPT | Mod: PN

## 2022-03-14 NOTE — PROGRESS NOTES
[facility]      Name: Jia Ugalde  Clinic Number: [id number]    Therapy Diagnosis:   Encounter Diagnoses   Name Primary?    Bad posture Yes    Decreased ROM of trunk and back     Decreased strength of trunk and back      Physician: Ghada Zamora, *    Visit Date: 3/15/2022    Physician Orders: PT Eval and Treat    Medical Diagnosis from Referral:   M48.062 (ICD-10-CM) - Spinal stenosis of lumbar region with neurogenic claudication   M51.36 (ICD-10-CM) - DDD (degenerative disc disease), lumbar   Evaluation Date: 2/3/2022  Authorization Period Expiration: 22  Plan of Care Expiration: 22  Reassessment Due: 04/15/22  Visit # / Visits authorized: 10/20      Time In: 915  Time Out: 1000  Total Billable Time:45 minutes    Precautions:    2 lumbar fusion surgeries with hardware, , and 2020     Pattern of pain determined:  4 PEN leg dominant, intermittent, worse standing/walking, better sitting         Subjective   Jia Vila reports she is doing fine. Did not experience any issues following last session.     Patient reports tolerating previous visit fine.   Patient reports their pain to be 3/10 on a 0-10 scale with 0 being no pain and 10 being the worst pain imaginable.  Pain Location: B buttocks        Pts goals: walk with family, use to walk in [place], walk for 10-15 min as a starting goal without support     Objective     Baseline Isometric Testing on Med X equipment: Testing administered by PT  Date of testin/3/22  ROM 0-36 deg   Max Peak Torque 86    Min Peak Torque 27    Flex/Ext Ratio 3/1   % below normative data 49%       Midpoint Isometric Testing on Med X equipment: Testing administered by PT  Date of testin/15/22  ROM 0-42 deg   Max Peak Torque 115   Min Peak Torque 25   Flex/Ext Ratio 3/1   % below normative data 47%        Limitation/Restriction for FOTO 2/3/22 Survey     Therapist reviewed FOTO scores for Jia Ugalde on  "2/3/2022.   FOTO documents entered into [employer] - see Media section.     Limitation Score: 48% limited  Visit 10 score: 56%  Goal:  <40 % limited             Treatment    Pt was instructed in and performed the following:     Jia Vila received therapeutic exercises to develop/improved posture, cardiovascular endurance, muscular endurance, lumbar/cervical ROM, strength and muscular endurance for 43 minutes including the following exercises:      Treadmill (2.2 mph), x10 min    [facility] 3/15/2022   Visit Number 10   VAS Pain Rating 3   Treadmill Time (in min.) 10   Speed 2.2   Lumbar Stretches - Slouch Overcorrection -   Extension in Standing -   Flexion in Lying -   Lumbar Extension Seat Pad 0   Femur Restraint 5   Top Dead Center 24   Counterweight 160   Lumbar Flexion 42   Lumbar Extension 0   Lumbar Peak Torque 115   Min Torque 25   Test Percent Below Normative Data 47   Test Percent Gain in Strength from Initial  12   Lumbar Weight -   Repetitions -   Rating of Perceived Exertion -   Ice - Z Lie (in min.) 10       Peripheral muscle strengthening which included 1 set of 15-20 repetitions at a slow, controlled 10-13 second per rep pace focused on strengthening supporting musculature for improved body mechanics and functional mobility.  Pt and therapist focused on proper form during treatment to ensure optimal strengthening of each targeted muscle group.  Machines were utilized including torso rotation, chest press, rowing, biceps, and triceps. Leg extension, leg curl, hip abd, hip add, and leg press added visit 3       Not performed 2/2 MedX isometric testing:  Standing extension, 5 sec holds x10   Thoracic ext over chair (1/2 foam beneath shoulder blades), 3 sec holds x10  +Seated flexion stretch (reaching beneath chair), x10  Open books c/ RTB, x10  SKTC, x10  +Lat pulldown c/ march (14# FM), 2x10  +Staggered bridge, 2x10  SL clams c/ BTB, 3x10   Supine hip flexor stretch c/ strap, 3x30"   SOC, x15  LTR, " "x10  STS c/ 7.5# KB, 2x10  Palloff press RTB x 15 ea  Piriformis, 2x30"      Home Exercises Provided and Patient Education Provided   Stretching: SOC, standing extension, piriformis, hip flexor, DKTC, LTR  Strengthening: teresa dial  Cardio program (V5): 2/22/22  Lifting education (V11): v11  Using Lumbar Roll: Pt reports she has one.     Education provided:   -Rationale for POC and progressions   -MedX isometric testing: technique and results       Written Home Exercises Provided: Patient instructed to cont prior HEP.  Exercises were reviewed and Jia was able to demonstrate them prior to the end of the session.  Jia demonstrated good  understanding of the education provided.       See EMR under Patient Instructions for exercises provided 2/3/2022.    Assessment   Jia has attended 10 visits of [facility], which included MD evaluation, PT evaluation with isometric testing, and physical therapy tx including HEP instruction, education (including posture and ergonomics), aerobic work, dynamic strengthening on MedX equipment for the spine, and whole body strengthening on MedX equipment with increasing weight loads.  Pt  is demonstrating increased ability to reduce symptoms, improved posture, improved ROM, and improved strength as follows:    -Improved attention to posture  -Improved 6 degrees of ROM,  initially 0-36 degrees upon MedX testing and currently 0-42 degrees  -Improved strength at each test point per MedX isometric testing with 12% average improvement and c/ reduced pain noted by pt      Patient is making fair progress towards established goals. Although pt reports compliance to HEP, she does still require max cueing for recall of exercises and for proper technique/demonstration of exercises given.   Pt will continue to benefit from skilled outpatient physical therapy to address the deficits stated in the impairment chart, provide pt/family education and to maximize pt's level of " independence in the home and community environment.     Anticipated Barriers for therapy: 2 back surgeries, significant loss of function     Pt's spiritual, cultural and educational needs considered and pt agreeable to plan of care and goals as stated below:       GOALS: Pt is in agreement with the following goals.     Short term goals:  6 weeks or 10 visits   1.  Pt will demonstrate increased lumbar ROM by at least 6 degrees from the initial ROM value with improvements noted in functional ROM and ability to perform ADLs. GOAL MET 02/25/22  2.  Pt will demonstrate increased MedX average isometric strength value  by 20% from initial test resulting in improved ability to perform bending, lifting, and carrying activities safely, confidently.  (approp and ongoing)  3.  Patient report a reduction in worst pain score by 1-2 points for improved tolerance for 8/10 at worst for standing to cook.  GOAL MET 03/15/22  4.  Pt able to perform HEP correctly with minimal cueing or supervision from therapist to encourage independent management of symptoms. NOT CONSISTENT        Long term goals: 10 weeks or 20 visits   1. Pt will demonstrate increased lumbar ROM by at least 12 degrees from initial ROM value, resulting in improved ability to perform functional fwd bending while standing and sitting. (approp and ongoing)  2. Pt will demonstrate increased MedX average isometric strength value  by 35% from initial test resulting in improved ability to perform bending, lifting, and carrying activities safely, confidently.  (approp and ongoing)  3. Pt to demonstrate ability to independently control and reduce their pain through posture positioning and mechanical movements throughout a typical day.  (approp and ongoing)  4.  Pt will demonstrate reduced pain and improved functional outcomes as reported on the FOTO by reaching a limitation score of < or = 40% or less in order to demonstrate subjective improvement in pt's condition.    (approp  and ongoing)  5. Pt will demonstrate independence with the HEP at discharge  (approp and ongoing)  6. Sit to stand 10 times without hands, Single leg stance 5 sec bilat (approp and ongoing)  7. Stand and walk to cook, and grocery shop with pain <6/10  8. Walk from waiting room to healthy back with  Pain < 4/10, walk 10 min in community (approp and ongoing)      Plan   Continue with established Plan of Care towards established PT goals.     Grace Reynolds, PT, DPT  03/15/22

## 2022-03-15 ENCOUNTER — CLINICAL SUPPORT (OUTPATIENT)
Dept: REHABILITATION | Facility: HOSPITAL | Age: 62
End: 2022-03-15
Attending: PHYSICAL MEDICINE & REHABILITATION
Payer: MEDICARE

## 2022-03-15 DIAGNOSIS — R29.898 DECREASED STRENGTH OF TRUNK AND BACK: ICD-10-CM

## 2022-03-15 DIAGNOSIS — M25.69 DECREASED ROM OF TRUNK AND BACK: ICD-10-CM

## 2022-03-15 DIAGNOSIS — R29.3 BAD POSTURE: Primary | ICD-10-CM

## 2022-03-15 PROCEDURE — 97110 THERAPEUTIC EXERCISES: CPT | Mod: PN

## 2022-03-21 NOTE — PROGRESS NOTES
"[facility]      Name: Jia Vila [facility]  Clinic Number: [id number]    Therapy Diagnosis:   Encounter Diagnoses   Name Primary?    Bad posture Yes    Decreased ROM of trunk and back     Decreased strength of trunk and back      Physician: Ghada Zamora, *    Visit Date: 3/22/2022    Physician Orders: PT Eval and Treat    Medical Diagnosis from Referral:   M48.062 (ICD-10-CM) - Spinal stenosis of lumbar region with neurogenic claudication   M51.36 (ICD-10-CM) - DDD (degenerative disc disease), lumbar   Evaluation Date: 2/3/2022  Authorization Period Expiration: 22  Plan of Care Expiration: 22  Reassessment Due: 04/15/22  Visit # / Visits authorized:       Time In: 705  Time Out: 810  Total Billable Time: 65 minutes    Precautions:    2 lumbar fusion surgeries with hardware, , and 2020     Pattern of pain determined:  4 PEN leg dominant, intermittent, worse standing/walking, better sitting         Subjective   Jia Vila reports she was a bit stiff and sore upon waking this morning. Has been waking up throughout the night c/ spasms in legs. When asked about performance of given home stretches, pt replies that she does "one big stretch" in the morning and essentially moves her legs throughout their range of motion. When asked specifically about her printed HEP, she states she is not performing these exercises as much as she should.     Patient reports tolerating previous visit fine.   Patient reports their pain to be 3/10 on a 0-10 scale with 0 being no pain and 10 being the worst pain imaginable.  Pain Location: B buttocks        Pts goals: walk with family, use to walk in [place], walk for 10-15 min as a starting goal without support     Objective     Baseline Isometric Testing on Med X equipment: Testing administered by PT  Date of testin/3/22  ROM 0-36 deg   Max Peak Torque 86    Min Peak Torque 27    Flex/Ext Ratio 3/1   % below " "normative data 49%       Midpoint Isometric Testing on Med X equipment: Testing administered by PT  Date of testin/15/22  ROM 0-42 deg   Max Peak Torque 115   Min Peak Torque 25   Flex/Ext Ratio 3/1   % below normative data 47%        Limitation/Restriction for FOTO 2/3/22 Survey     Therapist reviewed FOTO scores for Jia Ugalde on 2/3/2022.   FOTO documents entered into [employer] - see Media section.     Limitation Score: 48% limited  Visit 10 score: 56%  Goal:  <40 % limited             Treatment    Pt was instructed in and performed the following:     Jia Vila received therapeutic exercises to develop/improved posture, cardiovascular endurance, muscular endurance, lumbar/thoracic ROM, strength and muscular endurance for 55 minutes including the following exercises:      Treadmill (2.2 mph), x10 min    Reviewed/modified HEP:  Standing extension, 5 sec holds x15   SOC, x15  SKTC, 5 sec holds x10 ea   Piriformis stretch, 2x45" ea  HSS c/ strap, x30" ea    [facility] 3/22/2022   Visit Number 11   VAS Pain Rating 3   Treadmill Time (in min.) 10   Speed 2.2   Lumbar Stretches - Slouch Overcorrection 15   Extension in Standing 15   Flexion in Lying 10   Lumbar Extension Seat Pad -   Femur Restraint -   Top Dead Center -   Counterweight -   Lumbar Flexion -   Lumbar Extension -   Lumbar Peak Torque -   Min Torque -   Test Percent Below Normative Data -   Test Percent Gain in Strength from Initial  -   Lumbar Weight 60   Repetitions 15   Rating of Perceived Exertion 3   Ice - Z Lie (in min.) -       Peripheral muscle strengthening which included 1 set of 15-20 repetitions at a slow, controlled 10-13 second per rep pace focused on strengthening supporting musculature for improved body mechanics and functional mobility.  Pt and therapist focused on proper form during treatment to ensure optimal strengthening of each targeted muscle group.  Machines were utilized including torso rotation, chest press, rowing, " biceps, and triceps. Leg extension, leg curl, hip abd, hip add, and leg press added visit 3       Home Exercises Provided and Patient Education Provided   Stretching: SOC, standing extension, piriformis stretch, HSS, SKTC  Strengthening: clamshells and bridges deferred for now to encourage improved compliance to HEP  Cardio program (V5): 2/22/22  Lifting education (V11): v11  Using Lumbar Roll: Pt reports she has one.     Education provided:   -Rationale for POC and progressions   -Review of HEP and importance of consistency      Written Home Exercises Provided: Patient instructed to cont prior HEP.  Exercises were reviewed and Jia was able to demonstrate them prior to the end of the session.  Jia demonstrated good  understanding of the education provided.       See EMR under Patient Instructions for exercises provided 2/3/2022.    Assessment   Jia tolerated today's session well. HEP modified and reviewed to encourage improved compliance. Additional printout of exercises given to pt and importance of daily consistency for optimal therapeutic benefit reinforced. Pt verbalized understanding. MedX resistance increased to 60 ft lbs c/ pt performing 15 reps c/ a reported exertion of 3/10. Will continue to review HEP and progress tx as appropriate.       Patient is making fair progress towards established goals.   Pt will continue to benefit from skilled outpatient physical therapy to address the deficits stated in the impairment chart, provide pt/family education and to maximize pt's level of independence in the home and community environment.     Anticipated Barriers for therapy: 2 back surgeries, significant loss of function     Pt's spiritual, cultural and educational needs considered and pt agreeable to plan of care and goals as stated below:       GOALS: Pt is in agreement with the following goals.     Short term goals:  6 weeks or 10 visits   1.  Pt will demonstrate increased lumbar ROM by at least 6 degrees  from the initial ROM value with improvements noted in functional ROM and ability to perform ADLs. GOAL MET 02/25/22  2.  Pt will demonstrate increased MedX average isometric strength value  by 20% from initial test resulting in improved ability to perform bending, lifting, and carrying activities safely, confidently.  (approp and ongoing)  3.  Patient report a reduction in worst pain score by 1-2 points for improved tolerance for 8/10 at worst for standing to cook.  GOAL MET 03/15/22  4.  Pt able to perform HEP correctly with minimal cueing or supervision from therapist to encourage independent management of symptoms. NOT CONSISTENT        Long term goals: 10 weeks or 20 visits   1. Pt will demonstrate increased lumbar ROM by at least 12 degrees from initial ROM value, resulting in improved ability to perform functional fwd bending while standing and sitting. (approp and ongoing)  2. Pt will demonstrate increased MedX average isometric strength value  by 35% from initial test resulting in improved ability to perform bending, lifting, and carrying activities safely, confidently.  (approp and ongoing)  3. Pt to demonstrate ability to independently control and reduce their pain through posture positioning and mechanical movements throughout a typical day.  (approp and ongoing)  4.  Pt will demonstrate reduced pain and improved functional outcomes as reported on the FOTO by reaching a limitation score of < or = 40% or less in order to demonstrate subjective improvement in pt's condition.    (approp and ongoing)  5. Pt will demonstrate independence with the HEP at discharge  (approp and ongoing)  6. Sit to stand 10 times without hands, Single leg stance 5 sec bilat (approp and ongoing)  7. Stand and walk to cook, and grocery shop with pain <6/10  8. Walk from waiting room to healthy back with  Pain < 4/10, walk 10 min in community (approp and ongoing)      Plan   Continue with established Plan of Care towards established  PT goals.     Grace Reynolds, PT, DPT  03/22/22

## 2022-03-22 ENCOUNTER — CLINICAL SUPPORT (OUTPATIENT)
Dept: REHABILITATION | Facility: HOSPITAL | Age: 62
End: 2022-03-22
Attending: PHYSICAL MEDICINE & REHABILITATION
Payer: MEDICARE

## 2022-03-22 ENCOUNTER — PATIENT OUTREACH (OUTPATIENT)
Dept: ADMINISTRATIVE | Facility: OTHER | Age: 62
End: 2022-03-22
Payer: MEDICARE

## 2022-03-22 DIAGNOSIS — R29.3 BAD POSTURE: Primary | ICD-10-CM

## 2022-03-22 DIAGNOSIS — R29.898 DECREASED STRENGTH OF TRUNK AND BACK: ICD-10-CM

## 2022-03-22 DIAGNOSIS — M25.69 DECREASED ROM OF TRUNK AND BACK: ICD-10-CM

## 2022-03-22 PROCEDURE — 97110 THERAPEUTIC EXERCISES: CPT | Mod: PN

## 2022-03-22 NOTE — PROGRESS NOTES
[facility]      Name: Jia Vila [facility]  Clinic Number: [id number]    Therapy Diagnosis:   Encounter Diagnoses   Name Primary?    Bad posture Yes    Decreased ROM of trunk and back     Decreased strength of trunk and back      Physician: Ghada Zamora, *    Visit Date: 3/24/2022    Physician Orders: PT Eval and Treat    Medical Diagnosis from Referral:   M48.062 (ICD-10-CM) - Spinal stenosis of lumbar region with neurogenic claudication   M51.36 (ICD-10-CM) - DDD (degenerative disc disease), lumbar   Evaluation Date: 2/3/2022  Authorization Period Expiration: 22  Plan of Care Expiration: 22  Reassessment Due: 04/15/22  Visit # / Visits authorized:       Time In: 0745AM  Time Out: 0855AM  Total Billable Time:  70 minutes    Precautions:    2 lumbar fusion surgeries with hardware, , and 2020     Pattern of pain determined:  4 PEN leg dominant, intermittent, worse standing/walking, better sitting       Subjective   Jia Vila reports feeling much better compared to previous visit. Her back is okay this morning.     Patient reports tolerating previous visit fine.   Patient reports their pain to be 0/10 on a 0-10 scale with 0 being no pain and 10 being the worst pain imaginable.  Pain Location: B buttocks        Pts goals: walk with family, use to walk in [place], walk for 10-15 min as a starting goal without support     Objective     Baseline Isometric Testing on Med X equipment: Testing administered by PT  Date of testin/3/22  ROM 0-36 deg   Max Peak Torque 86    Min Peak Torque 27    Flex/Ext Ratio 3/1   % below normative data 49%       Midpoint Isometric Testing on Med X equipment: Testing administered by PT  Date of testin/15/22  ROM 0-42 deg   Max Peak Torque 115   Min Peak Torque 25   Flex/Ext Ratio 3/1   % below normative data 47%        Limitation/Restriction for FOTO 2/3/22 Survey     Therapist reviewed FOTO scores for Jia Vila  "[employer] on 2/3/2022.   FOTO documents entered into [employer] - see Media section.     Limitation Score: 48% limited  Visit 10 score: 56%  Goal:  <40 % limited             Treatment    Pt was instructed in and performed the following:     Jia Vila received therapeutic exercises to develop/improved posture, cardiovascular endurance, muscular endurance, lumbar/thoracic ROM, strength and muscular endurance for 55 minutes including the following exercises:      Treadmill (2.2 mph), x10 min    +[employer] Paloff Press 7# 12x/ea  +[employer] march w/ Iso hold 7# 12x/ea   +KB Deadlift 20# w/ 12 in plyo 12x  +SL RDL w/ 4 in cone & Single limb UE support @ stairs 12x/ea  Standing extension, 5 sec holds x15   SOC, x15  SKTC, 5 sec holds x10 ea   +Table Top 90/90 3x20"    NP:Piriformis stretch, 2x45" ea  HSS c/ strap, x30" ea  [facility] 3/24/2022   Visit Number 12   VAS Pain Rating 0   Treadmill Time (in min.) 10   Speed 2.2   Lumbar Stretches - Slouch Overcorrection 15   Extension in Standing 15   Flexion in Lying 10   Lumbar Extension Seat Pad -   Femur Restraint -   Top Dead Center -   Counterweight -   Lumbar Flexion -   Lumbar Extension -   Lumbar Peak Torque -   Min Torque -   Test Percent Below Normative Data -   Test Percent Gain in Strength from Initial  -   Lumbar Weight 60   Repetitions 20   Rating of Perceived Exertion 3   Ice - Z Lie (in min.) 10       Peripheral muscle strengthening which included 1 set of 15-20 repetitions at a slow, controlled 10-13 second per rep pace focused on strengthening supporting musculature for improved body mechanics and functional mobility.  Pt and therapist focused on proper form during treatment to ensure optimal strengthening of each targeted muscle group.  Machines were utilized including torso rotation, chest press, rowing, biceps, and triceps. Leg extension, leg curl, hip abd, hip add, and leg press added visit 3       Home Exercises Provided and Patient Education Provided "   Stretching: SOC, standing extension, piriformis stretch, HSS, SKTC  Strengthening: clamshells and bridges deferred for now to encourage improved compliance to HEP  Cardio program (V5): 2/22/22  Lifting education (V11): v11  Using Lumbar Roll: Pt reports she has one.     Education provided:   -Rationale for POC and progressions   -Review of HEP and importance of consistency      Written Home Exercises Provided: Patient instructed to cont prior HEP.  Exercises were reviewed and Jia was able to demonstrate them prior to the end of the session.  Jia demonstrated good  understanding of the education provided.       See EMR under Patient Instructions for exercises provided 2/3/2022.    Assessment   Jia tolerated today's session well with minimal c/o pain. Added core exercises, as well as exercises to promote proper lifting techniques. Pt performed well with few reports muscles spasms down left leg.  MedX resistance maintained at 60 ft lbs c/ pt performing 20 reps c/ a reported exertion of 3/10. Progress resistance by 10-15% NV per pt tolerance.     Patient is making fair progress towards established goals.   Pt will continue to benefit from skilled outpatient physical therapy to address the deficits stated in the impairment chart, provide pt/family education and to maximize pt's level of independence in the home and community environment.     Anticipated Barriers for therapy: 2 back surgeries, significant loss of function     Pt's spiritual, cultural and educational needs considered and pt agreeable to plan of care and goals as stated below:       GOALS: Pt is in agreement with the following goals.     Short term goals:  6 weeks or 10 visits   1.  Pt will demonstrate increased lumbar ROM by at least 6 degrees from the initial ROM value with improvements noted in functional ROM and ability to perform ADLs. GOAL MET 02/25/22  2.  Pt will demonstrate increased MedX average isometric strength value  by 20% from initial  test resulting in improved ability to perform bending, lifting, and carrying activities safely, confidently.  (approp and ongoing)  3.  Patient report a reduction in worst pain score by 1-2 points for improved tolerance for 8/10 at worst for standing to cook.  GOAL MET 03/15/22  4.  Pt able to perform HEP correctly with minimal cueing or supervision from therapist to encourage independent management of symptoms. NOT CONSISTENT        Long term goals: 10 weeks or 20 visits   1. Pt will demonstrate increased lumbar ROM by at least 12 degrees from initial ROM value, resulting in improved ability to perform functional fwd bending while standing and sitting. (approp and ongoing)  2. Pt will demonstrate increased MedX average isometric strength value  by 35% from initial test resulting in improved ability to perform bending, lifting, and carrying activities safely, confidently.  (approp and ongoing)  3. Pt to demonstrate ability to independently control and reduce their pain through posture positioning and mechanical movements throughout a typical day.  (approp and ongoing)  4.  Pt will demonstrate reduced pain and improved functional outcomes as reported on the FOTO by reaching a limitation score of < or = 40% or less in order to demonstrate subjective improvement in pt's condition.    (approp and ongoing)  5. Pt will demonstrate independence with the HEP at discharge  (approp and ongoing)  6. Sit to stand 10 times without hands, Single leg stance 5 sec bilat (approp and ongoing)  7. Stand and walk to cook, and grocery shop with pain <6/10  8. Walk from waiting room to healthy back with  Pain < 4/10, walk 10 min in community (approp and ongoing)      Plan   Continue with established Plan of Care towards established PT goals.     Diana Walsh PTA, DPT  03/22/22

## 2022-03-24 ENCOUNTER — TELEPHONE (OUTPATIENT)
Dept: REHABILITATION | Facility: HOSPITAL | Age: 62
End: 2022-03-24

## 2022-03-24 ENCOUNTER — OFFICE VISIT (OUTPATIENT)
Dept: OBSTETRICS AND GYNECOLOGY | Facility: CLINIC | Age: 62
End: 2022-03-24
Payer: MEDICARE

## 2022-03-24 ENCOUNTER — CLINICAL SUPPORT (OUTPATIENT)
Dept: REHABILITATION | Facility: HOSPITAL | Age: 62
End: 2022-03-24
Attending: PHYSICAL MEDICINE & REHABILITATION
Payer: MEDICARE

## 2022-03-24 VITALS — DIASTOLIC BLOOD PRESSURE: 94 MMHG | BODY MASS INDEX: 35.3 KG/M2 | SYSTOLIC BLOOD PRESSURE: 142 MMHG | WEIGHT: 199.31 LBS

## 2022-03-24 DIAGNOSIS — M25.69 DECREASED ROM OF TRUNK AND BACK: ICD-10-CM

## 2022-03-24 DIAGNOSIS — R39.9 UNSPECIFIED SYMPTOMS AND SIGNS INVOLVING THE GENITOURINARY SYSTEM: Primary | ICD-10-CM

## 2022-03-24 DIAGNOSIS — N93.9 VAGINAL BLEEDING: ICD-10-CM

## 2022-03-24 DIAGNOSIS — R29.3 BAD POSTURE: Primary | ICD-10-CM

## 2022-03-24 DIAGNOSIS — R29.898 DECREASED STRENGTH OF TRUNK AND BACK: ICD-10-CM

## 2022-03-24 LAB
BILIRUB UR QL STRIP: NEGATIVE
CLARITY UR: CLEAR
COLOR UR: YELLOW
GLUCOSE UR QL STRIP: NEGATIVE
HGB UR QL STRIP: NEGATIVE
KETONES UR QL STRIP: NEGATIVE
LEUKOCYTE ESTERASE UR QL STRIP: NEGATIVE
NITRITE UR QL STRIP: NEGATIVE
PH UR STRIP: 6 [PH] (ref 5–8)
PROT UR QL STRIP: NEGATIVE
SP GR UR STRIP: 1.02 (ref 1–1.03)
URN SPEC COLLECT METH UR: NORMAL
UROBILINOGEN UR STRIP-ACNC: NEGATIVE EU/DL

## 2022-03-24 PROCEDURE — 3077F SYST BP >= 140 MM HG: CPT | Mod: CPTII,S$GLB,, | Performed by: OBSTETRICS & GYNECOLOGY

## 2022-03-24 PROCEDURE — 3008F BODY MASS INDEX DOCD: CPT | Mod: CPTII,S$GLB,, | Performed by: OBSTETRICS & GYNECOLOGY

## 2022-03-24 PROCEDURE — 99213 PR OFFICE/OUTPT VISIT, EST, LEVL III, 20-29 MIN: ICD-10-PCS | Mod: S$GLB,,, | Performed by: OBSTETRICS & GYNECOLOGY

## 2022-03-24 PROCEDURE — 99999 PR PBB SHADOW E&M-EST. PATIENT-LVL III: CPT | Mod: PBBFAC,,, | Performed by: OBSTETRICS & GYNECOLOGY

## 2022-03-24 PROCEDURE — 1159F MED LIST DOCD IN RCRD: CPT | Mod: CPTII,S$GLB,, | Performed by: OBSTETRICS & GYNECOLOGY

## 2022-03-24 PROCEDURE — 3080F PR MOST RECENT DIASTOLIC BLOOD PRESSURE >= 90 MM HG: ICD-10-PCS | Mod: CPTII,S$GLB,, | Performed by: OBSTETRICS & GYNECOLOGY

## 2022-03-24 PROCEDURE — 97110 THERAPEUTIC EXERCISES: CPT | Mod: PN,CQ

## 2022-03-24 PROCEDURE — 87491 CHLMYD TRACH DNA AMP PROBE: CPT | Performed by: OBSTETRICS & GYNECOLOGY

## 2022-03-24 PROCEDURE — 1159F PR MEDICATION LIST DOCUMENTED IN MEDICAL RECORD: ICD-10-PCS | Mod: CPTII,S$GLB,, | Performed by: OBSTETRICS & GYNECOLOGY

## 2022-03-24 PROCEDURE — 87591 N.GONORRHOEAE DNA AMP PROB: CPT | Performed by: OBSTETRICS & GYNECOLOGY

## 2022-03-24 PROCEDURE — 3077F PR MOST RECENT SYSTOLIC BLOOD PRESSURE >= 140 MM HG: ICD-10-PCS | Mod: CPTII,S$GLB,, | Performed by: OBSTETRICS & GYNECOLOGY

## 2022-03-24 PROCEDURE — 87481 CANDIDA DNA AMP PROBE: CPT | Mod: 59 | Performed by: OBSTETRICS & GYNECOLOGY

## 2022-03-24 PROCEDURE — 81003 URINALYSIS AUTO W/O SCOPE: CPT | Performed by: OBSTETRICS & GYNECOLOGY

## 2022-03-24 PROCEDURE — 99213 OFFICE O/P EST LOW 20 MIN: CPT | Mod: S$GLB,,, | Performed by: OBSTETRICS & GYNECOLOGY

## 2022-03-24 PROCEDURE — 87086 URINE CULTURE/COLONY COUNT: CPT | Performed by: OBSTETRICS & GYNECOLOGY

## 2022-03-24 PROCEDURE — 99999 PR PBB SHADOW E&M-EST. PATIENT-LVL III: ICD-10-PCS | Mod: PBBFAC,,, | Performed by: OBSTETRICS & GYNECOLOGY

## 2022-03-24 PROCEDURE — 87801 DETECT AGNT MULT DNA AMPLI: CPT | Performed by: OBSTETRICS & GYNECOLOGY

## 2022-03-24 PROCEDURE — 3008F PR BODY MASS INDEX (BMI) DOCUMENTED: ICD-10-PCS | Mod: CPTII,S$GLB,, | Performed by: OBSTETRICS & GYNECOLOGY

## 2022-03-24 PROCEDURE — 3080F DIAST BP >= 90 MM HG: CPT | Mod: CPTII,S$GLB,, | Performed by: OBSTETRICS & GYNECOLOGY

## 2022-03-24 NOTE — PROGRESS NOTES
SUBJECTIVE:   61 y.o. female   for bleeding    No LMP recorded. Patient has had a hysterectomy..      Pt c/o on and off bleeding x 2 months  Noted when after she wipes after urinating  She denies UTI symptoms and denies seeing blood on her underwear  She is certain bleeding from vagina as she placed toilet paper in vagina to check  She is not sexually active, and denies vaginal discharge  Pt is UTD with her colonscopy       S/p TLH in  for AUB/fibroids  Denies abnl pap  Denies HRT     OB History    Para Term  AB Living   3 3 3         SAB IAB Ectopic Multiple Live Births                  # Outcome Date GA Lbr Jacky/2nd Weight Sex Delivery Anes PTL Lv   3 Term            2 Term            1 Term               Obstetric Comments   S/p TLH in  for AUB/fibroids, Denies HRT   Denies abnl pap   MMG 10/2020 NEG     Past Medical History:   Diagnosis Date    GERD (gastroesophageal reflux disease)     Hyperlipidemia      Past Surgical History:   Procedure Laterality Date    BREAST BIOPSY      BREAST SURGERY      COLONOSCOPY N/A 2021    Procedure: COLONOSCOPY;  Surgeon: Caitlin Joseph MD;  Location: [facility];  Service: Endoscopy;  Laterality: N/A;  fully vaccinated-GT    pt states had polyps    EPIDURAL STEROID INJECTION Bilateral 2021    Procedure: Bilateral L5 Transforaminal Epidural Steroid Injection;  Surgeon: Keron Cristina Jr., MD;  Location: [facility];  Service: Pain Management;  Laterality: Bilateral;  Arrive @ 1030 (requested 10); No ATC or DM; Vacc.    ESOPHAGOGASTRODUODENOSCOPY N/A 2022    Procedure: EGD (ESOPHAGOGASTRODUODENOSCOPY);  Surgeon: Lowell Robles MD;  [facility]  Service: Endoscopy;  Laterality: N/A;  fully vaccinated  Pt requesting earlier date with any scoping MD - ERW  RAPID - add on / prep ins. emailed and reviewed- ERW    HEMORRHOID SURGERY      HYSTERECTOMY      SPINE SURGERY      TUBAL LIGATION       Social History      Socioeconomic History    Marital status: Single   Tobacco Use    Smoking status: Never Smoker    Smokeless tobacco: Never Used   Substance and Sexual Activity    Alcohol use: Never    Drug use: Never    Sexual activity: Not Currently     Family History   Problem Relation Age of Onset    Pancreatic cancer Maternal Grandmother     Pancreatic cancer Paternal Grandmother          Current Outpatient Medications   Medication Sig Dispense Refill    biotin 10,000 mcg Cap Take by mouth.      budesonide 1 mg/2 mL NbSp EMPTY CONTENTS OF 1 RESPULE INTO NASAL IRRIGATION SYSTEM, ADD DISTILLED WATER, SALT PACK, MIX & IRRIGATE. PERFORM TWICE DAILY      cholecalciferol, vitamin D3, capsule/tablet Take by mouth once daily.      evolocumab (REPATHA SURECLICK) 140 mg/mL PnIj Inject 1 mL (140 mg total) into the skin every 14 (fourteen) days. 2 mL 11    fluocinonide (LIDEX) 0.05 % external solution apply 1ml TO SCALP ONCE TO twice daily AS DIRECTED      fluticasone propionate (FLONASE) 50 mcg/actuation nasal spray 1 spray (50 mcg total) by Each Nostril route once daily. 16 g 3    gabapentin (NEURONTIN) 300 MG capsule TAKE ONE CAPSULE BY MOUTH THREE TIMES DAILY AS NEEDED 180 capsule 1    hydrocortisone (ANUSOL-HC) 2.5 % rectal cream Place rectally 2 (two) times daily. 30 g 1    meloxicam (MOBIC) 15 MG tablet TAKE ONE TABLET BY MOUTH ONCE DAILY 90 tablet 1    multivit/folic acid/vit K1 (ONE-A-DAY WOMEN'S 50 PLUS ORAL) Take by mouth.      NEILMED SINUS RINSE COMPLETE pkdv use as directed      pantoprazole (PROTONIX) 40 MG tablet TAKE 1 TABLET BY MOUTH once DAILY 90 tablet 1    tobramycin, PF, (OPHELIA) 300 mg/5 mL nebulizer solution EMPTY CONTENTS OF 1 AMPULE INTO NASAL IRRIGATION SYSTEM, ADD DISTILLED WATER, SALT PACK, MIX & IRRIGATE. PERFORM 2 TIMES DAILY       No current facility-administered medications for this visit.     Allergies: Patient has no known allergies.       ROS:  GENERAL: Denies weight gain or weight  loss. Feeling well overall.   SKIN: Denies rash or lesions.   HEAD: Denies head injury or headache.   NODES: Denies enlarged lymph nodes.   CHEST: Denies chest pain or shortness of breath.   CARDIOVASCULAR: Denies palpitations or left sided chest pain.   ABDOMEN: No abdominal pain, constipation, diarrhea, nausea, vomiting or rectal bleeding.   URINARY: No frequency, dysuria, hematuria, or burning on urination.  REPRODUCTIVE: see HPI  BREASTS: The patient performs breast self-examination and denies pain, lumps, or nipple discharge.   HEMATOLOGIC: No easy bruisability or excessive bleeding.  MUSCULOSKELETAL: Denies joint pain or swelling.   NEUROLOGIC: Denies syncope or weakness.   PSYCHIATRIC: Denies depression, anxiety or mood swings.      OBJECTIVE:   BP (!) 142/94   Wt 90.4 kg (199 lb 4.7 oz)   BMI 35.30 kg/m²   The patient appears well, alert, oriented x 3, in no distress.  NECK: negative, no thyromegaly, trachea midline  SKIN: normal, good color, good turgor and no acne, striae, hirsutism  BREAST EXAM: not examined  ABDOMEN: soft, non-tender; bowel sounds normal; no masses,  no organomegaly and no hernias, masses, or hepatosplenomegaly  BLADDER: soft  GENITALIA: normal external genitalia, no erythema, no discharge and there is a small about 5mm ulcer noted on perineum at vaginal introitus.  URETHRA: normal appearing urethra with no masses, tenderness or lesions and normal urethra, normal urethral meatus  VAGINA: normal, no discharge, no lesion, no bleeding  CERVIX: absent  UTERUS: uterus absent  ADNEXA: no mass, fullness, tenderness      ASSESSMENT:   Genitourinary bleeding:  Check urine, pelvic exam normal besides a small ulcer noted at vaginal introitus, I offered to do biopsy today but pt prefer to wait.    She will rtc in May for annual check up, if bleeding persists or ulcer still presents - I discussed the need for biopsy

## 2022-03-25 LAB
C TRACH DNA SPEC QL NAA+PROBE: NOT DETECTED
N GONORRHOEA DNA SPEC QL NAA+PROBE: NOT DETECTED

## 2022-03-26 ENCOUNTER — PATIENT MESSAGE (OUTPATIENT)
Dept: PHARMACY | Facility: CLINIC | Age: 62
End: 2022-03-26
Payer: MEDICARE

## 2022-03-26 LAB — BACTERIA UR CULT: NORMAL

## 2022-03-28 LAB
BACTERIAL VAGINOSIS DNA: NEGATIVE
CANDIDA GLABRATA DNA: POSITIVE
CANDIDA KRUSEI DNA: NEGATIVE
CANDIDA RRNA VAG QL PROBE: NEGATIVE
T VAGINALIS RRNA GENITAL QL PROBE: NEGATIVE

## 2022-03-29 ENCOUNTER — PATIENT MESSAGE (OUTPATIENT)
Dept: OBSTETRICS AND GYNECOLOGY | Facility: CLINIC | Age: 62
End: 2022-03-29
Payer: MEDICARE

## 2022-03-29 ENCOUNTER — PATIENT MESSAGE (OUTPATIENT)
Dept: PAIN MEDICINE | Facility: CLINIC | Age: 62
End: 2022-03-29
Payer: MEDICARE

## 2022-03-29 ENCOUNTER — CLINICAL SUPPORT (OUTPATIENT)
Dept: REHABILITATION | Facility: HOSPITAL | Age: 62
End: 2022-03-29
Attending: PHYSICAL MEDICINE & REHABILITATION
Payer: MEDICARE

## 2022-03-29 DIAGNOSIS — M25.69 DECREASED ROM OF TRUNK AND BACK: ICD-10-CM

## 2022-03-29 DIAGNOSIS — R29.898 DECREASED STRENGTH OF TRUNK AND BACK: ICD-10-CM

## 2022-03-29 DIAGNOSIS — R29.3 BAD POSTURE: Primary | ICD-10-CM

## 2022-03-29 PROCEDURE — 97110 THERAPEUTIC EXERCISES: CPT | Mod: PN,CQ

## 2022-03-29 RX ORDER — FLUCONAZOLE 150 MG/1
150 TABLET ORAL ONCE
Qty: 1 TABLET | Refills: 0 | Status: SHIPPED | OUTPATIENT
Start: 2022-03-29 | End: 2022-03-29

## 2022-03-29 NOTE — PROGRESS NOTES
[facility]      Name: Jia Vila [facility]  Clinic Number: [id number]    Therapy Diagnosis:   Encounter Diagnoses   Name Primary?    Bad posture Yes    Decreased ROM of trunk and back     Decreased strength of trunk and back      Physician: Ghada Zamora, *    Visit Date: 3/29/2022    Physician Orders: PT Eval and Treat    Medical Diagnosis from Referral:   M48.062 (ICD-10-CM) - Spinal stenosis of lumbar region with neurogenic claudication   M51.36 (ICD-10-CM) - DDD (degenerative disc disease), lumbar   Evaluation Date: 2/3/2022  Authorization Period Expiration: 22  Plan of Care Expiration: 22  Reassessment Due: 04/15/22  Visit # / Visits authorized:       Time In: 703AM  Time Out: 820AM  Total Billable Time:  45 minutes    Precautions:    2 lumbar fusion surgeries with hardware, , and 2020     Pattern of pain determined:  4 PEN leg dominant, intermittent, worse standing/walking, better sitting       Subjective   Jia Vila reports she is feeling pretty good today. She fell yesterday at [employer] when she stepped up on numb leg and she thinks her leg just gave out. She doesn't have any pains from it because she landed on her purse.     Patient reports tolerating previous visit fine.   Patient reports their pain to be 2/10 on a 0-10 scale with 0 being no pain and 10 being the worst pain imaginable.  Pain Location: B buttocks        Pts goals: walk with family, use to walk in [place], walk for 10-15 min as a starting goal without support     Objective     Baseline Isometric Testing on Med X equipment: Testing administered by PT  Date of testin/3/22  ROM 0-36 deg   Max Peak Torque 86    Min Peak Torque 27    Flex/Ext Ratio 3/1   % below normative data 49%       Midpoint Isometric Testing on Med X equipment: Testing administered by PT  Date of testin/15/22  ROM 0-42 deg   Max Peak Torque 115   Min Peak Torque 25   Flex/Ext Ratio 3/1   %  "below normative data 47%        Limitation/Restriction for FOTO 2/3/22 Survey     Therapist reviewed FOTO scores for Jia Ugalde on 2/3/2022.   FOTO documents entered into [employer] - see Media section.     Limitation Score: 48% limited  Visit 10 score: 56%  Goal:  <40 % limited             Treatment    Pt was instructed in and performed the following:     Jia Vila received therapeutic exercises to develop/improved posture, cardiovascular endurance, muscular endurance, lumbar/thoracic ROM, strength and muscular endurance for 77 minutes including the following exercises:      Treadmill (2.2 mph), x10 min    Standing extension, 5 sec holds x15   SOC, x15  SKTC, 5 sec holds x10 ea   Table Top 90/90 3x20"  +Lateral walks RTB @ knees 2 laps   [employer] Paloff Press 7# 15x/ea  [employer] march w/ Iso hold 7# 20x/ea   KB Deadlift 20# w/ 12 in plyo 12x  SL RDL w/ 4 in cone & Single limb UE support @ stairs 12x/ea    NP:Piriformis stretch, 2x45" ea  HSS c/ strap, x30" ea    [facility] 3/29/2022   Visit Number 13   VAS Pain Rating 2   Treadmill Time (in min.) 10   Speed 2.2   Lumbar Stretches - Slouch Overcorrection 15   Extension in Standing 15   Flexion in Lying 10   Lumbar Extension Seat Pad -   Femur Restraint -   Top Dead Center -   Counterweight -   Lumbar Flexion -   Lumbar Extension -   Lumbar Peak Torque -   Min Torque -   Test Percent Below Normative Data -   Test Percent Gain in Strength from Initial  -   Lumbar Weight 66   Repetitions 15   Rating of Perceived Exertion 5   Ice - Z Lie (in min.) -         Peripheral muscle strengthening which included 1 set of 15-20 repetitions at a slow, controlled 10-13 second per rep pace focused on strengthening supporting musculature for improved body mechanics and functional mobility.  Pt and therapist focused on proper form during treatment to ensure optimal strengthening of each targeted muscle group.  Machines were utilized including torso rotation, chest press, " rowing, biceps, and triceps. Leg extension, leg curl, hip abd, hip add, and leg press added visit 3       Home Exercises Provided and Patient Education Provided   Stretching: SOC, standing extension, piriformis stretch, HSS, SKTC  Strengthening: clamshells and bridges deferred for now to encourage improved compliance to HEP  Cardio program (V5): 2/22/22  Lifting education (V11): v11  Using Lumbar Roll: Pt reports she has one.     Education provided:   -Rationale for POC and progressions   -Review of HEP and importance of consistency      Written Home Exercises Provided: Patient instructed to cont prior HEP.  Exercises were reviewed and Jia was able to demonstrate them prior to the end of the session.  Jia demonstrated good  understanding of the education provided.       See EMR under Patient Instructions for exercises provided 2/3/2022.    Assessment     Jia tolerated session well. Continued previous progressions with good performance. Added lateral walks with muscle fatigue noted. MedX was performed at 66ft lbs with 15 reps performed at an exertion rating of 5/10. Continue progressing functional therex as tolerated by pt.       Patient is making fair progress towards established goals.   Pt will continue to benefit from skilled outpatient physical therapy to address the deficits stated in the impairment chart, provide pt/family education and to maximize pt's level of independence in the home and community environment.     Anticipated Barriers for therapy: 2 back surgeries, significant loss of function     Pt's spiritual, cultural and educational needs considered and pt agreeable to plan of care and goals as stated below:       GOALS: Pt is in agreement with the following goals.     Short term goals:  6 weeks or 10 visits   1.  Pt will demonstrate increased lumbar ROM by at least 6 degrees from the initial ROM value with improvements noted in functional ROM and ability to perform ADLs. GOAL MET 02/25/22  2.  Pt  will demonstrate increased MedX average isometric strength value  by 20% from initial test resulting in improved ability to perform bending, lifting, and carrying activities safely, confidently.  (approp and ongoing)  3.  Patient report a reduction in worst pain score by 1-2 points for improved tolerance for 8/10 at worst for standing to cook.  GOAL MET 03/15/22  4.  Pt able to perform HEP correctly with minimal cueing or supervision from therapist to encourage independent management of symptoms. NOT CONSISTENT        Long term goals: 10 weeks or 20 visits   1. Pt will demonstrate increased lumbar ROM by at least 12 degrees from initial ROM value, resulting in improved ability to perform functional fwd bending while standing and sitting. (approp and ongoing)  2. Pt will demonstrate increased MedX average isometric strength value  by 35% from initial test resulting in improved ability to perform bending, lifting, and carrying activities safely, confidently.  (approp and ongoing)  3. Pt to demonstrate ability to independently control and reduce their pain through posture positioning and mechanical movements throughout a typical day.  (approp and ongoing)  4.  Pt will demonstrate reduced pain and improved functional outcomes as reported on the FOTO by reaching a limitation score of < or = 40% or less in order to demonstrate subjective improvement in pt's condition.    (approp and ongoing)  5. Pt will demonstrate independence with the HEP at discharge  (approp and ongoing)  6. Sit to stand 10 times without hands, Single leg stance 5 sec bilat (approp and ongoing)  7. Stand and walk to cook, and grocery shop with pain <6/10  8. Walk from waiting room to healthy back with  Pain < 4/10, walk 10 min in community (approp and ongoing)      Plan   Continue with established Plan of Care towards established PT goals.     Reno Nash, PTA  03/29/2022

## 2022-04-01 ENCOUNTER — TELEPHONE (OUTPATIENT)
Dept: OBSTETRICS AND GYNECOLOGY | Facility: CLINIC | Age: 62
End: 2022-04-01
Payer: MEDICARE

## 2022-04-01 NOTE — TELEPHONE ENCOUNTER
Pt advised of result. Pt does not want to take boric acid,  request different rx.       ----- Message from Liat Knutson sent at 3/29/2022 10:54 AM CDT -----  Type: Patient Call Back    Pt      What is the request in detail:pt calling to discuss message that was left on portal. Call pt    Can the clinic reply by [facility]?    Would the patient rather a call back or a response via [facility]? call    Best call back number:[phone] (home)       Additional Information:

## 2022-04-04 ENCOUNTER — TELEPHONE (OUTPATIENT)
Dept: OBSTETRICS AND GYNECOLOGY | Facility: CLINIC | Age: 62
End: 2022-04-04
Payer: MEDICARE

## 2022-04-04 NOTE — PROGRESS NOTES
[facility]      Name: Jia Vila [facility]  Clinic Number: [id number]    Therapy Diagnosis:   Encounter Diagnoses   Name Primary?    Bad posture Yes    Decreased ROM of trunk and back     Decreased strength of trunk and back      Physician: Ghada Zamora, *    Visit Date: 2022    Physician Orders: PT Eval and Treat    Medical Diagnosis from Referral:   M48.062 (ICD-10-CM) - Spinal stenosis of lumbar region with neurogenic claudication   M51.36 (ICD-10-CM) - DDD (degenerative disc disease), lumbar   Evaluation Date: 2/3/2022  Authorization Period Expiration: 22  Plan of Care Expiration: 22  Reassessment Due: 04/15/22  Visit # / Visits authorized:       Time In: 704  Time Out: 745  Total Billable Time:  41 minutes    Precautions:    2 lumbar fusion surgeries with hardware, , and 2020     Pattern of pain determined:  4 PEN leg dominant, intermittent, worse standing/walking, better sitting       Subjective   Jia Vila reports nothing new. Has not been performing home exercises, stating she has a lot going on at home. Needs to be out of the clinic early as she has a  coming over. Has not initiated walking program yet, stating maybe she will start walking c/ her son.     Patient reports tolerating previous visit fine.   Patient reports their pain to be 2/10 on a 0-10 scale with 0 being no pain and 10 being the worst pain imaginable.  Pain Location: low back, B buttocks        Pts goals: walk with family, use to walk in [place], walk for 10-15 min as a starting goal without support     Objective     Baseline Isometric Testing on Med X equipment: Testing administered by PT  Date of testin/3/22  ROM 0-36 deg   Max Peak Torque 86    Min Peak Torque 27    Flex/Ext Ratio 3/1   % below normative data 49%       Midpoint Isometric Testing on Med X equipment: Testing administered by PT  Date of testin/15/22  ROM 0-42 deg   Max Peak Torque  "115   Min Peak Torque 25   Flex/Ext Ratio 3/1   % below normative data 47%        Limitation/Restriction for FOTO 2/3/22 Survey     Therapist reviewed FOTO scores for Jia Ugalde on 2/3/2022.   FOTO documents entered into [employer] - see Media section.     Limitation Score: 48% limited  Visit 10 score: 56%  Goal:  <40 % limited             Treatment    Pt was instructed in and performed the following:     Jia Vila received therapeutic exercises to develop/improved posture, cardiovascular endurance, muscular endurance, lumbar/thoracic ROM, strength and muscular endurance for 40 minutes including the following exercises:      Treadmill (2.5 mph), x10 min    Standing extension, 5 sec holds x15   SOC, x15  SKTC, 5 sec holds x10 ea   +Piriformis stretch, 2x30" ea   Isometric holds in table top position (90/90), 30" x 3  Lateral walks c/ RTB @ knees, x2 laps  FM palloff press 7#, x15 ea   FM march c/ isometric hold (20# total), x20    [facility] 4/5/2022   Visit Number 14   VAS Pain Rating 2   Treadmill Time (in min.) 10   Speed 2.5   Lumbar Stretches - Slouch Overcorrection 15   Extension in Standing 15   Flexion in Lying 10   Lumbar Extension Seat Pad -   Femur Restraint -   Top Dead Center -   Counterweight -   Lumbar Flexion -   Lumbar Extension -   Lumbar Peak Torque -   Min Torque -   Test Percent Below Normative Data -   Test Percent Gain in Strength from Initial  -   Lumbar Weight 66   Repetitions 18   Rating of Perceived Exertion 4   Ice - Z Lie (in min.) -       Peripheral muscle strengthening which included 1 set of 15-20 repetitions at a slow, controlled 10-13 second per rep pace focused on strengthening supporting musculature for improved body mechanics and functional mobility.  Pt and therapist focused on proper form during treatment to ensure optimal strengthening of each targeted muscle group.  Machines were utilized including torso rotation, chest press, rowing, biceps, and triceps. Leg " extension, leg curl, hip abd, hip add, and leg press added visit 3       Not performed 4/5/2022 2/2 lack of time:  MARTA Deadlift 20# w/ 12 in plyo 12x  SL RDL w/ 4 in cone & single limb UE support @ stairs 12x/ea      Home Exercises Provided and Patient Education Provided   Stretching: SOC, standing extension, piriformis stretch, HSS, SKTC  Strengthening: clamshells and bridges deferred for now to encourage improved compliance to HEP  Cardio program (V5): 2/22/22  Lifting education (V11): v11  Using Lumbar Roll: Pt reports she has one.     Education provided:   -Rationale for POC and progressions   -Review of HEP and importance of consistency      Written Home Exercises Provided: Patient instructed to cont prior HEP.  Exercises were reviewed and Jia was able to demonstrate them prior to the end of the session.  Jia demonstrated good  understanding of the education provided.       See EMR under Patient Instructions for exercises provided 2/3/2022.    Assessment   Jia tolerated session well. Treatment maintained to that of previous session c/ good response. Pt was able to perform 15 reps of 66 ft lbs on MedX c/ a reported exertion of 4/10. Some cueing required for appropriate pacing. Peripheral machines deferred today per pt request (needs to leave therapy early). To be resumed at next visit. PT once again reinforced importance of HEP compliance for optimal therapeutic gains. Pt verbalized understanding.       Patient is making fair progress towards established goals.   Pt will continue to benefit from skilled outpatient physical therapy to address the deficits stated in the impairment chart, provide pt/family education and to maximize pt's level of independence in the home and community environment.     Anticipated Barriers for therapy: 2 back surgeries, significant loss of function     Pt's spiritual, cultural and educational needs considered and pt agreeable to plan of care and goals as stated below:       GOALS:  Pt is in agreement with the following goals.     Short term goals:  6 weeks or 10 visits   1.  Pt will demonstrate increased lumbar ROM by at least 6 degrees from the initial ROM value with improvements noted in functional ROM and ability to perform ADLs. GOAL MET 02/25/22  2.  Pt will demonstrate increased MedX average isometric strength value  by 20% from initial test resulting in improved ability to perform bending, lifting, and carrying activities safely, confidently.  (approp and ongoing)  3.  Patient report a reduction in worst pain score by 1-2 points for improved tolerance for 8/10 at worst for standing to cook.  GOAL MET 03/15/22  4.  Pt able to perform HEP correctly with minimal cueing or supervision from therapist to encourage independent management of symptoms. NOT CONSISTENT        Long term goals: 10 weeks or 20 visits   1. Pt will demonstrate increased lumbar ROM by at least 12 degrees from initial ROM value, resulting in improved ability to perform functional fwd bending while standing and sitting. (approp and ongoing)  2. Pt will demonstrate increased MedX average isometric strength value  by 35% from initial test resulting in improved ability to perform bending, lifting, and carrying activities safely, confidently.  (approp and ongoing)  3. Pt to demonstrate ability to independently control and reduce their pain through posture positioning and mechanical movements throughout a typical day.  (approp and ongoing)  4.  Pt will demonstrate reduced pain and improved functional outcomes as reported on the FOTO by reaching a limitation score of < or = 40% or less in order to demonstrate subjective improvement in pt's condition.    (approp and ongoing)  5. Pt will demonstrate independence with the HEP at discharge  (approp and ongoing)  6. Sit to stand 10 times without hands, Single leg stance 5 sec bilat (approp and ongoing)  7. Stand and walk to cook, and grocery shop with pain <6/10 (approp and  ongoing)  8. Walk from waiting room to healthy back with pain < 4/10, walk 10 min in community (approp and ongoing)      Plan   Continue with established Plan of Care towards established PT goals.     Grace Reynolds, PT, DPT  04/05/22

## 2022-04-04 NOTE — TELEPHONE ENCOUNTER
----- Message from Jonathan Ayala Mai, MD sent at 4/4/2022  8:18 AM CDT -----  Please help      ----- Message -----  From: Walker Dennis LPN  Sent: 4/1/2022   5:10 PM CDT  To: Jonathan Ayala Mai, MD      ----- Message -----  From: Liat Knutson  Sent: 3/29/2022  10:55 AM CDT  To: Yuli Mcclain Staff    Type: Patient Call Back    Pt      What is the request in detail:pt calling to discuss message that was left on portal. Call pt    Can the clinic reply by [facility]?    Would the patient rather a call back or a response via [facility]? call    Best call back number:[phone] (home)       Additional Information:

## 2022-04-05 ENCOUNTER — CLINICAL SUPPORT (OUTPATIENT)
Dept: REHABILITATION | Facility: HOSPITAL | Age: 62
End: 2022-04-05
Attending: PHYSICAL MEDICINE & REHABILITATION
Payer: MEDICARE

## 2022-04-05 DIAGNOSIS — R29.3 BAD POSTURE: Primary | ICD-10-CM

## 2022-04-05 DIAGNOSIS — M25.69 DECREASED ROM OF TRUNK AND BACK: ICD-10-CM

## 2022-04-05 DIAGNOSIS — R29.898 DECREASED STRENGTH OF TRUNK AND BACK: ICD-10-CM

## 2022-04-05 PROCEDURE — 97110 THERAPEUTIC EXERCISES: CPT | Mod: PN

## 2022-04-07 ENCOUNTER — CLINICAL SUPPORT (OUTPATIENT)
Dept: REHABILITATION | Facility: HOSPITAL | Age: 62
End: 2022-04-07
Attending: PHYSICAL MEDICINE & REHABILITATION
Payer: MEDICARE

## 2022-04-07 DIAGNOSIS — R29.3 BAD POSTURE: Primary | ICD-10-CM

## 2022-04-07 DIAGNOSIS — M25.69 DECREASED ROM OF TRUNK AND BACK: ICD-10-CM

## 2022-04-07 DIAGNOSIS — R29.898 DECREASED STRENGTH OF TRUNK AND BACK: ICD-10-CM

## 2022-04-07 PROCEDURE — 97110 THERAPEUTIC EXERCISES: CPT | Mod: PN | Performed by: PHYSICAL MEDICINE & REHABILITATION

## 2022-04-07 NOTE — PATIENT INSTRUCTIONS
Goal:  - reduce back /thigh/buttock pain with mechanical movements or positions that relieve pressure on structures in low back  - exercises done to reduce symptoms when you experience them  - exercises done to prevent symptoms, when you are feeling good      1. Exercises  A. Supine Knee-to-Chest, Bilateral    Lie on back, hands clasped behind both knees. Pull knees in toward chest until a comfortable stretch is felt in lower back and buttocks. Hold 3 seconds.  Repeat 10 times per session. Do 2 sessions per day.        B. Flexion in Sitting    Sit in chair with knees spread apart. Bend forward toward floor. Comfortable stretch should be felt in lower back.  Hold 2 seconds.  Repeat 10 times per session. Do 2 sessions per day.        C. Flexion in Kneeling    Kneel, sitting on heels, arms forward on floor. Push chest toward floor, reaching forward as far as possible. Hold 3 seconds.  Repeat 10 times per session. Do 2 sessions per day.        D. Flexors Stretch, Standing    Stand about a thigh's length from table.  edges of table. Bend knees until stretch is felt under shoulder blades in chest. Hold 3 seconds.  Repeat 10 times per session. Do 2 sessions per day.        Pelvic Tilt      lying supine, flatten back by tightening stomach muscles and rolling pelvis back.   Your back will flatten, your buttock will lift up.   Do not hold your breath.  Hold 3 seconds.  Repeat 10 times per set. Do 10 sets per session. Do 2 sessions per day.    [email or web address]            F.  Back Exercises: Back Press    Do this exercise on your hands and knees. Keep your knees under your hips and your hands under your shoulders. Keep your spine in a neutral position (not arched or sagging). Be sure to maintain your necks natural curve:  Tighten your stomach and buttock muscles to press your back upward. Let your head drop slightly.  Hold for 5 seconds. Return to starting position.  Repeat 5 times.  Date Last Reviewed:  10/11/2015  © 2286-0290 [employer]. [address] [place] [address], [place] [address]. All rights reserved. This information is not intended as a substitute for professional medical care. Always follow your healthcare professional's instructions.            Positioning - Z LIE  Decompression Exercise: Leg Support.  Can be done on floor or bed with legs on couch, therapy ball, chair, or auto man        Lie on back on firm surface arms turned up, out to sides, backs of hands down. Support under legs: 90/90 position. Time 10 minutes.  Surface: floor      Copyright © I. All rights reserved.        Z lie is a decompression position which can be used daily to reduce or prevent symptoms.    It can be done with a ball or a chair.     Rolled towels or a foam roller can also be added under the pelvis for additional relief if needed.                         Develop habit:  Stretch and do Z lie  Walk   Then do Z lie again

## 2022-04-07 NOTE — PROGRESS NOTES
[facility] Back Physical Therapy Treatment      Name: Jia Vila [facility]  Clinic Number: [id number]    Therapy Diagnosis:   Encounter Diagnoses   Name Primary?    Bad posture Yes    Decreased ROM of trunk and back     Decreased strength of trunk and back      Physician: Ghada Zamora, *    Visit Date: 4/7/2022    Physician Orders: PT Eval and Treat    Medical Diagnosis from Referral:   M48.062 (ICD-10-CM) - Spinal stenosis of lumbar region with neurogenic claudication   M51.36 (ICD-10-CM) - DDD (degenerative disc disease), lumbar   Evaluation Date: 2/3/2022  Authorization Period Expiration: 2/22/22  Plan of Care Expiration: 05/02/22  Reassessment Due: 05/7/22  Visit # / Visits authorized: 15/20      Time In: 9:00 am  Time Out: 9:56 am  Total Billable Time:  54 minutes    Precautions:    2 lumbar fusion surgeries with hardware, 2018, and 11/2020     Pattern of pain determined:  4 PEN leg dominant, intermittent, worse standing/walking, better sitting       Subjective   Jia Vila reports nothing new. Has not been performing home exercises, stating she has a lot going on at home. However she commented that the position after MRI felt really good when her legs were up.  Reviewed Z lie as tool to be used each day to manage symptoms and discussed all the ways she can do Z lie with ball, chair, bolster.  She will look into getting a bolster.  Discussed routine, stretching, walk, Z lie to build function.  Reprinted HEP and encouraged patient to try developing routine for back care and to build walking function.         Patient reports tolerating previous visit fine.   Patient reports their pain to be 4/10 on a 0-10 scale with 0 being no pain and 10 being the worst pain imaginable.  Pain Location: low back, B buttocks        Pts goals: walk with family, use to walk in [place], walk for 10-15 min as a starting goal without support     Objective     Baseline Isometric Testing on Med X equipment: Testing  "administered by PT  Date of testin/3/22  ROM 0-36 deg   Max Peak Torque 86    Min Peak Torque 27    Flex/Ext Ratio 3/1   % below normative data 49%       Midpoint Isometric Testing on Med X equipment: Testing administered by PT  Date of testin/15/22  ROM 0-42 deg increased to 0-45 with improved lumbar range 22   Max Peak Torque 115   Min Peak Torque 25   Flex/Ext Ratio 3/1   % below normative data 47%        Limitation/Restriction for FOTO 2/3/22 Survey     Therapist reviewed FOTO scores for Jia Ugalde on 2/3/2022.   FOTO documents entered into [employer] - see Media section.     Limitation Score: 48% limited  Visit 10 score: 56%  Goal:  <40 % limited             Treatment    Pt was instructed in and performed the following:     Jia Vila received therapeutic exercises to develop/improved posture, cardiovascular endurance, muscular endurance, lumbar/thoracic ROM, strength and muscular endurance for 40 minutes including the following exercises:      Treadmill (2.5 mph), x 5 min and walk outside 5 min after stretching    Standing extension, 5 sec holds x15   SOC, x15  SKTC, 5 sec holds x10 ea   +Piriformis stretch, 2x30" ea   Pelvic tilts supine 10 reps  Flexion stretch in standing 10 reps  Z lie 3 min  Walk outside, Zlie again which reduced symptoms      [facility] 2022   Visit Number 15   VAS Pain Rating 4   Treadmill Time (in min.) 4   Speed 2.5   Lumbar Stretches - Slouch Overcorrection 10   Extension in Standing 10   Flexion in Lying 10   Lumbar Extension Seat Pad -   Femur Restraint -   Top Dead Center -   Counterweight -   Lumbar Flexion 45   Lumbar Extension 0   Lumbar Peak Torque -   Min Torque -   Test Percent Below Normative Data -   Test Percent Gain in Strength from Initial  -   Lumbar Weight 66   Repetitions 18   Rating of Perceived Exertion 4   Ice - Z Lie (in min.) 10       Peripheral muscle strengthening which included 1 set of 15-20 repetitions at a slow, controlled 10-13 " "second per rep pace focused on strengthening supporting musculature for improved body mechanics and functional mobility.  Pt and therapist focused on proper form during treatment to ensure optimal strengthening of each targeted muscle group.  Machines were utilized including torso rotation, chest press, rowing, biceps, and triceps. Leg extension, leg curl, hip abd, hip add, and leg press added visit 3       Not performed 4/7/2022 2/2 lack of time:  KB Deadlift 20# w/ 12 in plyo 12x  SL RDL w/ 4 in cone & single limb UE support @ stairs 12x/ea  Isometric holds in table top position (90/90), 30" x 3  Lateral walks c/ RTB @ knees, x2 laps  FM palloff press 7#, x15 ea   FM march c/ isometric hold (20# total), x20          Home Exercises Provided and Patient Education Provided   Stretching: SOC, standing extension, piriformis stretch, HSS, SKTC, flexion strategies, Z lie, and walking followed by Z lie again  Strengthening: clamshells and bridges deferred for now to encourage improved compliance to HEP  Cardio program (V5): 2/22/22 - stresed 4/7/22 with stretching and z lie first, then walking, then z lie again  Lifting education (V11): v11  Using Lumbar Roll: Pt reports she has one.     Education provided:   -Rationale for POC and progressions   -Review of HEP and importance of consistency  -Z lie reviewed and ways to do z lie  -patient still flexion responder, tried to discuss habit of flexion and Z lie, walking then Z lie each day      Written Home Exercises Provided: Patient instructed to cont prior HEP.  Exercises were reviewed and Jia was able to demonstrate them prior to the end of the session.  Jia demonstrated good  understanding of the education provided.       See EMR under Patient Instructions for exercises provided 4/7/22    Assessment   Jia tolerated session well.   She has improved lumbar flexion, 0-45 on medx.  She reports continued back and leg pain with walking/standing, better with flexion, and we " reviewed flexion strategies, then walking, then flexion, and also Z lie which she noted helped her.   Emphasized doing these stretches consistently and develop habits that reduce her symptoms and improve her function and walking.  Pt has able to perform 18 reps of 66 ft lbs on MedX c/ a reported exertion of 4/10. Some cueing required for appropriate pacing. Peripheral machines continued  today and tolerated well.  . PT once again reinforced importance of HEP compliance for optimal therapeutic gains. Pt verbalized understanding.       Patient is making fair progress towards established goals.   Pt will continue to benefit from skilled outpatient physical therapy to address the deficits stated in the impairment chart, provide pt/family education and to maximize pt's level of independence in the home and community environment.     Anticipated Barriers for therapy: 2 back surgeries, significant loss of function     Pt's spiritual, cultural and educational needs considered and pt agreeable to plan of care and goals as stated below:       GOALS: Pt is in agreement with the following goals.     Short term goals:  6 weeks or 10 visits   1.  Pt will demonstrate increased lumbar ROM by at least 6 degrees from the initial ROM value with improvements noted in functional ROM and ability to perform ADLs. GOAL MET 02/25/22  2.  Pt will demonstrate increased MedX average isometric strength value  by 20% from initial test resulting in improved ability to perform bending, lifting, and carrying activities safely, confidently.  (approp and ongoing)  3.  Patient report a reduction in worst pain score by 1-2 points for improved tolerance for 8/10 at worst for standing to cook.  GOAL MET 03/15/22  4.  Pt able to perform HEP correctly with minimal cueing or supervision from therapist to encourage independent management of symptoms. NOT CONSISTENT        Long term goals: 10 weeks or 20 visits   1. Pt will demonstrate increased lumbar ROM by  at least 12 degrees from initial ROM value, resulting in improved ability to perform functional fwd bending while standing and sitting. (approp and ongoing)  2. Pt will demonstrate increased MedX average isometric strength value  by 35% from initial test resulting in improved ability to perform bending, lifting, and carrying activities safely, confidently.  (approp and ongoing)  3. Pt to demonstrate ability to independently control and reduce their pain through posture positioning and mechanical movements throughout a typical day.  (approp and ongoing)  4.  Pt will demonstrate reduced pain and improved functional outcomes as reported on the FOTO by reaching a limitation score of < or = 40% or less in order to demonstrate subjective improvement in pt's condition.    (approp and ongoing)  5. Pt will demonstrate independence with the HEP at discharge  (approp and ongoing)  6. Sit to stand 10 times without hands, Single leg stance 5 sec bilat (approp and ongoing)  7. Stand and walk to cook, and grocery shop with pain <6/10 (approp and ongoing)  8. Walk from waiting room to healthy back with pain < 4/10, walk 10 min in community (approp and ongoing)      Plan   Continue with established Plan of Care towards established PT goals.     Paty Rodriguez, PT  04/07/22

## 2022-04-11 ENCOUNTER — SPECIALTY PHARMACY (OUTPATIENT)
Dept: PHARMACY | Facility: CLINIC | Age: 62
End: 2022-04-11
Payer: MEDICARE

## 2022-04-11 NOTE — PROGRESS NOTES
[facility]      Name: Jia Grantbons  Clinic Number: [id number]    Therapy Diagnosis:   Encounter Diagnoses   Name Primary?    Bad posture Yes    Decreased ROM of trunk and back     Decreased strength of trunk and back      Physician: Gahda Zamora, *    Visit Date: 2022    Physician Orders: PT Eval and Treat    Medical Diagnosis from Referral:   M48.062 (ICD-10-CM) - Spinal stenosis of lumbar region with neurogenic claudication   M51.36 (ICD-10-CM) - DDD (degenerative disc disease), lumbar   Evaluation Date: 2/3/2022  Authorization Period Expiration: 22  Plan of Care Expiration: 22  Reassessment Due: 22  Visit # / Visits authorized:       Time In: 700  Time Out: 755  Total Billable Time: 55 minutes    Precautions:    2 lumbar fusion surgeries with hardware, , and 2020     Pattern of pain determined:  4 PEN leg dominant, intermittent, worse standing/walking, better sitting       Subjective   Jia Vila reports back is doing alright this morning. Walked a 5 min lap prior to therapy session and experienced mild pain in low back. When asked about home exercises, reports she did them on both Saturday and .     Patient reports tolerating previous visit fine.   Patient reports their pain to be 3/10 on a 0-10 scale with 0 being no pain and 10 being the worst pain imaginable.  Pain Location: low back, B buttocks        Pts goals: walk with family, use to walk in [place], walk for 10-15 min as a starting goal without support     Objective     Baseline Isometric Testing on Med X equipment: Testing administered by PT  Date of testin/3/22  ROM 0-36 deg   Max Peak Torque 86    Min Peak Torque 27    Flex/Ext Ratio 3/1   % below normative data 49%       Midpoint Isometric Testing on Med X equipment: Testing administered by PT  Date of testin/15/22  ROM 0-42 deg increased to 0-45 with improved lumbar range 22   Max Peak  "Torque 115   Min Peak Torque 25   Flex/Ext Ratio 3/1   % below normative data 47%        Limitation/Restriction for FOTO 2/3/22 Survey     Therapist reviewed FOTO scores for Jia Ugalde on 2/3/2022.   FOTO documents entered into [employer] - see Media section.     Limitation Score: 48% limited  Visit 10 score: 56%  Goal:  <40 % limited             Treatment    Pt was instructed in and performed the following:     Jia Vila received therapeutic exercises to develop/improved posture, cardiovascular endurance, muscular endurance, lumbar/thoracic ROM, strength and muscular endurance for 53 minutes including the following exercises:      Treadmill (2.5 mph), x10 min    Standing extension, 5 sec holds x15   SOC, x15  SKTC, 5 sec holds x10 ea   Piriformis stretch, 2x30" ea   PPT, x20  Standing flexion stretch, x10      [facility] 4/12/2022   Visit Number 16   VAS Pain Rating 3   Treadmill Time (in min.) 10   Speed 2   Lumbar Stretches - Slouch Overcorrection 15   Extension in Standing 15   Flexion in Lying 10   Lumbar Extension Seat Pad -   Femur Restraint -   Top Dead Center -   Counterweight -   Lumbar Flexion -   Lumbar Extension -   Lumbar Peak Torque -   Min Torque -   Test Percent Below Normative Data -   Test Percent Gain in Strength from Initial  -   Lumbar Weight 66   Repetitions 20   Rating of Perceived Exertion 3   Ice - Z Lie (in min.) -       Peripheral muscle strengthening which included 1 set of 15-20 repetitions at a slow, controlled 10-13 second per rep pace focused on strengthening supporting musculature for improved body mechanics and functional mobility.  Pt and therapist focused on proper form during treatment to ensure optimal strengthening of each targeted muscle group.  Machines were utilized including torso rotation, chest press, rowing, biceps, and triceps. Leg extension, leg curl, hip abd, hip add, and leg press added visit 3       Not performed 4/12/2022 2/2 lack of time:  MARTA Deadlift 20# " "w/ 12 in plyo 12x  SL RDL w/ 4 in cone & single limb UE support @ stairs 12x/ea  Isometric holds in table top position (90/90), 30" x 3  Lateral walks c/ RTB @ knees, x2 laps  FM palloff press 7#, x15 ea   FM march c/ isometric hold (20# total), x20        Home Exercises Provided and Patient Education Provided   Stretching: SOC, standing extension, piriformis stretch, HSS, SKTC, flexion strategies, Z lie, and walking followed by Z lie again  Strengthening: clamshells and bridges deferred for now to encourage improved compliance to HEP  Cardio program (V5): 2/22/22 - stressed 04/07/22 with stretching and z lie first, then walking, then z lie again  Lifting education (V11): v11  Using Lumbar Roll: Pt reports she has one.       Education provided:   -Rationale for POC and progressions   -Review of HEP and importance of consistency  -Z lie reviewed and ways to do z lie  -patient still flexion responder, tried to discuss habit of flexion and Z lie, walking then Z lie each day      Written Home Exercises Provided: Patient instructed to cont prior HEP.  Exercises were reviewed and Jia was able to demonstrate them prior to the end of the session.  Jia demonstrated good  understanding of the education provided.       See EMR under Patient Instructions for exercises provided 4/7/22    Assessment   Jia tolerated session well. Tx maintained to that of previous session to drive home importance of foundational exercises. Pt does report improved compliance to HEP over weekend, however continues to require some cueing required for correct form. Pt encouraged and reinforced pertinence of HEP for therapeutic benefit. MedX resistance maintained to 66 ft lbs c/ pt performing 20 reps c/ a reported exertion of 3/10. Core stability likely to be reintroduced at next session.       Patient is making fair progress towards established goals.   Pt will continue to benefit from skilled outpatient physical therapy to address the deficits " stated in the impairment chart, provide pt/family education and to maximize pt's level of independence in the home and community environment.     Anticipated Barriers for therapy: 2 back surgeries, significant loss of function     Pt's spiritual, cultural and educational needs considered and pt agreeable to plan of care and goals as stated below:       GOALS: Pt is in agreement with the following goals.     Short term goals:  6 weeks or 10 visits   1.  Pt will demonstrate increased lumbar ROM by at least 6 degrees from the initial ROM value with improvements noted in functional ROM and ability to perform ADLs. GOAL MET 02/25/22  2.  Pt will demonstrate increased MedX average isometric strength value  by 20% from initial test resulting in improved ability to perform bending, lifting, and carrying activities safely, confidently.  (approp and ongoing)  3.  Patient report a reduction in worst pain score by 1-2 points for improved tolerance for 8/10 at worst for standing to cook.  GOAL MET 03/15/22  4.  Pt able to perform HEP correctly with minimal cueing or supervision from therapist to encourage independent management of symptoms. NOT CONSISTENT        Long term goals: 10 weeks or 20 visits   1. Pt will demonstrate increased lumbar ROM by at least 12 degrees from initial ROM value, resulting in improved ability to perform functional fwd bending while standing and sitting. (approp and ongoing)  2. Pt will demonstrate increased MedX average isometric strength value  by 35% from initial test resulting in improved ability to perform bending, lifting, and carrying activities safely, confidently.  (approp and ongoing)  3. Pt to demonstrate ability to independently control and reduce their pain through posture positioning and mechanical movements throughout a typical day.  (approp and ongoing)  4.  Pt will demonstrate reduced pain and improved functional outcomes as reported on the FOTO by reaching a limitation score of < or  = 40% or less in order to demonstrate subjective improvement in pt's condition.    (approp and ongoing)  5. Pt will demonstrate independence with the HEP at discharge  (approp and ongoing)  6. Sit to stand 10 times without hands, Single leg stance 5 sec bilat (approp and ongoing)  7. Stand and walk to cook, and grocery shop with pain <6/10 (approp and ongoing)  8. Walk from waiting room to healthy back with pain < 4/10, walk 10 min in community (approp and ongoing)      Plan   Continue with established Plan of Care towards established PT goals.     Grace Reynolds, PT, DPT  04/12/22

## 2022-04-11 NOTE — TELEPHONE ENCOUNTER
Specialty Pharmacy - Refill Coordination    Specialty Medication Orders Linked to Encounter    Flowsheet Row Most Recent Value   Medication #1 evolocumab (REPATHA SURECLICK) 140 mg/mL PnIj (Order#[id number], Rx#[id number])          Refill Questions - Documented Responses    Flowsheet Row Most Recent Value   Patient Availability and HIPAA Verification    Does patient want to proceed with activity? Yes   HIPAA/medical authority confirmed? Yes   Relationship to patient of person spoken to? Self   Refill Screening Questions    Would patient like to speak to a pharmacist? No   When does the patient need to receive the medication? 04/25/22   Refill Delivery Questions    How will the patient receive the medication? Delivery Guillermina   When does the patient need to receive the medication? 04/25/22   Shipping Address Home   Address in [facility] confirmed and updated if neccessary? Yes   Expected Copay ($) 4   Is the patient able to afford the medication copay? Yes   Payment Method CC on file   Days supply of Refill 28   Supplies needed? No supplies needed   Refill activity completed? Yes   Refill activity plan Refill scheduled   Shipment/Pickup Date: 04/13/22          Current Outpatient Medications   Medication Sig    biotin 10,000 mcg Cap Take by mouth.    boric acid (BORIC ACID) vaginal suppository Place 1 each (650 mg total) vaginally every evening. for 14 days    budesonide 1 mg/2 mL NbSp EMPTY CONTENTS OF 1 RESPULE INTO NASAL IRRIGATION SYSTEM, ADD DISTILLED WATER, SALT PACK, MIX & IRRIGATE. PERFORM TWICE DAILY    cholecalciferol, vitamin D3, capsule/tablet Take by mouth once daily.    evolocumab (REPATHA SURECLICK) 140 mg/mL PnIj Inject 1 mL (140 mg total) into the skin every 14 (fourteen) days.    fluocinonide (LIDEX) 0.05 % external solution apply 1ml TO SCALP ONCE TO twice daily AS DIRECTED    fluticasone propionate (FLONASE) 50 mcg/actuation nasal spray 1 spray (50 mcg total) by Each Nostril route once  daily.    gabapentin (NEURONTIN) 300 MG capsule TAKE ONE CAPSULE BY MOUTH THREE TIMES DAILY AS NEEDED    hydrocortisone (ANUSOL-HC) 2.5 % rectal cream Place rectally 2 (two) times daily.    meloxicam (MOBIC) 15 MG tablet TAKE ONE TABLET BY MOUTH ONCE DAILY    multivit/folic acid/vit K1 (ONE-A-DAY WOMEN'S 50 PLUS ORAL) Take by mouth.    NEILMED SINUS RINSE COMPLETE pkdv use as directed    pantoprazole (PROTONIX) 40 MG tablet TAKE 1 TABLET BY MOUTH once DAILY    tobramycin, PF, (OPHELIA) 300 mg/5 mL nebulizer solution EMPTY CONTENTS OF 1 AMPULE INTO NASAL IRRIGATION SYSTEM, ADD DISTILLED WATER, SALT PACK, MIX & IRRIGATE. PERFORM 2 TIMES DAILY   Last reviewed on 3/24/2022  9:35 AM by Ashley Bowen MA    Review of patient's allergies indicates:  No Known Allergies Last reviewed on  3/29/2022 9:29 AM by Jonathan Roldan      Tasks added this encounter   No tasks added.   Tasks due within next 3 months   4/18/2022 - Refill Call (Auto Added)     Renny PharmD  [facility]  [address] [place] [id number]  Phone: [phone]  Fax: [phone]

## 2022-04-12 ENCOUNTER — CLINICAL SUPPORT (OUTPATIENT)
Dept: REHABILITATION | Facility: HOSPITAL | Age: 62
End: 2022-04-12
Attending: PHYSICAL MEDICINE & REHABILITATION
Payer: MEDICARE

## 2022-04-12 DIAGNOSIS — M25.69 DECREASED ROM OF TRUNK AND BACK: ICD-10-CM

## 2022-04-12 DIAGNOSIS — R29.898 DECREASED STRENGTH OF TRUNK AND BACK: ICD-10-CM

## 2022-04-12 DIAGNOSIS — R29.3 BAD POSTURE: Primary | ICD-10-CM

## 2022-04-12 PROCEDURE — 97110 THERAPEUTIC EXERCISES: CPT | Mod: PN

## 2022-04-14 ENCOUNTER — IMMUNIZATION (OUTPATIENT)
Dept: OBSTETRICS AND GYNECOLOGY | Facility: CLINIC | Age: 62
End: 2022-04-14
Payer: MEDICARE

## 2022-04-14 ENCOUNTER — CLINICAL SUPPORT (OUTPATIENT)
Dept: REHABILITATION | Facility: HOSPITAL | Age: 62
End: 2022-04-14
Attending: PHYSICAL MEDICINE & REHABILITATION
Payer: MEDICARE

## 2022-04-14 DIAGNOSIS — Z23 NEED FOR VACCINATION: Primary | ICD-10-CM

## 2022-04-14 DIAGNOSIS — R29.3 BAD POSTURE: Primary | ICD-10-CM

## 2022-04-14 DIAGNOSIS — R29.898 DECREASED STRENGTH OF TRUNK AND BACK: ICD-10-CM

## 2022-04-14 DIAGNOSIS — M25.69 DECREASED ROM OF TRUNK AND BACK: ICD-10-CM

## 2022-04-14 PROCEDURE — 97110 THERAPEUTIC EXERCISES: CPT | Mod: PN | Performed by: PHYSICAL MEDICINE & REHABILITATION

## 2022-04-14 PROCEDURE — 0054A COVID-19, MRNA, LNP-S, PF, 30 MCG/0.3 ML DOSE VACCINE (PFIZER): CPT | Mod: PBBFAC | Performed by: FAMILY MEDICINE

## 2022-04-14 NOTE — PROGRESS NOTES
[facility] Physical Therapy Treatment      Name: Jia Grantbons  Clinic Number: [id number]    Therapy Diagnosis:   Encounter Diagnoses   Name Primary?    Bad posture Yes    Decreased ROM of trunk and back     Decreased strength of trunk and back      Physician: Ghada Zamora, *    Visit Date: 2022    Physician Orders: PT Eval and Treat    Medical Diagnosis from Referral:   M48.062 (ICD-10-CM) - Spinal stenosis of lumbar region with neurogenic claudication   M51.36 (ICD-10-CM) - DDD (degenerative disc disease), lumbar   Evaluation Date: 2/3/2022  Authorization Period Expiration: 22  Plan of Care Expiration: 22  Reassessment Due: 22  Visit # / Visits authorized:         Time In: 8:50 am  Time Out: 9:47 am  Total Billable Time: 55 minutes    Precautions:    2 lumbar fusion surgeries with hardware, , and 2020     Pattern of pain determined:  4 PEN leg dominant, intermittent, worse standing/walking, better sitting       Subjective   Jia Vila reports back is doing alright this morning.   She had a little pain in the back of her legs upon arrival, but no pain during session.  She is trying to walk.   She also has elliptical and we did 1 min here.  She will try to slowly do this at home.   We discussed wellness.  She will consider this because she does want to join silver sne[employer] or a gym.  We discussed how wellness can help prepare her for the next step.      Patient reports tolerating previous visit fine.   Patient reports their pain to be 3/10 on a 0-10 scale with 0 being no pain and 10 being the worst pain imaginable.  Pain Location: low back, B buttocks        Pts goals: walk with family, use to walk in [place], walk for 10-15 min as a starting goal without support     Objective     Baseline Isometric Testing on Med X equipment: Testing administered by PT  Date of testin/3/22  ROM 0-36 deg   Max Peak Torque 86    Min Peak Torque 27    Flex/Ext Ratio 3/1  "  % below normative data 49%       Midpoint Isometric Testing on Med X equipment: Testing administered by PT  Date of testin/15/22  ROM 0-42 deg increased to 0-45 with improved lumbar range 22   Max Peak Torque 115   Min Peak Torque 25   Flex/Ext Ratio 3/1   % below normative data 47%        Limitation/Restriction for FOTO 2/3/22 Survey     Therapist reviewed FOTO scores for Jia Ugalde on 2/3/2022.   FOTO documents entered into [employer] - see Media section.     Limitation Score: 48% limited  Visit 10 score: 56%  Goal:  <40 % limited             Treatment    Pt was instructed in and performed the following:     Jia Vila received therapeutic exercises to develop/improved posture, cardiovascular endurance, muscular endurance, lumbar/thoracic ROM, strength and muscular endurance for 53 minutes including the following exercises:      Treadmill (2.5 mph), x10 min    Standing extension, 5 sec holds x15   SOC, x15  SKTC, 5 sec holds x10 ea   Piriformis stretch, 2x30" ea   PPT, x20  Standing flexion stretch, x10  Isometric holds in table top position (90/90), 30" x 3  Lateral walks c/ RTB @ knees, x2 laps    Elliptical 1 min ( she will try 1 min at home and build up)    [facility] 2022   Visit Number 17   VAS Pain Rating 3   Treadmill Time (in min.) 10   Speed 2.3   Lumbar Stretches - Slouch Overcorrection 10   Extension in Standing 10   Flexion in Lying 10   Lumbar Extension Seat Pad -   Femur Restraint -   Top Dead Center -   Counterweight -   Lumbar Flexion -   Lumbar Extension -   Lumbar Peak Torque -   Min Torque -   Test Percent Below Normative Data -   Test Percent Gain in Strength from Initial  -   Lumbar Weight 70  Use warm up prior to exercise weight   Repetitions 15   Rating of Perceived Exertion 4   Ice - Z Lie (in min.) 10           Peripheral muscle strengthening which included 1 set of 15-20 repetitions at a slow, controlled 10-13 second per rep pace focused on strengthening supporting " musculature for improved body mechanics and functional mobility.  Pt and therapist focused on proper form during treatment to ensure optimal strengthening of each targeted muscle group.  Machines were utilized including torso rotation, chest press, rowing, biceps, and triceps. Leg extension, leg curl, hip abd, hip add, and leg press added visit 3       Not performed 4/14/2022 2/2 lack of time:  KB Deadlift 20# w/ 12 in plyo 12x  SL RDL w/ 4 in cone & single limb UE support @ stairs 12x/ea  FM palloff press 7#, x15 ea   FM march c/ isometric hold (20# total), x20        Home Exercises Provided and Patient Education Provided   Stretching: SOC, standing extension, piriformis stretch, HSS, SKTC, flexion strategies, Z lie, and walking followed by Z lie again  Strengthening: clamshells and bridges deferred for now to encourage improved compliance to HEP  Cardio program (V5): 2/22/22 - stressed 04/07/22 with stretching and z lie first, then walking, then z lie again and elliptical 1-3 min daily with increasing slowly  Lifting education (V11): v11  Using Lumbar Roll: Pt reports she has one.       Education provided:   -Review of HEP and importance of consistency  -Z lie reviewed and ways to do z lie  -patient still flexion responder, reviewed habit of flexion and Z lie, walking then Z lie each day  -discussed wellness at discharge, she plans to attend      Written Home Exercises Provided: Patient instructed to cont prior HEP.  Exercises were reviewed and Jia was able to demonstrate them prior to the end of the session.  Jia demonstrated good  understanding of the education provided.       See EMR under Patient Instructions for exercises provided 4/7/22    Assessment   Jia tolerated session well. Tx maintained to that of previous session to drive home importance of foundational exercises. Pt does report improved compliance to HEP but  continues to require some cueing required for correct form. Pt encouraged and reinforced  pertinence of HEP for therapeutic benefit. She does have an elliptical at home, and we discussed using this 1-3 min and building as an option for cardio.   MedX resistance maintained to 70 ft lbs with warm up, tolerated well.  Continued core stability exercises with focus continuing to be on home strategies to empower patient to manage symptoms and improve function.  Discussed wellness at discharge and she is interested.  Review correct lifting techniques, cardio, HEP, and revisit wellness over next few visits       Patient is making fair progress towards established goals.   Pt will continue to benefit from skilled outpatient physical therapy to address the deficits stated in the impairment chart, provide pt/family education and to maximize pt's level of independence in the home and community environment.     Anticipated Barriers for therapy: 2 back surgeries, significant loss of function     Pt's spiritual, cultural and educational needs considered and pt agreeable to plan of care and goals as stated below:       GOALS: Pt is in agreement with the following goals.     Short term goals:  6 weeks or 10 visits   1.  Pt will demonstrate increased lumbar ROM by at least 6 degrees from the initial ROM value with improvements noted in functional ROM and ability to perform ADLs. GOAL MET 02/25/22  2.  Pt will demonstrate increased MedX average isometric strength value  by 20% from initial test resulting in improved ability to perform bending, lifting, and carrying activities safely, confidently.  (approp and ongoing)  3.  Patient report a reduction in worst pain score by 1-2 points for improved tolerance for 8/10 at worst for standing to cook.  GOAL MET 03/15/22  4.  Pt able to perform HEP correctly with minimal cueing or supervision from therapist to encourage independent management of symptoms. NOT CONSISTENT        Long term goals: 10 weeks or 20 visits   1. Pt will demonstrate increased lumbar ROM by at least 12  degrees from initial ROM value, resulting in improved ability to perform functional fwd bending while standing and sitting. (approp and ongoing)  2. Pt will demonstrate increased MedX average isometric strength value  by 35% from initial test resulting in improved ability to perform bending, lifting, and carrying activities safely, confidently.  (approp and ongoing)  3. Pt to demonstrate ability to independently control and reduce their pain through posture positioning and mechanical movements throughout a typical day.  (approp and ongoing)  4.  Pt will demonstrate reduced pain and improved functional outcomes as reported on the FOTO by reaching a limitation score of < or = 40% or less in order to demonstrate subjective improvement in pt's condition.    (approp and ongoing)  5. Pt will demonstrate independence with the HEP at discharge  (approp and ongoing)  6. Sit to stand 10 times without hands, Single leg stance 5 sec bilat (approp and ongoing)  7. Stand and walk to cook, and grocery shop with pain <6/10 (approp and ongoing)  8. Walk from waiting room to healthy back with pain < 4/10, walk 10 min in community (approp and ongoing)      Plan   Continue with established Plan of Care towards established PT goals.     Paty Rodriguez, PT   4/14/22

## 2022-04-18 ENCOUNTER — PATIENT MESSAGE (OUTPATIENT)
Dept: ADMINISTRATIVE | Facility: OTHER | Age: 62
End: 2022-04-18
Payer: MEDICARE

## 2022-04-19 ENCOUNTER — TELEPHONE (OUTPATIENT)
Dept: PAIN MEDICINE | Facility: CLINIC | Age: 62
End: 2022-04-19
Payer: MEDICARE

## 2022-04-19 NOTE — PROGRESS NOTES
[facility]      Name: Jia Vila [facility]  Clinic Number: [id number]    Therapy Diagnosis:   Encounter Diagnoses   Name Primary?    Bad posture Yes    Decreased ROM of trunk and back     Decreased strength of trunk and back      Physician: Ghada Zamora, *    Visit Date: 2022    Physician Orders: PT Eval and Treat    Medical Diagnosis from Referral:   M48.062 (ICD-10-CM) - Spinal stenosis of lumbar region with neurogenic claudication   M51.36 (ICD-10-CM) - DDD (degenerative disc disease), lumbar   Evaluation Date: 2/3/2022  Authorization Period Expiration: 22  Plan of Care Expiration: 22  Reassessment Due: 22  Visit # / Visits authorized:       Time In: 745  Time Out: 845  Total Billable Time: 60 minutes    Precautions:    2 lumbar fusion surgeries with hardware, , and 2020     Pattern of pain determined:  4 PEN leg dominant, intermittent, worse standing/walking, better sitting       Subjective   Jia Vila reports back is doing okay. Pain is a little more than usual this morning. She is also having some spasms in posterior upper thigh. Reports she has improved consistency c/ HEP and did attempt to use her elliptical for a few minutes at home but ended up getting dizzy. Is unsure of whether or not she wants to participate in wellness.       Patient reports tolerating previous visit fine.   Patient reports their pain to be 5/10 on a 0-10 scale with 0 being no pain and 10 being the worst pain imaginable.  Pain Location: low back, B buttocks        Pts goals: walk with family, use to walk in [place], walk for 10-15 min as a starting goal without support     Objective     Baseline Isometric Testing on Med X equipment: Testing administered by PT  Date of testin/3/22  ROM 0-36 deg   Max Peak Torque 86    Min Peak Torque 27    Flex/Ext Ratio 3/1   % below normative data 49%       Midpoint Isometric Testing on Med X equipment: Testing  "administered by PT  Date of testin/15/22  ROM 0-42 deg increased to 0-45 with improved lumbar range 22   Max Peak Torque 115   Min Peak Torque 25   Flex/Ext Ratio 3/1   % below normative data 47%        Limitation/Restriction for FOTO 2/3/22 Survey     Therapist reviewed FOTO scores for Jia Ugalde on 2/3/2022.   FOTO documents entered into [employer] - see Media section.     Limitation Score: 48% limited  Visit 10 score: 56%  Goal:  <40 % limited             Treatment    Pt was instructed in and performed the following:     Jia Vila received therapeutic exercises to develop/improved posture, cardiovascular endurance, muscular endurance, lumbar/thoracic ROM, strength and muscular endurance for 57 minutes including the following exercises:      Treadmill (2.5 mph), x10 min    Standing extension, 5 sec holds x15   SOC, x10  SKTC, 5 sec holds x10 ea   Piriformis stretch, 2x30" ea   PPT, x20  +Glute bridge, 2x10  Isometric holds in table top position (90/90), 2x30"  Lateral walks c/ RTB @ knees, x1 laps       [facility] 2022   Visit Number 18   VAS Pain Rating 5   Treadmill Time (in min.) 10   Speed 2.3   Lumbar Stretches - Slouch Overcorrection 10   Extension in Standing 15   Flexion in Lying 10   Lumbar Extension Seat Pad -   Femur Restraint -   Top Dead Center -   Counterweight -   Lumbar Flexion -   Lumbar Extension -   Lumbar Peak Torque -   Min Torque -   Test Percent Below Normative Data -   Test Percent Gain in Strength from Initial  -   Lumbar Weight 70   Repetitions 15   Rating of Perceived Exertion 6   Ice - Z Lie (in min.) -       Peripheral muscle strengthening which included 1 set of 15-20 repetitions at a slow, controlled 10-13 second per rep pace focused on strengthening supporting musculature for improved body mechanics and functional mobility.  Pt and therapist focused on proper form during treatment to ensure optimal strengthening of each targeted muscle group.  Machines were " utilized including torso rotation, chest press, rowing, biceps, and triceps. Leg extension, leg curl, hip abd, hip add, and leg press added visit 3       Not performed 4/20/2022 2/2 lack of time:  MARTA Deadlift 20# w/ 12 in plyo 12x  SL RDL w/ 4 in cone & single limb UE support @ stairs 12x/ea  FM palloff press 7#, x15 ea   FM march c/ isometric hold (20# total), x20  Elliptical 1 min ( she will try 1 min at home and build up)        Home Exercises Provided and Patient Education Provided   Stretching: SOC, standing extension, piriformis stretch, HSS, SKTC, flexion strategies, Z lie, and walking followed by Z lie again  Strengthening: clamshells and bridges deferred for now to encourage improved compliance to HEP  Cardio program (V5): 2/22/22 - stressed 04/07/22 with stretching and z lie first, then walking, then z lie again and elliptical 1-3 min daily with increasing slowly  Lifting education (V11): v11  Using Lumbar Roll: Pt reports she has one.       Education provided:   -Review of HEP and importance of consistency  -Z lie reviewed and ways to do z lie  -patient still flexion responder, reviewed habit of flexion and Z lie, walking then Z lie each day  -discussed wellness at discharge, she plans to attend      Written Home Exercises Provided: Patient instructed to cont prior HEP.  Exercises were reviewed and Jia was able to demonstrate them prior to the end of the session.  Jia demonstrated good  understanding of the education provided.       See EMR under Patient Instructions for exercises provided 4/7/22    Assessment   Jia tolerated session well. Reviewed home exercises c/ fair recall by pt. Unsure if she has improved consistency of HEP to 100%. MedX resistance maintained to that of previous session (70 ft lbs), however pt was able to perform only 15 reps due to reported pain in back and exertion of 6/10. Discussed wellness program once more and encouraged. Pt is unsure of whether or not she will attend. Will  F/U c/ this at next visit.       Patient is making fair progress towards established goals.   Pt will continue to benefit from skilled outpatient physical therapy to address the deficits stated in the impairment chart, provide pt/family education and to maximize pt's level of independence in the home and community environment.     Anticipated Barriers for therapy: 2 back surgeries, significant loss of function     Pt's spiritual, cultural and educational needs considered and pt agreeable to plan of care and goals as stated below:       GOALS: Pt is in agreement with the following goals.     Short term goals:  6 weeks or 10 visits   1.  Pt will demonstrate increased lumbar ROM by at least 6 degrees from the initial ROM value with improvements noted in functional ROM and ability to perform ADLs. GOAL MET 02/25/22  2.  Pt will demonstrate increased MedX average isometric strength value  by 20% from initial test resulting in improved ability to perform bending, lifting, and carrying activities safely, confidently.  (approp and ongoing)  3.  Patient report a reduction in worst pain score by 1-2 points for improved tolerance for 8/10 at worst for standing to cook.  GOAL MET 03/15/22  4.  Pt able to perform HEP correctly with minimal cueing or supervision from therapist to encourage independent management of symptoms. NOT CONSISTENT        Long term goals: 10 weeks or 20 visits   1. Pt will demonstrate increased lumbar ROM by at least 12 degrees from initial ROM value, resulting in improved ability to perform functional fwd bending while standing and sitting. (approp and ongoing)  2. Pt will demonstrate increased MedX average isometric strength value  by 35% from initial test resulting in improved ability to perform bending, lifting, and carrying activities safely, confidently.  (approp and ongoing)  3. Pt to demonstrate ability to independently control and reduce their pain through posture positioning and mechanical  movements throughout a typical day.  (approp and ongoing)  4.  Pt will demonstrate reduced pain and improved functional outcomes as reported on the FOTO by reaching a limitation score of < or = 40% or less in order to demonstrate subjective improvement in pt's condition.    (approp and ongoing)  5. Pt will demonstrate independence with the HEP at discharge  (approp and ongoing)  6. Sit to stand 10 times without hands, Single leg stance 5 sec bilat (approp and ongoing)  7. Stand and walk to cook, and grocery shop with pain <6/10 (approp and ongoing)  8. Walk from waiting room to healthy back with pain < 4/10, walk 10 min in community (approp and ongoing)      Plan   Continue with established Plan of Care towards established PT goals.     Grace Reynolds, PT   04/20/22

## 2022-04-19 NOTE — TELEPHONE ENCOUNTER
NA/LVM to remind/confirm pt's appt on 4/20 at 7:15am. Left clinic phone umber and asked pt to call back and confirm appt.

## 2022-04-20 ENCOUNTER — OFFICE VISIT (OUTPATIENT)
Dept: PAIN MEDICINE | Facility: CLINIC | Age: 62
End: 2022-04-20
Payer: MEDICARE

## 2022-04-20 ENCOUNTER — TELEPHONE (OUTPATIENT)
Dept: PAIN MEDICINE | Facility: HOSPITAL | Age: 62
End: 2022-04-20
Payer: MEDICARE

## 2022-04-20 ENCOUNTER — PATIENT MESSAGE (OUTPATIENT)
Dept: PAIN MEDICINE | Facility: CLINIC | Age: 62
End: 2022-04-20

## 2022-04-20 ENCOUNTER — CLINICAL SUPPORT (OUTPATIENT)
Dept: REHABILITATION | Facility: HOSPITAL | Age: 62
End: 2022-04-20
Attending: PHYSICAL MEDICINE & REHABILITATION
Payer: MEDICARE

## 2022-04-20 VITALS
SYSTOLIC BLOOD PRESSURE: 127 MMHG | OXYGEN SATURATION: 98 % | WEIGHT: 198.31 LBS | DIASTOLIC BLOOD PRESSURE: 77 MMHG | HEART RATE: 80 BPM | HEIGHT: 63 IN | BODY MASS INDEX: 35.14 KG/M2

## 2022-04-20 DIAGNOSIS — R29.898 DECREASED STRENGTH OF TRUNK AND BACK: ICD-10-CM

## 2022-04-20 DIAGNOSIS — M47.816 LUMBAR SPONDYLOSIS: Primary | ICD-10-CM

## 2022-04-20 DIAGNOSIS — Z98.1 HISTORY OF LUMBAR FUSION: ICD-10-CM

## 2022-04-20 DIAGNOSIS — M54.16 LUMBAR RADICULOPATHY: ICD-10-CM

## 2022-04-20 DIAGNOSIS — R29.3 BAD POSTURE: Primary | ICD-10-CM

## 2022-04-20 DIAGNOSIS — M51.36 DDD (DEGENERATIVE DISC DISEASE), LUMBAR: ICD-10-CM

## 2022-04-20 DIAGNOSIS — M25.69 DECREASED ROM OF TRUNK AND BACK: ICD-10-CM

## 2022-04-20 PROCEDURE — 1159F PR MEDICATION LIST DOCUMENTED IN MEDICAL RECORD: ICD-10-PCS | Mod: CPTII,S$GLB,, | Performed by: PAIN MEDICINE

## 2022-04-20 PROCEDURE — 3074F SYST BP LT 130 MM HG: CPT | Mod: CPTII,S$GLB,, | Performed by: PAIN MEDICINE

## 2022-04-20 PROCEDURE — 99999 PR PBB SHADOW E&M-EST. PATIENT-LVL IV: CPT | Mod: PBBFAC,,, | Performed by: PAIN MEDICINE

## 2022-04-20 PROCEDURE — 99214 OFFICE O/P EST MOD 30 MIN: CPT | Mod: S$GLB,,, | Performed by: PAIN MEDICINE

## 2022-04-20 PROCEDURE — 3078F DIAST BP <80 MM HG: CPT | Mod: CPTII,S$GLB,, | Performed by: PAIN MEDICINE

## 2022-04-20 PROCEDURE — 3008F BODY MASS INDEX DOCD: CPT | Mod: CPTII,S$GLB,, | Performed by: PAIN MEDICINE

## 2022-04-20 PROCEDURE — 99214 PR OFFICE/OUTPT VISIT, EST, LEVL IV, 30-39 MIN: ICD-10-PCS | Mod: S$GLB,,, | Performed by: PAIN MEDICINE

## 2022-04-20 PROCEDURE — 1160F RVW MEDS BY RX/DR IN RCRD: CPT | Mod: CPTII,S$GLB,, | Performed by: PAIN MEDICINE

## 2022-04-20 PROCEDURE — 1160F PR REVIEW ALL MEDS BY PRESCRIBER/CLIN PHARMACIST DOCUMENTED: ICD-10-PCS | Mod: CPTII,S$GLB,, | Performed by: PAIN MEDICINE

## 2022-04-20 PROCEDURE — 97110 THERAPEUTIC EXERCISES: CPT | Mod: PN

## 2022-04-20 PROCEDURE — 3074F PR MOST RECENT SYSTOLIC BLOOD PRESSURE < 130 MM HG: ICD-10-PCS | Mod: CPTII,S$GLB,, | Performed by: PAIN MEDICINE

## 2022-04-20 PROCEDURE — 1159F MED LIST DOCD IN RCRD: CPT | Mod: CPTII,S$GLB,, | Performed by: PAIN MEDICINE

## 2022-04-20 PROCEDURE — 3008F PR BODY MASS INDEX (BMI) DOCUMENTED: ICD-10-PCS | Mod: CPTII,S$GLB,, | Performed by: PAIN MEDICINE

## 2022-04-20 PROCEDURE — 3078F PR MOST RECENT DIASTOLIC BLOOD PRESSURE < 80 MM HG: ICD-10-PCS | Mod: CPTII,S$GLB,, | Performed by: PAIN MEDICINE

## 2022-04-20 PROCEDURE — 99999 PR PBB SHADOW E&M-EST. PATIENT-LVL IV: ICD-10-PCS | Mod: PBBFAC,,, | Performed by: PAIN MEDICINE

## 2022-04-20 RX ORDER — FINASTERIDE 5 MG/1
5 TABLET, FILM COATED ORAL DAILY
COMMUNITY
Start: 2022-02-25

## 2022-04-20 NOTE — TELEPHONE ENCOUNTER
----- Message from Val Paris sent at 4/20/2022 11:52 AM CDT -----  Type: Patient Call Back    Who called: self     What is the request in detail: Patient requesting to speak with a nurse when available. Did not go into further detail.     Can the clinic reply by [facility]? No     Would the patient rather a call back or a response via [facility]? Call back     Best call back number: [phone]

## 2022-04-20 NOTE — PROGRESS NOTES
Subjective:     Patient ID: Jia Ugalde is a 61 y.o. female    Chief Complaint: Low-back Pain ([facility])      Referred by: No ref. provider found    Disclaimer: This note was generated using voice recognition software.  There may be typographical errors that were missed during proofreading.    HPI:    Interval History (4/20/22):  She returns today for follow up.  She reports that back program has been helpful for the back and lower extremity pain.  She feels the gabapentin is causing memory loss and weight gain.  She also feels that her left lower extremities becoming weaker.  States that her knee will give out her more frequently in that she has numbness in the area of the left knee.  This is not a new problem but she feels it is worsening      Interval History NP (02/22/22):    Pt returns today for follow up. She reports an improvement in pain since starting Healthy [facility]. Stretching in the morning is most helpful. She does continue gabapentin 900 mg nightly without adverse effects. She denies any new or worsening symptoms at this time.     Interval History NP (12/28/21):    Pt returns today for follow up of bilateral L5 TESI. She reports mild-moderate relief from the injection. She continues gabapentin daily and meloxicam only when needed. There are no adverse reactions with these medications. She does not wish to discuss additional interventional options at this time.       Interval History NP (10/5/21):    Pt returns today for follow up and imaging review. Her pain remains unchanged in quality and location. Bilateral lower extremity radicular pain most bothersome at this time. She denies any new or worsening symptoms since last encounter.     Initial Encounter (9/22/21):  Jia Ugalde is a 61 y.o. female who presents today with chronic bilateral low back, hips, thigh pain. The pain has been present for years. She has undergone 2 low back surgeries within the past 3 years. She  denies any improvement in her symptoms following surgery. She feels that her pain has actually worsened. The pain is located mainly in the bilateral lower lumbar region and will radiate to the bilateral buttocks and posterior thighs. She denies  Any pain below the knees. She does reports chronic numbness in the medial aspect of her left thigh and weakness of the right knee. She denies any b.b dysfunction. She think she recently underwent bilateral SIJ injections that were not helpful. She states that pain is only plresent while active. She can only walk for short distances before needing to rest. The pain resolves completely upon sitting. .   This pain is described in detail below.    Physical Therapy: Yes. Performs daily HEP    Non-pharmacologic Treatment: Rest helps         · TENS? No    Pain Medications:         · Currently taking: gabapentin, meloxicam    · Has tried in the past:  Tylenol    · Has not tried: Opioids, Muscle relaxants, TCAs, SNRIs, topical creams    Blood thinners: None    Interventional Therapies:   11/08/2021- bilateral L5 TESI    Relevant Surgeries:   2 lumbar fusion surgeries with hardware    Affecting sleep? Yes    Affecting daily activities? yes    Depressive symptoms? no          · SI/HI? No    Work status: Unemployed    Pain Scores:    Best:       2/10  Worst:     9/10  Usually:   4/10  Today:    4/10      Review of Systems   Constitutional: Negative for activity change, appetite change, chills, fatigue, fever and unexpected weight change.   HENT: Negative for hearing loss.    Eyes: Negative for visual disturbance.   Respiratory: Negative for chest tightness and shortness of breath.    Cardiovascular: Negative for chest pain.   Gastrointestinal: Negative for abdominal pain, constipation, diarrhea, nausea and vomiting.   Genitourinary: Negative for difficulty urinating.   Musculoskeletal: Positive for arthralgias, back pain, gait problem and myalgias. Negative for neck pain.   Skin:  Negative for rash.   Neurological: Positive for weakness and numbness. Negative for dizziness, light-headedness and headaches.   Psychiatric/Behavioral: Positive for sleep disturbance. Negative for hallucinations and suicidal ideas. The patient is not nervous/anxious.          Past Medical History:   Diagnosis Date    GERD (gastroesophageal reflux disease)     Hyperlipidemia        Past Surgical History:   Procedure Laterality Date    BREAST BIOPSY      BREAST SURGERY      COLONOSCOPY N/A 12/8/2021    Procedure: COLONOSCOPY;  Surgeon: Caitlin Joseph MD;  Location: [facility];  Service: Endoscopy;  Laterality: N/A;  fully vaccinated-GT    pt states had polyps    EPIDURAL STEROID INJECTION Bilateral 11/8/2021    Procedure: Bilateral L5 Transforaminal Epidural Steroid Injection;  Surgeon: Keron Cristina Jr., MD;  Location: [facility];  Service: Pain Management;  Laterality: Bilateral;  Arrive @ 1030 (requested 10); No ATC or DM; Vacc.    ESOPHAGOGASTRODUODENOSCOPY N/A 2/18/2022    Procedure: EGD (ESOPHAGOGASTRODUODENOSCOPY);  Surgeon: Lowell Robles MD;  Location: [facility] ([address] [place] [address]);  Service: Endoscopy;  Laterality: N/A;  fully vaccinated  Pt requesting earlier date with any scoping MD - ERW  RAPID - add on / prep ins. emailed and reviewed- ERW    HEMORRHOID SURGERY      HYSTERECTOMY      SPINE SURGERY      TUBAL LIGATION         Social History     Socioeconomic History    Marital status: Single   Tobacco Use    Smoking status: Never Smoker    Smokeless tobacco: Never Used   Substance and Sexual Activity    Alcohol use: Never    Drug use: Never    Sexual activity: Not Currently       Review of patient's allergies indicates:  No Known Allergies    Current Outpatient Medications on File Prior to Visit   Medication Sig Dispense Refill    biotin 10,000 mcg Cap Take by mouth.      budesonide 1 mg/2 mL NbSp EMPTY CONTENTS OF 1 RESPULE INTO NASAL IRRIGATION SYSTEM, ADD DISTILLED WATER, SALT PACK, MIX &  "IRRIGATE. PERFORM TWICE DAILY      cholecalciferol, vitamin D3, capsule/tablet Take by mouth once daily.      evolocumab (REPATHA SURECLICK) 140 mg/mL PnIj Inject 1 mL (140 mg total) into the skin every 14 (fourteen) days. 2 mL 11    finasteride (PROSCAR) 5 mg tablet Take 5 mg by mouth once daily.      fluocinonide (LIDEX) 0.05 % external solution apply 1ml TO SCALP ONCE TO twice daily AS DIRECTED      fluticasone propionate (FLONASE) 50 mcg/actuation nasal spray 1 spray (50 mcg total) by Each Nostril route once daily. 16 g 3    gabapentin (NEURONTIN) 300 MG capsule TAKE ONE CAPSULE BY MOUTH THREE TIMES DAILY AS NEEDED 180 capsule 1    hydrocortisone (ANUSOL-HC) 2.5 % rectal cream Place rectally 2 (two) times daily. 30 g 1    hydroquinone 2 % Crea APPLY TO AFFECTED AREA(S) 1-2 TIMES DAILY      meloxicam (MOBIC) 15 MG tablet TAKE ONE TABLET BY MOUTH ONCE DAILY 90 tablet 1    multivit/folic acid/vit K1 (ONE-A-DAY WOMEN'S 50 PLUS ORAL) Take by mouth.      NEILMED SINUS RINSE COMPLETE pkdv use as directed      pantoprazole (PROTONIX) 40 MG tablet TAKE 1 TABLET BY MOUTH once DAILY 90 tablet 1    tobramycin, PF, (OPHELIA) 300 mg/5 mL nebulizer solution EMPTY CONTENTS OF 1 AMPULE INTO NASAL IRRIGATION SYSTEM, ADD DISTILLED WATER, SALT PACK, MIX & IRRIGATE. PERFORM 2 TIMES DAILY       No current facility-administered medications on file prior to visit.       Objective:      /77 (BP Location: Right arm, Patient Position: Sitting, BP Method: Medium (Automatic))   Pulse 80   Ht 5' 3" (1.6 m)   Wt 89.9 kg (198 lb 4.8 oz)   SpO2 98%   BMI 35.13 kg/m²     Exam:  GEN:  Well developed, well nourished.  No acute distress. Normal pain behavior.  HEENT:  No trauma.  Mucous membranes moist.  Nares patent bilaterally.  PSYCH: Normal affect. Thought content appropriate.  CHEST:  Breathing symmetric.  No audible wheezing.  ABD: Soft, non-distended.  SKIN:  Warm, pink, dry.  No rash on exposed areas.    EXT:  No " cyanosis, clubbing, or edema.  No color change or changes in nail or hair growth.  NEURO/MUSCULOSKELETAL:  Fully alert, oriented, and appropriate. Speech normal marin. No cranial nerve deficits.   Gait: Normal.  No trendelenburg sign bilaterally.     5/5 strength throughout bilateral lower extremities  Diminished left patellar DTR when compared to right          Imaging:  Imaging and report uploaded to media.     Assessment:       Encounter Diagnoses   Name Primary?    Lumbar spondylosis Yes    Lumbar radiculopathy     DDD (degenerative disc disease), lumbar     History of lumbar fusion          Plan:       Jia Vila was seen today for low-back pain.    Diagnoses and all orders for this visit:    Lumbar spondylosis  -     MRI Lumbar Spine Without Contrast; Future    Lumbar radiculopathy  -     MRI Lumbar Spine Without Contrast; Future    DDD (degenerative disc disease), lumbar  -     MRI Lumbar Spine Without Contrast; Future    History of lumbar fusion        Jia Ugalde is a 61 y.o. female with chronic bilateral low back and lower extremity pain. Symptoms most consistent with neurogenic claudication.  History of lumbar fusion at the L4-5 level.  Now having symptoms concerning for possible L4 radiculopathy with associated subjective weakness.  No objective weakness noted on examination.    1. Continue healthy back for ROM, strengthening, stretching and HEP.   2. Discontinue gabapentin.  Can resume this medication if symptoms of memory loss and weight gain do not improve.  May consider Lyrica as an alternative.    3. Lumbar MRI to evaluate for worsening pathology causing left lower extremity weakness/numbness.  4. Return to clinic after MRI to review results.  May consider neurosurgery referral if appropriate.  May also be a candidate for MBB/RFA and SCS trial in the future.

## 2022-04-26 ENCOUNTER — TELEPHONE (OUTPATIENT)
Dept: OBSTETRICS AND GYNECOLOGY | Facility: CLINIC | Age: 62
End: 2022-04-26
Payer: MEDICARE

## 2022-04-26 ENCOUNTER — PATIENT MESSAGE (OUTPATIENT)
Dept: OBSTETRICS AND GYNECOLOGY | Facility: CLINIC | Age: 62
End: 2022-04-26
Payer: MEDICARE

## 2022-04-26 NOTE — PROGRESS NOTES
"  [facility] Discharge     Name: Jia Vila [facility]  Clinic Number: [id number]    Therapy Diagnosis:   Encounter Diagnoses   Name Primary?    Bad posture Yes    Decreased ROM of trunk and back     Decreased strength of trunk and back      Physician: Ghada Zamora, *    Visit Date: 2022    Physician Orders: PT Eval and Treat    Medical Diagnosis from Referral:   M48.062 (ICD-10-CM) - Spinal stenosis of lumbar region with neurogenic claudication   M51.36 (ICD-10-CM) - DDD (degenerative disc disease), lumbar   Evaluation Date: 2/3/2022  Authorization Period Expiration: 22  Plan of Care Expiration: 22  Reassessment Due: 22  Visit # / Visits authorized:       Time In: 745  Time Out: 830  Total Billable Time: 45 minutes    Precautions:    2 lumbar fusion surgeries with hardware, , and 2020     Pattern of pain determined:  4 PEN leg dominant, intermittent, worse standing/walking, better sitting       Subjective   Jia Vila reports back is doing okay today. Would like today today to be her last visit. Does not want to participate in wellness going forward. She reports she has "Silver Sneakers" and can go to the gym for free. Pt reports that HEP was reviewed session before last and that she still has a printout of the exercises. Denies need to review. Reports pain still hovers around a 5-6 after 10 min of treadmill walking. Has an MRI scheduled for back tomorrow.     Patient reports tolerating previous visit fine.   Patient reports their pain to be 5/10 on a 0-10 scale with 0 being no pain and 10 being the worst pain imaginable.  Pain Location: low back, B buttocks        Pts goals: walk with family, use to walk in [place], walk for 10-15 min as a starting goal without support     Objective     Baseline Isometric Testing on Med X equipment: Testing administered by PT  Date of testin/3/22  ROM 0-36 deg   Max Peak Torque 86    Min Peak Torque 27  "   Flex/Ext Ratio 3/1   % below normative data 49%       Midpoint Isometric Testing on Med X equipment: Testing administered by PT  Date of testin/15/22  ROM 0-42 deg increased to 0-45 with improved lumbar range 22   Max Peak Torque 115   Min Peak Torque 25   Flex/Ext Ratio 3/1   % below normative data 47%     Endpoint Isometric Testing on Med X equipment: Testing administered by PT  Date of testin22  ROM 0-45 deg   Max Peak Torque 108   Min Peak Torque 24   Flex/Ext Ratio 4.5/1   % below normative data 55%        Limitation/Restriction for FOTO 2/3/22 Survey     Therapist reviewed FOTO scores for Jia Ugalde on 2/3/2022.   FOTO documents entered into [employer] - see Media section.     Limitation Score: 48% limited  Visit 10 score: 56%  Visit 20 score: 62%  Goal:  <40 % limited             Treatment    Pt was instructed in and performed the following:     Jia Vila received therapeutic exercises to develop/improved posture, cardiovascular endurance, muscular endurance, lumbar/thoracic ROM, strength and muscular endurance for 43 minutes including the following exercises:      Treadmill (2.5 mph), x10 min      [facility] 2022   Visit Number 20   VAS Pain Rating 3   Treadmill Time (in min.) 10   Speed 2.3   Lumbar Stretches - Slouch Overcorrection -   Extension in Standing -   Flexion in Lying -   Lumbar Extension Seat Pad 0   Femur Restraint 5   Top Dead Center 24   Counterweight 160   Lumbar Flexion 45   Lumbar Extension 0   Lumbar Peak Torque 108   Min Torque 24   Test Percent Below Normative Data 55       Peripheral muscle strengthening which included 1 set of 15-20 repetitions at a slow, controlled 10-13 second per rep pace focused on strengthening supporting musculature for improved body mechanics and functional mobility.  Pt and therapist focused on proper form during treatment to ensure optimal strengthening of each targeted muscle group.  Machines were utilized including torso  rotation, chest press, rowing, biceps, and triceps. Leg extension, leg curl, hip abd, hip add, and leg press added visit 3         Home Exercises Provided and Patient Education Provided   Stretching: SOC, standing extension, piriformis stretch, HSS, SKTC, flexion strategies, Z lie, and walking followed by Z lie again  Strengthening: clamshells and bridges deferred for now to encourage improved compliance to HEP  Cardio program (V5): 2/22/22 - stressed 04/07/22 with stretching and z lie first, then walking, then z lie again and elliptical 1-3 min daily with increasing slowly  Lifting education (V11): v11  Using Lumbar Roll: Pt reports she has one.       Education provided:   -Review of HEP and importance of consistency  -Z lie reviewed and ways to do z lie  -patient still flexion responder, reviewed habit of flexion and Z lie, walking then Z lie each day  -discussed wellness at discharge, she plans to attend      Written Home Exercises Provided: Patient instructed to cont prior HEP.  Exercises were reviewed and Jia was able to demonstrate them prior to the end of the session.  Jia demonstrated good  understanding of the education provided.       See EMR under Patient Instructions for exercises provided 4/7/22    Assessment   Jia has attended 19 visits of [facility] program for aerobic work, MedX isometric testing with dynamic strengthening on MedX equipment for spine, whole body strengthening on MedX equipment, establishment of HEP, and education on exercise, posture, biomechanics and ergonomics. Pt has completed the Healthy Back program. Importance of the wellness program was stressed and pt was encouraged to join, however pt does not want to at this time. Printout of peripheral machine setup and home modification given to pt instead and importance of continuing therex and attentiveness to posture and ergonomics stressed. Overall, most set goals were not met, likely due to ongoing non-compliance c/  HEP. Pt did improve 9 degrees of ROM,  initially 0-36 degrees upon MedX testing and currently 0-45 degrees.      [facility] 4/27/2022   Visit Number 20   VAS Pain Rating 3   Treadmill Time (in min.) 10   Speed 2.3   Lumbar Stretches - Slouch Overcorrection -   Extension in Standing -   Flexion in Lying -   Lumbar Extension Seat Pad 0   Femur Restraint 5   Top Dead Center 24   Counterweight 160   Lumbar Flexion 45   Lumbar Extension 0   Lumbar Peak Torque 108   Min Torque 24   Test Percent Below Normative Data 55       Anticipated Barriers for therapy: 2 back surgeries, significant loss of function, non-compliance    Pt's spiritual, cultural and educational needs considered and pt agreeable to plan of care and goals as stated below:       GOALS: Pt is in agreement with the following goals.     Short term goals:  6 weeks or 10 visits   1.  Pt will demonstrate increased lumbar ROM by at least 6 degrees from the initial ROM value with improvements noted in functional ROM and ability to perform ADLs. GOAL MET 02/25/22  2.  Pt will demonstrate increased MedX average isometric strength value  by 20% from initial test resulting in improved ability to perform bending, lifting, and carrying activities safely, confidently. NOT MET   3.  Patient report a reduction in worst pain score by 1-2 points for improved tolerance for 8/10 at worst for standing to cook.  GOAL MET 03/15/22  4.  Pt able to perform HEP correctly with minimal cueing or supervision from therapist to encourage independent management of symptoms. NOT CONSISTENT        Long term goals: 10 weeks or 20 visits   1. Pt will demonstrate increased lumbar ROM by at least 12 degrees from initial ROM value, resulting in improved ability to perform functional fwd bending while standing and sitting. NOT MET  2. Pt will demonstrate increased MedX average isometric strength value  by 35% from initial test resulting in improved ability to perform bending, lifting, and  carrying activities safely, confidently. NOT MET   3. Pt to demonstrate ability to independently control and reduce their pain through posture positioning and mechanical movements throughout a typical day. NOT MET   4.  Pt will demonstrate reduced pain and improved functional outcomes as reported on the FOTO by reaching a limitation score of < or = 40% or less in order to demonstrate subjective improvement in pt's condition. NOT MET   5. Pt will demonstrate independence with the HEP at discharge NOT MET   6. Sit to stand 10 times without hands, Single leg stance 5 sec bilat NOT MET   7. Stand and walk to cook, and grocery shop with pain <6/10 NOT MET   8. Walk from waiting room to healthy back with pain < 4/10, walk 10 min in community NOT MET       Plan   Pt has reached her maximum potential c/ PT and is to be D/C'ed.     Grace Reynodls, PT, DPT  04/27/22

## 2022-04-27 ENCOUNTER — CLINICAL SUPPORT (OUTPATIENT)
Dept: REHABILITATION | Facility: HOSPITAL | Age: 62
End: 2022-04-27
Attending: PHYSICAL MEDICINE & REHABILITATION
Payer: MEDICARE

## 2022-04-27 DIAGNOSIS — R29.3 BAD POSTURE: Primary | ICD-10-CM

## 2022-04-27 DIAGNOSIS — M25.69 DECREASED ROM OF TRUNK AND BACK: ICD-10-CM

## 2022-04-27 DIAGNOSIS — R29.898 DECREASED STRENGTH OF TRUNK AND BACK: ICD-10-CM

## 2022-04-27 PROCEDURE — 97110 THERAPEUTIC EXERCISES: CPT | Mod: PN

## 2022-04-27 NOTE — PATIENT INSTRUCTIONS
Guide for set-up of machines at gym:    Back Extension  Instructions    Setup:  1) Select an appropriate weight.  2) Adjust the movement arm to a comfortable starting position.  3) Position your feet on the foot rests.  4) Rest your lower back against the back pad.  Exercise Action:  -Cross your arms in front of your chest.  -Extend back in a controlled motion while maintaining a neutral spine.  -Pause at full contraction.  -Slowly return to the start position.   Training Tips:  -Keep your lower back against the back pad.  -Avoid overextending.  -Beginners should start with a reduced range of motion.    Torso Rotation  Instructions    Setup:  1) Select an appropriate weight.  2) Sit with your back against the pads.  3) Select your starting position (left or right).  4) Place your elbows on the outside of the roller pads and grasp both handles.  5) Gently  the support pad with your knees.  Exercise Action:  -While bracing your upper body with your arms and hands, rotate in the desired direction using a slow, controlled motion.   -Pause at full contraction.  -Slowly return to the start position.  -Repeat the exercises in the other starting position.  Training Tips:  -Use a lighter weight and higher repetitions for this exercise.  -Maintain a loose  on the handles while training.   -Maintain a neutral spine and engage your abdominal muscles prior to each rotation.   Chest Press  Instructions    Setup:  1) Select an appropriate weight.  2) Adjust the seat so the handles are aligned with your mid-chest.  3) Press the foot assist to pull the handles forward into a comfortable starting position.   4) Grasp both handles and slowly release the foot assist. Place feet firmly on the floor.  Exercise Action:  -Extend your arms in a controlled motion.  -Slowly return to the start position.  -Use the foot assistant to return the handles to the rest position.  Training Tips:  -Avoid locking you elbows.  -To vary your  training routine, try both hand .   Seated Row  Instructions    Setup:  1) Select an appropriate weight.  2) Adjust the seat so the chest pad is slightly below shoulder level.  Exercise Action:  -Grasp both handles.   -Bend your elbows slightly prior to starting the movement.  -Pull the handles toward you in a controlled motion.  -Slowly return to the start position, maintaining a slight bend at the elbow between each repetition.  Training Tips:  -Keep your head in a neutral position and your chest firmly against the chest pad.   -Avoid elevating your shoulders while performing the movement.   Triceps Extension  Instructions    Setup:  1) Select an appropriate weight.  2) Adjust the seat height so your upper arms rest flat on the pad.  3) Align your elbows with the pivot.  Exercise Action:  -Grasp both handles.  -Extend your arms in a controlled motion.   -Pause at full extension.  -Slowly return to the start position.  Training Tips:  -Keep your upper arms flat on the pad.  -Maintain a slight bend in your elbow at the end of your range of motion.   Bicep Curl  Instructions    Setup:  1) Select an appropriate weight.  2) Adjust the seat height so your upper arms rest flat on the pad.  3) Align your elbows with the pivot.  Exercise Action:  -Grasp both handles.  -Bend your elbows slightly prior to starting the movement.  -Curl your arms up in a controlled motion.  -Slowly return to the start position, maintaining a slight bend at the elbow between each repetition.  Training Tips:  -Keep your upper arms flat on the pad and maintain a neutral spine at all times.   -To maintain even pacing for each repetition, count to two in each direction as you move.   Leg Press  Instructions    Setup:  1) Select as appropriate weight.   2) Sit and place your feet on the footplate approximately shoulder-width apart with your lower leg perpendicular to the footplate.   3) Raise the seat handle and position the seat so your knees  are at a 90° angle. Release the handle to lock your starting position prior to beginning the movement.  Exercise Action:  -Extend your legs in a controlled motion.  -Pause at full contraction.  -Slowly return to the start position.  Training Tips:  -Avoid locking your knees.   -Keep your back in contact with the pad at all times.   -Varying your foot position will change the training effect.   Leg Extension  Instructions    Setup:  1) Select an appropriate weight.  2) Align your knees with the pivot by adjusting the back pad.  3) Adjust the roller pad to a comfortable position atop your ankle.   4) Set movement arm to your desired start position.  Exercise Action:  -Grasp both handles.  -Extend your legs in a controlled motion.   -Pause at full extension.  -Slowly return to start position.  Training Tips:  -Avoid locking knees at full extension.  -Maintain contact with the back pad throughout your range of motion.   Leg Curl  Instructions    Setup:  1) Adjust the movement arm to a lowered position for easy equipment entry.   2) Select an appropriate weight.  3) Align your knees with the pivot by adjusting the back pad.  4) Adjust the ankle pad to a comfortable position.   1) Set the movement arm to your desired start position.  5) Make sure the tibia pad is positioned below your knee cap.   Exercise Action:  -Grasp both handles.  -Curl your legs in a controlled motion.  -Pause at full contraction.  -Slowly return to the start position.   Training Tips:  -Avoid locking knee in the starting position.  -Maintain contact with the back pad throughout the movement.   Hip Abduction  Instructions    Setup:  1) Select an appropriate weight.  2) Use the adjustment handle to set the movement arms to slightly open position.  3) Sit down with your outer thighs resting against the thigh pads.  4) Set the movement arms to position your legs together.   Exercise Action:  -Grasp both handles.  -Press thighs outward in a controlled  motion.  -Pause at full contraction.  -Slowly return to the start position.  Training Tips:  -You can ratchet the movement arms inward. Use the adjustment handles to move them outward.  -Avoid jerking motion throughout this exercise.  -Maintain contact with the back pad throughout the exercise.   Hip Adduction Instructions    Setup:  1) Select an appropriate weight.  2) Use the adjustment handle to close the movement arms.  3) Sit down with your inner thighs resting against the thigh pads.  4) Open the movement arms to your desired start position.   Exercise Action:  -Grasp both handles.  -Squeeze thighs inward in a controlled motion.   -Pause at full contraction.  -Slowly return to the start position.  Training Tips:  -Select a starting position that feels comfortable for your hips, yet provides the greatest possible range of motion.  -You can ratchet the movement arms outward. Use the adjustment handle to move them inward.      Guide for modification of machines for in-home without equipment:  My Home Exercise Program      Lumbar Extension      Cervical Extension      Torso      Chest Press      Seated Row      Triceps Extension      Bicep Curls      Leg Press      Leg Extension      Leg Curls      Hip Abduction      Hip Adduction

## 2022-04-28 ENCOUNTER — HOSPITAL ENCOUNTER (OUTPATIENT)
Dept: RADIOLOGY | Facility: HOSPITAL | Age: 62
Discharge: HOME | End: 2022-04-28
Attending: PAIN MEDICINE
Payer: MEDICARE

## 2022-04-28 ENCOUNTER — PATIENT MESSAGE (OUTPATIENT)
Dept: PAIN MEDICINE | Facility: CLINIC | Age: 62
End: 2022-04-28
Payer: MEDICARE

## 2022-04-28 DIAGNOSIS — M47.816 LUMBAR SPONDYLOSIS: ICD-10-CM

## 2022-04-28 DIAGNOSIS — M51.36 DDD (DEGENERATIVE DISC DISEASE), LUMBAR: ICD-10-CM

## 2022-04-28 DIAGNOSIS — M54.16 LUMBAR RADICULOPATHY: Primary | ICD-10-CM

## 2022-04-28 DIAGNOSIS — M54.16 LUMBAR RADICULOPATHY: ICD-10-CM

## 2022-04-29 ENCOUNTER — PATIENT MESSAGE (OUTPATIENT)
Dept: PAIN MEDICINE | Facility: CLINIC | Age: 62
End: 2022-04-29
Payer: MEDICARE

## 2022-04-29 ENCOUNTER — TELEPHONE (OUTPATIENT)
Dept: PAIN MEDICINE | Facility: CLINIC | Age: 62
End: 2022-04-29
Payer: MEDICARE

## 2022-04-29 NOTE — TELEPHONE ENCOUNTER
Order/referral for MRI successfully faxed to [facility] @ 0953a.  Pt notified via [employer].

## 2022-05-02 ENCOUNTER — PES CALL (OUTPATIENT)
Dept: ADMINISTRATIVE | Facility: CLINIC | Age: 62
End: 2022-05-02
Payer: MEDICARE

## 2022-05-13 ENCOUNTER — PATIENT OUTREACH (OUTPATIENT)
Dept: ADMINISTRATIVE | Facility: OTHER | Age: 62
End: 2022-05-13
Payer: MEDICARE

## 2022-05-13 ENCOUNTER — PATIENT MESSAGE (OUTPATIENT)
Dept: PAIN MEDICINE | Facility: CLINIC | Age: 62
End: 2022-05-13

## 2022-05-13 ENCOUNTER — OFFICE VISIT (OUTPATIENT)
Dept: PAIN MEDICINE | Facility: CLINIC | Age: 62
End: 2022-05-13
Payer: MEDICARE

## 2022-05-13 VITALS
SYSTOLIC BLOOD PRESSURE: 132 MMHG | DIASTOLIC BLOOD PRESSURE: 77 MMHG | BODY MASS INDEX: 34.77 KG/M2 | WEIGHT: 196.31 LBS | OXYGEN SATURATION: 99 % | HEART RATE: 70 BPM | TEMPERATURE: 98 F

## 2022-05-13 DIAGNOSIS — M51.36 DDD (DEGENERATIVE DISC DISEASE), LUMBAR: Primary | ICD-10-CM

## 2022-05-13 DIAGNOSIS — M47.816 LUMBAR SPONDYLOSIS: ICD-10-CM

## 2022-05-13 DIAGNOSIS — M54.16 LUMBAR RADICULOPATHY: ICD-10-CM

## 2022-05-13 PROCEDURE — 99999 PR PBB SHADOW E&M-EST. PATIENT-LVL III: CPT | Mod: PBBFAC,,, | Performed by: PAIN MEDICINE

## 2022-05-13 PROCEDURE — 99214 OFFICE O/P EST MOD 30 MIN: CPT | Mod: S$GLB,,, | Performed by: PAIN MEDICINE

## 2022-05-13 PROCEDURE — 3078F PR MOST RECENT DIASTOLIC BLOOD PRESSURE < 80 MM HG: ICD-10-PCS | Mod: CPTII,S$GLB,, | Performed by: PAIN MEDICINE

## 2022-05-13 PROCEDURE — 3008F BODY MASS INDEX DOCD: CPT | Mod: CPTII,S$GLB,, | Performed by: PAIN MEDICINE

## 2022-05-13 PROCEDURE — 1159F PR MEDICATION LIST DOCUMENTED IN MEDICAL RECORD: ICD-10-PCS | Mod: CPTII,S$GLB,, | Performed by: PAIN MEDICINE

## 2022-05-13 PROCEDURE — 1160F RVW MEDS BY RX/DR IN RCRD: CPT | Mod: CPTII,S$GLB,, | Performed by: PAIN MEDICINE

## 2022-05-13 PROCEDURE — 99999 PR PBB SHADOW E&M-EST. PATIENT-LVL III: ICD-10-PCS | Mod: PBBFAC,,, | Performed by: PAIN MEDICINE

## 2022-05-13 PROCEDURE — 3075F SYST BP GE 130 - 139MM HG: CPT | Mod: CPTII,S$GLB,, | Performed by: PAIN MEDICINE

## 2022-05-13 PROCEDURE — 99214 PR OFFICE/OUTPT VISIT, EST, LEVL IV, 30-39 MIN: ICD-10-PCS | Mod: S$GLB,,, | Performed by: PAIN MEDICINE

## 2022-05-13 PROCEDURE — 3078F DIAST BP <80 MM HG: CPT | Mod: CPTII,S$GLB,, | Performed by: PAIN MEDICINE

## 2022-05-13 PROCEDURE — 3008F PR BODY MASS INDEX (BMI) DOCUMENTED: ICD-10-PCS | Mod: CPTII,S$GLB,, | Performed by: PAIN MEDICINE

## 2022-05-13 PROCEDURE — 1160F PR REVIEW ALL MEDS BY PRESCRIBER/CLIN PHARMACIST DOCUMENTED: ICD-10-PCS | Mod: CPTII,S$GLB,, | Performed by: PAIN MEDICINE

## 2022-05-13 PROCEDURE — 3075F PR MOST RECENT SYSTOLIC BLOOD PRESS GE 130-139MM HG: ICD-10-PCS | Mod: CPTII,S$GLB,, | Performed by: PAIN MEDICINE

## 2022-05-13 PROCEDURE — 1159F MED LIST DOCD IN RCRD: CPT | Mod: CPTII,S$GLB,, | Performed by: PAIN MEDICINE

## 2022-05-13 RX ORDER — DOXYCYCLINE 100 MG/1
100 CAPSULE ORAL 2 TIMES DAILY
COMMUNITY
End: 2022-10-28 | Stop reason: SDUPTHER

## 2022-05-13 RX ORDER — PREGABALIN 75 MG/1
CAPSULE ORAL
Qty: 187 CAPSULE | Refills: 0 | Status: SHIPPED | OUTPATIENT
Start: 2022-05-13 | End: 2022-07-13

## 2022-05-13 NOTE — PROGRESS NOTES
Subjective:     Patient ID: Jia Ugalde is a 61 y.o. female    Chief Complaint: Follow-up, Low-back Pain, and Leg Pain (Bilateral leg pain and weakness )      Referred by: No ref. provider found    Disclaimer: This note was generated using voice recognition software.  There may be typographical errors that were missed during proofreading.    HPI:    Interval History (5/13/22):  She returns today for follow up.  She reports that she has discontinued gabapentin.  She states that memory loss and weight gain have improved.  She continues have symptoms of left knee pain/paresthesias as well as left knee weakness.  She denies any significant changes in the quality or location of his pain.  She states that she is not interested in interventional procedures for this problem.  States she has undergone multiple epidurals without improvement.      Interval History (4/20/22):  She returns today for follow up.  She reports that back program has been helpful for the back and lower extremity pain.  She feels the gabapentin is causing memory loss and weight gain.  She also feels that her left lower extremities becoming weaker.  States that her knee will give out her more frequently in that she has numbness in the area of the left knee.  This is not a new problem but she feels it is worsening      Interval History NP (02/22/22):    Pt returns today for follow up. She reports an improvement in pain since starting [facility]. Stretching in the morning is most helpful. She does continue gabapentin 900 mg nightly without adverse effects. She denies any new or worsening symptoms at this time.     Interval History NP (12/28/21):    Pt returns today for follow up of bilateral L5 TESI. She reports mild-moderate relief from the injection. She continues gabapentin daily and meloxicam only when needed. There are no adverse reactions with these medications. She does not wish to discuss additional interventional options at this time.        Interval History NP (10/5/21):    Pt returns today for follow up and imaging review. Her pain remains unchanged in quality and location. Bilateral lower extremity radicular pain most bothersome at this time. She denies any new or worsening symptoms since last encounter.     Initial Encounter (9/22/21):  Jia Ugalde is a 61 y.o. female who presents today with chronic bilateral low back, hips, thigh pain. The pain has been present for years. She has undergone 2 low back surgeries within the past 3 years. She denies any improvement in her symptoms following surgery. She feels that her pain has actually worsened. The pain is located mainly in the bilateral lower lumbar region and will radiate to the bilateral buttocks and posterior thighs. She denies  Any pain below the knees. She does reports chronic numbness in the medial aspect of her left thigh and weakness of the right knee. She denies any b.b dysfunction. She think she recently underwent bilateral SIJ injections that were not helpful. She states that pain is only plresent while active. She can only walk for short distances before needing to rest. The pain resolves completely upon sitting. .   This pain is described in detail below.    Physical Therapy: Yes. Performs daily HEP    Non-pharmacologic Treatment: Rest helps         · TENS? No    Pain Medications:         · Currently taking: meloxicam    · Has tried in the past:  Tylenol, gabapentin    · Has not tried: Opioids, Muscle relaxants, TCAs, SNRIs, topical creams    Blood thinners: None    Interventional Therapies:   11/08/2021- bilateral L5 TESI    Relevant Surgeries:   2 lumbar fusion surgeries with hardware    Affecting sleep? Yes    Affecting daily activities? yes    Depressive symptoms? no          · SI/HI? No    Work status: Unemployed    Pain Scores:    Best:       0/10  Worst:     10/10  Usually:   10/10  Today:    10/10      Review of Systems   Constitutional: Negative for activity change,  appetite change, chills, fatigue, fever and unexpected weight change.   HENT: Negative for hearing loss.    Eyes: Negative for visual disturbance.   Respiratory: Negative for chest tightness and shortness of breath.    Cardiovascular: Negative for chest pain.   Gastrointestinal: Negative for abdominal pain, constipation, diarrhea, nausea and vomiting.   Genitourinary: Negative for difficulty urinating.   Musculoskeletal: Positive for arthralgias, back pain, gait problem and myalgias. Negative for neck pain.   Skin: Negative for rash.   Neurological: Positive for weakness and numbness. Negative for dizziness, light-headedness and headaches.   Psychiatric/Behavioral: Positive for sleep disturbance. Negative for hallucinations and suicidal ideas. The patient is not nervous/anxious.          Past Medical History:   Diagnosis Date    GERD (gastroesophageal reflux disease)     Hyperlipidemia        Past Surgical History:   Procedure Laterality Date    BREAST BIOPSY      BREAST SURGERY      COLONOSCOPY N/A 12/8/2021    Procedure: COLONOSCOPY;  Surgeon: Caitlin Joseph MD;  Location: [facility];  Service: Endoscopy;  Laterality: N/A;  fully vaccinated-GT    pt states had polyps    EPIDURAL STEROID INJECTION Bilateral 11/8/2021    Procedure: Bilateral L5 Transforaminal Epidural Steroid Injection;  Surgeon: Keron Cristina Jr., MD;  Location: [facility];  Service: Pain Management;  Laterality: Bilateral;  Arrive @ 1030 (requested 10); No ATC or DM; Vacc.    ESOPHAGOGASTRODUODENOSCOPY N/A 2/18/2022    Procedure: EGD (ESOPHAGOGASTRODUODENOSCOPY);  Surgeon: Lowell Robles MD;  Location: [facility] ([address] [place] [address]);  Service: Endoscopy;  Laterality: N/A;  fully vaccinated  Pt requesting earlier date with any scoping MD - ERW  RAPID - add on / prep ins. emailed and reviewed- ERW    HEMORRHOID SURGERY      HYSTERECTOMY      SPINE SURGERY      TUBAL LIGATION         Social History     Socioeconomic History    Marital status:  Single   Tobacco Use    Smoking status: Never Smoker    Smokeless tobacco: Never Used   Substance and Sexual Activity    Alcohol use: Never    Drug use: Never    Sexual activity: Not Currently       Review of patient's allergies indicates:  No Known Allergies    Current Outpatient Medications on File Prior to Visit   Medication Sig Dispense Refill    biotin 10,000 mcg Cap Take by mouth.      budesonide 1 mg/2 mL NbSp EMPTY CONTENTS OF 1 RESPULE INTO NASAL IRRIGATION SYSTEM, ADD DISTILLED WATER, SALT PACK, MIX & IRRIGATE. PERFORM TWICE DAILY      cholecalciferol, vitamin D3, capsule/tablet Take by mouth once daily.      finasteride (PROSCAR) 5 mg tablet Take 5 mg by mouth once daily.      fluocinonide (LIDEX) 0.05 % external solution apply 1ml TO SCALP ONCE TO twice daily AS DIRECTED      fluticasone propionate (FLONASE) 50 mcg/actuation nasal spray 1 spray (50 mcg total) by Each Nostril route once daily. 16 g 3    hydrocortisone (ANUSOL-HC) 2.5 % rectal cream Place rectally 2 (two) times daily. 30 g 1    hydroquinone 2 % Crea APPLY TO AFFECTED AREA(S) 1-2 TIMES DAILY      meloxicam (MOBIC) 15 MG tablet TAKE ONE TABLET BY MOUTH ONCE DAILY 90 tablet 1    multivit/folic acid/vit K1 (ONE-A-DAY WOMEN'S 50 PLUS ORAL) Take by mouth.      NEILMED SINUS RINSE COMPLETE pkdv use as directed      pantoprazole (PROTONIX) 40 MG tablet TAKE 1 TABLET BY MOUTH once DAILY 90 tablet 1    tobramycin, PF, (OPHELIA) 300 mg/5 mL nebulizer solution EMPTY CONTENTS OF 1 AMPULE INTO NASAL IRRIGATION SYSTEM, ADD DISTILLED WATER, SALT PACK, MIX & IRRIGATE. PERFORM 2 TIMES DAILY      doxycycline (VIBRAMYCIN) 100 MG Cap Take 100 mg by mouth 2 (two) times daily.       No current facility-administered medications on file prior to visit.       Objective:      /77 (BP Location: Right arm, Patient Position: Sitting, BP Method: Medium (Automatic))   Pulse 70   Temp 97.9 °F (36.6 °C) (Skin)   Wt 89 kg (196 lb 4.8 oz)   SpO2 99%    BMI 34.77 kg/m²     Exam:  GEN:  Well developed, well nourished.  No acute distress. Normal pain behavior.  HEENT:  No trauma.  Mucous membranes moist.  Nares patent bilaterally.  PSYCH: Normal affect. Thought content appropriate.  CHEST:  Breathing symmetric.  No audible wheezing.  ABD: Soft, non-distended.  SKIN:  Warm, pink, dry.  No rash on exposed areas.    EXT:  No cyanosis, clubbing, or edema.  No color change or changes in nail or hair growth.  NEURO/MUSCULOSKELETAL:  Fully alert, oriented, and appropriate. Speech normal marin. No cranial nerve deficits.   Gait: Normal.  No trendelenburg sign bilaterally.               Imaging:  External MRI reviewed today.  Status post L4-5 fusion.  Appears have some adjacent segment disease at the L3-4 level.  May have some left foraminal/lateral recess stenosis causing L3 and possibly L4 impingement.    Assessment:       Encounter Diagnoses   Name Primary?    DDD (degenerative disc disease), lumbar Yes    Lumbar spondylosis     Lumbar radiculopathy          Plan:       Jia Vila was seen today for follow-up, low-back pain and leg pain.    Diagnoses and all orders for this visit:    DDD (degenerative disc disease), lumbar  -     pregabalin (LYRICA) 75 MG capsule; Take 1 capsule (75 mg total) by mouth every evening for 7 days, THEN 1 capsule (75 mg total) 2 (two) times daily.    Lumbar spondylosis  -     pregabalin (LYRICA) 75 MG capsule; Take 1 capsule (75 mg total) by mouth every evening for 7 days, THEN 1 capsule (75 mg total) 2 (two) times daily.    Lumbar radiculopathy  -     pregabalin (LYRICA) 75 MG capsule; Take 1 capsule (75 mg total) by mouth every evening for 7 days, THEN 1 capsule (75 mg total) 2 (two) times daily.        Jia Ugalde is a 61 y.o. female with chronic bilateral low back and lower extremity pain. Symptoms most consistent with neurogenic claudication.  History of lumbar fusion at the L4-5 level.  Now having symptoms concerning for  possible L4 radiculopathy with associated subjective weakness.  No objective weakness noted on examination.    1.  Start Lyrica 75 mg q.h.s..  After 1 week can increase to 75 mg b.i.d..  2.  I advised patient to follow-up with her Neurosurgery team for further evaluation and possible treatment.  3.  Return to clinic in 8 weeks or sooner if needed.  At that time we will discuss efficacy of Lyrica and adjust medication as needed.  May consider transforaminal epidural steroid injection if patient is interested

## 2022-05-16 ENCOUNTER — SPECIALTY PHARMACY (OUTPATIENT)
Dept: PHARMACY | Facility: CLINIC | Age: 62
End: 2022-05-16
Payer: MEDICARE

## 2022-05-16 ENCOUNTER — TELEPHONE (OUTPATIENT)
Dept: FAMILY MEDICINE | Facility: CLINIC | Age: 62
End: 2022-05-16
Payer: MEDICARE

## 2022-05-16 NOTE — TELEPHONE ENCOUNTER
Specialty Pharmacy - Refill Coordination    Specialty Medication Orders Linked to Encounter    Flowsheet Row Most Recent Value   Medication #1 evolocumab (REPATHA SURECLICK) 140 mg/mL PnIj (Order#[id number], Rx#[id number])          Refill Questions - Documented Responses    Flowsheet Row Most Recent Value   Patient Availability and HIPAA Verification    Does patient want to proceed with activity? Yes   HIPAA/medical authority confirmed? Yes   Relationship to patient of person spoken to? Self   Refill Screening Questions    Would patient like to speak to a pharmacist? No   When does the patient need to receive the medication? 05/23/22   Refill Delivery Questions    How will the patient receive the medication? Delivery Guillermina   When does the patient need to receive the medication? 05/23/22   Shipping Address Home   Address in [facility] confirmed and updated if neccessary? Yes   Expected Copay ($) 4   Is the patient able to afford the medication copay? Yes   Payment Method CC on file   Days supply of Refill 28   Refill activity completed? Yes   Refill activity plan Refill scheduled   Shipment/Pickup Date: 05/20/22          Current Outpatient Medications   Medication Sig    biotin 10,000 mcg Cap Take by mouth.    budesonide 1 mg/2 mL NbSp EMPTY CONTENTS OF 1 RESPULE INTO NASAL IRRIGATION SYSTEM, ADD DISTILLED WATER, SALT PACK, MIX & IRRIGATE. PERFORM TWICE DAILY    cholecalciferol, vitamin D3, capsule/tablet Take by mouth once daily.    doxycycline (VIBRAMYCIN) 100 MG Cap Take 100 mg by mouth 2 (two) times daily.    evolocumab (REPATHA SURECLICK) 140 mg/mL PnIj Inject 1 mL (140 mg total) into the skin every 14 (fourteen) days.    finasteride (PROSCAR) 5 mg tablet Take 5 mg by mouth once daily.    fluocinonide (LIDEX) 0.05 % external solution apply 1ml TO SCALP ONCE TO twice daily AS DIRECTED    fluticasone propionate (FLONASE) 50 mcg/actuation nasal spray 1 spray (50 mcg total) by Each Nostril route once  daily.    hydrocortisone (ANUSOL-HC) 2.5 % rectal cream Place rectally 2 (two) times daily.    hydroquinone 2 % Crea APPLY TO AFFECTED AREA(S) 1-2 TIMES DAILY    meloxicam (MOBIC) 15 MG tablet TAKE ONE TABLET BY MOUTH ONCE DAILY    multivit/folic acid/vit K1 (ONE-A-DAY WOMEN'S 50 PLUS ORAL) Take by mouth.    NEILMED SINUS RINSE COMPLETE pkdv use as directed    pantoprazole (PROTONIX) 40 MG tablet TAKE 1 TABLET BY MOUTH once DAILY    pregabalin (LYRICA) 75 MG capsule Take 1 capsule (75 mg total) by mouth every evening for 7 days, THEN 1 capsule (75 mg total) 2 (two) times daily.    tobramycin, PF, (OPHELIA) 300 mg/5 mL nebulizer solution EMPTY CONTENTS OF 1 AMPULE INTO NASAL IRRIGATION SYSTEM, ADD DISTILLED WATER, SALT PACK, MIX & IRRIGATE. PERFORM 2 TIMES DAILY   Last reviewed on 5/16/2022  7:12 AM by Keron Cristina Jr., MD    Review of patient's allergies indicates:  No Known Allergies Last reviewed on  5/16/2022 7:12 AM by Keron Cristina      Tasks added this encounter   6/13/2022 - Refill Call (Auto Added)   Tasks due within next 3 months   No tasks due.     Azalia Patel [facility]  [address] [place] [id number]  Phone: [phone]  Fax: [phone]

## 2022-06-01 ENCOUNTER — OFFICE VISIT (OUTPATIENT)
Dept: FAMILY MEDICINE | Facility: CLINIC | Age: 62
End: 2022-06-01
Payer: MEDICARE

## 2022-06-01 DIAGNOSIS — M54.16 LUMBAR RADICULOPATHY: ICD-10-CM

## 2022-06-01 DIAGNOSIS — M51.36 DDD (DEGENERATIVE DISC DISEASE), LUMBAR: Primary | ICD-10-CM

## 2022-06-01 PROCEDURE — 1159F PR MEDICATION LIST DOCUMENTED IN MEDICAL RECORD: ICD-10-PCS | Mod: CPTII,95,, | Performed by: INTERNAL MEDICINE

## 2022-06-01 PROCEDURE — 99213 PR OFFICE/OUTPT VISIT, EST, LEVL III, 20-29 MIN: ICD-10-PCS | Mod: 95,,, | Performed by: INTERNAL MEDICINE

## 2022-06-01 PROCEDURE — 99213 OFFICE O/P EST LOW 20 MIN: CPT | Mod: 95,,, | Performed by: INTERNAL MEDICINE

## 2022-06-01 PROCEDURE — 1159F MED LIST DOCD IN RCRD: CPT | Mod: CPTII,95,, | Performed by: INTERNAL MEDICINE

## 2022-06-01 PROCEDURE — 1160F RVW MEDS BY RX/DR IN RCRD: CPT | Mod: CPTII,95,, | Performed by: INTERNAL MEDICINE

## 2022-06-01 PROCEDURE — 1160F PR REVIEW ALL MEDS BY PRESCRIBER/CLIN PHARMACIST DOCUMENTED: ICD-10-PCS | Mod: CPTII,95,, | Performed by: INTERNAL MEDICINE

## 2022-06-01 NOTE — PROGRESS NOTES
SUBJECTIVE     No chief complaint on file.      HPI  Jia Ugalde is a 61 y.o. female with multiple medical diagnoses as listed in the medical history and problem list that presents for follow-up for continued low back pain. She returns today after completing the healthy back program. Pt has had no change in back pain since last visit. Pt self-discontinued Gabapentin because it was affecting her memory. She had the same problem with Lyrica, so she doesn't feel comfortable taking it. Her back pain is now associated with left knee pain and B/L anterior thigh paresthesias and weakness.  Pt has already had 2 back surgeries as well as steroid injections and is not interested in any additional surgical  procedures.    PAST MEDICAL HISTORY:  Past Medical History:   Diagnosis Date    GERD (gastroesophageal reflux disease)     Hyperlipidemia        PAST SURGICAL HISTORY:  Past Surgical History:   Procedure Laterality Date    BREAST BIOPSY      BREAST SURGERY      COLONOSCOPY N/A 12/8/2021    Procedure: COLONOSCOPY;  Surgeon: Caitlin Joseph MD;  Location: [facility];  Service: Endoscopy;  Laterality: N/A;  fully vaccinated-GT    pt states had polyps    EPIDURAL STEROID INJECTION Bilateral 11/8/2021    Procedure: Bilateral L5 Transforaminal Epidural Steroid Injection;  Surgeon: Keron Cristina Jr., MD;  Location: [facility];  Service: Pain Management;  Laterality: Bilateral;  Arrive @ 1030 (requested 10); No ATC or DM; Vacc.    ESOPHAGOGASTRODUODENOSCOPY N/A 2/18/2022    Procedure: EGD (ESOPHAGOGASTRODUODENOSCOPY);  Surgeon: Lowell Robles MD;  Location: [facility] ([address] [place] [address]);  Service: Endoscopy;  Laterality: N/A;  fully vaccinated  Pt requesting earlier date with any scoping MD - ERW  RAPID - add on / prep ins. emailed and reviewed- ERW    HEMORRHOID SURGERY      HYSTERECTOMY      SPINE SURGERY      TUBAL LIGATION         SOCIAL HISTORY:  Social History     Socioeconomic History    Marital status: Single    Tobacco Use    Smoking status: Never Smoker    Smokeless tobacco: Never Used   Substance and Sexual Activity    Alcohol use: Never    Drug use: Never    Sexual activity: Not Currently       FAMILY HISTORY:  Family History   Problem Relation Age of Onset    Pancreatic cancer Maternal Grandmother     Pancreatic cancer Paternal Grandmother        ALLERGIES AND MEDICATIONS: updated and reviewed.  Review of patient's allergies indicates:  No Known Allergies  Current Outpatient Medications   Medication Sig Dispense Refill    biotin 10,000 mcg Cap Take by mouth.      budesonide 1 mg/2 mL NbSp EMPTY CONTENTS OF 1 RESPULE INTO NASAL IRRIGATION SYSTEM, ADD DISTILLED WATER, SALT PACK, MIX & IRRIGATE. PERFORM TWICE DAILY      cholecalciferol, vitamin D3, capsule/tablet Take by mouth once daily.      doxycycline (VIBRAMYCIN) 100 MG Cap Take 100 mg by mouth 2 (two) times daily.      evolocumab (REPATHA SURECLICK) 140 mg/mL PnIj Inject 1 mL (140 mg total) into the skin every 14 (fourteen) days. 2 mL 11    finasteride (PROSCAR) 5 mg tablet Take 5 mg by mouth once daily.      fluocinonide (LIDEX) 0.05 % external solution apply 1ml TO SCALP ONCE TO twice daily AS DIRECTED      fluticasone propionate (FLONASE) 50 mcg/actuation nasal spray 1 spray (50 mcg total) by Each Nostril route once daily. 16 g 3    hydrocortisone (ANUSOL-HC) 2.5 % rectal cream Place rectally 2 (two) times daily. 30 g 1    hydroquinone 2 % Crea APPLY TO AFFECTED AREA(S) 1-2 TIMES DAILY      meloxicam (MOBIC) 15 MG tablet TAKE ONE TABLET BY MOUTH ONCE DAILY 90 tablet 1    multivit/folic acid/vit K1 (ONE-A-DAY WOMEN'S 50 PLUS ORAL) Take by mouth.      NEILMED SINUS RINSE COMPLETE pkdv use as directed      pantoprazole (PROTONIX) 40 MG tablet TAKE 1 TABLET BY MOUTH once DAILY 90 tablet 1    pregabalin (LYRICA) 75 MG capsule Take 1 capsule (75 mg total) by mouth every evening for 7 days, THEN 1 capsule (75 mg total) 2 (two) times daily. 187  capsule 0    tobramycin, PF, (OPHELIA) 300 mg/5 mL nebulizer solution EMPTY CONTENTS OF 1 AMPULE INTO NASAL IRRIGATION SYSTEM, ADD DISTILLED WATER, SALT PACK, MIX & IRRIGATE. PERFORM 2 TIMES DAILY       No current facility-administered medications for this visit.       ROS  Review of Systems   Constitutional: Negative for chills and fever.   HENT: Negative for hearing loss and sore throat.    Eyes: Negative for visual disturbance.   Respiratory: Negative for cough and shortness of breath.    Cardiovascular: Negative for chest pain, palpitations and leg swelling.   Gastrointestinal: Negative for abdominal pain, constipation, diarrhea, nausea and vomiting.   Genitourinary: Positive for hematuria. Negative for dysuria, frequency, pelvic pain and urgency.   Musculoskeletal: Positive for back pain. Negative for arthralgias, joint swelling and myalgias.   Skin: Negative for rash and wound.   Neurological: Positive for weakness and numbness. Negative for headaches.   Psychiatric/Behavioral: Negative for agitation and confusion. The patient is not nervous/anxious.          OBJECTIVE     Physical Exam  There were no vitals filed for this visit. There is no height or weight on file to calculate BMI.            Physical Exam  Constitutional:       General: She is not in acute distress.     Appearance: She is well-developed.   HENT:      Head: Normocephalic and atraumatic.      Right Ear: External ear normal.      Left Ear: External ear normal.      Nose: Nose normal.   Eyes:      General: No scleral icterus.        Right eye: No discharge.         Left eye: No discharge.      Conjunctiva/sclera: Conjunctivae normal.   Neck:      Vascular: No JVD.      Trachea: No tracheal deviation.   Pulmonary:      Effort: Pulmonary effort is normal. No respiratory distress.   Musculoskeletal:         General: No deformity. Normal range of motion.      Cervical back: Normal range of motion and neck supple.   Skin:     General: Skin is dry.       Findings: No erythema or rash.   Neurological:      Mental Status: She is alert and oriented to person, place, and time.      Motor: No abnormal muscle tone.      Coordination: Coordination normal.   Psychiatric:         Behavior: Behavior normal.         Thought Content: Thought content normal.         Judgment: Judgment normal.           Health Maintenance       Date Due Completion Date    TETANUS VACCINE Never done ---    Mammogram 11/10/2022 11/10/2021    DEXA Scan 12/02/2023 12/2/2021    Lipid Panel 12/02/2026 12/2/2021    Colorectal Cancer Screening 12/08/2028 12/8/2021            ASSESSMENT     61 y.o. female with     1. DDD (degenerative disc disease), lumbar    2. Lumbar radiculopathy        PLAN:     1. DDD (degenerative disc disease), lumbar  - Pt to continue heat compresses  - Okay to take OTC Tylenol or NSAIDs prn pain  - Continue topical Voltaren   - Pt looking to calling to schedule with medical marijuana providers    2. Lumbar radiculopathy  - As above        RTC in 4 weeks for repeat assessment of current treatment plan    The patient location is: [place]  The chief complaint leading to consultation is:  Chronic low back pain    Visit type: audiovisual    Face to Face time with patient: 15 min  16 minutes of total time spent on the encounter, which includes face to face time and non-face to face time preparing to see the patient (eg, review of tests), Obtaining and/or reviewing separately obtained history, Documenting clinical information in the electronic or other health record, Independently interpreting results (not separately reported) and communicating results to the patient/family/caregiver, or Care coordination (not separately reported).         Each patient to whom he or she provides medical services by telemedicine is:  (1) informed of the relationship between the physician and patient and the respective role of any other health care provider with respect to management of the patient; and (2)  notified that he or she may decline to receive medical services by telemedicine and may withdraw from such care at any time.    Notes:        Reyna Umanzor MD  06/01/2022 3:26 PM        No follow-ups on file.

## 2022-06-03 ENCOUNTER — TELEPHONE (OUTPATIENT)
Dept: PAIN MEDICINE | Facility: CLINIC | Age: 62
End: 2022-06-03
Payer: MEDICARE

## 2022-06-03 NOTE — TELEPHONE ENCOUNTER
NA/LVM explaining that we have her disc and it's ready to be picked up. It will be waiting for her at the  and she can come by and pick it up whenever she's ready.

## 2022-06-08 ENCOUNTER — OFFICE VISIT (OUTPATIENT)
Dept: OBSTETRICS AND GYNECOLOGY | Facility: CLINIC | Age: 62
End: 2022-06-08
Payer: MEDICARE

## 2022-06-08 VITALS
SYSTOLIC BLOOD PRESSURE: 130 MMHG | DIASTOLIC BLOOD PRESSURE: 88 MMHG | BODY MASS INDEX: 34.41 KG/M2 | WEIGHT: 194.25 LBS

## 2022-06-08 DIAGNOSIS — Z01.419 WELL WOMAN EXAM WITH ROUTINE GYNECOLOGICAL EXAM: Primary | ICD-10-CM

## 2022-06-08 PROCEDURE — 99999 PR PBB SHADOW E&M-EST. PATIENT-LVL III: CPT | Mod: PBBFAC,,, | Performed by: OBSTETRICS & GYNECOLOGY

## 2022-06-08 PROCEDURE — G0101 PR CA SCREEN;PELVIC/BREAST EXAM: ICD-10-PCS | Mod: S$GLB,,, | Performed by: OBSTETRICS & GYNECOLOGY

## 2022-06-08 PROCEDURE — 1159F PR MEDICATION LIST DOCUMENTED IN MEDICAL RECORD: ICD-10-PCS | Mod: CPTII,S$GLB,, | Performed by: OBSTETRICS & GYNECOLOGY

## 2022-06-08 PROCEDURE — 3079F DIAST BP 80-89 MM HG: CPT | Mod: CPTII,S$GLB,, | Performed by: OBSTETRICS & GYNECOLOGY

## 2022-06-08 PROCEDURE — 99999 PR PBB SHADOW E&M-EST. PATIENT-LVL III: ICD-10-PCS | Mod: PBBFAC,,, | Performed by: OBSTETRICS & GYNECOLOGY

## 2022-06-08 PROCEDURE — 1159F MED LIST DOCD IN RCRD: CPT | Mod: CPTII,S$GLB,, | Performed by: OBSTETRICS & GYNECOLOGY

## 2022-06-08 PROCEDURE — 3008F BODY MASS INDEX DOCD: CPT | Mod: CPTII,S$GLB,, | Performed by: OBSTETRICS & GYNECOLOGY

## 2022-06-08 PROCEDURE — 3008F PR BODY MASS INDEX (BMI) DOCUMENTED: ICD-10-PCS | Mod: CPTII,S$GLB,, | Performed by: OBSTETRICS & GYNECOLOGY

## 2022-06-08 PROCEDURE — 3079F PR MOST RECENT DIASTOLIC BLOOD PRESSURE 80-89 MM HG: ICD-10-PCS | Mod: CPTII,S$GLB,, | Performed by: OBSTETRICS & GYNECOLOGY

## 2022-06-08 PROCEDURE — 3075F PR MOST RECENT SYSTOLIC BLOOD PRESS GE 130-139MM HG: ICD-10-PCS | Mod: CPTII,S$GLB,, | Performed by: OBSTETRICS & GYNECOLOGY

## 2022-06-08 PROCEDURE — G0101 CA SCREEN;PELVIC/BREAST EXAM: HCPCS | Mod: S$GLB,,, | Performed by: OBSTETRICS & GYNECOLOGY

## 2022-06-08 PROCEDURE — 3075F SYST BP GE 130 - 139MM HG: CPT | Mod: CPTII,S$GLB,, | Performed by: OBSTETRICS & GYNECOLOGY

## 2022-06-08 NOTE — PROGRESS NOTES
SUBJECTIVE:   Jia Ugalde is a 61 y.o. female   for annual well woman exam. No LMP recorded. Patient has had a hysterectomy..  She has no unusual complaints.      S/p TLH in  for AUB/fibroids  Denies abnl pap  Denies HRT     Pt with vaginal bleeding a few months ago, examination in 3/2022 showed a 5mm ulcer noted on perineum at vaginal introitus, we discussed biopsy at the time but pt elected to wait    She reported bleeding has stopped, thinks it was due to new medication that she started on at the time - gabapentin   she has stopped the medicaiton    She has no concerns for today    Past Medical History:   Diagnosis Date    GERD (gastroesophageal reflux disease)     Hyperlipidemia      Past Surgical History:   Procedure Laterality Date    BREAST BIOPSY      BREAST SURGERY      COLONOSCOPY N/A 2021    Procedure: COLONOSCOPY;  Surgeon: Ciatlin Joseph MD;  Location: [facility];  Service: Endoscopy;  Laterality: N/A;  fully vaccinated-GT    pt states had polyps    EPIDURAL STEROID INJECTION Bilateral 2021    Procedure: Bilateral L5 Transforaminal Epidural Steroid Injection;  Surgeon: Keron Cristina Jr., MD;  Location: [facility];  Service: Pain Management;  Laterality: Bilateral;  Arrive @ 1030 (requested 10); No ATC or DM; Vacc.    ESOPHAGOGASTRODUODENOSCOPY N/A 2022    Procedure: EGD (ESOPHAGOGASTRODUODENOSCOPY);  Surgeon: Lowell Robles MD;  [facility]  Service: Endoscopy;  Laterality: N/A;  fully vaccinated  Pt requesting earlier date with any scoping MD - ERW  RAPID - add on / prep ins. emailed and reviewed- ERW    HEMORRHOID SURGERY      HYSTERECTOMY      SPINE SURGERY      TUBAL LIGATION       Social History     Socioeconomic History    Marital status: Single   Tobacco Use    Smoking status: Never Smoker    Smokeless tobacco: Never Used   Substance and Sexual Activity    Alcohol use: Never    Drug use: Never    Sexual activity: Not Currently      Family History   Problem Relation Age of Onset    Pancreatic cancer Maternal Grandmother     Pancreatic cancer Paternal Grandmother      OB History    Para Term  AB Living   3 3 3         SAB IAB Ectopic Multiple Live Births                  # Outcome Date GA Lbr Jacky/2nd Weight Sex Delivery Anes PTL Lv   3 Term            2 Term            1 Term               Obstetric Comments   S/p TLH in  for AUB/fibroids, Denies HRT   Denies abnl pap   MMG 10/2020 NEG         Current Outpatient Medications   Medication Sig Dispense Refill    biotin 10,000 mcg Cap Take by mouth.      budesonide 1 mg/2 mL NbSp EMPTY CONTENTS OF 1 RESPULE INTO NASAL IRRIGATION SYSTEM, ADD DISTILLED WATER, SALT PACK, MIX & IRRIGATE. PERFORM TWICE DAILY      cholecalciferol, vitamin D3, capsule/tablet Take by mouth once daily.      doxycycline (VIBRAMYCIN) 100 MG Cap Take 100 mg by mouth 2 (two) times daily.      evolocumab (REPATHA SURECLICK) 140 mg/mL PnIj Inject 1 mL (140 mg total) into the skin every 14 (fourteen) days. 2 mL 11    finasteride (PROSCAR) 5 mg tablet Take 5 mg by mouth once daily.      fluocinonide (LIDEX) 0.05 % external solution apply 1ml TO SCALP ONCE TO twice daily AS DIRECTED      fluticasone propionate (FLONASE) 50 mcg/actuation nasal spray 1 spray (50 mcg total) by Each Nostril route once daily. 16 g 3    hydrocortisone (ANUSOL-HC) 2.5 % rectal cream Place rectally 2 (two) times daily. 30 g 1    hydroquinone 2 % Crea APPLY TO AFFECTED AREA(S) 1-2 TIMES DAILY      meloxicam (MOBIC) 15 MG tablet TAKE ONE TABLET BY MOUTH ONCE DAILY 90 tablet 1    multivit/folic acid/vit K1 (ONE-A-DAY WOMEN'S 50 PLUS ORAL) Take by mouth.      NEILMED SINUS RINSE COMPLETE pkdv use as directed      pantoprazole (PROTONIX) 40 MG tablet TAKE 1 TABLET BY MOUTH once DAILY 90 tablet 1    pregabalin (LYRICA) 75 MG capsule Take 1 capsule (75 mg total) by mouth every evening for 7 days, THEN 1 capsule (75 mg total) 2  (two) times daily. 187 capsule 0    tobramycin, PF, (OPHELIA) 300 mg/5 mL nebulizer solution EMPTY CONTENTS OF 1 AMPULE INTO NASAL IRRIGATION SYSTEM, ADD DISTILLED WATER, SALT PACK, MIX & IRRIGATE. PERFORM 2 TIMES DAILY       No current facility-administered medications for this visit.     Allergies: Patient has no known allergies.       ROS:  GENERAL: Denies weight gain or weight loss. Feeling well overall.   SKIN: Denies rash or lesions.   HEAD: Denies head injury or headache.   NODES: Denies enlarged lymph nodes.   CHEST: Denies chest pain or shortness of breath.   CARDIOVASCULAR: Denies palpitations or left sided chest pain.   ABDOMEN: No abdominal pain, constipation, diarrhea, nausea, vomiting or rectal bleeding.   URINARY: No frequency, dysuria, hematuria, or burning on urination.  REPRODUCTIVE: Denies vaginal discharge, abnormal vaginal bleeding, lesions, pelvic pain  BREASTS: The patient performs breast self-examination and denies pain, lumps, or nipple discharge.   HEMATOLOGIC: No easy bruisability or excessive bleeding.  MUSCULOSKELETAL: Denies joint pain or swelling.   NEUROLOGIC: Denies syncope or weakness.   PSYCHIATRIC: Denies depression, anxiety or mood swings.      OBJECTIVE:   /88   Wt 88.1 kg (194 lb 3.6 oz)   BMI 34.41 kg/m²   The patient appears well, alert, oriented x 3, in no distress.  PSYCH:  Normal, full range of affect  NECK: negative, no thyromegaly, trachea midline  SKIN: normal, good color, good turgor and no acne, striae, hirsutism  BREAST EXAM: breasts appear normal, no suspicious masses, no skin or nipple changes or axillary nodes  ABDOMEN: soft, non-tender; bowel sounds normal; no masses,  no organomegaly and no hernias, masses, or hepatosplenomegaly  GENITALIA: normal external genitalia, no erythema, no discharge, previous ulcer healed,   BLADDER: soft  URETHRA: normal appearing urethra with no masses, tenderness or lesions and normal urethra, normal urethral meatus  VAGINA:  Normal  CERVIX: absent  UTERUS: uterus absent  ADNEXA: no mass, fullness, tenderness      ASSESSMENT:   1. Health maintenance  -pap not indicated, s/p hysterectomy  -screening [facility] UTD, neg 11/2021  -counseled on exercise and healthy diet, weight loss  -bone health/osteopenia 2011:  Continue Vitamin D and Calcium supplementation, weight bearing exercises  2.  Discussed safety at home/school/work, healthy and balanced diet, sleep hygiene, as well as violence/weapons exposure.   3.  rtc Every 1-2 years

## 2022-06-14 ENCOUNTER — SPECIALTY PHARMACY (OUTPATIENT)
Dept: PHARMACY | Facility: CLINIC | Age: 62
End: 2022-06-14
Payer: MEDICARE

## 2022-06-14 NOTE — TELEPHONE ENCOUNTER
Specialty Pharmacy - Refill Coordination    Specialty Medication Orders Linked to Encounter    Flowsheet Row Most Recent Value   Medication #1 evolocumab (REPATHA SURECLICK) 140 mg/mL PnIj (Order#[id number], Rx#[id number])        Patient was informed that [place] [employer] will not cover her $4 charge as secondary to medicare. Patient verbalized understanding.      Refill Questions - Documented Responses    Flowsheet Row Most Recent Value   Patient Availability and HIPAA Verification    Does patient want to proceed with activity? Yes   HIPAA/medical authority confirmed? Yes   Relationship to patient of person spoken to? Self   Refill Screening Questions    Would patient like to speak to a pharmacist? No   When does the patient need to receive the medication? 06/20/22   Refill Delivery Questions    How will the patient receive the medication? Delivery Guillermina   When does the patient need to receive the medication? 06/20/22   Shipping Address Home   Address in [facility] confirmed and updated if neccessary? Yes   Expected Copay ($) 4   Is the patient able to afford the medication copay? Yes   Payment Method CC on file   Days supply of Refill 28   Supplies needed? No supplies needed   Refill activity completed? Yes   Refill activity plan Refill scheduled   Shipment/Pickup Date: 06/15/22          Current Outpatient Medications   Medication Sig    biotin 10,000 mcg Cap Take by mouth.    budesonide 1 mg/2 mL NbSp EMPTY CONTENTS OF 1 RESPULE INTO NASAL IRRIGATION SYSTEM, ADD DISTILLED WATER, SALT PACK, MIX & IRRIGATE. PERFORM TWICE DAILY    cholecalciferol, vitamin D3, capsule/tablet Take by mouth once daily.    doxycycline (VIBRAMYCIN) 100 MG Cap Take 100 mg by mouth 2 (two) times daily.    evolocumab (REPATHA SURECLICK) 140 mg/mL PnIj Inject 1 mL (140 mg total) into the skin every 14 (fourteen) days.    finasteride (PROSCAR) 5 mg tablet Take 5 mg by mouth once daily.    fluocinonide (LIDEX) 0.05 % external  solution apply 1ml TO SCALP ONCE TO twice daily AS DIRECTED    fluticasone propionate (FLONASE) 50 mcg/actuation nasal spray 1 spray (50 mcg total) by Each Nostril route once daily.    hydrocortisone (ANUSOL-HC) 2.5 % rectal cream Place rectally 2 (two) times daily.    hydroquinone 2 % Crea APPLY TO AFFECTED AREA(S) 1-2 TIMES DAILY    meloxicam (MOBIC) 15 MG tablet TAKE ONE TABLET BY MOUTH ONCE DAILY    multivit/folic acid/vit K1 (ONE-A-DAY WOMEN'S 50 PLUS ORAL) Take by mouth.    NEILMED SINUS RINSE COMPLETE pkdv use as directed    pantoprazole (PROTONIX) 40 MG tablet TAKE 1 TABLET BY MOUTH once DAILY    pregabalin (LYRICA) 75 MG capsule Take 1 capsule (75 mg total) by mouth every evening for 7 days, THEN 1 capsule (75 mg total) 2 (two) times daily.    tobramycin, PF, (OPHELIA) 300 mg/5 mL nebulizer solution EMPTY CONTENTS OF 1 AMPULE INTO NASAL IRRIGATION SYSTEM, ADD DISTILLED WATER, SALT PACK, MIX & IRRIGATE. PERFORM 2 TIMES DAILY   Last reviewed on 6/8/2022  8:13 AM by Bong Springer MA    Review of patient's allergies indicates:  No Known Allergies Last reviewed on  6/8/2022 8:12 AM by Bong Springer      Tasks added this encounter   7/11/2022 - Refill Call (Auto Added)   Tasks due within next 3 months   No tasks due.     Lissa Florence, PharmD  [facility]  [address] [facility] [address]  Phone: [phone]  Fax: [phone]

## 2022-06-28 ENCOUNTER — OFFICE VISIT (OUTPATIENT)
Dept: CARDIOLOGY | Facility: CLINIC | Age: 62
End: 2022-06-28
Payer: MEDICARE

## 2022-06-28 ENCOUNTER — LAB VISIT (OUTPATIENT)
Dept: LAB | Facility: HOSPITAL | Age: 62
End: 2022-06-28
Attending: INTERNAL MEDICINE
Payer: MEDICARE

## 2022-06-28 VITALS
SYSTOLIC BLOOD PRESSURE: 128 MMHG | HEIGHT: 64 IN | DIASTOLIC BLOOD PRESSURE: 84 MMHG | WEIGHT: 194.25 LBS | HEART RATE: 77 BPM | BODY MASS INDEX: 33.16 KG/M2 | OXYGEN SATURATION: 99 %

## 2022-06-28 DIAGNOSIS — E78.2 MIXED HYPERLIPIDEMIA: Primary | ICD-10-CM

## 2022-06-28 DIAGNOSIS — R03.0 ELEVATED BLOOD PRESSURE READING IN OFFICE WITHOUT DIAGNOSIS OF HYPERTENSION: ICD-10-CM

## 2022-06-28 DIAGNOSIS — E66.09 CLASS 1 OBESITY DUE TO EXCESS CALORIES WITH SERIOUS COMORBIDITY AND BODY MASS INDEX (BMI) OF 33.0 TO 33.9 IN ADULT: ICD-10-CM

## 2022-06-28 DIAGNOSIS — T46.6X5A MYALGIA DUE TO STATIN: ICD-10-CM

## 2022-06-28 DIAGNOSIS — E78.2 MIXED HYPERLIPIDEMIA: ICD-10-CM

## 2022-06-28 DIAGNOSIS — M79.10 MYALGIA DUE TO STATIN: ICD-10-CM

## 2022-06-28 LAB
CHOLEST SERPL-MCNC: 212 MG/DL (ref 120–199)
CHOLEST/HDLC SERPL: 3.4 {RATIO} (ref 2–5)
HDLC SERPL-MCNC: 62 MG/DL (ref 40–75)
HDLC SERPL: 29.2 % (ref 20–50)
LDLC SERPL CALC-MCNC: 125.2 MG/DL (ref 63–159)
NONHDLC SERPL-MCNC: 150 MG/DL
TRIGL SERPL-MCNC: 124 MG/DL (ref 30–150)

## 2022-06-28 PROCEDURE — 1159F MED LIST DOCD IN RCRD: CPT | Mod: CPTII,S$GLB,, | Performed by: INTERNAL MEDICINE

## 2022-06-28 PROCEDURE — 3008F PR BODY MASS INDEX (BMI) DOCUMENTED: ICD-10-PCS | Mod: CPTII,S$GLB,, | Performed by: INTERNAL MEDICINE

## 2022-06-28 PROCEDURE — 3074F SYST BP LT 130 MM HG: CPT | Mod: CPTII,S$GLB,, | Performed by: INTERNAL MEDICINE

## 2022-06-28 PROCEDURE — 99999 PR PBB SHADOW E&M-EST. PATIENT-LVL IV: ICD-10-PCS | Mod: PBBFAC,,, | Performed by: INTERNAL MEDICINE

## 2022-06-28 PROCEDURE — 99999 PR PBB SHADOW E&M-EST. PATIENT-LVL IV: CPT | Mod: PBBFAC,,, | Performed by: INTERNAL MEDICINE

## 2022-06-28 PROCEDURE — 36415 COLL VENOUS BLD VENIPUNCTURE: CPT | Performed by: INTERNAL MEDICINE

## 2022-06-28 PROCEDURE — 3079F DIAST BP 80-89 MM HG: CPT | Mod: CPTII,S$GLB,, | Performed by: INTERNAL MEDICINE

## 2022-06-28 PROCEDURE — 1159F PR MEDICATION LIST DOCUMENTED IN MEDICAL RECORD: ICD-10-PCS | Mod: CPTII,S$GLB,, | Performed by: INTERNAL MEDICINE

## 2022-06-28 PROCEDURE — 3074F PR MOST RECENT SYSTOLIC BLOOD PRESSURE < 130 MM HG: ICD-10-PCS | Mod: CPTII,S$GLB,, | Performed by: INTERNAL MEDICINE

## 2022-06-28 PROCEDURE — 3079F PR MOST RECENT DIASTOLIC BLOOD PRESSURE 80-89 MM HG: ICD-10-PCS | Mod: CPTII,S$GLB,, | Performed by: INTERNAL MEDICINE

## 2022-06-28 PROCEDURE — 99214 PR OFFICE/OUTPT VISIT, EST, LEVL IV, 30-39 MIN: ICD-10-PCS | Mod: S$GLB,,, | Performed by: INTERNAL MEDICINE

## 2022-06-28 PROCEDURE — 3008F BODY MASS INDEX DOCD: CPT | Mod: CPTII,S$GLB,, | Performed by: INTERNAL MEDICINE

## 2022-06-28 PROCEDURE — 80061 LIPID PANEL: CPT | Performed by: INTERNAL MEDICINE

## 2022-06-28 PROCEDURE — 99214 OFFICE O/P EST MOD 30 MIN: CPT | Mod: S$GLB,,, | Performed by: INTERNAL MEDICINE

## 2022-06-28 RX ORDER — CLOBETASOL PROPIONATE 0.46 MG/ML
SOLUTION TOPICAL
COMMUNITY
Start: 2022-05-06

## 2022-06-28 RX ORDER — DOXYCYCLINE 100 MG/1
CAPSULE ORAL
COMMUNITY
Start: 2022-05-06 | End: 2022-12-16

## 2022-06-28 RX ORDER — FLUCONAZOLE 150 MG/1
TABLET ORAL
COMMUNITY
Start: 2022-03-29 | End: 2022-09-01 | Stop reason: SDUPTHER

## 2022-06-28 RX ORDER — MAGNESIUM 250 MG
TABLET ORAL
COMMUNITY
End: 2022-12-16

## 2022-06-28 RX ORDER — TRIAMCINOLONE ACETONIDE 1 MG/G
OINTMENT TOPICAL
COMMUNITY
Start: 2022-05-06

## 2022-06-28 NOTE — PROGRESS NOTES
CARDIOLOGY CLINIC VISIT        HISTORY OF PRESENT ILLNESS:     Jia Ugalde presents for continued care.  Last seen 02/25/2022. Seen 01/26/2022 for evaluation of chest pain.  Patient noted left-sided chest discomfort.  Episodes worse with inspiration.  Usually last less than 1 minute.  Exercise stress echocardiogram showed ejection fraction of 55%.  Normal pulmonary pressure.  No significant valvular abnormalities.  The patient exercised for 4 minutes 34 seconds.  Functional capacity 6 Mets.  Hypertensive response exercise 218/118.  History of statin myopathy.  Last . She has been approved for Repatha.  She states that she had no symptoms during the stress test.  Blood pressure is elevated today.  She says she has values of 120s to 130 systolic at home.    06/28/2022:  Feels good.  No complaints.  Has been on Repatha for 3 months.    CARDIOVASCULAR HISTORY:     None    PAST MEDICAL HISTORY:     Past Medical History:   Diagnosis Date    GERD (gastroesophageal reflux disease)     Hyperlipidemia        PAST SURGICAL HISTORY:     Past Surgical History:   Procedure Laterality Date    BREAST BIOPSY      BREAST SURGERY      COLONOSCOPY N/A 12/8/2021    Procedure: COLONOSCOPY;  Surgeon: Caitlin Joseph MD;  Location: [facility];  Service: Endoscopy;  Laterality: N/A;  fully vaccinated-GT    pt states had polyps    EPIDURAL STEROID INJECTION Bilateral 11/8/2021    Procedure: Bilateral L5 Transforaminal Epidural Steroid Injection;  Surgeon: Keron Cristina Jr., MD;  Location: [facility];  Service: Pain Management;  Laterality: Bilateral;  Arrive @ 1030 (requested 10); No ATC or DM; Vacc.    ESOPHAGOGASTRODUODENOSCOPY N/A 2/18/2022    Procedure: EGD (ESOPHAGOGASTRODUODENOSCOPY);  Surgeon: Lowell Robles MD;  Location: [facility] ([address] [place] [address]);  Service: Endoscopy;  Laterality: N/A;  fully vaccinated  Pt requesting earlier date with any scoping MD - ERW  RAPID - add on / prep ins. emailed and reviewed- ERW     HEMORRHOID SURGERY      HYSTERECTOMY      SPINE SURGERY      TUBAL LIGATION         ALLERGIES AND MEDICATION:   Review of patient's allergies indicates:  No Known Allergies     Medication List          Accurate as of June 28, 2022 10:44 AM. If you have any questions, ask your nurse or doctor.            CONTINUE taking these medications    biotin 10,000 mcg Cap     budesonide 1 mg/2 mL Nbsp     cholecalciferol (vitamin D3) capsule/tablet     clobetasoL 0.05 % external solution  Commonly known as: TEMOVATE     doxycycline 100 MG Cap  Commonly known as: VIBRAMYCIN     doxycycline 100 MG capsule  Commonly known as: MONODOX     finasteride 5 mg tablet  Commonly known as: PROSCAR     fluconazole 150 MG Tab  Commonly known as: DIFLUCAN     fluocinonide 0.05 % external solution  Commonly known as: LIDEX     fluticasone propionate 50 mcg/actuation nasal spray  Commonly known as: FLONASE  1 spray (50 mcg total) by Each Nostril route once daily.     hydrocortisone 2.5 % rectal cream  Commonly known as: ANUSOL-HC  Place rectally 2 (two) times daily.     hydroquinone 2 % Crea     magnesium 250 mg Tab     meloxicam 15 MG tablet  Commonly known as: MOBIC  TAKE ONE TABLET BY MOUTH ONCE DAILY     NEILMED SINUS RINSE COMPLETE Pkdv  Generic drug: sod chlor-bicarb-squeez bottle     ONE-A-DAY WOMEN'S 50 PLUS ORAL     pantoprazole 40 MG tablet  Commonly known as: PROTONIX  TAKE 1 TABLET BY MOUTH once DAILY     pregabalin 75 MG capsule  Commonly known as: LYRICA  Take 1 capsule (75 mg total) by mouth every evening for 7 days, THEN 1 capsule (75 mg total) 2 (two) times daily.  Start taking on: May 13, 2022     REPATHA SURECLICK 140 mg/mL Pnij  Generic drug: evolocumab  Inject 1 mL (140 mg total) into the skin every 14 (fourteen) days.     tobramycin (PF) 300 mg/5 mL nebulizer solution  Commonly known as: OPHELIA     triamcinolone acetonide 0.1% 0.1 % ointment  Commonly known as: KENALOG            SOCIAL HISTORY:     Social History  "    Socioeconomic History    Marital status: Single   Tobacco Use    Smoking status: Never Smoker    Smokeless tobacco: Never Used   Substance and Sexual Activity    Alcohol use: Never    Drug use: Never    Sexual activity: Not Currently       FAMILY HISTORY:     Family History   Problem Relation Age of Onset    Pancreatic cancer Maternal Grandmother     Pancreatic cancer Paternal Grandmother        REVIEW OF SYSTEMS:   Review of Systems   Respiratory: Negative for sputum production, shortness of breath and wheezing.    Cardiovascular: Negative for chest pain, palpitations, orthopnea, claudication, leg swelling and PND.   Gastrointestinal: Negative for abdominal pain, heartburn, nausea and vomiting.   Neurological: Negative for dizziness, speech change, focal weakness, loss of consciousness, weakness and headaches.       PHYSICAL EXAM:     Vitals:    06/28/22 1030   BP: 128/84   Pulse: 77    Body mass index is 33.34 kg/m².  Weight: 88.1 kg (194 lb 3.6 oz)   Height: 5' 4" (162.6 cm)     Physical Exam  Constitutional:       General: She is not in acute distress.     Appearance: She is well-developed. She is not diaphoretic.   HENT:      Head: Normocephalic.   Neck:      Vascular: No carotid bruit or JVD.   Cardiovascular:      Rate and Rhythm: Normal rate and regular rhythm.      Pulses: Normal pulses.      Heart sounds: Normal heart sounds.   Pulmonary:      Effort: Pulmonary effort is normal.      Breath sounds: Normal breath sounds.   Abdominal:      General: Bowel sounds are normal.      Palpations: Abdomen is soft.      Tenderness: There is no abdominal tenderness.   Skin:     General: Skin is warm and dry.   Neurological:      Mental Status: She is alert and oriented to person, place, and time.   Psychiatric:         Speech: Speech normal.         Behavior: Behavior normal.         Thought Content: Thought content normal.         DATA:   EKG: (personally reviewed tracing)    Laboratory:  CBC:  Recent " Labs   Lab 02/07/20  1442 10/20/20  1211 12/02/21  0857   WBC 5.15 5.31 5.49   Hemoglobin 13.5 13.1 12.8   Hematocrit 44.4 42.3 40.5   Platelets 265 272 255       CHEMISTRIES:  Recent Labs   Lab 03/12/21  0836 08/26/21  1005 12/02/21  0857   Glucose 98 99 92   Sodium 141 140 139   Potassium 4.4 4.1 4.2   BUN 14 12 13   Creatinine 1.0 0.9 0.9   eGFR if African American >60 >60.0 >60.0   eGFR if non African American >60 >60.0 >60.0   Calcium 9.1 9.9 9.9   Magnesium  --  1.9  --        CARDIAC BIOMARKERS:  Recent Labs   Lab 08/26/21  1005          COAGS:        LIPIDS/LFTS:  Recent Labs   Lab 06/03/20  0721 10/20/20  1211 03/12/21  0836 12/02/21  0857   Cholesterol 317 H  --  297 H 307 H   Triglycerides 151 H  --  112 88   HDL 72  --  65 62   LDL Cholesterol 214.8 H  --  209.6 H 227.4 H   Non-HDL Cholesterol 245  --  232 245   AST  --  22 21 22   ALT  --  17 14 17       Cardiovascular Testing:    Exercise stress echocardiogram 02/09/2022:    · The estimated ejection fraction is 55%.  · The patient's exercise capacity was below average.  · The test was stopped because the patient experienced fatigue.  · There were no arrhythmias during stress.  · The left ventricle is normal in size with normal systolic function.  · Indeterminate left ventricular diastolic function.  · Normal right ventricular size with normal right ventricular systolic function.  · Moderate left atrial enlargement.  · Mild tricuspid regurgitation.  · Mild right atrial enlargement.  · The estimated PA systolic pressure is 27 mmHg.  · Normal central venous pressure (3 mmHg).  · Mild mitral regurgitation.  · The stress echo portion of this study is negative for myocardial ischemia.  · The ECG portion of this study is negative for myocardial ischemia.      ASSESSMENT:     1. Chest pain: no recurrence  2. Elevated blood pressure without diagnosis of hypertension: controlled.  3. Hypercholesterolemia  4. Class 1 obesity  5. Statin myalgia    PLAN:      1. Chest pain:  Exercise stress echocardiogram negative for ischemia.  2. Elevated blood pressure without diagnosis of hypertension:  Good value today.   3. Hypercholesterolemia:   Taking Repatha. Lipids today.  4. Return to clinic 6 months.           Jc Rogers MD, MPH, FACC, [facility]

## 2022-07-11 ENCOUNTER — SPECIALTY PHARMACY (OUTPATIENT)
Dept: PHARMACY | Facility: CLINIC | Age: 62
End: 2022-07-11
Payer: MEDICARE

## 2022-07-11 NOTE — TELEPHONE ENCOUNTER
Specialty Pharmacy - Refill Coordination    Specialty Medication Orders Linked to Encounter    Flowsheet Row Most Recent Value   Medication #1 evolocumab (REPATHA SURECLICK) 140 mg/mL PnIj (Order#[id number], Rx#[id number])          Refill Questions - Documented Responses    Flowsheet Row Most Recent Value   Patient Availability and HIPAA Verification    Does patient want to proceed with activity? Yes   HIPAA/medical authority confirmed? Yes   Relationship to patient of person spoken to? Self   Refill Screening Questions    Would patient like to speak to a pharmacist? No   When does the patient need to receive the medication? 07/18/22   Refill Delivery Questions    How will the patient receive the medication? Delivery Guillermina   When does the patient need to receive the medication? 07/18/22   Shipping Address Home   Address in [facility] confirmed and updated if neccessary? Yes   Expected Copay ($) 4   Is the patient able to afford the medication copay? Yes   Payment Method CC on file   Days supply of Refill 28   Refill activity completed? Yes   Refill activity plan Refill scheduled   Shipment/Pickup Date: 07/14/22          Current Outpatient Medications   Medication Sig    biotin 10,000 mcg Cap Take by mouth.    budesonide 1 mg/2 mL NbSp EMPTY CONTENTS OF 1 RESPULE INTO NASAL IRRIGATION SYSTEM, ADD DISTILLED WATER, SALT PACK, MIX & IRRIGATE. PERFORM TWICE DAILY    cholecalciferol, vitamin D3, capsule/tablet Take by mouth once daily.    clobetasoL (TEMOVATE) 0.05 % external solution Apply TO SCALP in AREAS of HAIR loss AT betime nightly monday - FRIDAY    doxycycline (MONODOX) 100 MG capsule TAKE ONE CAPSULE BY MOUTH ONCE DAILY with food/yogurt AND tall GLASS of water    doxycycline (VIBRAMYCIN) 100 MG Cap Take 100 mg by mouth 2 (two) times daily.    evolocumab (REPATHA SURECLICK) 140 mg/mL PnIj Inject 1 mL (140 mg total) into the skin every 14 (fourteen) days.    finasteride (PROSCAR) 5 mg tablet Take 5  mg by mouth once daily.    fluconazole (DIFLUCAN) 150 MG Tab TAKE ONE TABLET BY MOUTH ONCE as A single DOSE    fluocinonide (LIDEX) 0.05 % external solution apply 1ml TO SCALP ONCE TO twice daily AS DIRECTED    fluticasone propionate (FLONASE) 50 mcg/actuation nasal spray 1 spray (50 mcg total) by Each Nostril route once daily.    hydrocortisone (ANUSOL-HC) 2.5 % rectal cream Place rectally 2 (two) times daily.    hydroquinone 2 % Crea APPLY TO AFFECTED AREA(S) 1-2 TIMES DAILY    magnesium 250 mg Tab     meloxicam (MOBIC) 15 MG tablet TAKE ONE TABLET BY MOUTH ONCE DAILY    multivit/folic acid/vit K1 (ONE-A-DAY WOMEN'S 50 PLUS ORAL) Take by mouth.    NEILMED SINUS RINSE COMPLETE pkdv use as directed    pantoprazole (PROTONIX) 40 MG tablet TAKE 1 TABLET BY MOUTH once DAILY    pregabalin (LYRICA) 75 MG capsule Take 1 capsule (75 mg total) by mouth every evening for 7 days, THEN 1 capsule (75 mg total) 2 (two) times daily. (Patient not taking: Reported on 6/28/2022)    tobramycin, PF, (OPHELIA) 300 mg/5 mL nebulizer solution EMPTY CONTENTS OF 1 AMPULE INTO NASAL IRRIGATION SYSTEM, ADD DISTILLED WATER, SALT PACK, MIX & IRRIGATE. PERFORM 2 TIMES DAILY    triamcinolone acetonide 0.1% (KENALOG) 0.1 % ointment Apply TO SCALP in AREAS of HAIR loss AT bedtime   Last reviewed on 6/28/2022 10:36 AM by Jane Roberson MA    Review of patient's allergies indicates:  No Known Allergies Last reviewed on  6/28/2022 10:32 AM by Jane Roberson      Tasks added this encounter   8/8/2022 - Refill Call (Auto Added)   Tasks due within next 3 months   No tasks due.     Kathya Durant, PharmD  [facility]  [address] [place] [id number]  Phone: [phone]  Fax: [phone]

## 2022-07-13 ENCOUNTER — OFFICE VISIT (OUTPATIENT)
Dept: PAIN MEDICINE | Facility: CLINIC | Age: 62
End: 2022-07-13
Payer: MEDICARE

## 2022-07-13 VITALS
SYSTOLIC BLOOD PRESSURE: 132 MMHG | HEART RATE: 70 BPM | DIASTOLIC BLOOD PRESSURE: 81 MMHG | HEIGHT: 64 IN | OXYGEN SATURATION: 100 % | BODY MASS INDEX: 33.34 KG/M2

## 2022-07-13 DIAGNOSIS — M47.816 LUMBAR SPONDYLOSIS: Primary | ICD-10-CM

## 2022-07-13 DIAGNOSIS — M51.36 DDD (DEGENERATIVE DISC DISEASE), LUMBAR: ICD-10-CM

## 2022-07-13 DIAGNOSIS — M48.062 SPINAL STENOSIS OF LUMBAR REGION WITH NEUROGENIC CLAUDICATION: ICD-10-CM

## 2022-07-13 DIAGNOSIS — Z98.1 HISTORY OF LUMBAR FUSION: ICD-10-CM

## 2022-07-13 PROCEDURE — 3008F BODY MASS INDEX DOCD: CPT | Mod: CPTII,S$GLB,, | Performed by: PAIN MEDICINE

## 2022-07-13 PROCEDURE — 1160F PR REVIEW ALL MEDS BY PRESCRIBER/CLIN PHARMACIST DOCUMENTED: ICD-10-PCS | Mod: CPTII,S$GLB,, | Performed by: PAIN MEDICINE

## 2022-07-13 PROCEDURE — 3079F DIAST BP 80-89 MM HG: CPT | Mod: CPTII,S$GLB,, | Performed by: PAIN MEDICINE

## 2022-07-13 PROCEDURE — 3075F PR MOST RECENT SYSTOLIC BLOOD PRESS GE 130-139MM HG: ICD-10-PCS | Mod: CPTII,S$GLB,, | Performed by: PAIN MEDICINE

## 2022-07-13 PROCEDURE — 1160F RVW MEDS BY RX/DR IN RCRD: CPT | Mod: CPTII,S$GLB,, | Performed by: PAIN MEDICINE

## 2022-07-13 PROCEDURE — 3008F PR BODY MASS INDEX (BMI) DOCUMENTED: ICD-10-PCS | Mod: CPTII,S$GLB,, | Performed by: PAIN MEDICINE

## 2022-07-13 PROCEDURE — 3079F PR MOST RECENT DIASTOLIC BLOOD PRESSURE 80-89 MM HG: ICD-10-PCS | Mod: CPTII,S$GLB,, | Performed by: PAIN MEDICINE

## 2022-07-13 PROCEDURE — 99999 PR PBB SHADOW E&M-EST. PATIENT-LVL IV: CPT | Mod: PBBFAC,,, | Performed by: PAIN MEDICINE

## 2022-07-13 PROCEDURE — 1159F PR MEDICATION LIST DOCUMENTED IN MEDICAL RECORD: ICD-10-PCS | Mod: CPTII,S$GLB,, | Performed by: PAIN MEDICINE

## 2022-07-13 PROCEDURE — 99999 PR PBB SHADOW E&M-EST. PATIENT-LVL IV: ICD-10-PCS | Mod: PBBFAC,,, | Performed by: PAIN MEDICINE

## 2022-07-13 PROCEDURE — 99214 PR OFFICE/OUTPT VISIT, EST, LEVL IV, 30-39 MIN: ICD-10-PCS | Mod: S$GLB,,, | Performed by: PAIN MEDICINE

## 2022-07-13 PROCEDURE — 99214 OFFICE O/P EST MOD 30 MIN: CPT | Mod: S$GLB,,, | Performed by: PAIN MEDICINE

## 2022-07-13 PROCEDURE — 3075F SYST BP GE 130 - 139MM HG: CPT | Mod: CPTII,S$GLB,, | Performed by: PAIN MEDICINE

## 2022-07-13 PROCEDURE — 1159F MED LIST DOCD IN RCRD: CPT | Mod: CPTII,S$GLB,, | Performed by: PAIN MEDICINE

## 2022-07-13 NOTE — PROGRESS NOTES
Subjective:     Patient ID: Jia Ugalde is a 61 y.o. female    Chief Complaint: Back Pain (8wk F\U)      Referred by: No ref. provider found    Disclaimer: This note was generated using voice recognition software.  There may be typographical errors that were missed during proofreading.    HPI:    Interval History (7/13/22):  She returns today for follow up.  She reports that Lyrica caused similar side effects to gabapentin.  As a result, she discontinue this medication.  Otherwise, she denies any changes in the quality location for pain.  She denies any new worsening symptoms.  She is not interested in discussing additional surgical options at this time.  She is also not interested in additional interventional procedures.      Interval History (5/13/22):  She returns today for follow up.  She reports that she has discontinued gabapentin.  She states that memory loss and weight gain have improved.  She continues have symptoms of left knee pain/paresthesias as well as left knee weakness.  She denies any significant changes in the quality or location of his pain.  She states that she is not interested in interventional procedures for this problem.  States she has undergone multiple epidurals without improvement.      Interval History (4/20/22):  She returns today for follow up.  She reports that back program has been helpful for the back and lower extremity pain.  She feels the gabapentin is causing memory loss and weight gain.  She also feels that her left lower extremities becoming weaker.  States that her knee will give out her more frequently in that she has numbness in the area of the left knee.  This is not a new problem but she feels it is worsening      Interval History NP (02/22/22):    Pt returns today for follow up. She reports an improvement in pain since starting [facility]. Stretching in the morning is most helpful. She does continue gabapentin 900 mg nightly without adverse effects. She denies any  new or worsening symptoms at this time.     Interval History NP (12/28/21):    Pt returns today for follow up of bilateral L5 TESI. She reports mild-moderate relief from the injection. She continues gabapentin daily and meloxicam only when needed. There are no adverse reactions with these medications. She does not wish to discuss additional interventional options at this time.       Interval History NP (10/5/21):    Pt returns today for follow up and imaging review. Her pain remains unchanged in quality and location. Bilateral lower extremity radicular pain most bothersome at this time. She denies any new or worsening symptoms since last encounter.     Initial Encounter (9/22/21):  Jia Ugalde is a 61 y.o. female who presents today with chronic bilateral low back, hips, thigh pain. The pain has been present for years. She has undergone 2 low back surgeries within the past 3 years. She denies any improvement in her symptoms following surgery. She feels that her pain has actually worsened. The pain is located mainly in the bilateral lower lumbar region and will radiate to the bilateral buttocks and posterior thighs. She denies  Any pain below the knees. She does reports chronic numbness in the medial aspect of her left thigh and weakness of the right knee. She denies any b.b dysfunction. She think she recently underwent bilateral SIJ injections that were not helpful. She states that pain is only plresent while active. She can only walk for short distances before needing to rest. The pain resolves completely upon sitting. .   This pain is described in detail below.    Physical Therapy: Yes. Performs daily HEP    Non-pharmacologic Treatment: Rest helps         · TENS? No    Pain Medications:         · Currently taking: meloxicam    · Has tried in the past:  Tylenol, gabapentin    · Has not tried: Opioids, Muscle relaxants, TCAs, SNRIs, topical creams    Blood thinners: None    Interventional Therapies:    11/08/2021- bilateral L5 TESI    Relevant Surgeries:   2 lumbar fusion surgeries with hardware    Affecting sleep? Yes    Affecting daily activities? yes    Depressive symptoms? no          · SI/HI? No    Work status: Unemployed    Pain Scores:    Best:       2/10  Worst:     10/10  Usually:   3/10  Today:    7/10      Review of Systems   Constitutional: Negative for activity change, appetite change, chills, fatigue, fever and unexpected weight change.   HENT: Negative for hearing loss.    Eyes: Negative for visual disturbance.   Respiratory: Negative for chest tightness and shortness of breath.    Cardiovascular: Negative for chest pain.   Gastrointestinal: Negative for abdominal pain, constipation, diarrhea, nausea and vomiting.   Genitourinary: Negative for difficulty urinating.   Musculoskeletal: Positive for arthralgias, back pain, gait problem and myalgias. Negative for neck pain.   Skin: Negative for rash.   Neurological: Positive for weakness and numbness. Negative for dizziness, light-headedness and headaches.   Psychiatric/Behavioral: Positive for sleep disturbance. Negative for hallucinations and suicidal ideas. The patient is not nervous/anxious.          Past Medical History:   Diagnosis Date    GERD (gastroesophageal reflux disease)     Hyperlipidemia        Past Surgical History:   Procedure Laterality Date    BREAST BIOPSY      BREAST SURGERY      COLONOSCOPY N/A 12/8/2021    Procedure: COLONOSCOPY;  Surgeon: Caitlin Joseph MD;  Location: [facility];  Service: Endoscopy;  Laterality: N/A;  fully vaccinated-GT    pt states had polyps    EPIDURAL STEROID INJECTION Bilateral 11/8/2021    Procedure: Bilateral L5 Transforaminal Epidural Steroid Injection;  Surgeon: Keron Cristina Jr., MD;  Location: [facility];  Service: Pain Management;  Laterality: Bilateral;  Arrive @ 1030 (requested 10); No ATC or DM; Vacc.    ESOPHAGOGASTRODUODENOSCOPY N/A 2/18/2022    Procedure: EGD  (ESOPHAGOGASTRODUODENOSCOPY);  Surgeon: Lowell Robles MD;  Location: [facility] ([address] [place] [address]);  Service: Endoscopy;  Laterality: N/A;  fully vaccinated  Pt requesting earlier date with any scoping MD - ERW  RAPID - add on / prep ins. emailed and reviewed- ERW    HEMORRHOID SURGERY      HYSTERECTOMY      SPINE SURGERY      TUBAL LIGATION         Social History     Socioeconomic History    Marital status: Single   Tobacco Use    Smoking status: Never Smoker    Smokeless tobacco: Never Used   Substance and Sexual Activity    Alcohol use: Never    Drug use: Never    Sexual activity: Not Currently       Review of patient's allergies indicates:  No Known Allergies    Current Outpatient Medications on File Prior to Visit   Medication Sig Dispense Refill    biotin 10,000 mcg Cap Take by mouth.      budesonide 1 mg/2 mL NbSp EMPTY CONTENTS OF 1 RESPULE INTO NASAL IRRIGATION SYSTEM, ADD DISTILLED WATER, SALT PACK, MIX & IRRIGATE. PERFORM TWICE DAILY      cholecalciferol, vitamin D3, capsule/tablet Take by mouth once daily.      clobetasoL (TEMOVATE) 0.05 % external solution Apply TO SCALP in AREAS of HAIR loss AT betime nightly monday - FRIDAY      doxycycline (MONODOX) 100 MG capsule TAKE ONE CAPSULE BY MOUTH ONCE DAILY with food/yogurt AND tall GLASS of water      doxycycline (VIBRAMYCIN) 100 MG Cap Take 100 mg by mouth 2 (two) times daily.      evolocumab (REPATHA SURECLICK) 140 mg/mL PnIj Inject 1 mL (140 mg total) into the skin every 14 (fourteen) days. 2 mL 11    finasteride (PROSCAR) 5 mg tablet Take 5 mg by mouth once daily.      fluconazole (DIFLUCAN) 150 MG Tab TAKE ONE TABLET BY MOUTH ONCE as A single DOSE      fluocinonide (LIDEX) 0.05 % external solution apply 1ml TO SCALP ONCE TO twice daily AS DIRECTED      fluticasone propionate (FLONASE) 50 mcg/actuation nasal spray 1 spray (50 mcg total) by Each Nostril route once daily. 16 g 3    hydrocortisone (ANUSOL-HC) 2.5 % rectal cream Place rectally 2  "(two) times daily. 30 g 1    hydroquinone 2 % Crea APPLY TO AFFECTED AREA(S) 1-2 TIMES DAILY      magnesium 250 mg Tab       meloxicam (MOBIC) 15 MG tablet TAKE ONE TABLET BY MOUTH ONCE DAILY 90 tablet 1    multivit/folic acid/vit K1 (ONE-A-DAY WOMEN'S 50 PLUS ORAL) Take by mouth.      NEILMED SINUS RINSE COMPLETE pkdv use as directed      pantoprazole (PROTONIX) 40 MG tablet TAKE 1 TABLET BY MOUTH once DAILY 90 tablet 1    tobramycin, PF, (OPHELIA) 300 mg/5 mL nebulizer solution EMPTY CONTENTS OF 1 AMPULE INTO NASAL IRRIGATION SYSTEM, ADD DISTILLED WATER, SALT PACK, MIX & IRRIGATE. PERFORM 2 TIMES DAILY      triamcinolone acetonide 0.1% (KENALOG) 0.1 % ointment Apply TO SCALP in AREAS of HAIR loss AT bedtime      [DISCONTINUED] pregabalin (LYRICA) 75 MG capsule Take 1 capsule (75 mg total) by mouth every evening for 7 days, THEN 1 capsule (75 mg total) 2 (two) times daily. 187 capsule 0     No current facility-administered medications on file prior to visit.       Objective:      /81 (BP Location: Right arm, Patient Position: Sitting, BP Method: Medium (Automatic))   Pulse 70   Ht 5' 4" (1.626 m)   SpO2 100%   BMI 33.34 kg/m²     Exam:  GEN:  Well developed, well nourished.  No acute distress. Normal pain behavior.  HEENT:  No trauma.  Mucous membranes moist.  Nares patent bilaterally.  PSYCH: Normal affect. Thought content appropriate.  CHEST:  Breathing symmetric.  No audible wheezing.  ABD: Soft, non-distended.  SKIN:  Warm, pink, dry.  No rash on exposed areas.    EXT:  No cyanosis, clubbing, or edema.  No color change or changes in nail or hair growth.  NEURO/MUSCULOSKELETAL:  Fully alert, oriented, and appropriate. Speech normal marin. No cranial nerve deficits.   Gait: Normal.  No trendelenburg sign bilaterally.               Imaging:  External MRI reviewed today.  Status post L4-5 fusion.  Appears have some adjacent segment disease at the L3-4 level.  May have some left foraminal/lateral " recess stenosis causing L3 and possibly L4 impingement.    Assessment:       Encounter Diagnoses   Name Primary?    Lumbar spondylosis Yes    History of lumbar fusion     DDD (degenerative disc disease), lumbar     Spinal stenosis of lumbar region with neurogenic claudication          Plan:       Jia Vila was seen today for back pain.    Diagnoses and all orders for this visit:    Lumbar spondylosis    History of lumbar fusion    DDD (degenerative disc disease), lumbar    Spinal stenosis of lumbar region with neurogenic claudication        Jia Ugalde is a 61 y.o. female with chronic bilateral low back and lower extremity pain. Symptoms most consistent with neurogenic claudication.  History of lumbar fusion at the L4-5 level.  Now having symptoms concerning for possible L4 radiculopathy with associated subjective weakness.  No objective weakness noted on examination.    1.  Patient not interested in trying additional medications at this time.  2.  Return to clinic as needed.  May be a candidate for repeat transforaminal epidural steroid injection.  May also benefit from re-evaluation by Neurosurgery.  May be a good spinal cord stimulator candidate, but would need to know that no surgical options are available.

## 2022-08-08 ENCOUNTER — SPECIALTY PHARMACY (OUTPATIENT)
Dept: PHARMACY | Facility: CLINIC | Age: 62
End: 2022-08-08
Payer: MEDICARE

## 2022-08-22 ENCOUNTER — PATIENT MESSAGE (OUTPATIENT)
Dept: CARDIOLOGY | Facility: CLINIC | Age: 62
End: 2022-08-22
Payer: MEDICARE

## 2022-08-24 ENCOUNTER — TELEPHONE (OUTPATIENT)
Dept: CARDIOLOGY | Facility: CLINIC | Age: 62
End: 2022-08-24
Payer: MEDICARE

## 2022-08-24 NOTE — TELEPHONE ENCOUNTER
----- Message from Liat Knutson sent at 8/24/2022  1:29 PM CDT -----  Type: Patient Call Back    Who called:pt     What is the request in detail:pt requesting to get application for prescription filled out by the doctor. Call pt     Can the clinic reply by MARGARETNER?    Would the patient rather a call back or a response via [facility]? call    Best call back number: [phone] (home)       Additional Information:

## 2022-09-01 ENCOUNTER — LAB VISIT (OUTPATIENT)
Dept: LAB | Facility: HOSPITAL | Age: 62
End: 2022-09-01
Attending: FAMILY MEDICINE
Payer: MEDICARE

## 2022-09-01 ENCOUNTER — TELEPHONE (OUTPATIENT)
Dept: OBSTETRICS AND GYNECOLOGY | Facility: CLINIC | Age: 62
End: 2022-09-01
Payer: MEDICARE

## 2022-09-01 ENCOUNTER — PATIENT MESSAGE (OUTPATIENT)
Dept: OBSTETRICS AND GYNECOLOGY | Facility: CLINIC | Age: 62
End: 2022-09-01
Payer: MEDICARE

## 2022-09-01 ENCOUNTER — OFFICE VISIT (OUTPATIENT)
Dept: FAMILY MEDICINE | Facility: CLINIC | Age: 62
End: 2022-09-01
Payer: MEDICARE

## 2022-09-01 VITALS
TEMPERATURE: 98 F | BODY MASS INDEX: 33.12 KG/M2 | RESPIRATION RATE: 18 BRPM | WEIGHT: 194 LBS | OXYGEN SATURATION: 98 % | DIASTOLIC BLOOD PRESSURE: 80 MMHG | HEIGHT: 64 IN | HEART RATE: 89 BPM | SYSTOLIC BLOOD PRESSURE: 122 MMHG

## 2022-09-01 DIAGNOSIS — R82.90 CLOUDY URINE: ICD-10-CM

## 2022-09-01 DIAGNOSIS — R82.90 CLOUDY URINE: Primary | ICD-10-CM

## 2022-09-01 LAB
BILIRUB SERPL-MCNC: NORMAL MG/DL
BILIRUB UR QL STRIP: NEGATIVE
BLOOD URINE, POC: NORMAL
CLARITY UR REFRACT.AUTO: CLEAR
CLARITY, POC UA: NORMAL
COLOR UR AUTO: YELLOW
COLOR, POC UA: YELLOW
GLUCOSE UR QL STRIP: NEGATIVE
GLUCOSE UR QL STRIP: NORMAL
HGB UR QL STRIP: NEGATIVE
KETONES UR QL STRIP: NEGATIVE
KETONES UR QL STRIP: NORMAL
LEUKOCYTE ESTERASE UR QL STRIP: NEGATIVE
LEUKOCYTE ESTERASE URINE, POC: NORMAL
NITRITE UR QL STRIP: NEGATIVE
NITRITE, POC UA: NORMAL
PH UR STRIP: 6 [PH] (ref 5–8)
PH, POC UA: 5
PROT UR QL STRIP: NEGATIVE
PROTEIN, POC: NORMAL
SP GR UR STRIP: 1.02 (ref 1–1.03)
SPECIFIC GRAVITY, POC UA: 1.01
URN SPEC COLLECT METH UR: NORMAL
UROBILINOGEN, POC UA: NORMAL

## 2022-09-01 PROCEDURE — 3079F PR MOST RECENT DIASTOLIC BLOOD PRESSURE 80-89 MM HG: ICD-10-PCS | Mod: CPTII,S$GLB,, | Performed by: FAMILY MEDICINE

## 2022-09-01 PROCEDURE — 3074F PR MOST RECENT SYSTOLIC BLOOD PRESSURE < 130 MM HG: ICD-10-PCS | Mod: CPTII,S$GLB,, | Performed by: FAMILY MEDICINE

## 2022-09-01 PROCEDURE — 87801 DETECT AGNT MULT DNA AMPLI: CPT | Performed by: FAMILY MEDICINE

## 2022-09-01 PROCEDURE — 99214 OFFICE O/P EST MOD 30 MIN: CPT | Mod: S$GLB,,, | Performed by: FAMILY MEDICINE

## 2022-09-01 PROCEDURE — 81002 URINALYSIS NONAUTO W/O SCOPE: CPT | Mod: S$GLB,,, | Performed by: FAMILY MEDICINE

## 2022-09-01 PROCEDURE — 3008F BODY MASS INDEX DOCD: CPT | Mod: CPTII,S$GLB,, | Performed by: FAMILY MEDICINE

## 2022-09-01 PROCEDURE — 99999 PR PBB SHADOW E&M-EST. PATIENT-LVL IV: CPT | Mod: PBBFAC,,, | Performed by: FAMILY MEDICINE

## 2022-09-01 PROCEDURE — 87481 CANDIDA DNA AMP PROBE: CPT | Mod: 59 | Performed by: FAMILY MEDICINE

## 2022-09-01 PROCEDURE — 3074F SYST BP LT 130 MM HG: CPT | Mod: CPTII,S$GLB,, | Performed by: FAMILY MEDICINE

## 2022-09-01 PROCEDURE — 1159F PR MEDICATION LIST DOCUMENTED IN MEDICAL RECORD: ICD-10-PCS | Mod: CPTII,S$GLB,, | Performed by: FAMILY MEDICINE

## 2022-09-01 PROCEDURE — 3008F PR BODY MASS INDEX (BMI) DOCUMENTED: ICD-10-PCS | Mod: CPTII,S$GLB,, | Performed by: FAMILY MEDICINE

## 2022-09-01 PROCEDURE — 1159F MED LIST DOCD IN RCRD: CPT | Mod: CPTII,S$GLB,, | Performed by: FAMILY MEDICINE

## 2022-09-01 PROCEDURE — 3079F DIAST BP 80-89 MM HG: CPT | Mod: CPTII,S$GLB,, | Performed by: FAMILY MEDICINE

## 2022-09-01 PROCEDURE — 99214 PR OFFICE/OUTPT VISIT, EST, LEVL IV, 30-39 MIN: ICD-10-PCS | Mod: S$GLB,,, | Performed by: FAMILY MEDICINE

## 2022-09-01 PROCEDURE — 81003 URINALYSIS AUTO W/O SCOPE: CPT | Performed by: FAMILY MEDICINE

## 2022-09-01 PROCEDURE — 81002 POCT URINE DIPSTICK WITHOUT MICROSCOPE: ICD-10-PCS | Mod: S$GLB,,, | Performed by: FAMILY MEDICINE

## 2022-09-01 PROCEDURE — 99999 PR PBB SHADOW E&M-EST. PATIENT-LVL IV: ICD-10-PCS | Mod: PBBFAC,,, | Performed by: FAMILY MEDICINE

## 2022-09-01 RX ORDER — ASCORBIC ACID 500 MG
TABLET ORAL
COMMUNITY
End: 2022-12-16

## 2022-09-01 RX ORDER — CEPHRADINE 500 MG
CAPSULE ORAL
COMMUNITY
End: 2022-12-16

## 2022-09-01 RX ORDER — FLUCONAZOLE 150 MG/1
150 TABLET ORAL
Qty: 2 TABLET | Refills: 0 | Status: SHIPPED | OUTPATIENT
Start: 2022-09-01 | End: 2022-09-05

## 2022-09-01 NOTE — PROGRESS NOTES
Subjective:       Patient ID: Jia Ugalde is a 61 y.o. female.    Chief Complaint: Cystitis (Cloudy urine)      HPI  60 yo female presents for possible for uti. Endorses cloudy urine. Started about 2 weeks ago. Denies any dysuria or discharge.    Review of Systems   Constitutional: Negative.    HENT: Negative.     Respiratory: Negative.     Cardiovascular: Negative.    Gastrointestinal: Negative.    Endocrine: Negative.    Genitourinary: Negative.    Musculoskeletal: Negative.    Neurological: Negative.    Psychiatric/Behavioral: Negative.          Past Medical History:   Diagnosis Date    GERD (gastroesophageal reflux disease)     Hyperlipidemia      Past Surgical History:   Procedure Laterality Date    BREAST BIOPSY      BREAST SURGERY      COLONOSCOPY N/A 12/8/2021    Procedure: COLONOSCOPY;  Surgeon: Caitlin Joseph MD;  Location: [facility];  Service: Endoscopy;  Laterality: N/A;  fully vaccinated-GT    pt states had polyps    EPIDURAL STEROID INJECTION Bilateral 11/8/2021    Procedure: Bilateral L5 Transforaminal Epidural Steroid Injection;  Surgeon: Keron Cristina Jr., MD;  Location: [facility];  Service: Pain Management;  Laterality: Bilateral;  Arrive @ 1030 (requested 10); No ATC or DM; Vacc.    ESOPHAGOGASTRODUODENOSCOPY N/A 2/18/2022    Procedure: EGD (ESOPHAGOGASTRODUODENOSCOPY);  Surgeon: Lowell Robles MD;  Location: [facility] ([address] [place] [address]);  Service: Endoscopy;  Laterality: N/A;  fully vaccinated  Pt requesting earlier date with any scoping MD - ERW  RAPID - add on / prep ins. emailed and reviewed- ERW    HEMORRHOID SURGERY      HYSTERECTOMY      SPINE SURGERY      TUBAL LIGATION       Family History   Problem Relation Age of Onset    Pancreatic cancer Maternal Grandmother     Pancreatic cancer Paternal Grandmother      Social History     Socioeconomic History    Marital status: Single   Tobacco Use    Smoking status: Never    Smokeless tobacco: Never   Substance and Sexual Activity    Alcohol use:  Never    Drug use: Never    Sexual activity: Not Currently       Current Outpatient Medications:     alpha lipoic acid 200 mg Cap, , Disp: , Rfl:     ascorbic acid, vitamin C, (VITAMIN C) 500 MG tablet, , Disp: , Rfl:     biotin 10,000 mcg Cap, Take by mouth., Disp: , Rfl:     budesonide 1 mg/2 mL NbSp, EMPTY CONTENTS OF 1 RESPULE INTO NASAL IRRIGATION SYSTEM, ADD DISTILLED WATER, SALT PACK, MIX & IRRIGATE. PERFORM TWICE DAILY, Disp: , Rfl:     cholecalciferol, vitamin D3, capsule/tablet, Take by mouth once daily., Disp: , Rfl:     clobetasoL (TEMOVATE) 0.05 % external solution, Apply TO SCALP in AREAS of HAIR loss AT betime nightly monday - FRIDAY, Disp: , Rfl:     doxycycline (MONODOX) 100 MG capsule, TAKE ONE CAPSULE BY MOUTH ONCE DAILY with food/yogurt AND tall GLASS of water, Disp: , Rfl:     evolocumab (REPATHA SURECLICK) 140 mg/mL PnIj, Inject 1 mL (140 mg total) into the skin every 14 (fourteen) days., Disp: 2 mL, Rfl: 11    finasteride (PROSCAR) 5 mg tablet, Take 5 mg by mouth once daily., Disp: , Rfl:     fluocinonide (LIDEX) 0.05 % external solution, apply 1ml TO SCALP ONCE TO twice daily AS DIRECTED, Disp: , Rfl:     fluticasone propionate (FLONASE) 50 mcg/actuation nasal spray, 1 spray (50 mcg total) by Each Nostril route once daily., Disp: 16 g, Rfl: 3    hydrocortisone (ANUSOL-HC) 2.5 % rectal cream, Place rectally 2 (two) times daily., Disp: 30 g, Rfl: 1    hydroquinone 2 % Crea, APPLY TO AFFECTED AREA(S) 1-2 TIMES DAILY, Disp: , Rfl:     magnesium 250 mg Tab, , Disp: , Rfl:     multivit/folic acid/vit K1 (ONE-A-DAY WOMEN'S 50 PLUS ORAL), Take by mouth., Disp: , Rfl:     NEILMED SINUS RINSE COMPLETE pkdv, use as directed, Disp: , Rfl:     pantoprazole (PROTONIX) 40 MG tablet, TAKE 1 TABLET BY MOUTH once DAILY, Disp: 90 tablet, Rfl: 1    triamcinolone acetonide 0.1% (KENALOG) 0.1 % ointment, Apply TO SCALP in AREAS of HAIR loss AT bedtime, Disp: , Rfl:     doxycycline (VIBRAMYCIN) 100 MG Cap, Take 100  "mg by mouth 2 (two) times daily., Disp: , Rfl:     fluconazole (DIFLUCAN) 150 MG Tab, Take 1 tablet (150 mg total) by mouth every 72 hours. for 2 doses, Disp: 2 tablet, Rfl: 0    meloxicam (MOBIC) 15 MG tablet, TAKE ONE TABLET BY MOUTH ONCE DAILY, Disp: 90 tablet, Rfl: 1    tobramycin, PF, (OPHELIA) 300 mg/5 mL nebulizer solution, EMPTY CONTENTS OF 1 AMPULE INTO NASAL IRRIGATION SYSTEM, ADD DISTILLED WATER, SALT PACK, MIX & IRRIGATE. PERFORM 2 TIMES DAILY, Disp: , Rfl:    Objective:      Vitals:    09/01/22 0804   BP: 122/80   BP Location: Left arm   Patient Position: Sitting   BP Method: Small (Manual)   Pulse: 89   Resp: 18   Temp: 98.4 °F (36.9 °C)   TempSrc: Oral   SpO2: 98%   Weight: 88 kg (194 lb 0.1 oz)   Height: 5' 4" (1.626 m)       Physical Exam  Constitutional:       General: She is not in acute distress.     Appearance: She is not diaphoretic.   HENT:      Head: Normocephalic and atraumatic.   Eyes:      Conjunctiva/sclera: Conjunctivae normal.   Pulmonary:      Effort: Pulmonary effort is normal.   Musculoskeletal:      Cervical back: Neck supple.   Skin:     Findings: No rash.   Neurological:      Mental Status: She is alert and oriented to person, place, and time.   Psychiatric:         Behavior: Behavior normal.         Thought Content: Thought content normal.         Judgment: Judgment normal.          Assessment:       1. Cloudy urine          Plan:       Cloudy urine  -     POCT URINE DIPSTICK WITHOUT MICROSCOPE  -     fluconazole (DIFLUCAN) 150 MG Tab; Take 1 tablet (150 mg total) by mouth every 72 hours. for 2 doses  Dispense: 2 tablet; Refill: 0  -     VAGINOSIS SCREEN BY DNA PROBE  -     Urine culture; Future; Expected date: 09/01/2022  Dip showed blood and protein. F/u test but artur ltx for possible candida infection.        No future appointments.    Patient note was created using [employer].  Any errors in syntax or even information may not have been identified and edited on initial review prior to " signing this note.

## 2022-09-01 NOTE — TELEPHONE ENCOUNTER
No answer on cb. lvm      ----- Message from Afshan Zabala sent at 9/1/2022 11:29 AM CDT -----  Regarding: self [phone]  Type: Patient Call Back    Who called: self    What is the request in detail: would like  and earlier visit.    Can the clinic reply by [facility]? no    Would the patient rather a call back or a response via [facility]? Call back    Best call back number: [phone]

## 2022-09-01 NOTE — TELEPHONE ENCOUNTER
On call back, pt advised she went to [facility] for poss uti, but was very unsatisfied with the service. Provider had her swab herself for a vaginosis. Provider says her uti was negative, but urine dip shows blood and protein in her urine.  Pt is not having any other symptoms. Pt requesting Dr Roldan to contact her direct her on what to do next.      ----- Message from Kiki Nur sent at 9/1/2022  4:10 PM CDT -----  .Type:  Patient Returning Call    Who Called: self    Who Left Message for Patient: Che    Does the patient know what this is regarding?:yes wants to speak to Dr. Roldan.    Would the patient rather a call back or a response via [facility]? call    Best Call Back Number:.[phone] (home)

## 2022-09-07 ENCOUNTER — SPECIALTY PHARMACY (OUTPATIENT)
Dept: PHARMACY | Facility: CLINIC | Age: 62
End: 2022-09-07
Payer: MEDICARE

## 2022-09-07 DIAGNOSIS — E78.2 MIXED HYPERLIPIDEMIA: Primary | ICD-10-CM

## 2022-09-07 NOTE — TELEPHONE ENCOUNTER
Patient called for refill of Repatha. PA required. Informed her that we will reach out to her once PA determination is received.     Routing to Renny Anderson PharmD to submit PA.

## 2022-09-08 NOTE — TELEPHONE ENCOUNTER
Specialty Pharmacy - Medication/Referral Authorization  Specialty Pharmacy - Refill Coordination    Specialty Medication Orders Linked to Encounter      Flowsheet Row Most Recent Value   Medication #1 evolocumab (REPATHA SURECLICK) 140 mg/mL PnIj (Order#[id number], Rx#[id number])            Refill Questions - Documented Responses      Flowsheet Row Most Recent Value   Patient Availability and HIPAA Verification    Does patient want to proceed with activity? Yes   HIPAA/medical authority confirmed? Yes   Relationship to patient of person spoken to? Self   Refill Screening Questions    Would patient like to speak to a pharmacist? No   When does the patient need to receive the medication? 09/12/22   Refill Delivery Questions    How will the patient receive the medication? Delivery Guillermina   When does the patient need to receive the medication? 09/12/22   Shipping Address Home   Address in [facility] confirmed and updated if neccessary? Yes   Expected Copay ($) 4   Is the patient able to afford the medication copay? Yes   Payment Method CC on file   Days supply of Refill 28   Supplies needed? No supplies needed   Refill activity completed? Yes   Refill activity plan Refill scheduled   Shipment/Pickup Date: 09/08/22            Current Outpatient Medications   Medication Sig    evolocumab (REPATHA SURECLICK) 140 mg/mL PnIj Inject 1 mL (140 mg total) into the skin every 14 (fourteen) days.    alpha lipoic acid 200 mg Cap     ascorbic acid, vitamin C, (VITAMIN C) 500 MG tablet     biotin 10,000 mcg Cap Take by mouth.    budesonide 1 mg/2 mL NbSp EMPTY CONTENTS OF 1 RESPULE INTO NASAL IRRIGATION SYSTEM, ADD DISTILLED WATER, SALT PACK, MIX & IRRIGATE. PERFORM TWICE DAILY    cholecalciferol, vitamin D3, capsule/tablet Take by mouth once daily.    clobetasoL (TEMOVATE) 0.05 % external solution Apply TO SCALP in AREAS of HAIR loss AT betime nightly monday - FRIDAY    doxycycline (MONODOX) 100 MG capsule TAKE ONE CAPSULE BY  MOUTH ONCE DAILY with food/yogurt AND tall GLASS of water    doxycycline (VIBRAMYCIN) 100 MG Cap Take 100 mg by mouth 2 (two) times daily.    finasteride (PROSCAR) 5 mg tablet Take 5 mg by mouth once daily.    fluocinonide (LIDEX) 0.05 % external solution apply 1ml TO SCALP ONCE TO twice daily AS DIRECTED    fluticasone propionate (FLONASE) 50 mcg/actuation nasal spray 1 spray (50 mcg total) by Each Nostril route once daily.    hydrocortisone (ANUSOL-HC) 2.5 % rectal cream Place rectally 2 (two) times daily.    hydroquinone 2 % Crea APPLY TO AFFECTED AREA(S) 1-2 TIMES DAILY    magnesium 250 mg Tab     meloxicam (MOBIC) 15 MG tablet TAKE ONE TABLET BY MOUTH ONCE DAILY    multivit/folic acid/vit K1 (ONE-A-DAY WOMEN'S 50 PLUS ORAL) Take by mouth.    NEILMED SINUS RINSE COMPLETE pkdv use as directed    pantoprazole (PROTONIX) 40 MG tablet TAKE 1 TABLET BY MOUTH once DAILY    tobramycin, PF, (OPHELIA) 300 mg/5 mL nebulizer solution EMPTY CONTENTS OF 1 AMPULE INTO NASAL IRRIGATION SYSTEM, ADD DISTILLED WATER, SALT PACK, MIX & IRRIGATE. PERFORM 2 TIMES DAILY    triamcinolone acetonide 0.1% (KENALOG) 0.1 % ointment Apply TO SCALP in AREAS of HAIR loss AT bedtime   Last reviewed on 9/1/2022  8:10 AM by Kathya Wright MA    Review of patient's allergies indicates:  No Known Allergies Last reviewed on  9/1/2022 8:05 AM by Kathya Wright      Tasks added this encounter   10/3/2022 - Refill Call (Auto Added)   Tasks due within next 3 months   No tasks due.     Sandra Newsome, PharmD  [facility]  [address] [place] [id number]  Phone: [phone]  Fax: [phone]

## 2022-09-08 NOTE — TELEPHONE ENCOUNTER
Outgoing call to patient regarding Repatha. Informed patient [facility] is working on re-authorization and will reach out to patient once determination received.    Pending question set in epic.

## 2022-09-08 NOTE — TELEPHONE ENCOUNTER
Pt reports she received call from "[employer] stating Suzan SWEET approved. Medicare LIS level 2, $ copay @004. Refill set up

## 2022-09-19 ENCOUNTER — SPECIALTY PHARMACY (OUTPATIENT)
Dept: PHARMACY | Facility: CLINIC | Age: 62
End: 2022-09-19
Payer: MEDICARE

## 2022-09-19 DIAGNOSIS — E78.2 MIXED HYPERLIPIDEMIA: ICD-10-CM

## 2022-09-19 NOTE — TELEPHONE ENCOUNTER
Incoming call from the patient regarding Repatha. She states she spoke with her insurance and they told her the copay for Repatha should be $4 in total until the end of the year. She asked me to run claim to give her what her copay would be. I advised I am unable to give that information as Darnell is RTS till 9/29. She can call back then and [facility] can provide copay. Patient states she will call insurance back.     Routing to assigned [facility], located within [facility] for review.

## 2022-09-19 NOTE — TELEPHONE ENCOUNTER
Pt would like to start getting repatha 3 months at a time. Staff message sent to request a 3 month Rx from the provider.    Provider approved 3 month Rx, but it printed rather than sending electronically. Staff message sent to request an electronic Rx.

## 2022-09-25 DIAGNOSIS — E78.2 MIXED HYPERLIPIDEMIA: ICD-10-CM

## 2022-09-27 DIAGNOSIS — E78.2 MIXED HYPERLIPIDEMIA: ICD-10-CM

## 2022-09-28 ENCOUNTER — OFFICE VISIT (OUTPATIENT)
Dept: FAMILY MEDICINE | Facility: CLINIC | Age: 62
End: 2022-09-28
Payer: MEDICARE

## 2022-09-28 VITALS
TEMPERATURE: 98 F | BODY MASS INDEX: 32.61 KG/M2 | WEIGHT: 191 LBS | HEART RATE: 81 BPM | RESPIRATION RATE: 18 BRPM | SYSTOLIC BLOOD PRESSURE: 132 MMHG | OXYGEN SATURATION: 98 % | HEIGHT: 64 IN | DIASTOLIC BLOOD PRESSURE: 80 MMHG

## 2022-09-28 DIAGNOSIS — R82.90 CLOUDY URINE: Primary | ICD-10-CM

## 2022-09-28 DIAGNOSIS — Z12.31 ENCOUNTER FOR SCREENING MAMMOGRAM FOR BREAST CANCER: ICD-10-CM

## 2022-09-28 DIAGNOSIS — M79.674 GREAT TOE PAIN, RIGHT: ICD-10-CM
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
## 2022-09-28 LAB
BILIRUB SERPL-MCNC: NEGATIVE MG/DL
BILIRUB UR QL STRIP: NEGATIVE
BLOOD URINE, POC: NEGATIVE
CLARITY UR: CLEAR
COLOR UR: YELLOW
COLOR, POC UA: YELLOW
GLUCOSE UR QL STRIP: NEGATIVE
GLUCOSE UR QL STRIP: NORMAL
HGB UR QL STRIP: NEGATIVE
KETONES UR QL STRIP: NEGATIVE
KETONES UR QL STRIP: NEGATIVE
LEUKOCYTE ESTERASE UR QL STRIP: NEGATIVE
LEUKOCYTE ESTERASE URINE, POC: NEGATIVE
NITRITE UR QL STRIP: NEGATIVE
NITRITE, POC UA: NEGATIVE
PH UR STRIP: 7 [PH] (ref 5–8)
PH, POC UA: 6
PROT UR QL STRIP: ABNORMAL
PROTEIN, POC: POSITIVE
SP GR UR STRIP: 1.02 (ref 1–1.03)
SPECIFIC GRAVITY, POC UA: 1.01
URN SPEC COLLECT METH UR: ABNORMAL
UROBILINOGEN UR STRIP-ACNC: NEGATIVE EU/DL
UROBILINOGEN, POC UA: NORMAL

## 2022-09-28 PROCEDURE — 81003 URINALYSIS AUTO W/O SCOPE: CPT | Performed by: INTERNAL MEDICINE

## 2022-09-28 PROCEDURE — 81001 POCT URINALYSIS, DIPSTICK OR TABLET REAGENT, AUTOMATED, WITH MICROSCOP: ICD-10-PCS | Mod: S$GLB,,, | Performed by: INTERNAL MEDICINE

## 2022-09-28 PROCEDURE — 81001 URINALYSIS AUTO W/SCOPE: CPT | Mod: S$GLB,,, | Performed by: INTERNAL MEDICINE

## 2022-09-28 PROCEDURE — 99999 PR PBB SHADOW E&M-EST. PATIENT-LVL V: ICD-10-PCS | Mod: PBBFAC,,, | Performed by: INTERNAL MEDICINE

## 2022-09-28 PROCEDURE — 1159F MED LIST DOCD IN RCRD: CPT | Mod: CPTII,S$GLB,, | Performed by: INTERNAL MEDICINE

## 2022-09-28 PROCEDURE — 3008F PR BODY MASS INDEX (BMI) DOCUMENTED: ICD-10-PCS | Mod: CPTII,S$GLB,, | Performed by: INTERNAL MEDICINE

## 2022-09-28 PROCEDURE — 87086 URINE CULTURE/COLONY COUNT: CPT | Performed by: INTERNAL MEDICINE

## 2022-09-28 PROCEDURE — 3008F BODY MASS INDEX DOCD: CPT | Mod: CPTII,S$GLB,, | Performed by: INTERNAL MEDICINE

## 2022-09-28 PROCEDURE — 3075F SYST BP GE 130 - 139MM HG: CPT | Mod: CPTII,S$GLB,, | Performed by: INTERNAL MEDICINE

## 2022-09-28 PROCEDURE — 3079F PR MOST RECENT DIASTOLIC BLOOD PRESSURE 80-89 MM HG: ICD-10-PCS | Mod: CPTII,S$GLB,, | Performed by: INTERNAL MEDICINE

## 2022-09-28 PROCEDURE — 3079F DIAST BP 80-89 MM HG: CPT | Mod: CPTII,S$GLB,, | Performed by: INTERNAL MEDICINE

## 2022-09-28 PROCEDURE — 1160F RVW MEDS BY RX/DR IN RCRD: CPT | Mod: CPTII,S$GLB,, | Performed by: INTERNAL MEDICINE

## 2022-09-28 PROCEDURE — 99214 OFFICE O/P EST MOD 30 MIN: CPT | Mod: S$GLB,,, | Performed by: INTERNAL MEDICINE

## 2022-09-28 PROCEDURE — 1160F PR REVIEW ALL MEDS BY PRESCRIBER/CLIN PHARMACIST DOCUMENTED: ICD-10-PCS | Mod: CPTII,S$GLB,, | Performed by: INTERNAL MEDICINE

## 2022-09-28 PROCEDURE — 99214 PR OFFICE/OUTPT VISIT, EST, LEVL IV, 30-39 MIN: ICD-10-PCS | Mod: S$GLB,,, | Performed by: INTERNAL MEDICINE

## 2022-09-28 PROCEDURE — 99999 PR PBB SHADOW E&M-EST. PATIENT-LVL V: CPT | Mod: PBBFAC,,, | Performed by: INTERNAL MEDICINE

## 2022-09-28 PROCEDURE — 1159F PR MEDICATION LIST DOCUMENTED IN MEDICAL RECORD: ICD-10-PCS | Mod: CPTII,S$GLB,, | Performed by: INTERNAL MEDICINE

## 2022-09-28 PROCEDURE — 3075F PR MOST RECENT SYSTOLIC BLOOD PRESS GE 130-139MM HG: ICD-10-PCS | Mod: CPTII,S$GLB,, | Performed by: INTERNAL MEDICINE

## 2022-09-28 NOTE — PROGRESS NOTES
SUBJECTIVE     Chief Complaint   Patient presents with    Urinary Tract Infection       HPI  Jia Ugalde is a 61 y.o. female with multiple medical diagnoses as listed in the medical history and problem list that presents for evaluation for UTI x 3-4 weeks. Pt reports foul smell cloudiness. Pt does not have fever, chills, or night sweats. Pt has been taking Diflucan 2 tabs without improvement of symptoms.    PAST MEDICAL HISTORY:  Past Medical History:   Diagnosis Date    GERD (gastroesophageal reflux disease)     Hyperlipidemia        PAST SURGICAL HISTORY:  Past Surgical History:   Procedure Laterality Date    BREAST BIOPSY      BREAST SURGERY      COLONOSCOPY N/A 12/8/2021    Procedure: COLONOSCOPY;  Surgeon: Caitlin Joseph MD;  Location: [facility];  Service: Endoscopy;  Laterality: N/A;  fully vaccinated-GT    pt states had polyps    EPIDURAL STEROID INJECTION Bilateral 11/8/2021    Procedure: Bilateral L5 Transforaminal Epidural Steroid Injection;  Surgeon: Keron Cristina Jr., MD;  Location: [facility];  Service: Pain Management;  Laterality: Bilateral;  Arrive @ 1030 (requested 10); No ATC or DM; Vacc.    ESOPHAGOGASTRODUODENOSCOPY N/A 2/18/2022    Procedure: EGD (ESOPHAGOGASTRODUODENOSCOPY);  Surgeon: Lowell Robles MD;  [facility]  Service: Endoscopy;  Laterality: N/A;  fully vaccinated  Pt requesting earlier date with any scoping MD - ERW  RAPID - add on / prep ins. emailed and reviewed- ERW    HEMORRHOID SURGERY      HYSTERECTOMY      SPINE SURGERY      TUBAL LIGATION         SOCIAL HISTORY:  Social History     Socioeconomic History    Marital status: Single   Tobacco Use    Smoking status: Never    Smokeless tobacco: Never   Substance and Sexual Activity    Alcohol use: Never    Drug use: Never    Sexual activity: Not Currently       FAMILY HISTORY:  Family History   Problem Relation Age of Onset    Pancreatic cancer Maternal Grandmother     Pancreatic cancer Paternal Grandmother         ALLERGIES AND MEDICATIONS: updated and reviewed.  Review of patient's allergies indicates:  No Known Allergies  Current Outpatient Medications   Medication Sig Dispense Refill    alpha lipoic acid 200 mg Cap       ascorbic acid, vitamin C, (VITAMIN C) 500 MG tablet       biotin 10,000 mcg Cap Take by mouth.      budesonide 1 mg/2 mL NbSp EMPTY CONTENTS OF 1 RESPULE INTO NASAL IRRIGATION SYSTEM, ADD DISTILLED WATER, SALT PACK, MIX & IRRIGATE. PERFORM TWICE DAILY      cholecalciferol, vitamin D3, capsule/tablet Take by mouth once daily.      clobetasoL (TEMOVATE) 0.05 % external solution Apply TO SCALP in AREAS of HAIR loss AT betime nightly monday - FRIDAY      doxycycline (MONODOX) 100 MG capsule TAKE ONE CAPSULE BY MOUTH ONCE DAILY with food/yogurt AND tall GLASS of water      doxycycline (VIBRAMYCIN) 100 MG Cap Take 100 mg by mouth 2 (two) times daily.      evolocumab (REPATHA SURECLICK) 140 mg/mL PnIj Inject 1 mL (140 mg total) into the skin every 14 (fourteen) days. 6 mL 3    finasteride (PROSCAR) 5 mg tablet Take 5 mg by mouth once daily.      fluocinonide (LIDEX) 0.05 % external solution apply 1ml TO SCALP ONCE TO twice daily AS DIRECTED      fluticasone propionate (FLONASE) 50 mcg/actuation nasal spray 1 spray (50 mcg total) by Each Nostril route once daily. 16 g 3    hydrocortisone (ANUSOL-HC) 2.5 % rectal cream Place rectally 2 (two) times daily. 30 g 1    hydroquinone 2 % Crea APPLY TO AFFECTED AREA(S) 1-2 TIMES DAILY      magnesium 250 mg Tab       multivit/folic acid/vit K1 (ONE-A-DAY WOMEN'S 50 PLUS ORAL) Take by mouth.      NEILMED SINUS RINSE COMPLETE pkdv use as directed      pantoprazole (PROTONIX) 40 MG tablet TAKE 1 TABLET BY MOUTH once DAILY 90 tablet 1    tobramycin, PF, (OPHELIA) 300 mg/5 mL nebulizer solution EMPTY CONTENTS OF 1 AMPULE INTO NASAL IRRIGATION SYSTEM, ADD DISTILLED WATER, SALT PACK, MIX & IRRIGATE. PERFORM 2 TIMES DAILY      triamcinolone acetonide 0.1% (KENALOG) 0.1 %  "ointment Apply TO SCALP in AREAS of HAIR loss AT bedtime      meloxicam (MOBIC) 15 MG tablet TAKE ONE TABLET BY MOUTH ONCE DAILY 90 tablet 1     No current facility-administered medications for this visit.       ROS  Review of Systems   Constitutional:  Negative for activity change and unexpected weight change.   HENT:  Negative for hearing loss, rhinorrhea and trouble swallowing.    Eyes:  Negative for discharge and visual disturbance.   Respiratory:  Negative for chest tightness and wheezing.    Cardiovascular:  Negative for chest pain and palpitations.   Gastrointestinal:  Negative for blood in stool, constipation, diarrhea and vomiting.   Endocrine: Negative for polydipsia and polyuria.   Genitourinary:  Negative for difficulty urinating, dysuria, hematuria and menstrual problem.        Cloudy urine   Musculoskeletal:  Negative for arthralgias, joint swelling and neck pain.        R toe pain   Skin:  Negative for rash and wound.   Neurological:  Negative for weakness and headaches.   Psychiatric/Behavioral:  Negative for confusion and dysphoric mood.        OBJECTIVE     Physical Exam  Vitals:    09/28/22 1016   BP: 132/80   Pulse: 81   Resp: 18   Temp: 98.3 °F (36.8 °C)    Body mass index is 32.79 kg/m².  Weight: 86.6 kg (191 lb)   Height: 5' 4" (162.6 cm)     Physical Exam  Constitutional:       General: She is not in acute distress.     Appearance: She is well-developed.   HENT:      Head: Normocephalic and atraumatic.      Right Ear: External ear normal.      Left Ear: External ear normal.      Nose: Nose normal.   Eyes:      General: No scleral icterus.        Right eye: No discharge.         Left eye: No discharge.      Conjunctiva/sclera: Conjunctivae normal.   Neck:      Vascular: No JVD.      Trachea: No tracheal deviation.   Cardiovascular:      Rate and Rhythm: Normal rate and regular rhythm.      Heart sounds: No murmur heard.    No friction rub. No gallop.   Pulmonary:      Effort: Pulmonary effort " is normal. No respiratory distress.      Breath sounds: Normal breath sounds. No wheezing.   Abdominal:      General: Bowel sounds are normal. There is no distension.      Palpations: Abdomen is soft. There is no mass.      Tenderness: There is no abdominal tenderness. There is no right CVA tenderness, left CVA tenderness, guarding or rebound.   Musculoskeletal:         General: No tenderness or deformity. Normal range of motion.      Cervical back: Normal range of motion and neck supple.   Skin:     General: Skin is warm and dry.      Findings: No erythema or rash.   Neurological:      Mental Status: She is alert and oriented to person, place, and time.      Motor: No abnormal muscle tone.      Coordination: Coordination normal.   Psychiatric:         Behavior: Behavior normal.         Thought Content: Thought content normal.         Judgment: Judgment normal.         Health Maintenance         Date Due Completion Date    TETANUS VACCINE Never done ---    COVID-19 Vaccine (5 - Booster for Pfizer series) 06/09/2022 4/14/2022    Influenza Vaccine (1) 09/01/2022 11/12/2021    Mammogram 11/10/2022 11/10/2021    DEXA Scan 12/02/2023 12/2/2021    Lipid Panel 06/28/2027 6/28/2022    Colorectal Cancer Screening 12/08/2028 12/8/2021              ASSESSMENT     61 y.o. female with     1. Cloudy urine    2. Fall, initial encounter    3. Great toe pain, right    4. Encounter for screening mammogram for breast cancer        PLAN:     1. Cloudy urine  - Pt with recent C glabrata; urine wnl, but will send for further eval  - Urinalysis  - Urine culture  - POCT urinalysis, dipstick or tablet reag    2. Fall, initial encounter  - Pt advised to ambulate with care    3. Great toe pain, right  - Pt encouraged to apply ice packs 2-3 times daily at 10 minute intervals x 72 hours, then okay to change to heating compress with care not to burn her self; she  voiced understanding   - Pt okay to take NSAIDs or Tylenol prn pain    4.  Encounter for screening mammogram for breast cancer  - Mammo Digital Screening Bilat w/ Devan; Future        RTC in 4 months     Reyna Umanzor MD  09/28/2022 10:47 AM        No follow-ups on file.

## 2022-09-30 LAB — BACTERIA UR CULT: NORMAL

## 2022-10-03 ENCOUNTER — SPECIALTY PHARMACY (OUTPATIENT)
Dept: PHARMACY | Facility: CLINIC | Age: 62
End: 2022-10-03
Payer: MEDICARE

## 2022-10-03 NOTE — TELEPHONE ENCOUNTER
Specialty Pharmacy - Refill Coordination    Specialty Medication Orders Linked to Encounter      Flowsheet Row Most Recent Value   Medication #1 evolocumab (REPATHA SURECLICK) 140 mg/mL PnIj (Order#[id number], Rx#[id number])            Refill Questions - Documented Responses      Flowsheet Row Most Recent Value   Patient Availability and HIPAA Verification    Does patient want to proceed with activity? Yes   HIPAA/medical authority confirmed? Yes   Relationship to patient of person spoken to? Self   Refill Screening Questions    Would patient like to speak to a pharmacist? No   When does the patient need to receive the medication? 10/10/22   Refill Delivery Questions    How will the patient receive the medication? [facility]   When does the patient need to receive the medication? 10/10/22   Shipping Address Home   Address in [facility] confirmed and updated if neccessary? Yes   Expected Copay ($) 4   Is the patient able to afford the medication copay? Yes   Payment Method CC on file   Days supply of Refill 84   Refill activity completed? Yes   Refill activity plan Refill scheduled   Shipment/Pickup Date: 10/03/22            Current Outpatient Medications   Medication Sig    alpha lipoic acid 200 mg Cap     ascorbic acid, vitamin C, (VITAMIN C) 500 MG tablet     biotin 10,000 mcg Cap Take by mouth.    budesonide 1 mg/2 mL NbSp EMPTY CONTENTS OF 1 RESPULE INTO NASAL IRRIGATION SYSTEM, ADD DISTILLED WATER, SALT PACK, MIX & IRRIGATE. PERFORM TWICE DAILY    cholecalciferol, vitamin D3, capsule/tablet Take by mouth once daily.    clobetasoL (TEMOVATE) 0.05 % external solution Apply TO SCALP in AREAS of HAIR loss AT betime nightly monday - FRIDAY    doxycycline (MONODOX) 100 MG capsule TAKE ONE CAPSULE BY MOUTH ONCE DAILY with food/yogurt AND tall GLASS of water    doxycycline (VIBRAMYCIN) 100 MG Cap Take 100 mg by mouth 2 (two) times daily.    evolocumab (REPATHA SURECLICK) 140 mg/mL PnIj Inject 1 mL (140 mg total) into  the skin every 14 (fourteen) days.    finasteride (PROSCAR) 5 mg tablet Take 5 mg by mouth once daily.    fluocinonide (LIDEX) 0.05 % external solution apply 1ml TO SCALP ONCE TO twice daily AS DIRECTED    fluticasone propionate (FLONASE) 50 mcg/actuation nasal spray 1 spray (50 mcg total) by Each Nostril route once daily.    hydrocortisone (ANUSOL-HC) 2.5 % rectal cream Place rectally 2 (two) times daily.    hydroquinone 2 % Crea APPLY TO AFFECTED AREA(S) 1-2 TIMES DAILY    magnesium 250 mg Tab     meloxicam (MOBIC) 15 MG tablet TAKE ONE TABLET BY MOUTH ONCE DAILY    multivit/folic acid/vit K1 (ONE-A-DAY WOMEN'S 50 PLUS ORAL) Take by mouth.    NEILMED SINUS RINSE COMPLETE pkdv use as directed    pantoprazole (PROTONIX) 40 MG tablet TAKE 1 TABLET BY MOUTH once DAILY    tobramycin, PF, (OPHELIA) 300 mg/5 mL nebulizer solution EMPTY CONTENTS OF 1 AMPULE INTO NASAL IRRIGATION SYSTEM, ADD DISTILLED WATER, SALT PACK, MIX & IRRIGATE. PERFORM 2 TIMES DAILY    triamcinolone acetonide 0.1% (KENALOG) 0.1 % ointment Apply TO SCALP in AREAS of HAIR loss AT bedtime   Last reviewed on 9/28/2022 10:45 AM by Reyna Umanzor MD    Review of patient's allergies indicates:  No Known Allergies Last reviewed on  9/28/2022 10:45 AM by Reyna Umanzor      Tasks added this encounter   12/25/2022 - Refill Call (Auto Added)   Tasks due within next 3 months   No tasks due.     Kathya Durant, PharmD  [facility]  [address] [place] [id number]  Phone: [phone]  Fax: [phone]

## 2022-10-26 ENCOUNTER — OFFICE VISIT (OUTPATIENT)
Dept: FAMILY MEDICINE | Facility: CLINIC | Age: 62
End: 2022-10-26
Payer: MEDICARE

## 2022-10-26 VITALS
BODY MASS INDEX: 33.46 KG/M2 | HEART RATE: 60 BPM | RESPIRATION RATE: 16 BRPM | TEMPERATURE: 98 F | WEIGHT: 196 LBS | SYSTOLIC BLOOD PRESSURE: 138 MMHG | DIASTOLIC BLOOD PRESSURE: 88 MMHG | HEIGHT: 64 IN | OXYGEN SATURATION: 99 %

## 2022-10-26 DIAGNOSIS — M96.1 POSTLAMINECTOMY SYNDROME OF LUMBAR REGION: ICD-10-CM

## 2022-10-26 DIAGNOSIS — R53.81 PHYSICAL DEBILITY: ICD-10-CM

## 2022-10-26 DIAGNOSIS — M85.89 OSTEOPENIA OF MULTIPLE SITES: ICD-10-CM

## 2022-10-26 DIAGNOSIS — M48.062 SPINAL STENOSIS OF LUMBAR REGION WITH NEUROGENIC CLAUDICATION: Primary | ICD-10-CM

## 2022-10-26 PROBLEM — R29.3 BAD POSTURE: Status: RESOLVED | Noted: 2022-02-03 | Resolved: 2022-10-26

## 2022-10-26 PROBLEM — M25.69 DECREASED ROM OF TRUNK AND BACK: Status: RESOLVED | Noted: 2022-02-03 | Resolved: 2022-10-26

## 2022-10-26 PROBLEM — R29.898 DECREASED STRENGTH OF TRUNK AND BACK: Status: RESOLVED | Noted: 2022-02-03 | Resolved: 2022-10-26

## 2022-10-26 PROBLEM — R13.10 DYSPHAGIA: Status: RESOLVED | Noted: 2022-02-10 | Resolved: 2022-10-26

## 2022-10-26 PROBLEM — E66.01 SEVERE OBESITY (BMI 35.0-39.9) WITH COMORBIDITY: Status: RESOLVED | Noted: 2022-02-25 | Resolved: 2022-10-26

## 2022-10-26 PROBLEM — Z91.09 MULTIPLE ENVIRONMENTAL ALLERGIES: Status: ACTIVE | Noted: 2022-10-26

## 2022-10-26 PROCEDURE — 3079F DIAST BP 80-89 MM HG: CPT | Mod: CPTII,S$GLB,, | Performed by: NURSE PRACTITIONER

## 2022-10-26 PROCEDURE — 1160F RVW MEDS BY RX/DR IN RCRD: CPT | Mod: CPTII,S$GLB,, | Performed by: NURSE PRACTITIONER

## 2022-10-26 PROCEDURE — 1159F PR MEDICATION LIST DOCUMENTED IN MEDICAL RECORD: ICD-10-PCS | Mod: CPTII,S$GLB,, | Performed by: NURSE PRACTITIONER

## 2022-10-26 PROCEDURE — 3079F PR MOST RECENT DIASTOLIC BLOOD PRESSURE 80-89 MM HG: ICD-10-PCS | Mod: CPTII,S$GLB,, | Performed by: NURSE PRACTITIONER

## 2022-10-26 PROCEDURE — 3075F SYST BP GE 130 - 139MM HG: CPT | Mod: CPTII,S$GLB,, | Performed by: NURSE PRACTITIONER

## 2022-10-26 PROCEDURE — 1159F MED LIST DOCD IN RCRD: CPT | Mod: CPTII,S$GLB,, | Performed by: NURSE PRACTITIONER

## 2022-10-26 PROCEDURE — 1160F PR REVIEW ALL MEDS BY PRESCRIBER/CLIN PHARMACIST DOCUMENTED: ICD-10-PCS | Mod: CPTII,S$GLB,, | Performed by: NURSE PRACTITIONER

## 2022-10-26 PROCEDURE — 3075F PR MOST RECENT SYSTOLIC BLOOD PRESS GE 130-139MM HG: ICD-10-PCS | Mod: CPTII,S$GLB,, | Performed by: NURSE PRACTITIONER

## 2022-10-26 PROCEDURE — 99999 PR PBB SHADOW E&M-EST. PATIENT-LVL V: ICD-10-PCS | Mod: PBBFAC,,, | Performed by: NURSE PRACTITIONER

## 2022-10-26 PROCEDURE — 99999 PR PBB SHADOW E&M-EST. PATIENT-LVL V: CPT | Mod: PBBFAC,,, | Performed by: NURSE PRACTITIONER

## 2022-10-26 PROCEDURE — 99214 OFFICE O/P EST MOD 30 MIN: CPT | Mod: S$GLB,,, | Performed by: NURSE PRACTITIONER

## 2022-10-26 PROCEDURE — 99214 PR OFFICE/OUTPT VISIT, EST, LEVL IV, 30-39 MIN: ICD-10-PCS | Mod: S$GLB,,, | Performed by: NURSE PRACTITIONER

## 2022-10-26 RX ORDER — ALENDRONATE SODIUM 70 MG/1
70 TABLET ORAL
Qty: 4 TABLET | Refills: 11 | Status: SHIPPED | OUTPATIENT
Start: 2022-10-26 | End: 2023-08-30 | Stop reason: SINTOL

## 2022-10-28 NOTE — PROGRESS NOTES
Subjective:      Patient ID: Jia Ugalde is a 61 y.o. female.  Pt with chronic lumbar pain with known disc herniation and spinal stenosis s/p laminectomy presents to clinic with progressive weakness and difficulty ambulating needing support to walk. Denies new bowel or bladder complaints. Hasn't seen pain management in a few months. Did not see improvement with PT or injections. Denies new trauma, injury or fall.     Back Pain  This is a chronic problem. The problem occurs constantly. The problem is unchanged. The pain is present in the lumbar spine and gluteal. The quality of the pain is described as burning, aching, shooting and stabbing. The pain radiates to the left knee. The pain is severe. The pain is The same all the time. The symptoms are aggravated by bending, position, lying down, standing, sitting and twisting. Stiffness is present All day. Associated symptoms include leg pain, numbness, paresis, paresthesias and weakness. Pertinent negatives include no abdominal pain, bladder incontinence, bowel incontinence, chest pain, dysuria, fever, headaches, pelvic pain, perianal numbness, tingling or weight loss. Risk factors include history of osteoporosis, history of steroid use, menopause, sedentary lifestyle, lack of exercise and poor posture. She has tried chiropractic manipulation, analgesics, bed rest, home exercises, muscle relaxant, NSAIDs, walking, heat and ice for the symptoms. Improvement on treatment: variable.   Review of Systems   Constitutional:  Negative for activity change, appetite change, fever, unexpected weight change and weight loss.   Respiratory:  Negative for chest tightness and shortness of breath.    Cardiovascular:  Negative for chest pain and palpitations.   Gastrointestinal:  Positive for change in bowel habit and change in bowel habit. Negative for abdominal pain, bowel incontinence, constipation, diarrhea, nausea and vomiting.   Genitourinary: Negative.  Negative for bladder  "incontinence, difficulty urinating, dysuria and pelvic pain.   Musculoskeletal:  Positive for arthralgias, back pain, gait problem, leg pain and myalgias. Negative for joint swelling, neck pain, neck stiffness and joint deformity.   Integumentary:  Negative for rash.   Neurological:  Positive for weakness, numbness and paresthesias. Negative for tingling and headaches.   All other systems reviewed and are negative.      Objective:     Vitals:    10/26/22 0904   BP: 138/88   Pulse: 60   Resp: 16   Temp: 98.1 °F (36.7 °C)   TempSrc: Oral   SpO2: 99%   Weight: 88.9 kg (195 lb 15.8 oz)   Height: 5' 4" (1.626 m)     Physical Exam  Vitals and nursing note reviewed.   Constitutional:       General: She is not in acute distress.     Appearance: Normal appearance. She is well-developed, well-groomed and overweight. She is not ill-appearing.   HENT:      Head: Normocephalic and atraumatic.      Right Ear: External ear normal.      Left Ear: External ear normal.      Nose: Nose normal.      Mouth/Throat:      Lips: Pink.      Mouth: Mucous membranes are moist.   Eyes:      General: Lids are normal. Vision grossly intact. Gaze aligned appropriately.      Conjunctiva/sclera: Conjunctivae normal.   Neck:      Trachea: Phonation normal.   Pulmonary:      Effort: Pulmonary effort is normal. No accessory muscle usage or respiratory distress.   Abdominal:      General: Abdomen is flat. There is no distension.      Palpations: Abdomen is soft.      Tenderness: There is no abdominal tenderness.   Musculoskeletal:      Cervical back: Neck supple.   Skin:     General: Skin is warm and dry.      Findings: No rash.   Neurological:      General: No focal deficit present.      Mental Status: She is alert and oriented to person, place, and time. Mental status is at baseline.      Cranial Nerves: No cranial nerve deficit.      Sensory: Sensory deficit present.      Motor: Weakness present. No atrophy or abnormal muscle tone.      Gait: Gait " abnormal (antalgic).   Psychiatric:         Attention and Perception: Attention and perception normal.         Mood and Affect: Mood and affect normal.         Speech: Speech normal.         Behavior: Behavior normal. Behavior is cooperative.         Thought Content: Thought content normal.         Cognition and Memory: Cognition and memory normal.         Judgment: Judgment normal.     Assessment and Plan:     1. Spinal stenosis of lumbar region with neurogenic claudication  Without acute motor or sensory deficit  Natural history and expected course discussed. Questions answered.  Proper lifting, bending technique discussed.  Stretching exercises discussed.  Regular aerobic and trunk strengthening exercises discussed.  Ice to affected area as needed for local pain relief.  Heat to affected area as needed for local pain relief.  OTC analgesics as needed.  Consider resuming PT if fails to improve or worsens  - WALKER FOR HOME USE  - KNEE BRACE FOR HOME USE    2. Postlaminectomy syndrome of lumbar region  Same as above   - WALKER FOR HOME USE  - KNEE BRACE FOR HOME USE    3. Physical debility  Same as above   - WALKER FOR HOME USE  - KNEE BRACE FOR HOME USE    4. Osteopenia of multiple sites  Without acute fracture, continue daily calcium with vit. D supplementation   - alendronate (FOSAMAX) 70 MG tablet; Take 1 tablet (70 mg total) by mouth every 7 days.  Dispense: 4 tablet; Refill: 11           KARLIE Garcia, FNP-C  Family/Internal Medicine  [facility]       resilient/elastic

## 2022-11-08 ENCOUNTER — PATIENT MESSAGE (OUTPATIENT)
Dept: FAMILY MEDICINE | Facility: CLINIC | Age: 62
End: 2022-11-08
Payer: MEDICARE

## 2022-11-09 ENCOUNTER — TELEPHONE (OUTPATIENT)
Dept: FAMILY MEDICINE | Facility: CLINIC | Age: 62
End: 2022-11-09
Payer: MEDICARE

## 2022-11-09 ENCOUNTER — PES CALL (OUTPATIENT)
Dept: ADMINISTRATIVE | Facility: CLINIC | Age: 62
End: 2022-11-09
Payer: MEDICARE

## 2022-11-09 ENCOUNTER — PATIENT MESSAGE (OUTPATIENT)
Dept: FAMILY MEDICINE | Facility: CLINIC | Age: 62
End: 2022-11-09
Payer: MEDICARE

## 2022-11-09 NOTE — TELEPHONE ENCOUNTER
----- Message from Eddy Troncoso sent at 11/8/2022  3:38 PM CST -----  Type: Patient Call Back    Who called:Self    What is the request in detail: Pt is requesting a copy of knee brace order. Please call    Can the clinic reply by [facility]? no    Would the patient rather a call back or a response via [facility]? Call back    Best call back number: [phone] (home)       Additional Information:

## 2022-11-11 ENCOUNTER — CLINICAL SUPPORT (OUTPATIENT)
Dept: FAMILY MEDICINE | Facility: CLINIC | Age: 62
End: 2022-11-11
Payer: MEDICARE

## 2022-11-11 ENCOUNTER — HOSPITAL ENCOUNTER (OUTPATIENT)
Dept: RADIOLOGY | Facility: HOSPITAL | Age: 62
Discharge: HOME | End: 2022-11-11
Attending: INTERNAL MEDICINE
Payer: MEDICARE

## 2022-11-11 VITALS — WEIGHT: 196 LBS | BODY MASS INDEX: 33.46 KG/M2 | HEIGHT: 64 IN

## 2022-11-11 DIAGNOSIS — Z12.31 ENCOUNTER FOR SCREENING MAMMOGRAM FOR BREAST CANCER: ICD-10-CM

## 2022-11-11 DIAGNOSIS — Z23 FLU VACCINE NEED: Primary | ICD-10-CM

## 2022-11-11 PROCEDURE — 77063 BREAST TOMOSYNTHESIS BI: CPT | Mod: 26,,, | Performed by: RADIOLOGY

## 2022-11-11 PROCEDURE — 90686 FLU VACCINE (QUAD) GREATER THAN OR EQUAL TO 3YO PRESERVATIVE FREE IM: ICD-10-PCS | Mod: S$GLB,,, | Performed by: INTERNAL MEDICINE

## 2022-11-11 PROCEDURE — G0008 ADMIN INFLUENZA VIRUS VAC: HCPCS | Mod: S$GLB,,, | Performed by: INTERNAL MEDICINE

## 2022-11-11 PROCEDURE — 77067 MAMMO DIGITAL SCREENING BILAT WITH TOMO: ICD-10-PCS | Mod: 26,,, | Performed by: RADIOLOGY

## 2022-11-11 PROCEDURE — 99499 NO LOS: ICD-10-PCS | Mod: S$GLB,,, | Performed by: INTERNAL MEDICINE

## 2022-11-11 PROCEDURE — 77067 SCR MAMMO BI INCL CAD: CPT | Mod: 26,,, | Performed by: RADIOLOGY

## 2022-11-11 PROCEDURE — 99499 UNLISTED E&M SERVICE: CPT | Mod: S$GLB,,, | Performed by: INTERNAL MEDICINE

## 2022-11-11 PROCEDURE — 77063 MAMMO DIGITAL SCREENING BILAT WITH TOMO: ICD-10-PCS | Mod: 26,,, | Performed by: RADIOLOGY

## 2022-11-11 PROCEDURE — 77067 SCR MAMMO BI INCL CAD: CPT | Mod: TC

## 2022-11-11 PROCEDURE — 90686 IIV4 VACC NO PRSV 0.5 ML IM: CPT | Mod: S$GLB,,, | Performed by: INTERNAL MEDICINE

## 2022-11-11 PROCEDURE — G0008 FLU VACCINE (QUAD) GREATER THAN OR EQUAL TO 3YO PRESERVATIVE FREE IM: ICD-10-PCS | Mod: S$GLB,,, | Performed by: INTERNAL MEDICINE

## 2022-11-11 PROCEDURE — 77063 BREAST TOMOSYNTHESIS BI: CPT | Mod: TC

## 2022-12-13 ENCOUNTER — PATIENT MESSAGE (OUTPATIENT)
Dept: CARDIOLOGY | Facility: CLINIC | Age: 62
End: 2022-12-13
Payer: MEDICARE

## 2022-12-13 DIAGNOSIS — E78.2 MIXED HYPERLIPIDEMIA: ICD-10-CM

## 2022-12-16 ENCOUNTER — OFFICE VISIT (OUTPATIENT)
Dept: CARDIOLOGY | Facility: CLINIC | Age: 62
End: 2022-12-16
Payer: MEDICARE

## 2022-12-16 ENCOUNTER — SPECIALTY PHARMACY (OUTPATIENT)
Dept: PHARMACY | Facility: CLINIC | Age: 62
End: 2022-12-16
Payer: MEDICARE

## 2022-12-16 VITALS
HEART RATE: 69 BPM | DIASTOLIC BLOOD PRESSURE: 84 MMHG | WEIGHT: 197.19 LBS | SYSTOLIC BLOOD PRESSURE: 137 MMHG | OXYGEN SATURATION: 99 % | BODY MASS INDEX: 33.85 KG/M2

## 2022-12-16 DIAGNOSIS — R03.0 ELEVATED BLOOD PRESSURE READING IN OFFICE WITHOUT DIAGNOSIS OF HYPERTENSION: ICD-10-CM

## 2022-12-16 DIAGNOSIS — T46.6X5A MYALGIA DUE TO STATIN: ICD-10-CM

## 2022-12-16 DIAGNOSIS — M79.10 MYALGIA DUE TO STATIN: ICD-10-CM

## 2022-12-16 DIAGNOSIS — E78.2 MIXED HYPERLIPIDEMIA: Primary | ICD-10-CM

## 2022-12-16 PROCEDURE — 99213 OFFICE O/P EST LOW 20 MIN: CPT | Mod: S$GLB,,, | Performed by: INTERNAL MEDICINE

## 2022-12-16 PROCEDURE — 3075F PR MOST RECENT SYSTOLIC BLOOD PRESS GE 130-139MM HG: ICD-10-PCS | Mod: CPTII,S$GLB,, | Performed by: INTERNAL MEDICINE

## 2022-12-16 PROCEDURE — 1159F MED LIST DOCD IN RCRD: CPT | Mod: CPTII,S$GLB,, | Performed by: INTERNAL MEDICINE

## 2022-12-16 PROCEDURE — 1159F PR MEDICATION LIST DOCUMENTED IN MEDICAL RECORD: ICD-10-PCS | Mod: CPTII,S$GLB,, | Performed by: INTERNAL MEDICINE

## 2022-12-16 PROCEDURE — 3008F PR BODY MASS INDEX (BMI) DOCUMENTED: ICD-10-PCS | Mod: CPTII,S$GLB,, | Performed by: INTERNAL MEDICINE

## 2022-12-16 PROCEDURE — 3079F DIAST BP 80-89 MM HG: CPT | Mod: CPTII,S$GLB,, | Performed by: INTERNAL MEDICINE

## 2022-12-16 PROCEDURE — 3079F PR MOST RECENT DIASTOLIC BLOOD PRESSURE 80-89 MM HG: ICD-10-PCS | Mod: CPTII,S$GLB,, | Performed by: INTERNAL MEDICINE

## 2022-12-16 PROCEDURE — 99213 PR OFFICE/OUTPT VISIT, EST, LEVL III, 20-29 MIN: ICD-10-PCS | Mod: S$GLB,,, | Performed by: INTERNAL MEDICINE

## 2022-12-16 PROCEDURE — 3075F SYST BP GE 130 - 139MM HG: CPT | Mod: CPTII,S$GLB,, | Performed by: INTERNAL MEDICINE

## 2022-12-16 PROCEDURE — 3008F BODY MASS INDEX DOCD: CPT | Mod: CPTII,S$GLB,, | Performed by: INTERNAL MEDICINE

## 2022-12-16 PROCEDURE — 99999 PR PBB SHADOW E&M-EST. PATIENT-LVL III: CPT | Mod: PBBFAC,,, | Performed by: INTERNAL MEDICINE

## 2022-12-16 PROCEDURE — 99999 PR PBB SHADOW E&M-EST. PATIENT-LVL III: ICD-10-PCS | Mod: PBBFAC,,, | Performed by: INTERNAL MEDICINE

## 2022-12-16 NOTE — PROGRESS NOTES
CARDIOLOGY CLINIC VISIT        HISTORY OF PRESENT ILLNESS:     Jia Ugalde presents for continued care.  Last seen 02/25/2022. Seen 01/26/2022 for evaluation of chest pain.  Patient noted left-sided chest discomfort.  Episodes worse with inspiration.  Usually last less than 1 minute.  Exercise stress echocardiogram showed ejection fraction of 55%.  Normal pulmonary pressure.  No significant valvular abnormalities.  The patient exercised for 4 minutes 34 seconds.  Functional capacity 6 Mets.  Hypertensive response exercise 218/118.  History of statin myopathy.  Last . She has been approved for Repatha.  She states that she had no symptoms during the stress test.  Blood pressure is elevated today.  She says she has values of 120s to 130 systolic at home.    06/28/2022:  Feels good.  No complaints.  Has been on Repatha for 3 months.    12/16/2022: Latest . Prior 227.  Complaints.    CARDIOVASCULAR HISTORY:     None    PAST MEDICAL HISTORY:     Past Medical History:   Diagnosis Date    GERD (gastroesophageal reflux disease)     Hyperlipidemia        PAST SURGICAL HISTORY:     Past Surgical History:   Procedure Laterality Date    BREAST BIOPSY      BREAST SURGERY      COLONOSCOPY N/A 12/8/2021    Procedure: COLONOSCOPY;  Surgeon: Caitlin Joseph MD;  Location: [facility];  Service: Endoscopy;  Laterality: N/A;  fully vaccinated-GT    pt states had polyps    EPIDURAL STEROID INJECTION Bilateral 11/8/2021    Procedure: Bilateral L5 Transforaminal Epidural Steroid Injection;  Surgeon: Keron Cristina Jr., MD;  Location: [facility];  Service: Pain Management;  Laterality: Bilateral;  Arrive @ 1030 (requested 10); No ATC or DM; Vacc.    ESOPHAGOGASTRODUODENOSCOPY N/A 2/18/2022    Procedure: EGD (ESOPHAGOGASTRODUODENOSCOPY);  Surgeon: Lowell Robles MD;  [address]  Service: Endoscopy;  Laterality: N/A;  fully vaccinated  Pt requesting earlier date with any scoping MD - SUSANW  RAPID - add on /  prep ins. emailed and reviewed- ERW    HEMORRHOID SURGERY      HYSTERECTOMY      SPINE SURGERY      TUBAL LIGATION         ALLERGIES AND MEDICATION:   Review of patient's allergies indicates:  No Known Allergies     Medication List            Accurate as of December 16, 2022  9:44 AM. If you have any questions, ask your nurse or doctor.                CONTINUE taking these medications      alendronate 70 MG tablet  Commonly known as: FOSAMAX  Take 1 tablet (70 mg total) by mouth every 7 days.     budesonide 1 mg/2 mL Nbsp     cholecalciferol (vitamin D3) capsule/tablet     clobetasoL 0.05 % external solution  Commonly known as: TEMOVATE     evolocumab 140 mg/mL Pnij  Commonly known as: REPATHA SURECLICK  Inject 1 mL (140 mg total) into the skin every 14 (fourteen) days.     finasteride 5 mg tablet  Commonly known as: PROSCAR     fluocinonide 0.05 % external solution  Commonly known as: LIDEX     fluticasone propionate 50 mcg/actuation nasal spray  Commonly known as: FLONASE  1 spray (50 mcg total) by Each Nostril route once daily.     hydrocortisone 2.5 % rectal cream  Commonly known as: ANUSOL-HC  Place rectally 2 (two) times daily.     hydroquinone 2 % Crea     NEILMED SINUS RINSE COMPLETE Pkdv  Generic drug: sod chlor-bicarb-squeez bottle     pantoprazole 40 MG tablet  Commonly known as: PROTONIX  TAKE 1 TABLET BY MOUTH once DAILY     tobramycin (PF) 300 mg/5 mL nebulizer solution  Commonly known as: OPHELIA     triamcinolone acetonide 0.1% 0.1 % ointment  Commonly known as: KENALOG               Where to Get Your Medications        Information about where to get these medications is not yet available    Ask your nurse or doctor about these medications  evolocumab 140 mg/mL Pnij         SOCIAL HISTORY:     Social History     Socioeconomic History    Marital status: Single   Tobacco Use    Smoking status: Never    Smokeless tobacco: Never   Substance and Sexual Activity    Alcohol use: Never    Drug use: Never     Sexual activity: Not Currently       FAMILY HISTORY:     Family History   Problem Relation Age of Onset    Pancreatic cancer Maternal Grandmother     Pancreatic cancer Paternal Grandmother        REVIEW OF SYSTEMS:   Review of Systems   Respiratory:  Negative for sputum production, shortness of breath and wheezing.    Cardiovascular:  Negative for chest pain, palpitations, orthopnea, claudication, leg swelling and PND.   Gastrointestinal:  Negative for abdominal pain, heartburn, nausea and vomiting.   Neurological:  Negative for dizziness, speech change, focal weakness, loss of consciousness, weakness and headaches.     PHYSICAL EXAM:     Vitals:    12/16/22 0903   BP: 137/84   Pulse: 69    Body mass index is 33.85 kg/m².  Weight: 89.4 kg (197 lb 3.2 oz)         Physical Exam  Constitutional:       General: She is not in acute distress.     Appearance: She is well-developed. She is not diaphoretic.   HENT:      Head: Normocephalic.   Neck:      Vascular: No carotid bruit or JVD.   Cardiovascular:      Rate and Rhythm: Normal rate and regular rhythm.      Pulses: Normal pulses.      Heart sounds: Normal heart sounds.   Pulmonary:      Effort: Pulmonary effort is normal.      Breath sounds: Normal breath sounds.   Abdominal:      General: Bowel sounds are normal.      Palpations: Abdomen is soft.      Tenderness: There is no abdominal tenderness.   Skin:     General: Skin is warm and dry.   Neurological:      Mental Status: She is alert and oriented to person, place, and time.   Psychiatric:         Speech: Speech normal.         Behavior: Behavior normal.         Thought Content: Thought content normal.       DATA:   EKG: (personally reviewed tracing)    Laboratory:  CBC:  Recent Labs   Lab 02/07/20  1442 10/20/20  1211 12/02/21  0857   WBC 5.15 5.31 5.49   Hemoglobin 13.5 13.1 12.8   Hematocrit 44.4 42.3 40.5   Platelets 265 272 255       CHEMISTRIES:  Recent Labs   Lab 03/12/21  0836 08/26/21  1005 12/02/21  0857    Glucose 98 99 92   Sodium 141 140 139   Potassium 4.4 4.1 4.2   BUN 14 12 13   Creatinine 1.0 0.9 0.9   eGFR if African American >60 >60.0 >60.0   eGFR if non African American >60 >60.0 >60.0   Calcium 9.1 9.9 9.9   Magnesium  --  1.9  --        CARDIAC BIOMARKERS:  Recent Labs   Lab 08/26/21  1005          COAGS:        LIPIDS/LFTS:  Recent Labs   Lab 10/20/20  1211 03/12/21  0836 12/02/21  0857 06/28/22  1107   Cholesterol  --  297 H 307 H 212 H   Triglycerides  --  112 88 124   HDL  --  65 62 62   LDL Cholesterol  --  209.6 H 227.4 H 125.2   Non-HDL Cholesterol  --  232 245 150   AST 22 21 22  --    ALT 17 14 17  --        Cardiovascular Testing:    Exercise stress echocardiogram 02/09/2022:    The estimated ejection fraction is 55%.  The patient's exercise capacity was below average.  The test was stopped because the patient experienced fatigue.  There were no arrhythmias during stress.  The left ventricle is normal in size with normal systolic function.  Indeterminate left ventricular diastolic function.  Normal right ventricular size with normal right ventricular systolic function.  Moderate left atrial enlargement.  Mild tricuspid regurgitation.  Mild right atrial enlargement.  The estimated PA systolic pressure is 27 mmHg.  Normal central venous pressure (3 mmHg).  Mild mitral regurgitation.  The stress echo portion of this study is negative for myocardial ischemia.  The ECG portion of this study is negative for myocardial ischemia.      ASSESSMENT:     1. Chest pain: no recurrence  2. Elevated blood pressure without diagnosis of hypertension: controlled.  3. Hypercholesterolemia  4. Class 1 obesity  5. Statin myalgia    PLAN:     1. Hypercholesterolemia:   Much improved. Continue Repatha  2. Return to clinic 9 months.           Jc Rogers MD, MPH, FACC, [facility]

## 2022-12-19 ENCOUNTER — SPECIALTY PHARMACY (OUTPATIENT)
Dept: PHARMACY | Facility: CLINIC | Age: 62
End: 2022-12-19
Payer: MEDICARE

## 2022-12-19 NOTE — TELEPHONE ENCOUNTER
Incoming call from patient regarding Repatha refill, patient reported she has a pen on hand for the dose due today and she will need the medication by 1/2. Informed patient [facility] will call next week on 12/27 to set up the refill as it is >10 days in advance, patient verbalized understanding.

## 2022-12-27 NOTE — TELEPHONE ENCOUNTER
Specialty Pharmacy - Refill Coordination    Specialty Medication Orders Linked to Encounter      Flowsheet Row Most Recent Value   Medication #1 evolocumab (REPATHA SURECLICK) 140 mg/mL PnIj (Order#[id number], Rx#[id number])            Refill Questions - Documented Responses      Flowsheet Row Most Recent Value   Patient Availability and HIPAA Verification    Does patient want to proceed with activity? Yes   HIPAA/medical authority confirmed? Yes   Relationship to patient of person spoken to? Self   Refill Screening Questions    Would patient like to speak to a pharmacist? No   When does the patient need to receive the medication? 01/02/23   Refill Delivery Questions    How will the patient receive the medication? MEDRx   When does the patient need to receive the medication? 01/02/23   Shipping Address Home   Address in [facility] confirmed and updated if neccessary? Yes   Expected Copay ($) 4   Is the patient able to afford the medication copay? Yes   Payment Method CC on file   Days supply of Refill 84   Supplies needed? No supplies needed   Refill activity completed? Yes   Refill activity plan Refill scheduled   Shipment/Pickup Date: 12/28/22            Current Outpatient Medications   Medication Sig    alendronate (FOSAMAX) 70 MG tablet Take 1 tablet (70 mg total) by mouth every 7 days.    budesonide 1 mg/2 mL NbSp EMPTY CONTENTS OF 1 RESPULE INTO NASAL IRRIGATION SYSTEM, ADD DISTILLED WATER, SALT PACK, MIX & IRRIGATE. PERFORM TWICE DAILY    cholecalciferol, vitamin D3, capsule/tablet Take by mouth once daily.    clobetasoL (TEMOVATE) 0.05 % external solution Apply TO SCALP in AREAS of HAIR loss AT betime nightly monday - FRIDAY    evolocumab (REPATHA SURECLICK) 140 mg/mL PnIj Inject 1 mL (140 mg total) into the skin every 14 (fourteen) days.    finasteride (PROSCAR) 5 mg tablet Take 5 mg by mouth once daily.    fluocinonide (LIDEX) 0.05 % external solution apply 1ml TO SCALP ONCE TO twice daily AS  DIRECTED    fluticasone propionate (FLONASE) 50 mcg/actuation nasal spray 1 spray (50 mcg total) by Each Nostril route once daily.    hydrocortisone (ANUSOL-HC) 2.5 % rectal cream Place rectally 2 (two) times daily.    hydroquinone 2 % Crea APPLY TO AFFECTED AREA(S) 1-2 TIMES DAILY    NEILMED SINUS RINSE COMPLETE pkdv use as directed    pantoprazole (PROTONIX) 40 MG tablet TAKE 1 TABLET BY MOUTH once DAILY    tobramycin, PF, (OPHELIA) 300 mg/5 mL nebulizer solution EMPTY CONTENTS OF 1 AMPULE INTO NASAL IRRIGATION SYSTEM, ADD DISTILLED WATER, SALT PACK, MIX & IRRIGATE. PERFORM 2 TIMES DAILY    triamcinolone acetonide 0.1% (KENALOG) 0.1 % ointment Apply TO SCALP in AREAS of HAIR loss AT bedtime   Last reviewed on 12/16/2022  9:04 AM by Ghada Smart MA    Review of patient's allergies indicates:  No Known Allergies Last reviewed on  12/16/2022 9:24 AM by Jc Rogers      Tasks added this encounter   No tasks added.   Tasks due within next 3 months   12/25/2022 - Refill Call (Auto Added)     [facility]  [address]  [facility] [address]  Phone: [phone]  Fax: [phone]

## 2023-01-26 ENCOUNTER — TELEPHONE (OUTPATIENT)
Dept: FAMILY MEDICINE | Facility: CLINIC | Age: 63
End: 2023-01-26

## 2023-01-26 ENCOUNTER — IMMUNIZATION (OUTPATIENT)
Dept: OBSTETRICS AND GYNECOLOGY | Facility: CLINIC | Age: 63
End: 2023-01-26
Payer: MEDICARE

## 2023-01-26 ENCOUNTER — LAB VISIT (OUTPATIENT)
Dept: LAB | Facility: HOSPITAL | Age: 63
End: 2023-01-26
Attending: INTERNAL MEDICINE
Payer: MEDICARE

## 2023-01-26 ENCOUNTER — OFFICE VISIT (OUTPATIENT)
Dept: FAMILY MEDICINE | Facility: CLINIC | Age: 63
End: 2023-01-26
Payer: MEDICARE

## 2023-01-26 VITALS
SYSTOLIC BLOOD PRESSURE: 126 MMHG | HEART RATE: 88 BPM | DIASTOLIC BLOOD PRESSURE: 72 MMHG | BODY MASS INDEX: 33.42 KG/M2 | WEIGHT: 195.75 LBS | RESPIRATION RATE: 17 BRPM | OXYGEN SATURATION: 99 % | TEMPERATURE: 98 F | HEIGHT: 64 IN

## 2023-01-26 DIAGNOSIS — Z00.00 ANNUAL PHYSICAL EXAM: Primary | ICD-10-CM

## 2023-01-26 DIAGNOSIS — R79.9 ABNORMAL FINDING OF BLOOD CHEMISTRY, UNSPECIFIED: ICD-10-CM

## 2023-01-26 DIAGNOSIS — M54.16 LUMBAR RADICULOPATHY: ICD-10-CM

## 2023-01-26 DIAGNOSIS — E78.2 MIXED HYPERLIPIDEMIA: ICD-10-CM

## 2023-01-26 DIAGNOSIS — Z23 NEED FOR VACCINATION: Primary | ICD-10-CM

## 2023-01-26 DIAGNOSIS — R68.89 OTHER GENERAL SYMPTOMS AND SIGNS: ICD-10-CM

## 2023-01-26 DIAGNOSIS — M65.331 TRIGGER FINGER, RIGHT MIDDLE FINGER: ICD-10-CM

## 2023-01-26 DIAGNOSIS — Z00.00 ANNUAL PHYSICAL EXAM: ICD-10-CM

## 2023-01-26 LAB
ALBUMIN SERPL BCP-MCNC: 3.9 G/DL (ref 3.5–5.2)
ALP SERPL-CCNC: 53 U/L (ref 55–135)
ALT SERPL W/O P-5'-P-CCNC: 14 U/L (ref 10–44)
ANION GAP SERPL CALC-SCNC: 9 MMOL/L (ref 8–16)
AST SERPL-CCNC: 21 U/L (ref 10–40)
BASOPHILS # BLD AUTO: 0.1 K/UL (ref 0–0.2)
BASOPHILS NFR BLD: 1.9 % (ref 0–1.9)
BILIRUB SERPL-MCNC: 0.8 MG/DL (ref 0.1–1)
BUN SERPL-MCNC: 11 MG/DL (ref 6–20)
CALCIUM SERPL-MCNC: 10 MG/DL (ref 8.7–10.5)
CHLORIDE SERPL-SCNC: 106 MMOL/L (ref 95–110)
CHOLEST SERPL-MCNC: 212 MG/DL (ref 120–199)
CHOLEST/HDLC SERPL: 3.1 {RATIO} (ref 2–5)
CO2 SERPL-SCNC: 26 MMOL/L (ref 23–29)
CREAT SERPL-MCNC: 1.2 MG/DL (ref 0.5–1.4)
DIFFERENTIAL METHOD: ABNORMAL
EOSINOPHIL # BLD AUTO: 0.3 K/UL (ref 0–0.5)
EOSINOPHIL NFR BLD: 4.8 % (ref 0–8)
ERYTHROCYTE [DISTWIDTH] IN BLOOD BY AUTOMATED COUNT: 13.6 % (ref 11.5–14.5)
EST. GFR  (NO RACE VARIABLE): 52 ML/MIN/1.73 M^2
GLUCOSE SERPL-MCNC: 95 MG/DL (ref 70–110)
HCT VFR BLD AUTO: 42 % (ref 37–48.5)
HDLC SERPL-MCNC: 68 MG/DL (ref 40–75)
HDLC SERPL: 32.1 % (ref 20–50)
HGB BLD-MCNC: 13.4 G/DL (ref 12–16)
IMM GRANULOCYTES # BLD AUTO: 0.01 K/UL (ref 0–0.04)
IMM GRANULOCYTES NFR BLD AUTO: 0.2 % (ref 0–0.5)
LDLC SERPL CALC-MCNC: 129.4 MG/DL (ref 63–159)
LYMPHOCYTES # BLD AUTO: 2.5 K/UL (ref 1–4.8)
LYMPHOCYTES NFR BLD: 45.9 % (ref 18–48)
MCH RBC QN AUTO: 29.1 PG (ref 27–31)
MCHC RBC AUTO-ENTMCNC: 31.9 G/DL (ref 32–36)
MCV RBC AUTO: 91 FL (ref 82–98)
MONOCYTES # BLD AUTO: 0.4 K/UL (ref 0.3–1)
MONOCYTES NFR BLD: 7.8 % (ref 4–15)
NEUTROPHILS # BLD AUTO: 2.1 K/UL (ref 1.8–7.7)
NEUTROPHILS NFR BLD: 39.4 % (ref 38–73)
NONHDLC SERPL-MCNC: 144 MG/DL
NRBC BLD-RTO: 0 /100 WBC
PLATELET # BLD AUTO: 313 K/UL (ref 150–450)
PMV BLD AUTO: 11 FL (ref 9.2–12.9)
POTASSIUM SERPL-SCNC: 4.7 MMOL/L (ref 3.5–5.1)
PROT SERPL-MCNC: 8 G/DL (ref 6–8.4)
RBC # BLD AUTO: 4.6 M/UL (ref 4–5.4)
SODIUM SERPL-SCNC: 141 MMOL/L (ref 136–145)
TRIGL SERPL-MCNC: 73 MG/DL (ref 30–150)
TSH SERPL DL<=0.005 MIU/L-ACNC: 2.18 UIU/ML (ref 0.4–4)
WBC # BLD AUTO: 5.38 K/UL (ref 3.9–12.7)

## 2023-01-26 PROCEDURE — 1160F PR REVIEW ALL MEDS BY PRESCRIBER/CLIN PHARMACIST DOCUMENTED: ICD-10-PCS | Mod: HCNC,CPTII,S$GLB, | Performed by: INTERNAL MEDICINE

## 2023-01-26 PROCEDURE — 1160F RVW MEDS BY RX/DR IN RCRD: CPT | Mod: HCNC,CPTII,S$GLB, | Performed by: INTERNAL MEDICINE

## 2023-01-26 PROCEDURE — 3078F DIAST BP <80 MM HG: CPT | Mod: HCNC,CPTII,S$GLB, | Performed by: INTERNAL MEDICINE

## 2023-01-26 PROCEDURE — 0124A COVID-19, MRNA, LNP-S, BIVALENT BOOSTER, PF, 30 MCG/0.3 ML DOSE: CPT | Mod: HCNC,PBBFAC | Performed by: FAMILY MEDICINE

## 2023-01-26 PROCEDURE — 83036 HEMOGLOBIN GLYCOSYLATED A1C: CPT | Mod: HCNC | Performed by: INTERNAL MEDICINE

## 2023-01-26 PROCEDURE — 36415 COLL VENOUS BLD VENIPUNCTURE: CPT | Mod: HCNC,PO | Performed by: INTERNAL MEDICINE

## 2023-01-26 PROCEDURE — 80053 COMPREHEN METABOLIC PANEL: CPT | Mod: HCNC | Performed by: INTERNAL MEDICINE

## 2023-01-26 PROCEDURE — 3008F PR BODY MASS INDEX (BMI) DOCUMENTED: ICD-10-PCS | Mod: HCNC,CPTII,S$GLB, | Performed by: INTERNAL MEDICINE

## 2023-01-26 PROCEDURE — 99999 PR PBB SHADOW E&M-EST. PATIENT-LVL III: ICD-10-PCS | Mod: PBBFAC,HCNC,, | Performed by: INTERNAL MEDICINE

## 2023-01-26 PROCEDURE — 85025 COMPLETE CBC W/AUTO DIFF WBC: CPT | Mod: HCNC | Performed by: INTERNAL MEDICINE

## 2023-01-26 PROCEDURE — 99999 PR PBB SHADOW E&M-EST. PATIENT-LVL III: CPT | Mod: PBBFAC,HCNC,, | Performed by: INTERNAL MEDICINE

## 2023-01-26 PROCEDURE — 91312 COVID-19, MRNA, LNP-S, BIVALENT BOOSTER, PF, 30 MCG/0.3 ML DOSE: ICD-10-PCS | Mod: HCNC,S$GLB,, | Performed by: FAMILY MEDICINE

## 2023-01-26 PROCEDURE — 3008F BODY MASS INDEX DOCD: CPT | Mod: HCNC,CPTII,S$GLB, | Performed by: INTERNAL MEDICINE

## 2023-01-26 PROCEDURE — 1159F MED LIST DOCD IN RCRD: CPT | Mod: HCNC,CPTII,S$GLB, | Performed by: INTERNAL MEDICINE

## 2023-01-26 PROCEDURE — 91312 COVID-19, MRNA, LNP-S, BIVALENT BOOSTER, PF, 30 MCG/0.3 ML DOSE: CPT | Mod: HCNC,S$GLB,, | Performed by: FAMILY MEDICINE

## 2023-01-26 PROCEDURE — 84443 ASSAY THYROID STIM HORMONE: CPT | Mod: HCNC | Performed by: INTERNAL MEDICINE

## 2023-01-26 PROCEDURE — 3074F PR MOST RECENT SYSTOLIC BLOOD PRESSURE < 130 MM HG: ICD-10-PCS | Mod: HCNC,CPTII,S$GLB, | Performed by: INTERNAL MEDICINE

## 2023-01-26 PROCEDURE — 99396 PREV VISIT EST AGE 40-64: CPT | Mod: HCNC,S$GLB,, | Performed by: INTERNAL MEDICINE

## 2023-01-26 PROCEDURE — 1159F PR MEDICATION LIST DOCUMENTED IN MEDICAL RECORD: ICD-10-PCS | Mod: HCNC,CPTII,S$GLB, | Performed by: INTERNAL MEDICINE

## 2023-01-26 PROCEDURE — 99396 PR PREVENTIVE VISIT,EST,40-64: ICD-10-PCS | Mod: HCNC,S$GLB,, | Performed by: INTERNAL MEDICINE

## 2023-01-26 PROCEDURE — 3078F PR MOST RECENT DIASTOLIC BLOOD PRESSURE < 80 MM HG: ICD-10-PCS | Mod: HCNC,CPTII,S$GLB, | Performed by: INTERNAL MEDICINE

## 2023-01-26 PROCEDURE — 80061 LIPID PANEL: CPT | Mod: HCNC | Performed by: INTERNAL MEDICINE

## 2023-01-26 PROCEDURE — 3074F SYST BP LT 130 MM HG: CPT | Mod: HCNC,CPTII,S$GLB, | Performed by: INTERNAL MEDICINE

## 2023-01-26 NOTE — TELEPHONE ENCOUNTER
Yoli with the referral Team is requesting another referral be placed for Neuro surgery due to the Dx for the patient. The Neurology referral will not work for a  regular for the Dx's that on the referral.

## 2023-01-26 NOTE — PROGRESS NOTES
SUBJECTIVE     No chief complaint on file.      HPI  Jia Ugalde is a 62 y.o. female with multiple medical diagnoses as listed in the medical history and problem list that presents for annual exam. Pt has been doing well since her last visit. She has a good appetite and eats well. She does not exercise, but walks in the store as often as possible. She sleeps for ~8-10 hours nightly. Pt does take OTC supplements, which is Vit D, fish oil, and DHEA. She does not have any current stressors. Pt is UTD on age appropriate CA screening.    PAST MEDICAL HISTORY:  Past Medical History:   Diagnosis Date    GERD (gastroesophageal reflux disease)     Hyperlipidemia        PAST SURGICAL HISTORY:  Past Surgical History:   Procedure Laterality Date    BREAST BIOPSY      BREAST SURGERY      COLONOSCOPY N/A 12/8/2021    Procedure: COLONOSCOPY;  Surgeon: Caitlin Joseph MD;  Location: [facility];  Service: Endoscopy;  Laterality: N/A;  fully vaccinated-GT    pt states had polyps    EPIDURAL STEROID INJECTION Bilateral 11/8/2021    Procedure: Bilateral L5 Transforaminal Epidural Steroid Injection;  Surgeon: Keron Cristina Jr., MD;  Location: [facility];  Service: Pain Management;  Laterality: Bilateral;  Arrive @ 1030 (requested 10); No ATC or DM; Vacc.    ESOPHAGOGASTRODUODENOSCOPY N/A 2/18/2022    Procedure: EGD (ESOPHAGOGASTRODUODENOSCOPY);  Surgeon: Lowell Robles MD;  [facility]  Service: Endoscopy;  Laterality: N/A;  fully vaccinated  Pt requesting earlier date with any scoping MD - ERW  RAPID - add on / prep ins. emailed and reviewed- ERW    HEMORRHOID SURGERY      HYSTERECTOMY      SPINE SURGERY      TUBAL LIGATION         SOCIAL HISTORY:  Social History     Socioeconomic History    Marital status: Single   Tobacco Use    Smoking status: Never    Smokeless tobacco: Never   Substance and Sexual Activity    Alcohol use: Never    Drug use: Never    Sexual activity: Not Currently       FAMILY  HISTORY:  Family History   Problem Relation Age of Onset    Pancreatic cancer Maternal Grandmother     Pancreatic cancer Paternal Grandmother        ALLERGIES AND MEDICATIONS: updated and reviewed.  Review of patient's allergies indicates:  No Known Allergies  Current Outpatient Medications   Medication Sig Dispense Refill    alendronate (FOSAMAX) 70 MG tablet Take 1 tablet (70 mg total) by mouth every 7 days. 4 tablet 11    budesonide 1 mg/2 mL NbSp EMPTY CONTENTS OF 1 RESPULE INTO NASAL IRRIGATION SYSTEM, ADD DISTILLED WATER, SALT PACK, MIX & IRRIGATE. PERFORM TWICE DAILY      cholecalciferol, vitamin D3, capsule/tablet Take by mouth once daily.      clobetasoL (TEMOVATE) 0.05 % external solution Apply TO SCALP in AREAS of HAIR loss AT betime nightly monday - FRIDAY      evolocumab (REPATHA SURECLICK) 140 mg/mL PnIj Inject 1 mL (140 mg total) into the skin every 14 (fourteen) days. 6 mL 3    finasteride (PROSCAR) 5 mg tablet Take 5 mg by mouth once daily.      fluocinonide (LIDEX) 0.05 % external solution apply 1ml TO SCALP ONCE TO twice daily AS DIRECTED      fluticasone propionate (FLONASE) 50 mcg/actuation nasal spray 1 spray (50 mcg total) by Each Nostril route once daily. 16 g 3    hydrocortisone (ANUSOL-HC) 2.5 % rectal cream Place rectally 2 (two) times daily. 30 g 1    hydroquinone 2 % Crea APPLY TO AFFECTED AREA(S) 1-2 TIMES DAILY      NEILMED SINUS RINSE COMPLETE pkdv use as directed      pantoprazole (PROTONIX) 40 MG tablet TAKE 1 TABLET BY MOUTH once DAILY 90 tablet 1    tobramycin, PF, (OPHELIA) 300 mg/5 mL nebulizer solution EMPTY CONTENTS OF 1 AMPULE INTO NASAL IRRIGATION SYSTEM, ADD DISTILLED WATER, SALT PACK, MIX & IRRIGATE. PERFORM 2 TIMES DAILY      triamcinolone acetonide 0.1% (KENALOG) 0.1 % ointment Apply TO SCALP in AREAS of HAIR loss AT bedtime       No current facility-administered medications for this visit.       ROS  Review of Systems   Constitutional:  Positive for activity change.  "Negative for unexpected weight change.   HENT:  Negative for hearing loss, rhinorrhea and trouble swallowing.    Eyes:  Negative for discharge and visual disturbance.   Respiratory:  Negative for chest tightness and wheezing.    Cardiovascular:  Negative for chest pain and palpitations.   Gastrointestinal:  Negative for blood in stool, constipation, diarrhea and vomiting.   Endocrine: Negative for polydipsia and polyuria.   Genitourinary:  Negative for difficulty urinating, dysuria, hematuria and menstrual problem.   Musculoskeletal:  Negative for arthralgias, joint swelling and neck pain.   Skin:  Negative for rash and wound.   Neurological:  Negative for weakness and headaches.   Psychiatric/Behavioral:  Negative for confusion and dysphoric mood.        OBJECTIVE     Physical Exam  Vitals:    01/26/23 1047   BP: 126/72   Pulse: 88   Resp: 17   Temp: 98.1 °F (36.7 °C)    Body mass index is 33.6 kg/m².  Weight: 88.8 kg (195 lb 12.3 oz)   Height: 5' 4" (162.6 cm)     Physical Exam  Constitutional:       General: She is not in acute distress.     Appearance: She is well-developed.   HENT:      Head: Normocephalic and atraumatic.      Right Ear: External ear normal.      Left Ear: External ear normal.      Ears:      Comments: L ear cerumen     Nose: Nose normal.   Eyes:      General: No scleral icterus.        Right eye: No discharge.         Left eye: No discharge.      Conjunctiva/sclera: Conjunctivae normal.   Neck:      Vascular: No JVD.      Trachea: No tracheal deviation.   Cardiovascular:      Rate and Rhythm: Normal rate and regular rhythm.      Heart sounds: No murmur heard.    No friction rub. No gallop.   Pulmonary:      Effort: Pulmonary effort is normal. No respiratory distress.      Breath sounds: Normal breath sounds. No wheezing.   Abdominal:      General: Bowel sounds are normal. There is no distension.      Palpations: Abdomen is soft. There is no mass.      Tenderness: There is no abdominal " tenderness. There is no guarding or rebound.   Musculoskeletal:         General: No tenderness or deformity. Normal range of motion.      Cervical back: Normal range of motion and neck supple.   Skin:     General: Skin is warm and dry.      Findings: No erythema or rash.   Neurological:      Mental Status: She is alert and oriented to person, place, and time.      Motor: No abnormal muscle tone.      Coordination: Coordination normal.   Psychiatric:         Behavior: Behavior normal.         Thought Content: Thought content normal.         Judgment: Judgment normal.         Health Maintenance         Date Due Completion Date    TETANUS VACCINE Never done ---    COVID-19 Vaccine (5 - Booster for Pfizer series) 06/09/2022 4/14/2022    Mammogram 11/11/2023 11/11/2022    DEXA Scan 12/02/2023 12/2/2021    Hemoglobin A1c (Diabetic Prevention Screening) 03/12/2024 3/12/2021    Lipid Panel 06/28/2027 6/28/2022    Colorectal Cancer Screening 12/08/2028 12/8/2021              ASSESSMENT     62 y.o. female with     1. Annual physical exam    2. Trigger finger, right middle finger    3. Mixed hyperlipidemia    4. Lumbar radiculopathy    5. Abnormal finding of blood chemistry, unspecified    6. Other general symptoms and signs        PLAN:     1. Annual physical exam  - Counseled on age appropriate medical preventative services, including age appropriate cancer screenings, over all nutritional health, need for a consistent exercise regimen and an over all push towards maintaining a vigorous and active lifestyle.  Counseled on age appropriate vaccines and discussed upcoming health care needs based on age/gender.  Spent time with patient counseling on need for a good patient/doctor relationship moving forward.  Discussed use of common OTC medications and supplements.  Discussed common dietary aids and use of caffeine and the need for good sleep hygiene and stress management.  - CBC Auto Differential; Future  - Comprehensive  Metabolic Panel; Future  - Hemoglobin A1C; Future  - TSH; Future  - Lipid Panel; Future    2. Trigger finger, right middle finger  - Pt to rest, apply ice, and wear finger splint    3. Mixed hyperlipidemia  - Lipid Panel; Future    4. Lumbar radiculopathy  - Ambulatory referral/consult to Neurology; Future    5. Abnormal finding of blood chemistry, unspecified  - CBC Auto Differential; Future  - Hemoglobin A1C; Future    6. Other general symptoms and signs  - TSH; Future        RTC in 6 months     Reyna Umanzor MD  01/26/2023 10:59 AM        No follow-ups on file.

## 2023-01-27 DIAGNOSIS — N17.9 AKI (ACUTE KIDNEY INJURY): Primary | ICD-10-CM

## 2023-01-27 PROBLEM — R73.03 PREDIABETES: Status: ACTIVE | Noted: 2023-01-27

## 2023-01-27 LAB
ESTIMATED AVG GLUCOSE: 117 MG/DL (ref 68–131)
HBA1C MFR BLD: 5.7 % (ref 4–5.6)

## 2023-01-28 ENCOUNTER — PATIENT MESSAGE (OUTPATIENT)
Dept: FAMILY MEDICINE | Facility: CLINIC | Age: 63
End: 2023-01-28
Payer: MEDICARE

## 2023-01-30 DIAGNOSIS — M54.16 LUMBAR RADICULOPATHY: Primary | ICD-10-CM

## 2023-02-01 ENCOUNTER — PATIENT MESSAGE (OUTPATIENT)
Dept: FAMILY MEDICINE | Facility: CLINIC | Age: 63
End: 2023-02-01
Payer: MEDICARE

## 2023-02-07 ENCOUNTER — TELEPHONE (OUTPATIENT)
Dept: FAMILY MEDICINE | Facility: CLINIC | Age: 63
End: 2023-02-07
Payer: MEDICARE

## 2023-02-07 DIAGNOSIS — Z00.00 ENCOUNTER FOR MEDICARE ANNUAL WELLNESS EXAM: ICD-10-CM

## 2023-02-07 NOTE — TELEPHONE ENCOUNTER
----- Message from Diane Blackburn sent at 2/7/2023 10:05 AM CST -----  Contact: CORDELIA WOO [[id number]]  Type: Call Back      Who called: CORDELIA WOO [[id number]]      What is the request in detail: Patient is requesting a call back. Pt is calling in regard to having a order for a kidney function trest (repeat).   Please advise.     Can the clinic reply by [facility]? Yes      Would the patient rather a call back or a response via [facility]? Call back       Best call back number: [phone] (home)       Additional Information:

## 2023-02-07 NOTE — TELEPHONE ENCOUNTER
Call returned. Patient scheduled for repeat BMP on 2/28/23 at [facility] location.

## 2023-02-09 ENCOUNTER — TELEPHONE (OUTPATIENT)
Dept: SPINE | Facility: CLINIC | Age: 63
End: 2023-02-09
Payer: MEDICARE

## 2023-02-09 DIAGNOSIS — Z00.00 ENCOUNTER FOR MEDICARE ANNUAL WELLNESS EXAM: ICD-10-CM

## 2023-02-09 NOTE — TELEPHONE ENCOUNTER
----- Message from Evelio Bonilla sent at 2/9/2023 10:31 AM CST -----      Name of Who is Calling: CORDELIA WOO [[id number]]      What is the request in detail: Pt called to speak with the office regarding her appt.Please contact to further discuss and advise.          Can the clinic reply by [facility]: N      What Number to [facility] Back [facility]: [phone]

## 2023-02-10 ENCOUNTER — OFFICE VISIT (OUTPATIENT)
Dept: SPINE | Facility: CLINIC | Age: 63
End: 2023-02-10
Payer: MEDICARE

## 2023-02-10 VITALS
HEART RATE: 70 BPM | BODY MASS INDEX: 33.12 KG/M2 | HEIGHT: 64 IN | WEIGHT: 194 LBS | SYSTOLIC BLOOD PRESSURE: 128 MMHG | DIASTOLIC BLOOD PRESSURE: 66 MMHG

## 2023-02-10 DIAGNOSIS — G35 MULTIPLE SCLEROSIS: ICD-10-CM

## 2023-02-10 DIAGNOSIS — M96.1 POSTLAMINECTOMY SYNDROME OF LUMBAR REGION: ICD-10-CM

## 2023-02-10 DIAGNOSIS — M51.36 DDD (DEGENERATIVE DISC DISEASE), LUMBAR: ICD-10-CM

## 2023-02-10 DIAGNOSIS — M47.816 LUMBAR SPONDYLOSIS: ICD-10-CM

## 2023-02-10 DIAGNOSIS — Z98.1 HISTORY OF LUMBAR FUSION: ICD-10-CM

## 2023-02-10 DIAGNOSIS — M48.062 SPINAL STENOSIS OF LUMBAR REGION WITH NEUROGENIC CLAUDICATION: Primary | ICD-10-CM

## 2023-02-10 PROCEDURE — 3074F PR MOST RECENT SYSTOLIC BLOOD PRESSURE < 130 MM HG: ICD-10-PCS | Mod: HCNC,CPTII,S$GLB, | Performed by: NURSE PRACTITIONER

## 2023-02-10 PROCEDURE — 3078F PR MOST RECENT DIASTOLIC BLOOD PRESSURE < 80 MM HG: ICD-10-PCS | Mod: HCNC,CPTII,S$GLB, | Performed by: NURSE PRACTITIONER

## 2023-02-10 PROCEDURE — 1159F MED LIST DOCD IN RCRD: CPT | Mod: HCNC,CPTII,S$GLB, | Performed by: NURSE PRACTITIONER

## 2023-02-10 PROCEDURE — 3044F HG A1C LEVEL LT 7.0%: CPT | Mod: HCNC,CPTII,S$GLB, | Performed by: NURSE PRACTITIONER

## 2023-02-10 PROCEDURE — 99214 OFFICE O/P EST MOD 30 MIN: CPT | Mod: HCNC,S$GLB,, | Performed by: NURSE PRACTITIONER

## 2023-02-10 PROCEDURE — 1160F RVW MEDS BY RX/DR IN RCRD: CPT | Mod: HCNC,CPTII,S$GLB, | Performed by: NURSE PRACTITIONER

## 2023-02-10 PROCEDURE — 3078F DIAST BP <80 MM HG: CPT | Mod: HCNC,CPTII,S$GLB, | Performed by: NURSE PRACTITIONER

## 2023-02-10 PROCEDURE — 3044F PR MOST RECENT HEMOGLOBIN A1C LEVEL <7.0%: ICD-10-PCS | Mod: HCNC,CPTII,S$GLB, | Performed by: NURSE PRACTITIONER

## 2023-02-10 PROCEDURE — 99999 PR PBB SHADOW E&M-EST. PATIENT-LVL IV: ICD-10-PCS | Mod: PBBFAC,HCNC,, | Performed by: NURSE PRACTITIONER

## 2023-02-10 PROCEDURE — 99999 PR PBB SHADOW E&M-EST. PATIENT-LVL IV: CPT | Mod: PBBFAC,HCNC,, | Performed by: NURSE PRACTITIONER

## 2023-02-10 PROCEDURE — 3008F BODY MASS INDEX DOCD: CPT | Mod: HCNC,CPTII,S$GLB, | Performed by: NURSE PRACTITIONER

## 2023-02-10 PROCEDURE — 3008F PR BODY MASS INDEX (BMI) DOCUMENTED: ICD-10-PCS | Mod: HCNC,CPTII,S$GLB, | Performed by: NURSE PRACTITIONER

## 2023-02-10 PROCEDURE — 99214 PR OFFICE/OUTPT VISIT, EST, LEVL IV, 30-39 MIN: ICD-10-PCS | Mod: HCNC,S$GLB,, | Performed by: NURSE PRACTITIONER

## 2023-02-10 PROCEDURE — 3074F SYST BP LT 130 MM HG: CPT | Mod: HCNC,CPTII,S$GLB, | Performed by: NURSE PRACTITIONER

## 2023-02-10 PROCEDURE — 1159F PR MEDICATION LIST DOCUMENTED IN MEDICAL RECORD: ICD-10-PCS | Mod: HCNC,CPTII,S$GLB, | Performed by: NURSE PRACTITIONER

## 2023-02-10 PROCEDURE — 1160F PR REVIEW ALL MEDS BY PRESCRIBER/CLIN PHARMACIST DOCUMENTED: ICD-10-PCS | Mod: HCNC,CPTII,S$GLB, | Performed by: NURSE PRACTITIONER

## 2023-02-10 NOTE — PROGRESS NOTES
Subjective:      Patient ID: Jia Ugalde is a 62 y.o. female.    Chief Complaint: Low-back Pain    HPI Ms. Ugalde is a 62-year-old female here for evaluation of low back and leg pain    History of chronic low back pain for years, history of back surgery x2 without significant improvement in her pain  Previously seen by Dr. Cristina in 2022, discussed spinal cord stimulator as an option to better manage her pain  Had PT numerous times, has had several injections, last October 2021 bilateral L5 TFESI without relief  Not interested in surgical intervention at this time  Expresses concern for MS and is requesting to see Neurology for further evaluation    Lumbar MRI 5/2022 [facility]    Past Medical History:   Diagnosis Date    GERD (gastroesophageal reflux disease)     Hyperlipidemia        Past Surgical History:   Procedure Laterality Date    BREAST BIOPSY      BREAST SURGERY      COLONOSCOPY N/A 12/8/2021    Procedure: COLONOSCOPY;  Surgeon: Caitlin Joseph MD;  Location: [facility];  Service: Endoscopy;  Laterality: N/A;  fully vaccinated-GT    pt states had polyps    EPIDURAL STEROID INJECTION Bilateral 11/8/2021    Procedure: Bilateral L5 Transforaminal Epidural Steroid Injection;  Surgeon: Keron Cristina Jr., MD;  Location: [facility];  Service: Pain Management;  Laterality: Bilateral;  Arrive @ 1030 (requested 10); No ATC or DM; Vacc.    ESOPHAGOGASTRODUODENOSCOPY N/A 2/18/2022    Procedure: EGD (ESOPHAGOGASTRODUODENOSCOPY);  Surgeon: Lowell Robles MD;  [facility]  Service: Endoscopy;  Laterality: N/A;  fully vaccinated  Pt requesting earlier date with any scoping MD - ERW  RAPID - add on / prep ins. emailed and reviewed- ERW    HEMORRHOID SURGERY      HYSTERECTOMY      SPINE SURGERY      TUBAL LIGATION         Family History   Problem Relation Age of Onset    Pancreatic cancer Maternal Grandmother     Pancreatic cancer Paternal Grandmother        Social History     Socioeconomic  History    Marital status: Single   Tobacco Use    Smoking status: Never    Smokeless tobacco: Never   Substance and Sexual Activity    Alcohol use: Never    Drug use: Never    Sexual activity: Not Currently       Current Outpatient Medications   Medication Sig Dispense Refill    alendronate (FOSAMAX) 70 MG tablet Take 1 tablet (70 mg total) by mouth every 7 days. 4 tablet 11    budesonide 1 mg/2 mL NbSp EMPTY CONTENTS OF 1 RESPULE INTO NASAL IRRIGATION SYSTEM, ADD DISTILLED WATER, SALT PACK, MIX & IRRIGATE. PERFORM TWICE DAILY      cholecalciferol, vitamin D3, capsule/tablet Take by mouth once daily.      clobetasoL (TEMOVATE) 0.05 % external solution Apply TO SCALP in AREAS of HAIR loss AT betime nightly monday - FRIDAY      evolocumab (REPATHA SURECLICK) 140 mg/mL PnIj Inject 1 mL (140 mg total) into the skin every 14 (fourteen) days. 6 mL 3    finasteride (PROSCAR) 5 mg tablet Take 5 mg by mouth once daily.      fluocinonide (LIDEX) 0.05 % external solution apply 1ml TO SCALP ONCE TO twice daily AS DIRECTED      fluticasone propionate (FLONASE) 50 mcg/actuation nasal spray 1 spray (50 mcg total) by Each Nostril route once daily. 16 g 3    hydrocortisone (ANUSOL-HC) 2.5 % rectal cream Place rectally 2 (two) times daily. 30 g 1    hydroquinone 2 % Crea APPLY TO AFFECTED AREA(S) 1-2 TIMES DAILY      miscellaneous medical supply Kit 1 Package by Misc.(Non-Drug; Combo Route) route Daily. 1 kit 0    NEILMED SINUS RINSE COMPLETE pkdv use as directed      pantoprazole (PROTONIX) 40 MG tablet TAKE 1 TABLET BY MOUTH once DAILY 90 tablet 1    tobramycin, PF, (OPHELIA) 300 mg/5 mL nebulizer solution EMPTY CONTENTS OF 1 AMPULE INTO NASAL IRRIGATION SYSTEM, ADD DISTILLED WATER, SALT PACK, MIX & IRRIGATE. PERFORM 2 TIMES DAILY      triamcinolone acetonide 0.1% (KENALOG) 0.1 % ointment Apply TO SCALP in AREAS of HAIR loss AT bedtime       No current facility-administered medications for this visit.       Review of patient's  allergies indicates:  No Known Allergies      Review of Systems   Constitutional: Negative for fever.   Cardiovascular:  Negative for chest pain.   Respiratory:  Negative for shortness of breath.    Musculoskeletal:  Positive for back pain.   Gastrointestinal:  Negative for abdominal pain, bowel incontinence, nausea and vomiting.   Genitourinary:  Negative for bladder incontinence.   Neurological:  Positive for numbness and paresthesias.       Objective:        General: Jia Vila is well-developed, well-nourished, appears stated age, in no acute distress, alert and oriented to time, place and person.     General    Vitals reviewed.  Constitutional: She is oriented to person, place, and time. She appears well-developed and well-nourished.   HENT:   Head: Atraumatic.   Nose: Nose normal.   Eyes: Conjunctivae are normal.   Cardiovascular:  Normal rate.            Pulmonary/Chest: Effort normal.   Neurological: She is alert and oriented to person, place, and time.   Psychiatric: She has a normal mood and affect. Her behavior is normal. Judgment and thought content normal.     General Musculoskeletal Exam   Gait: antalgic     Back (L-Spine & T-Spine) / Neck (C-Spine) Exam     Back (L-Spine & T-Spine) Range of Motion   Extension:  10   Flexion:  80     Other   She has no scoliosis .  Spinal Kyphosis:  Absent      Muscle Strength   Right Upper Extremity   Biceps: 5/5   Deltoid:  5/5  Triceps:  5/5  Left Upper Extremity  Biceps: 5/5   Deltoid:  5/5  Triceps:  5/5  Right Lower Extremity   Hip Flexion: 5/5   Quadriceps:  5/5   Ankle Dorsiflexion:  5/5   Anterior tibial:  5/5   EHL:  5/5  Left Lower Extremity   Hip Flexion: 5/5   Quadriceps:  5/5   Ankle Dorsiflexion:  5/5   Anterior tibial:  5/5   EHL:  5/5    Reflexes     Left Side  Biceps:  2+  Brachioradialis:  2+  Achilles:  1+  Ankle Clonus:  absent    Right Side   Biceps:  2+  Brachioradialis:  2+  Achilles:  1+  Ankle Clonus:  absent    Vascular Exam     Right  Pulses        Carotid:                  2+    Left Pulses        Carotid:                  2+            Assessment:       1. Spinal stenosis of lumbar region with neurogenic claudication    2. Lumbar spondylosis    3. DDD (degenerative disc disease), lumbar    4. History of lumbar fusion    5. Postlaminectomy syndrome of lumbar region    6. Multiple sclerosis           Plan:          Prior imaging and records reviewed today  We discussed back pain and the nature of back pain.  We discussed that it is not one thing that causes the pain but an accumulation of multiple things that we do.    Referral to Neurology per patient request as she is concerned about MS symptoms  Discussed following up with Dr. Cristina to discuss spinal cord stimulator trial  She has tried numerous conservative and interventional options including physical therapy injections and has had 2 failed spinal surgeries in the past with continued pain  We discussed posture sitting and the importance of trying to sit better.    We discussed the benefits of therapy and exercise and continuing to move.     More than 50% of the total time  of 45 minutes was spent face to face in counseling on diagnosis and treatment options. I also counseled patient  on common and most usual side effect of prescribed medications.  I reviewed Primary care , and other specialty's notes to better coordinate patient's care. All questions were answered, and patient voiced understanding.      Follow-up: Follow up if symptoms worsen or fail to improve. If there are any questions prior to this, the patient was instructed to contact the office.

## 2023-02-13 ENCOUNTER — PATIENT MESSAGE (OUTPATIENT)
Dept: ADMINISTRATIVE | Facility: OTHER | Age: 63
End: 2023-02-13
Payer: MEDICARE

## 2023-02-13 ENCOUNTER — PATIENT MESSAGE (OUTPATIENT)
Dept: FAMILY MEDICINE | Facility: CLINIC | Age: 63
End: 2023-02-13
Payer: MEDICARE

## 2023-02-13 DIAGNOSIS — M79.671 FOOT PAIN, BILATERAL: Primary | ICD-10-CM

## 2023-02-13 DIAGNOSIS — M79.672 FOOT PAIN, BILATERAL: Primary | ICD-10-CM

## 2023-02-17 ENCOUNTER — OFFICE VISIT (OUTPATIENT)
Dept: PODIATRY | Facility: CLINIC | Age: 63
End: 2023-02-17
Payer: MEDICARE

## 2023-02-17 VITALS
HEART RATE: 68 BPM | DIASTOLIC BLOOD PRESSURE: 90 MMHG | BODY MASS INDEX: 33.12 KG/M2 | SYSTOLIC BLOOD PRESSURE: 139 MMHG | HEIGHT: 64 IN | WEIGHT: 194 LBS

## 2023-02-17 DIAGNOSIS — M79.671 FOOT PAIN, BILATERAL: ICD-10-CM

## 2023-02-17 DIAGNOSIS — M76.829 PTTD (POSTERIOR TIBIAL TENDON DYSFUNCTION): ICD-10-CM

## 2023-02-17 DIAGNOSIS — M79.672 FOOT PAIN, BILATERAL: ICD-10-CM

## 2023-02-17 DIAGNOSIS — M21.40 HYPERMOBILE FLAT FOOT, UNSPECIFIED LATERALITY: Primary | ICD-10-CM

## 2023-02-17 DIAGNOSIS — M24.573 EQUINUS CONTRACTURE OF ANKLE: ICD-10-CM

## 2023-02-17 PROCEDURE — 3044F HG A1C LEVEL LT 7.0%: CPT | Mod: HCNC,CPTII,S$GLB, | Performed by: PODIATRIST

## 2023-02-17 PROCEDURE — 3044F PR MOST RECENT HEMOGLOBIN A1C LEVEL <7.0%: ICD-10-PCS | Mod: HCNC,CPTII,S$GLB, | Performed by: PODIATRIST

## 2023-02-17 PROCEDURE — 99999 PR PBB SHADOW E&M-EST. PATIENT-LVL IV: ICD-10-PCS | Mod: PBBFAC,HCNC,, | Performed by: PODIATRIST

## 2023-02-17 PROCEDURE — 1159F PR MEDICATION LIST DOCUMENTED IN MEDICAL RECORD: ICD-10-PCS | Mod: HCNC,CPTII,S$GLB, | Performed by: PODIATRIST

## 2023-02-17 PROCEDURE — 99204 PR OFFICE/OUTPT VISIT, NEW, LEVL IV, 45-59 MIN: ICD-10-PCS | Mod: HCNC,S$GLB,, | Performed by: PODIATRIST

## 2023-02-17 PROCEDURE — 3080F DIAST BP >= 90 MM HG: CPT | Mod: HCNC,CPTII,S$GLB, | Performed by: PODIATRIST

## 2023-02-17 PROCEDURE — 99204 OFFICE O/P NEW MOD 45 MIN: CPT | Mod: HCNC,S$GLB,, | Performed by: PODIATRIST

## 2023-02-17 PROCEDURE — 3008F BODY MASS INDEX DOCD: CPT | Mod: HCNC,CPTII,S$GLB, | Performed by: PODIATRIST

## 2023-02-17 PROCEDURE — 3075F PR MOST RECENT SYSTOLIC BLOOD PRESS GE 130-139MM HG: ICD-10-PCS | Mod: HCNC,CPTII,S$GLB, | Performed by: PODIATRIST

## 2023-02-17 PROCEDURE — 1160F PR REVIEW ALL MEDS BY PRESCRIBER/CLIN PHARMACIST DOCUMENTED: ICD-10-PCS | Mod: HCNC,CPTII,S$GLB, | Performed by: PODIATRIST

## 2023-02-17 PROCEDURE — 3008F PR BODY MASS INDEX (BMI) DOCUMENTED: ICD-10-PCS | Mod: HCNC,CPTII,S$GLB, | Performed by: PODIATRIST

## 2023-02-17 PROCEDURE — 3080F PR MOST RECENT DIASTOLIC BLOOD PRESSURE >= 90 MM HG: ICD-10-PCS | Mod: HCNC,CPTII,S$GLB, | Performed by: PODIATRIST

## 2023-02-17 PROCEDURE — 1159F MED LIST DOCD IN RCRD: CPT | Mod: HCNC,CPTII,S$GLB, | Performed by: PODIATRIST

## 2023-02-17 PROCEDURE — 99999 PR PBB SHADOW E&M-EST. PATIENT-LVL IV: CPT | Mod: PBBFAC,HCNC,, | Performed by: PODIATRIST

## 2023-02-17 PROCEDURE — 1160F RVW MEDS BY RX/DR IN RCRD: CPT | Mod: HCNC,CPTII,S$GLB, | Performed by: PODIATRIST

## 2023-02-17 PROCEDURE — 3075F SYST BP GE 130 - 139MM HG: CPT | Mod: HCNC,CPTII,S$GLB, | Performed by: PODIATRIST

## 2023-02-17 NOTE — PROGRESS NOTES
Subjective:      Patient ID: Jia Ugalde is a 62 y.o. female.    Chief Complaint: Foot Pain (Bilateral foot/ consult for orthotics)    Flat feet.  Gradual onset, worsening over past several weeks, aggravated by increased weight bearing, shoe gear, pressure.  No previous medical treatment.  OTC pain med not helping. Denies trauma, surgery.    Wants orthotics for back pain.    Review of Systems   Constitutional: Negative for chills, diaphoresis, fever, malaise/fatigue and night sweats.   Cardiovascular:  Negative for claudication, cyanosis, leg swelling and syncope.   Skin:  Negative for color change, dry skin, nail changes, rash, suspicious lesions and unusual hair distribution.   Musculoskeletal:  Negative for falls, joint pain, joint swelling, muscle cramps, muscle weakness and stiffness.   Gastrointestinal:  Negative for constipation, diarrhea, nausea and vomiting.   Neurological:  Negative for brief paralysis, disturbances in coordination, focal weakness, numbness, paresthesias, sensory change and tremors.         Objective:      Physical Exam  Constitutional:       General: She is not in acute distress.     Appearance: She is well-developed. She is not diaphoretic.   Cardiovascular:      Pulses:           Popliteal pulses are 2+ on the right side and 2+ on the left side.        Dorsalis pedis pulses are 2+ on the right side and 2+ on the left side.        Posterior tibial pulses are 2+ on the right side and 2+ on the left side.      Comments: Capillary refill 3 seconds all toes/distal feet, all toes/both feet warm to touch.      Negative lymphadenopathy bilateral popliteal fossa and tarsal tunnel.      Negavie lower extremity edema bilateral.    Musculoskeletal:      Right ankle: No swelling, deformity, ecchymosis or lacerations. Normal range of motion. Normal pulse.      Right Achilles Tendon: Normal. No defects. Aggarwal's test negative.      Comments: rcsp is everted bilateral, normal single leg toe  raise Bilateral, negative lateral malleolar index and positive too many toe sign.  Crepitus absent to attempted reduction.  fully reducible except equinus   Medial arch collapse all loading and weight bearing.    No pain either foot.    Ankle dorsiflexion decreased at <10 degrees bilateral with moderate increase with knee flexion bilateral.    Otherwise, Normal angle, base, station of gait. All ten toes without clubbing, cyanosis, or signs of ischemia.  No pain to palpation bilateral lower extremities.  Range of motion, stability, muscle strength, and muscle tone normal bilateral feet and legs.    Lymphadenopathy:      Lower Body: No right inguinal adenopathy. No left inguinal adenopathy.      Comments: Negative lymphadenopathy bilateral popliteal fossa and tarsal tunnel.    Negative lymphangitic streaking bilateral feet/ankles/legs.   Skin:     General: Skin is warm and dry.      Capillary Refill: Capillary refill takes 2 to 3 seconds.      Coloration: Skin is not pale.      Findings: No abrasion, bruising, burn, ecchymosis, erythema, laceration, lesion or rash.      Nails: There is no clubbing.      Comments:   Skin is normal age and health appropriate color, turgor, texture, and temperature bilateral lower extremities without ulceration, hyperpigmentation, discoloration, masses nodules or cords palpated.  No ecchymosis, erythema, edema, or cardinal signs of infection bilateral lower extremities.      Neurological:      Mental Status: She is alert and oriented to person, place, and time.      Sensory: No sensory deficit.      Motor: No tremor, atrophy or abnormal muscle tone.      Gait: Gait normal.      Deep Tendon Reflexes:      Reflex Scores:       Patellar reflexes are 2+ on the right side and 2+ on the left side.       Achilles reflexes are 2+ on the right side and 2+ on the left side.     Comments: Negative tinel sign to percussion sural, superficial peroneal, deep peroneal, saphenous, and posterior tibial  nerves right and left ankles and feet.     Psychiatric:         Behavior: Behavior is cooperative.           Assessment:       Encounter Diagnoses   Name Primary?    Foot pain, bilateral     Hypermobile flat foot, unspecified laterality Yes    Equinus contracture of ankle     PTTD (posterior tibial tendon dysfunction)          Plan:       Jia Vila was seen today for foot pain.    Diagnoses and all orders for this visit:    Hypermobile flat foot, unspecified laterality  -     ORTHOTIC DEVICE (DME)    Foot pain, bilateral  -     Ambulatory referral/consult to Podiatry  -     ORTHOTIC DEVICE (DME)    Equinus contracture of ankle  -     ORTHOTIC DEVICE (DME)    PTTD (posterior tibial tendon dysfunction)  -     ORTHOTIC DEVICE (DME)      I counseled the patient on her conditions, their implications and medical management.        Patient will stretch the tendo achilles complex three times daily as demonstrated in the office.  Literature was dispensed illustrating proper stretching technique.    Patient will obtain over the counter arch supports and wear them in shoes whenever possible.  Athletic shoes intended for walking or running are usually best.    The patient was advised that NSAID-type medications have two very important potential side effects: gastrointestinal irritation including hemorrhage and renal injuries. She was asked to take the medication with food and to stop if she experiences any GI upset. I asked her to call for vomiting, abdominal pain or black/bloody stools. The patient expresses understanding of these issues and questions were answered.    Discussed conservative treatment with shoes of adequate dimensions, material, and style to alleviate symptoms and delay or prevent surgical intervention.    Rx custom orthotics              No follow-ups on file.

## 2023-02-20 ENCOUNTER — TELEPHONE (OUTPATIENT)
Dept: NEUROLOGY | Facility: CLINIC | Age: 63
End: 2023-02-20
Payer: MEDICARE

## 2023-02-20 ENCOUNTER — PATIENT MESSAGE (OUTPATIENT)
Dept: NEUROLOGY | Facility: CLINIC | Age: 63
End: 2023-02-20
Payer: MEDICARE

## 2023-02-20 NOTE — TELEPHONE ENCOUNTER
----- Message from Nikole Benitez RN sent at 2/20/2023 11:57 AM CST -----  Regarding: FW: appt    ----- Message -----  From: Tremontana Chevalier  Sent: 2/20/2023  11:55 AM CST  To: Manish CASILLAS Staff  Subject: appt                                              Pt clling to get sooner appt with Dr. Hammond that the scheduled casper in May. Pls cll pt @ [phone]

## 2023-02-22 ENCOUNTER — PATIENT MESSAGE (OUTPATIENT)
Dept: PODIATRY | Facility: CLINIC | Age: 63
End: 2023-02-22
Payer: MEDICARE

## 2023-02-27 ENCOUNTER — TELEPHONE (OUTPATIENT)
Dept: PODIATRY | Facility: CLINIC | Age: 63
End: 2023-02-27
Payer: MEDICARE

## 2023-02-27 NOTE — TELEPHONE ENCOUNTER
Spoke with [employer]  matter resolved         ----- Message from Caitlin Islas sent at 2/27/2023  1:25 PM CST -----  Regarding: Patient Call Back  .Type: Patient Call Back    Who called:Lissa CARTER with clinic intake for [employer]     What is the request in detail: orthotic for foot form -- needs to contact ref dept for [employer] for additional info -- states they have been trying to get this completed since feb 17     Can the clinic reply by [facility]? Call     Would the patient rather a call back or a response via [facility]? Call     Best call back number: [phone]   Ref#[id number]

## 2023-02-28 ENCOUNTER — LAB VISIT (OUTPATIENT)
Dept: LAB | Facility: HOSPITAL | Age: 63
End: 2023-02-28
Attending: INTERNAL MEDICINE
Payer: MEDICARE

## 2023-02-28 DIAGNOSIS — N17.9 AKI (ACUTE KIDNEY INJURY): ICD-10-CM

## 2023-02-28 PROBLEM — N18.31 STAGE 3A CHRONIC KIDNEY DISEASE: Status: ACTIVE | Noted: 2023-01-27

## 2023-02-28 LAB
ANION GAP SERPL CALC-SCNC: 6 MMOL/L (ref 8–16)
BUN SERPL-MCNC: 16 MG/DL (ref 6–20)
CALCIUM SERPL-MCNC: 9.7 MG/DL (ref 8.7–10.5)
CHLORIDE SERPL-SCNC: 105 MMOL/L (ref 95–110)
CO2 SERPL-SCNC: 29 MMOL/L (ref 23–29)
CREAT SERPL-MCNC: 1.2 MG/DL (ref 0.5–1.4)
EST. GFR  (NO RACE VARIABLE): 51.8 ML/MIN/1.73 M^2
GLUCOSE SERPL-MCNC: 98 MG/DL (ref 70–110)
POTASSIUM SERPL-SCNC: 4.2 MMOL/L (ref 3.5–5.1)
SODIUM SERPL-SCNC: 140 MMOL/L (ref 136–145)

## 2023-02-28 PROCEDURE — 36415 COLL VENOUS BLD VENIPUNCTURE: CPT | Mod: HCNC,PO | Performed by: INTERNAL MEDICINE

## 2023-02-28 PROCEDURE — 80048 BASIC METABOLIC PNL TOTAL CA: CPT | Mod: HCNC | Performed by: INTERNAL MEDICINE

## 2023-03-01 ENCOUNTER — PATIENT MESSAGE (OUTPATIENT)
Dept: FAMILY MEDICINE | Facility: CLINIC | Age: 63
End: 2023-03-01
Payer: MEDICARE

## 2023-03-03 ENCOUNTER — TELEPHONE (OUTPATIENT)
Dept: PODIATRY | Facility: CLINIC | Age: 63
End: 2023-03-03
Payer: MEDICARE

## 2023-03-03 NOTE — TELEPHONE ENCOUNTER
Staff gave patient the list of vendors for the custom orthotics her insurance([employer]) gave to the staff. Staff informed patient to call vendors and to call insurance to ask if the custom orthotics are covered by the insurance.    Patient verbalized understanding.

## 2023-03-03 NOTE — TELEPHONE ENCOUNTER
Staff informed Gary that the paperwork was faxed over to Gary.    Gary stated that the authorization would come from the Orthotic that is filling the order and gave staff Orthotic vendors information the patient would have to contact for the custom orthotics.    Staff verbalized understanding.        ----- Message from Caitlin Islas sent at 3/3/2023  1:56 PM CST -----  Regarding: Patient Call Back  .Type: Patient Call Back    Who called:  Lissa Vega     What is the request in detail: states gary is still waiting on info for patients orthotics for feet -- -auth was still not completed for a DME company.  States the patient is upset and has been waiting since Feb 17. States patient  would a follow up call with update .[phone] (patient)       Can the clinic reply by [facility]? Call     Would the patient rather a call back or a response via [facility]? Call     Best call back number: [phone]     Additional Information:  Ref#[id number]

## 2023-03-03 NOTE — TELEPHONE ENCOUNTER
Staff informed patient the noted for the custom orthotic was faxed over to insurance.    Patient stated that Gary stated that did not get the corrected form that needed to be filled out and she wants to speak with Dr. Scruggs.    Staff informed patient that a message was sent to Dr. Scruggs and waiting for a response.    Patient verbalized understanding.        ----- Message from Marlene Islas sent at 3/3/2023  3:41 PM CST -----  Regarding: self  .[phone]  .Type: Patient Call Back    Who called: self     What is the request in detail: Requesting to speak with Amarilis in regards to orthotics in that was faxed over to her insurance company     Can the clinic reply by [facility]?  Call back     Would the patient rather a call back or a response via [facility]?  Call back     Best call back number: .[phone]

## 2023-03-15 ENCOUNTER — PATIENT MESSAGE (OUTPATIENT)
Dept: PODIATRY | Facility: CLINIC | Age: 63
End: 2023-03-15
Payer: MEDICARE

## 2023-03-15 ENCOUNTER — TELEPHONE (OUTPATIENT)
Dept: PODIATRY | Facility: CLINIC | Age: 63
End: 2023-03-15
Payer: MEDICARE

## 2023-03-15 NOTE — TELEPHONE ENCOUNTER
Staff tried to contact patient, no answer, left a detailed message on voice mail informing patient that [employer] will not cover orthotics and we do not have an authorization form to send to them.

## 2023-03-15 NOTE — TELEPHONE ENCOUNTER
----- Message from Kathi Hoffmann sent at 3/15/2023  3:34 PM CDT -----  Name of Who is Calling:CORDELIA WOO [[id number]]              What is the request in detail:need authorization sent to [facility]. Please call the patient back              Can the clinic reply by [facility]:no              What Number to Call Back if not in [facility]:[phone]   Fax:[phone]

## 2023-03-15 NOTE — TELEPHONE ENCOUNTER
Staff informed patient a list of vendors will be left at the  desk or mailed.    Patient verbalized understanding.

## 2023-03-15 NOTE — TELEPHONE ENCOUNTER
Staff spoke with Antonio and informed him provider is not providing the services to do the authorization.    Antonio verbalized understanding.

## 2023-03-20 ENCOUNTER — SPECIALTY PHARMACY (OUTPATIENT)
Dept: PHARMACY | Facility: CLINIC | Age: 63
End: 2023-03-20
Payer: MEDICARE

## 2023-03-20 NOTE — TELEPHONE ENCOUNTER
Specialty Pharmacy - Refill Coordination    Specialty Medication Orders Linked to Encounter      Flowsheet Row Most Recent Value   Medication #1 evolocumab (REPATHA SURECLICK) 140 mg/mL PnIj (Order#[id number], Rx#[id number])            Refill Questions - Documented Responses      Flowsheet Row Most Recent Value   Patient Availability and HIPAA Verification    Does patient want to proceed with activity? Yes   HIPAA/medical authority confirmed? Yes   Relationship to patient of person spoken to? Self   Refill Screening Questions    Would patient like to speak to a pharmacist? No   When does the patient need to receive the medication? 03/27/23   Refill Delivery Questions    How will the patient receive the medication? MEDRx   When does the patient need to receive the medication? 03/27/23   Shipping Address Home   Address in [facility] confirmed and updated if neccessary? Yes   Expected Copay ($) 0   Is the patient able to afford the medication copay? Yes   Payment Method zero copay   Days supply of Refill 84   Supplies needed? Alcohol Swabs   Refill activity completed? Yes   Refill activity plan Refill scheduled   Shipment/Pickup Date: 03/22/23            Current Outpatient Medications   Medication Sig    alendronate (FOSAMAX) 70 MG tablet Take 1 tablet (70 mg total) by mouth every 7 days.    budesonide 1 mg/2 mL NbSp EMPTY CONTENTS OF 1 RESPULE INTO NASAL IRRIGATION SYSTEM, ADD DISTILLED WATER, SALT PACK, MIX & IRRIGATE. PERFORM TWICE DAILY    cholecalciferol, vitamin D3, capsule/tablet Take by mouth once daily.    clobetasoL (TEMOVATE) 0.05 % external solution Apply TO SCALP in AREAS of HAIR loss AT betime nightly monday - FRIDAY    evolocumab (REPATHA SURECLICK) 140 mg/mL PnIj Inject 1 mL (140 mg total) into the skin every 14 (fourteen) days.    finasteride (PROSCAR) 5 mg tablet Take 5 mg by mouth once daily.    fluocinonide (LIDEX) 0.05 % external solution apply 1ml TO SCALP ONCE TO twice daily AS DIRECTED     fluticasone propionate (FLONASE) 50 mcg/actuation nasal spray 1 spray (50 mcg total) by Each Nostril route once daily. (Patient not taking: Reported on 2/17/2023)    hydrocortisone (ANUSOL-HC) 2.5 % rectal cream Place rectally 2 (two) times daily. (Patient not taking: Reported on 2/17/2023)    hydroquinone 2 % Crea APPLY TO AFFECTED AREA(S) 1-2 TIMES DAILY    miscellaneous medical supply Kit 1 Package by Misc.(Non-Drug; Combo Route) route Daily.    NEILMED SINUS RINSE COMPLETE pkdv use as directed    pantoprazole (PROTONIX) 40 MG tablet TAKE 1 TABLET BY MOUTH once DAILY    tobramycin, PF, (OPHELIA) 300 mg/5 mL nebulizer solution EMPTY CONTENTS OF 1 AMPULE INTO NASAL IRRIGATION SYSTEM, ADD DISTILLED WATER, SALT PACK, MIX & IRRIGATE. PERFORM 2 TIMES DAILY    triamcinolone acetonide 0.1% (KENALOG) 0.1 % ointment Apply TO SCALP in AREAS of HAIR loss AT bedtime   Last reviewed on 2/17/2023  8:23 AM by Etienne Scruggs DPM    Review of patient's allergies indicates:  No Known Allergies Last reviewed on  2/17/2023 8:23 AM by Etienne Scruggs      Tasks added this encounter   6/12/2023 - Refill Call (Auto Added)   Tasks due within next 3 months   No tasks due.     Joe Butterfield, PharmD  [facility]  [address] [place] [id number]  Phone: [phone]  Fax: [phone]

## 2023-03-22 ENCOUNTER — OFFICE VISIT (OUTPATIENT)
Dept: NEUROLOGY | Facility: CLINIC | Age: 63
End: 2023-03-22
Payer: MEDICARE

## 2023-03-22 VITALS
DIASTOLIC BLOOD PRESSURE: 79 MMHG | BODY MASS INDEX: 34.3 KG/M2 | WEIGHT: 193.56 LBS | HEIGHT: 63 IN | HEART RATE: 65 BPM | SYSTOLIC BLOOD PRESSURE: 117 MMHG

## 2023-03-22 DIAGNOSIS — G35 MULTIPLE SCLEROSIS: ICD-10-CM

## 2023-03-22 PROCEDURE — 99999 PR PBB SHADOW E&M-EST. PATIENT-LVL IV: CPT | Mod: PBBFAC,,, | Performed by: STUDENT IN AN ORGANIZED HEALTH CARE EDUCATION/TRAINING PROGRAM

## 2023-03-22 PROCEDURE — 3008F BODY MASS INDEX DOCD: CPT | Mod: CPTII,S$GLB,, | Performed by: STUDENT IN AN ORGANIZED HEALTH CARE EDUCATION/TRAINING PROGRAM

## 2023-03-22 PROCEDURE — 3078F DIAST BP <80 MM HG: CPT | Mod: CPTII,S$GLB,, | Performed by: STUDENT IN AN ORGANIZED HEALTH CARE EDUCATION/TRAINING PROGRAM

## 2023-03-22 PROCEDURE — 99205 OFFICE O/P NEW HI 60 MIN: CPT | Mod: S$GLB,,, | Performed by: STUDENT IN AN ORGANIZED HEALTH CARE EDUCATION/TRAINING PROGRAM

## 2023-03-22 PROCEDURE — 3074F SYST BP LT 130 MM HG: CPT | Mod: CPTII,S$GLB,, | Performed by: STUDENT IN AN ORGANIZED HEALTH CARE EDUCATION/TRAINING PROGRAM

## 2023-03-22 NOTE — PROGRESS NOTES
[facility]  New Patient Visit      History:     She has low back pain since 1998 after she bent over. Most recent MRI 5/2022 showed multilevel lumbar stenosis, and she has associated sciatica pain down her bilateral legs upon standing.   She is s/p two spinal fusions in her lumbar spine, in May 2018 and Nov 2020.   She is having trouble walking longer distances because of b/l leg pain and weakness.   Denies pain when sitting or lying down.     She has completed the healthy back program but didn't find it very helpful. She used to be on gabapentin and meloxicam because they made her feel confused and out of it.     She denies arm pain or weakness. Denies imbalance.  She is having blurry vision and since Nov 2022 she has been experiencing sharp pain behind both eyes.     Denies family history of autoimmune or neurological illnesses.     She has HLD but otherwise no chronic medical illnesses.     ROS:     A complete 9 system ROS was reviewed by me and otherwise negative .     Past Medical History:   Diagnosis Date    GERD (gastroesophageal reflux disease)     Hyperlipidemia        Past Surgical History:   Procedure Laterality Date    BREAST BIOPSY      BREAST SURGERY      COLONOSCOPY N/A 12/8/2021    Procedure: COLONOSCOPY;  Surgeon: Caitlin Joseph MD;  Location: [facility];  Service: Endoscopy;  Laterality: N/A;  fully vaccinated-GT    pt states had polyps    EPIDURAL STEROID INJECTION Bilateral 11/8/2021    Procedure: Bilateral L5 Transforaminal Epidural Steroid Injection;  Surgeon: Keron Cristina Jr., MD;  Location: [facility];  Service: Pain Management;  Laterality: Bilateral;  Arrive @ 1030 (requested 10); No ATC or DM; Vacc.    ESOPHAGOGASTRODUODENOSCOPY N/A 2/18/2022    Procedure: EGD (ESOPHAGOGASTRODUODENOSCOPY);  Surgeon: Lowell Robles MD;  [facility]  Service: Endoscopy;  Laterality: N/A;  fully vaccinated  Pt requesting earlier date with any scoping MD -  "ERW  RAPID - add on / prep ins. emailed and reviewed- ERW    HEMORRHOID SURGERY      HYSTERECTOMY      SPINE SURGERY      TUBAL LIGATION         Family History   Problem Relation Age of Onset    Pancreatic cancer Maternal Grandmother     Pancreatic cancer Paternal Grandmother        Social History     Socioeconomic History    Marital status: Single   Tobacco Use    Smoking status: Never    Smokeless tobacco: Never   Substance and Sexual Activity    Alcohol use: Never    Drug use: Never    Sexual activity: Not Currently       Review of patient's allergies indicates:  No Known Allergies      Exam:     Vitals:    03/22/23 1150   BP: 117/79   Pulse: 65   Weight: 87.8 kg (193 lb 9 oz)   Height: 5' 3" (1.6 m)          In general, the patient is well nourished and appears to be in no acute distress.    MENTAL STATUS: language is fluent, normal verbal comprehension, short-term and remote memory is intact, attention is normal, patient is alert and oriented x 3, fund of knowlege is appropriate by vocabulary.     CRANIAL NERVE EXAM:  There is no intrernuclear ophthalmoplegia.  Extraocular muscles are intact. Pupils are equal, round, and reactive to light. No facial asymmetry. Facial sensation is intact bilaterally. There is no dysarthria. Uvula is midline, and palate moves symmetrically. Shoulder shrug intact bilaterlly. Tongue protrusion is midline. Hearing is intact to finger rub bilaterally. Neck is supple with full ROM  No Lhermitte's    MOTOR EXAM: Normal bulk and tone throughout UE and LE bilaterally.   No pronator drift; rapid sequential movements are normal;       Strength:  R deltoid 5/5, L deltoid 5/5  R biceps 5/5, L biceps 5/5  R triceps 5/5, L triceps 5/5  R finger flexors 5/5, L finger flexors 5/5  R finger extensors 5/5, L finger extensors 5/5  R finger abductors 5/5, L finger abductors 5/5  R  hip flexors 5/5, L hip flexors 4/5  R knee extensors 5/5, L knee extensors 5/5  R knee flexors 5/5, L knee flexors " 4/5  R ankle dorsiflexors 5/5, L ankle dorsiflexors 5/5  R ankle plantarflexors 5/5, L ankle plantarflexors 5/5    REFLEXES: 2+ b/l B, Br, Tr, 1+ b/l Patella (R > L), 2+ b/l Achilles, toes are down bilaterally    SENSORY EXAM: Normal to light touch, vibration, pinprick throughout.    COORDINATION: Normal finger-to-nose exam. HTS limited in L due to weakness    GAIT: Antalgic gait pattern, left leaning, difficulty with toe and heel walking, trouble with tandem .    Negative Romberg's        Imaging (personally reviewed):     MRI L spine 5/9/22: Stable L2-3 disc herniation, moderate bilateral foraminal restriction L > R. L3-4 annular bulge stable foraminal restriction. L5-S1 annular bular and bilateral facet arthropathy R > L      Labs:     No results found for: YBDFZFCV71IX  No results found for: JCVINDEX, JCVANTIBODY  No results found for: ZZ0IAKBC, ABSOLUTECD3, DH0VZCJR, ABSOLUTECD8, AA7MNQZK, ABSOLUTECD4, LABCD48  Lab Results   Component Value Date    WBC 5.38 01/26/2023    RBC 4.60 01/26/2023    HGB 13.4 01/26/2023    HCT 42.0 01/26/2023    MCV 91 01/26/2023    MCH 29.1 01/26/2023    MCHC 31.9 (L) 01/26/2023    RDW 13.6 01/26/2023     01/26/2023    MPV 11.0 01/26/2023    GRAN 2.1 01/26/2023    GRAN 39.4 01/26/2023    LYMPH 2.5 01/26/2023    LYMPH 45.9 01/26/2023    MONO 0.4 01/26/2023    MONO 7.8 01/26/2023    EOS 0.3 01/26/2023    BASO 0.10 01/26/2023    EOSINOPHIL 4.8 01/26/2023    BASOPHIL 1.9 01/26/2023     Sodium   Date Value Ref Range Status   02/28/2023 140 136 - 145 mmol/L Final     Potassium   Date Value Ref Range Status   02/28/2023 4.2 3.5 - 5.1 mmol/L Final     Chloride   Date Value Ref Range Status   02/28/2023 105 95 - 110 mmol/L Final     CO2   Date Value Ref Range Status   02/28/2023 29 23 - 29 mmol/L Final     Glucose   Date Value Ref Range Status   02/28/2023 98 70 - 110 mg/dL Final     BUN   Date Value Ref Range Status   02/28/2023 16 6 - 20 mg/dL Final     Creatinine   Date Value Ref  Range Status   02/28/2023 1.2 0.5 - 1.4 mg/dL Final     Calcium   Date Value Ref Range Status   02/28/2023 9.7 8.7 - 10.5 mg/dL Final     Total Protein   Date Value Ref Range Status   01/26/2023 8.0 6.0 - 8.4 g/dL Final     Albumin   Date Value Ref Range Status   01/26/2023 3.9 3.5 - 5.2 g/dL Final     Total Bilirubin   Date Value Ref Range Status   01/26/2023 0.8 0.1 - 1.0 mg/dL Final     Comment:     For infants and newborns, interpretation of results should be based  on gestational age, weight and in agreement with clinical  observations.    Premature Infant recommended reference ranges:  Up to 24 hours.............<8.0 mg/dL  Up to 48 hours............<12.0 mg/dL  3-5 days..................<15.0 mg/dL  6-29 days.................<15.0 mg/dL       Alkaline Phosphatase   Date Value Ref Range Status   01/26/2023 53 (L) 55 - 135 U/L Final     AST   Date Value Ref Range Status   01/26/2023 21 10 - 40 U/L Final     ALT   Date Value Ref Range Status   01/26/2023 14 10 - 44 U/L Final     Anion Gap   Date Value Ref Range Status   02/28/2023 6 (L) 8 - 16 mmol/L Final     eGFR if    Date Value Ref Range Status   12/02/2021 >60.0 >60 mL/min/1.73 m^2 Final     eGFR if non    Date Value Ref Range Status   12/02/2021 >60.0 >60 mL/min/1.73 m^2 Final     Comment:     Calculation used to obtain the estimated glomerular filtration  rate (eGFR) is the CKD-EPI equation.                   Diagnosis/Assessment/Plan:     Assessment:  Overall presentation consistent with lumbar spinal stenosis rather than multiple sclerosis.  No upper motor neuron signs on exam.  Discussed with the patient that she may improve symptomatically with optimizing pain control. No immunotherapy warranted at this time  Return to clinic as needed.                      Total time spent with the patient: 80 minutes, including face to face consultation, chart review and coordination of care, on the day of the visit. This includes  face to face time and non-face to face time preparing to see the patient (eg, review of tests), obtaining and/or reviewing separately obtained history, documenting clinical information in the electronic or other health record, independently interpreting resultsand communicating results to the patient/family/caregiver, or care coordination.         Sara Hammond MD, MSc  Attending neurologist

## 2023-04-25 ENCOUNTER — DOCUMENTATION ONLY (OUTPATIENT)
Dept: PAIN MEDICINE | Facility: CLINIC | Age: 63
End: 2023-04-25
Payer: MEDICARE

## 2023-05-09 ENCOUNTER — PATIENT MESSAGE (OUTPATIENT)
Dept: RESEARCH | Facility: HOSPITAL | Age: 63
End: 2023-05-09
Payer: MEDICARE

## 2023-05-15 ENCOUNTER — TELEPHONE (OUTPATIENT)
Dept: PAIN MEDICINE | Facility: CLINIC | Age: 63
End: 2023-05-15
Payer: MEDICARE

## 2023-05-15 NOTE — TELEPHONE ENCOUNTER
LVM informing pt that her appt scheduled tomorrow morning will need to be r/s as MD has a mtg- phone number included.

## 2023-05-15 NOTE — TELEPHONE ENCOUNTER
----- Message from Veronica Aggarwal sent at 5/15/2023 12:26 PM CDT -----  Regarding: [phone]  Type:  Patient Returning Call    Who Called: self    Who Left Message for Patient: Greta     Does the patient know what this is regarding?:no    Would the patient rather a call back or a response via [facility]? Call back     Best Call Back Number:[phone]        Additional Information:

## 2023-05-16 ENCOUNTER — OFFICE VISIT (OUTPATIENT)
Dept: PAIN MEDICINE | Facility: CLINIC | Age: 63
End: 2023-05-16
Payer: MEDICARE

## 2023-05-16 VITALS
HEART RATE: 62 BPM | SYSTOLIC BLOOD PRESSURE: 126 MMHG | RESPIRATION RATE: 18 BRPM | DIASTOLIC BLOOD PRESSURE: 76 MMHG | OXYGEN SATURATION: 98 %

## 2023-05-16 DIAGNOSIS — M48.062 SPINAL STENOSIS OF LUMBAR REGION WITH NEUROGENIC CLAUDICATION: Primary | ICD-10-CM

## 2023-05-16 DIAGNOSIS — M51.36 DDD (DEGENERATIVE DISC DISEASE), LUMBAR: ICD-10-CM

## 2023-05-16 DIAGNOSIS — M54.16 LUMBAR RADICULOPATHY, CHRONIC: ICD-10-CM

## 2023-05-16 DIAGNOSIS — Z98.1 HISTORY OF LUMBAR FUSION: ICD-10-CM

## 2023-05-16 DIAGNOSIS — M47.816 LUMBAR SPONDYLOSIS: ICD-10-CM

## 2023-05-16 DIAGNOSIS — M54.16 LUMBAR RADICULOPATHY: ICD-10-CM

## 2023-05-16 PROCEDURE — 1160F RVW MEDS BY RX/DR IN RCRD: CPT | Mod: CPTII,,, | Performed by: PAIN MEDICINE

## 2023-05-16 PROCEDURE — 3044F PR MOST RECENT HEMOGLOBIN A1C LEVEL <7.0%: ICD-10-PCS | Mod: CPTII,,, | Performed by: PAIN MEDICINE

## 2023-05-16 PROCEDURE — 1159F PR MEDICATION LIST DOCUMENTED IN MEDICAL RECORD: ICD-10-PCS | Mod: CPTII,,, | Performed by: PAIN MEDICINE

## 2023-05-16 PROCEDURE — 3078F DIAST BP <80 MM HG: CPT | Mod: CPTII,,, | Performed by: PAIN MEDICINE

## 2023-05-16 PROCEDURE — 99214 PR OFFICE/OUTPT VISIT, EST, LEVL IV, 30-39 MIN: ICD-10-PCS | Mod: ,,, | Performed by: PAIN MEDICINE

## 2023-05-16 PROCEDURE — 99999 PR PBB SHADOW E&M-EST. PATIENT-LVL IV: CPT | Mod: PBBFAC,,, | Performed by: PAIN MEDICINE

## 2023-05-16 PROCEDURE — 1160F PR REVIEW ALL MEDS BY PRESCRIBER/CLIN PHARMACIST DOCUMENTED: ICD-10-PCS | Mod: CPTII,,, | Performed by: PAIN MEDICINE

## 2023-05-16 PROCEDURE — 3074F SYST BP LT 130 MM HG: CPT | Mod: CPTII,,, | Performed by: PAIN MEDICINE

## 2023-05-16 PROCEDURE — 1159F MED LIST DOCD IN RCRD: CPT | Mod: CPTII,,, | Performed by: PAIN MEDICINE

## 2023-05-16 PROCEDURE — 99999 PR PBB SHADOW E&M-EST. PATIENT-LVL IV: ICD-10-PCS | Mod: PBBFAC,,, | Performed by: PAIN MEDICINE

## 2023-05-16 PROCEDURE — 3074F PR MOST RECENT SYSTOLIC BLOOD PRESSURE < 130 MM HG: ICD-10-PCS | Mod: CPTII,,, | Performed by: PAIN MEDICINE

## 2023-05-16 PROCEDURE — 3078F PR MOST RECENT DIASTOLIC BLOOD PRESSURE < 80 MM HG: ICD-10-PCS | Mod: CPTII,,, | Performed by: PAIN MEDICINE

## 2023-05-16 PROCEDURE — 3044F HG A1C LEVEL LT 7.0%: CPT | Mod: CPTII,,, | Performed by: PAIN MEDICINE

## 2023-05-16 PROCEDURE — 99214 OFFICE O/P EST MOD 30 MIN: CPT | Mod: ,,, | Performed by: PAIN MEDICINE

## 2023-05-16 NOTE — PROGRESS NOTES
Subjective:     Patient ID: Jia Ugalde is a 62 y.o. female    Chief Complaint: Follow-up      Referred by: No ref. provider found    Disclaimer: This note was generated using voice recognition software.  There may be typographical errors that were missed during proofreading.    HPI:    Interval History (5/16/23):  She returns today for follow up.  She reports that she continues to have back and lower extremity pain as before.  Today, she primarily focused of back pain to the left side and radiates to the posterior aspect of the left thigh with pain extending into the anterior aspect of left thigh to the knee with associated paresthesias.  Denies any focal weakness or bowel bladder dysfunction.  States that she is interested in exploring additional interventional options including spinal cord stimulator if appropriate.      Interval History (7/13/22):  She returns today for follow up.  She reports that Lyrica caused similar side effects to gabapentin.  As a result, she discontinue this medication.  Otherwise, she denies any changes in the quality location for pain.  She denies any new worsening symptoms.  She is not interested in discussing additional surgical options at this time.  She is also not interested in additional interventional procedures.      Interval History (5/13/22):  She returns today for follow up.  She reports that she has discontinued gabapentin.  She states that memory loss and weight gain have improved.  She continues have symptoms of left knee pain/paresthesias as well as left knee weakness.  She denies any significant changes in the quality or location of his pain.  She states that she is not interested in interventional procedures for this problem.  States she has undergone multiple epidurals without improvement.      Interval History (4/20/22):  She returns today for follow up.  She reports that back program has been helpful for the back and lower extremity pain.  She feels the  gabapentin is causing memory loss and weight gain.  She also feels that her left lower extremities becoming weaker.  States that her knee will give out her more frequently in that she has numbness in the area of the left knee.  This is not a new problem but she feels it is worsening      Interval History NP (02/22/22):    Pt returns today for follow up. She reports an improvement in pain since starting [employer]. Stretching in the morning is most helpful. She does continue gabapentin 900 mg nightly without adverse effects. She denies any new or worsening symptoms at this time.     Interval History NP (12/28/21):    Pt returns today for follow up of bilateral L5 TESI. She reports mild-moderate relief from the injection. She continues gabapentin daily and meloxicam only when needed. There are no adverse reactions with these medications. She does not wish to discuss additional interventional options at this time.       Interval History NP (10/5/21):    Pt returns today for follow up and imaging review. Her pain remains unchanged in quality and location. Bilateral lower extremity radicular pain most bothersome at this time. She denies any new or worsening symptoms since last encounter.     Initial Encounter (9/22/21):  Jia Ugalde is a 62 y.o. female who presents today with chronic bilateral low back, hips, thigh pain. The pain has been present for years. She has undergone 2 low back surgeries within the past 3 years. She denies any improvement in her symptoms following surgery. She feels that her pain has actually worsened. The pain is located mainly in the bilateral lower lumbar region and will radiate to the bilateral buttocks and posterior thighs. She denies  Any pain below the knees. She does reports chronic numbness in the medial aspect of her left thigh and weakness of the right knee. She denies any b.b dysfunction. She think she recently underwent bilateral SIJ injections that were not helpful. She  states that pain is only plresent while active. She can only walk for short distances before needing to rest. The pain resolves completely upon sitting. .   This pain is described in detail below.    Physical Therapy: Yes. Performs daily HEP    Non-pharmacologic Treatment: Rest helps         TENS? No    Pain Medications:         Currently taking:     Has tried in the past:  Tylenol, gabapentin, meloxicam    Has not tried: Opioids, Muscle relaxants, TCAs, SNRIs, topical creams    Blood thinners: None    Interventional Therapies:   11/08/2021- bilateral L5 TESI    Relevant Surgeries:   2 lumbar fusion surgeries with hardware    Affecting sleep? Yes    Affecting daily activities? yes    Depressive symptoms? no          SI/HI? No    Work status: Unemployed    Pain Scores:    Best:       3/10  Worst:     10/10  Usually:   7/10  Today:    3/10      Review of Systems   Constitutional:  Negative for activity change, appetite change, chills, fatigue, fever and unexpected weight change.   HENT:  Negative for hearing loss.    Eyes:  Negative for visual disturbance.   Respiratory:  Negative for chest tightness and shortness of breath.    Cardiovascular:  Negative for chest pain.   Gastrointestinal:  Negative for abdominal pain, constipation, diarrhea, nausea and vomiting.   Genitourinary:  Negative for difficulty urinating.   Musculoskeletal:  Positive for arthralgias, back pain, gait problem and myalgias. Negative for neck pain.   Skin:  Negative for rash.   Neurological:  Positive for weakness and numbness. Negative for dizziness, light-headedness and headaches.   Psychiatric/Behavioral:  Positive for sleep disturbance. Negative for hallucinations and suicidal ideas. The patient is not nervous/anxious.        Past Medical History:   Diagnosis Date    GERD (gastroesophageal reflux disease)     Hyperlipidemia        Past Surgical History:   Procedure Laterality Date    BREAST BIOPSY      BREAST SURGERY      COLONOSCOPY N/A  12/8/2021    Procedure: COLONOSCOPY;  Surgeon: Caitlin Joseph MD;  Location: [facility] ENDO;  Service: Endoscopy;  Laterality: N/A;  fully vaccinated-GT    pt states had polyps    EPIDURAL STEROID INJECTION Bilateral 11/8/2021    Procedure: Bilateral L5 Transforaminal Epidural Steroid Injection;  Surgeon: Keron Cristina Jr., MD;  Location: [facility];  Service: Pain Management;  Laterality: Bilateral;  Arrive @ 1030 (requested 10); No ATC or DM; Vacc.    ESOPHAGOGASTRODUODENOSCOPY N/A 2/18/2022    Procedure: EGD (ESOPHAGOGASTRODUODENOSCOPY);  Surgeon: Lowell Robles MD;  [facility]  Service: Endoscopy;  Laterality: N/A;  fully vaccinated  Pt requesting earlier date with any scoping MD - ERW  RAPID - add on / prep ins. emailed and reviewed- ERW    HEMORRHOID SURGERY      HYSTERECTOMY      SPINE SURGERY      TUBAL LIGATION         Social History     Socioeconomic History    Marital status: Single   Tobacco Use    Smoking status: Never    Smokeless tobacco: Never   Substance and Sexual Activity    Alcohol use: Never    Drug use: Never    Sexual activity: Not Currently       Review of patient's allergies indicates:  No Known Allergies    Current Outpatient Medications on File Prior to Visit   Medication Sig Dispense Refill    alendronate (FOSAMAX) 70 MG tablet Take 1 tablet (70 mg total) by mouth every 7 days. 4 tablet 11    budesonide 1 mg/2 mL NbSp EMPTY CONTENTS OF 1 RESPULE INTO NASAL IRRIGATION SYSTEM, ADD DISTILLED WATER, SALT PACK, MIX & IRRIGATE. PERFORM TWICE DAILY      cholecalciferol, vitamin D3, capsule/tablet Take by mouth once daily.      clobetasoL (TEMOVATE) 0.05 % external solution Apply TO SCALP in AREAS of HAIR loss AT betime nightly monday - FRIDAY      evolocumab (REPATHA SURECLICK) 140 mg/mL PnIj Inject 1 mL (140 mg total) into the skin every 14 (fourteen) days. 6 mL 3    finasteride (PROSCAR) 5 mg tablet Take 5 mg by mouth once daily.      fluocinonide (LIDEX) 0.05 % external  solution apply 1ml TO SCALP ONCE TO twice daily AS DIRECTED      fluticasone propionate (FLONASE) 50 mcg/actuation nasal spray 1 spray (50 mcg total) by Each Nostril route once daily. 16 g 3    hydrocortisone (ANUSOL-HC) 2.5 % rectal cream Place rectally 2 (two) times daily. 30 g 1    hydroquinone 2 % Crea APPLY TO AFFECTED AREA(S) 1-2 TIMES DAILY      miscellaneous medical supply Kit 1 Package by Misc.(Non-Drug; Combo Route) route Daily. 1 kit 0    NEILMED SINUS RINSE COMPLETE pkdv use as directed      pantoprazole (PROTONIX) 40 MG tablet TAKE 1 TABLET BY MOUTH once DAILY 90 tablet 1    tobramycin, PF, (OPHELIA) 300 mg/5 mL nebulizer solution EMPTY CONTENTS OF 1 AMPULE INTO NASAL IRRIGATION SYSTEM, ADD DISTILLED WATER, SALT PACK, MIX & IRRIGATE. PERFORM 2 TIMES DAILY      triamcinolone acetonide 0.1% (KENALOG) 0.1 % ointment Apply TO SCALP in AREAS of HAIR loss AT bedtime       No current facility-administered medications on file prior to visit.       Objective:      /76 (BP Location: Left arm, Patient Position: Sitting, BP Method: Medium (Automatic))   Pulse 62   Resp 18   SpO2 98%     Exam:  GEN:  Well developed, well nourished.  No acute distress.  Normal pain behavior.  HEENT:  No trauma.  Mucous membranes moist.  Nares patent bilaterally.  PSYCH: Normal affect. Thought content appropriate.  CHEST:  Breathing symmetric.  No audible wheezing.  ABD: Soft, non-distended.  SKIN:  Warm, pink, dry.  No rash on exposed areas.    EXT:  No cyanosis, clubbing, or edema.  No color change or changes in nail or hair growth.  NEURO/MUSCULOSKELETAL:  Fully alert, oriented, and appropriate. Speech normal marin. No cranial nerve deficits.   Gait:  Normal.  No trendelenburg sign bilaterally.   Motor Strength:  5/5 motor strength throughout lower extremities.   Sensory:  No sensory deficit in the lower extremities.   Reflexes:  1 + right patellar DTR.  Unable to elicit left patellar DTR.  Unable to elicit bilateral  Achilles DTRs.  No clonus or spasticity.  L-Spine:  Limited ROM with pain on flexion and extension.  Negative pain with axial/facet loading bilaterally.  Positive SLR on the left.  Positive femoral stretch on the left.   Positive TTP over midline lower lumbar spine and bilateral lumbar paraspinals                Imaging:  External MRI reviewed today.  Status post L4-5 fusion.  Appears have some adjacent segment disease at the L3-4 level.  May have some left foraminal/lateral recess stenosis causing L3 and possibly L4 impingement.    Assessment:       Encounter Diagnoses   Name Primary?    Spinal stenosis of lumbar region with neurogenic claudication Yes    Lumbar spondylosis     DDD (degenerative disc disease), lumbar     Lumbar radiculopathy     History of lumbar fusion     Lumbar radiculopathy, chronic            Plan:       Jia Vila was seen today for follow-up.    Diagnoses and all orders for this visit:    Spinal stenosis of lumbar region with neurogenic claudication  -     MRI Lumbar Spine Without Contrast; Future    Lumbar spondylosis  -     MRI Lumbar Spine Without Contrast; Future    DDD (degenerative disc disease), lumbar  -     MRI Lumbar Spine Without Contrast; Future    Lumbar radiculopathy  -     MRI Lumbar Spine Without Contrast; Future    History of lumbar fusion  -     MRI Lumbar Spine Without Contrast; Future    Lumbar radiculopathy, chronic  -     MRI Lumbar Spine Without Contrast; Future          Jia Ugalde is a 62 y.o. female with chronic bilateral low back and lower extremity pain.  Symptoms seem to be focused mainly on the left side at this time and are fairly consistent with left L3 radicular pain.    Lumbar MRI to get updated imaging.  Return to clinic after MRI to review results.  May consider additional interventional procedures including epidural steroid injection if appropriate.  May also consider rereferral to Neurosurgery.  Patient may be a good candidate for spinal cord  stimulator, but would need to exhaust other more conservative measures 1st.

## 2023-05-23 ENCOUNTER — HOSPITAL ENCOUNTER (OUTPATIENT)
Dept: RADIOLOGY | Facility: HOSPITAL | Age: 63
Discharge: HOME | End: 2023-05-23
Attending: PAIN MEDICINE
Payer: MEDICARE

## 2023-05-23 DIAGNOSIS — M47.816 LUMBAR SPONDYLOSIS: ICD-10-CM

## 2023-05-23 DIAGNOSIS — M54.16 LUMBAR RADICULOPATHY: ICD-10-CM

## 2023-05-23 DIAGNOSIS — Z98.1 HISTORY OF LUMBAR FUSION: ICD-10-CM

## 2023-05-23 DIAGNOSIS — M48.062 SPINAL STENOSIS OF LUMBAR REGION WITH NEUROGENIC CLAUDICATION: ICD-10-CM

## 2023-05-23 DIAGNOSIS — M54.16 LUMBAR RADICULOPATHY, CHRONIC: ICD-10-CM

## 2023-05-23 DIAGNOSIS — M51.36 DDD (DEGENERATIVE DISC DISEASE), LUMBAR: ICD-10-CM

## 2023-05-23 PROCEDURE — 72148 MRI LUMBAR SPINE W/O DYE: CPT | Mod: TC

## 2023-05-23 PROCEDURE — 72148 MRI LUMBAR SPINE W/O DYE: CPT | Mod: 26,,, | Performed by: RADIOLOGY

## 2023-05-23 PROCEDURE — 72148 MRI LUMBAR SPINE WITHOUT CONTRAST: ICD-10-PCS | Mod: 26,,, | Performed by: RADIOLOGY

## 2023-05-26 ENCOUNTER — OFFICE VISIT (OUTPATIENT)
Dept: PAIN MEDICINE | Facility: CLINIC | Age: 63
End: 2023-05-26
Payer: MEDICARE

## 2023-05-26 ENCOUNTER — OFFICE VISIT (OUTPATIENT)
Dept: CARDIOLOGY | Facility: CLINIC | Age: 63
End: 2023-05-26
Payer: MEDICARE

## 2023-05-26 VITALS
HEART RATE: 70 BPM | DIASTOLIC BLOOD PRESSURE: 79 MMHG | RESPIRATION RATE: 16 BRPM | OXYGEN SATURATION: 100 % | SYSTOLIC BLOOD PRESSURE: 134 MMHG

## 2023-05-26 VITALS
OXYGEN SATURATION: 98 % | HEIGHT: 63 IN | HEART RATE: 98 BPM | DIASTOLIC BLOOD PRESSURE: 74 MMHG | SYSTOLIC BLOOD PRESSURE: 126 MMHG | WEIGHT: 189.06 LBS | BODY MASS INDEX: 33.5 KG/M2 | RESPIRATION RATE: 15 BRPM

## 2023-05-26 DIAGNOSIS — M51.36 DDD (DEGENERATIVE DISC DISEASE), LUMBAR: ICD-10-CM

## 2023-05-26 DIAGNOSIS — M54.16 LUMBAR RADICULOPATHY: ICD-10-CM

## 2023-05-26 DIAGNOSIS — E78.2 MIXED HYPERLIPIDEMIA: ICD-10-CM

## 2023-05-26 DIAGNOSIS — K59.00 CONSTIPATION, UNSPECIFIED CONSTIPATION TYPE: Primary | ICD-10-CM

## 2023-05-26 DIAGNOSIS — M47.816 LUMBAR SPONDYLOSIS: Primary | ICD-10-CM

## 2023-05-26 DIAGNOSIS — Z98.1 HISTORY OF LUMBAR FUSION: ICD-10-CM

## 2023-05-26 DIAGNOSIS — M48.062 SPINAL STENOSIS OF LUMBAR REGION WITH NEUROGENIC CLAUDICATION: ICD-10-CM

## 2023-05-26 PROCEDURE — 3044F HG A1C LEVEL LT 7.0%: CPT | Mod: CPTII,S$GLB,, | Performed by: PAIN MEDICINE

## 2023-05-26 PROCEDURE — 1160F RVW MEDS BY RX/DR IN RCRD: CPT | Mod: CPTII,S$GLB,, | Performed by: INTERNAL MEDICINE

## 2023-05-26 PROCEDURE — 3044F PR MOST RECENT HEMOGLOBIN A1C LEVEL <7.0%: ICD-10-PCS | Mod: CPTII,S$GLB,, | Performed by: INTERNAL MEDICINE

## 2023-05-26 PROCEDURE — 3044F HG A1C LEVEL LT 7.0%: CPT | Mod: CPTII,S$GLB,, | Performed by: INTERNAL MEDICINE

## 2023-05-26 PROCEDURE — 1159F PR MEDICATION LIST DOCUMENTED IN MEDICAL RECORD: ICD-10-PCS | Mod: CPTII,S$GLB,, | Performed by: PAIN MEDICINE

## 2023-05-26 PROCEDURE — 99999 PR PBB SHADOW E&M-EST. PATIENT-LVL III: ICD-10-PCS | Mod: PBBFAC,,, | Performed by: PAIN MEDICINE

## 2023-05-26 PROCEDURE — 99999 PR PBB SHADOW E&M-EST. PATIENT-LVL III: CPT | Mod: PBBFAC,,, | Performed by: PAIN MEDICINE

## 2023-05-26 PROCEDURE — 1160F PR REVIEW ALL MEDS BY PRESCRIBER/CLIN PHARMACIST DOCUMENTED: ICD-10-PCS | Mod: CPTII,S$GLB,, | Performed by: INTERNAL MEDICINE

## 2023-05-26 PROCEDURE — 1159F MED LIST DOCD IN RCRD: CPT | Mod: CPTII,S$GLB,, | Performed by: PAIN MEDICINE

## 2023-05-26 PROCEDURE — 99214 OFFICE O/P EST MOD 30 MIN: CPT | Mod: S$GLB,,, | Performed by: PAIN MEDICINE

## 2023-05-26 PROCEDURE — 3078F DIAST BP <80 MM HG: CPT | Mod: CPTII,S$GLB,, | Performed by: PAIN MEDICINE

## 2023-05-26 PROCEDURE — 99214 PR OFFICE/OUTPT VISIT, EST, LEVL IV, 30-39 MIN: ICD-10-PCS | Mod: S$GLB,,, | Performed by: PAIN MEDICINE

## 2023-05-26 PROCEDURE — 3074F PR MOST RECENT SYSTOLIC BLOOD PRESSURE < 130 MM HG: ICD-10-PCS | Mod: CPTII,S$GLB,, | Performed by: INTERNAL MEDICINE

## 2023-05-26 PROCEDURE — 3075F SYST BP GE 130 - 139MM HG: CPT | Mod: CPTII,S$GLB,, | Performed by: PAIN MEDICINE

## 2023-05-26 PROCEDURE — 3008F BODY MASS INDEX DOCD: CPT | Mod: CPTII,S$GLB,, | Performed by: INTERNAL MEDICINE

## 2023-05-26 PROCEDURE — 99999 PR PBB SHADOW E&M-EST. PATIENT-LVL IV: CPT | Mod: PBBFAC,,, | Performed by: INTERNAL MEDICINE

## 2023-05-26 PROCEDURE — 3074F SYST BP LT 130 MM HG: CPT | Mod: CPTII,S$GLB,, | Performed by: INTERNAL MEDICINE

## 2023-05-26 PROCEDURE — 3078F PR MOST RECENT DIASTOLIC BLOOD PRESSURE < 80 MM HG: ICD-10-PCS | Mod: CPTII,S$GLB,, | Performed by: INTERNAL MEDICINE

## 2023-05-26 PROCEDURE — 1159F PR MEDICATION LIST DOCUMENTED IN MEDICAL RECORD: ICD-10-PCS | Mod: CPTII,S$GLB,, | Performed by: INTERNAL MEDICINE

## 2023-05-26 PROCEDURE — 93000 ELECTROCARDIOGRAM COMPLETE: CPT | Mod: S$GLB,,, | Performed by: INTERNAL MEDICINE

## 2023-05-26 PROCEDURE — 99214 OFFICE O/P EST MOD 30 MIN: CPT | Mod: S$GLB,,, | Performed by: INTERNAL MEDICINE

## 2023-05-26 PROCEDURE — 3075F PR MOST RECENT SYSTOLIC BLOOD PRESS GE 130-139MM HG: ICD-10-PCS | Mod: CPTII,S$GLB,, | Performed by: PAIN MEDICINE

## 2023-05-26 PROCEDURE — 3044F PR MOST RECENT HEMOGLOBIN A1C LEVEL <7.0%: ICD-10-PCS | Mod: CPTII,S$GLB,, | Performed by: PAIN MEDICINE

## 2023-05-26 PROCEDURE — 99999 PR PBB SHADOW E&M-EST. PATIENT-LVL IV: ICD-10-PCS | Mod: PBBFAC,,, | Performed by: INTERNAL MEDICINE

## 2023-05-26 PROCEDURE — 3078F PR MOST RECENT DIASTOLIC BLOOD PRESSURE < 80 MM HG: ICD-10-PCS | Mod: CPTII,S$GLB,, | Performed by: PAIN MEDICINE

## 2023-05-26 PROCEDURE — 1159F MED LIST DOCD IN RCRD: CPT | Mod: CPTII,S$GLB,, | Performed by: INTERNAL MEDICINE

## 2023-05-26 PROCEDURE — 3008F PR BODY MASS INDEX (BMI) DOCUMENTED: ICD-10-PCS | Mod: CPTII,S$GLB,, | Performed by: INTERNAL MEDICINE

## 2023-05-26 PROCEDURE — 99214 PR OFFICE/OUTPT VISIT, EST, LEVL IV, 30-39 MIN: ICD-10-PCS | Mod: S$GLB,,, | Performed by: INTERNAL MEDICINE

## 2023-05-26 PROCEDURE — 3078F DIAST BP <80 MM HG: CPT | Mod: CPTII,S$GLB,, | Performed by: INTERNAL MEDICINE

## 2023-05-26 PROCEDURE — 93000 EKG 12-LEAD: ICD-10-PCS | Mod: S$GLB,,, | Performed by: INTERNAL MEDICINE

## 2023-05-26 NOTE — PROGRESS NOTES
Subjective:     Patient ID: Jia Ugalde is a 62 y.o. female    Chief Complaint: Follow-up (MRI Review )      Referred by: No ref. provider found    Disclaimer: This note was generated using voice recognition software.  There may be typographical errors that were missed during proofreading.    HPI:    Interval History (5/26/23):  She returns today for follow up and MRI review.  She reports that her back pain is overall unchanged since last encounter.  Still has left-sided pain as before, but now having worsening right-sided pain in a similar distribution.  Denies any new numbness, tingling, weakness, bowel bladder dysfunction.        Interval History (5/16/23):  She returns today for follow up.  She reports that she continues to have back and lower extremity pain as before.  Today, she primarily focused of back pain to the left side and radiates to the posterior aspect of the left thigh with pain extending into the anterior aspect of left thigh to the knee with associated paresthesias.  Denies any focal weakness or bowel bladder dysfunction.  States that she is interested in exploring additional interventional options including spinal cord stimulator if appropriate.      Interval History (7/13/22):  She returns today for follow up.  She reports that Lyrica caused similar side effects to gabapentin.  As a result, she discontinue this medication.  Otherwise, she denies any changes in the quality location for pain.  She denies any new worsening symptoms.  She is not interested in discussing additional surgical options at this time.  She is also not interested in additional interventional procedures.      Interval History (5/13/22):  She returns today for follow up.  She reports that she has discontinued gabapentin.  She states that memory loss and weight gain have improved.  She continues have symptoms of left knee pain/paresthesias as well as left knee weakness.  She denies any significant changes in the quality  or location of his pain.  She states that she is not interested in interventional procedures for this problem.  States she has undergone multiple epidurals without improvement.      Interval History (4/20/22):  She returns today for follow up.  She reports that back program has been helpful for the back and lower extremity pain.  She feels the gabapentin is causing memory loss and weight gain.  She also feels that her left lower extremities becoming weaker.  States that her knee will give out her more frequently in that she has numbness in the area of the left knee.  This is not a new problem but she feels it is worsening      Interval History NP (02/22/22):    Pt returns today for follow up. She reports an improvement in pain since starting [employer]. Stretching in the morning is most helpful. She does continue gabapentin 900 mg nightly without adverse effects. She denies any new or worsening symptoms at this time.     Interval History NP (12/28/21):    Pt returns today for follow up of bilateral L5 TESI. She reports mild-moderate relief from the injection. She continues gabapentin daily and meloxicam only when needed. There are no adverse reactions with these medications. She does not wish to discuss additional interventional options at this time.       Interval History NP (10/5/21):    Pt returns today for follow up and imaging review. Her pain remains unchanged in quality and location. Bilateral lower extremity radicular pain most bothersome at this time. She denies any new or worsening symptoms since last encounter.     Initial Encounter (9/22/21):  Jia Ugalde is a 62 y.o. female who presents today with chronic bilateral low back, hips, thigh pain. The pain has been present for years. She has undergone 2 low back surgeries within the past 3 years. She denies any improvement in her symptoms following surgery. She feels that her pain has actually worsened. The pain is located mainly in the bilateral  lower lumbar region and will radiate to the bilateral buttocks and posterior thighs. She denies  Any pain below the knees. She does reports chronic numbness in the medial aspect of her left thigh and weakness of the right knee. She denies any b.b dysfunction. She think she recently underwent bilateral SIJ injections that were not helpful. She states that pain is only plresent while active. She can only walk for short distances before needing to rest. The pain resolves completely upon sitting. .   This pain is described in detail below.    Physical Therapy: Yes. Performs daily HEP    Non-pharmacologic Treatment: Rest helps         TENS? No    Pain Medications:         Currently taking:     Has tried in the past:  Tylenol, gabapentin, meloxicam    Has not tried: Opioids, Muscle relaxants, TCAs, SNRIs, topical creams    Blood thinners: None    Interventional Therapies:   11/08/2021- bilateral L5 TESI    Relevant Surgeries:   2 lumbar fusion surgeries with hardware    Affecting sleep? Yes    Affecting daily activities? yes    Depressive symptoms? no          SI/HI? No    Work status: Unemployed    Pain Scores:    Best:       5/10  Worst:     10/10  Usually:   7/10  Today:    5/10      Review of Systems   Constitutional:  Negative for activity change, appetite change, chills, fatigue, fever and unexpected weight change.   HENT:  Negative for hearing loss.    Eyes:  Negative for visual disturbance.   Respiratory:  Negative for chest tightness and shortness of breath.    Cardiovascular:  Negative for chest pain.   Gastrointestinal:  Negative for abdominal pain, constipation, diarrhea, nausea and vomiting.   Genitourinary:  Negative for difficulty urinating.   Musculoskeletal:  Positive for arthralgias, back pain, gait problem and myalgias. Negative for neck pain.   Skin:  Negative for rash.   Neurological:  Positive for weakness and numbness. Negative for dizziness, light-headedness and headaches.   Psychiatric/Behavioral:   Positive for sleep disturbance. Negative for hallucinations and suicidal ideas. The patient is not nervous/anxious.        Past Medical History:   Diagnosis Date    GERD (gastroesophageal reflux disease)     Hyperlipidemia        Past Surgical History:   Procedure Laterality Date    BREAST BIOPSY      BREAST SURGERY      COLONOSCOPY N/A 12/8/2021    Procedure: COLONOSCOPY;  Surgeon: Caitlin Joseph MD;  Location: [facility];  Service: Endoscopy;  Laterality: N/A;  fully vaccinated-GT    pt states had polyps    EPIDURAL STEROID INJECTION Bilateral 11/8/2021    Procedure: Bilateral L5 Transforaminal Epidural Steroid Injection;  Surgeon: Keron Cristina Jr., MD;  Location: [facility];  Service: Pain Management;  Laterality: Bilateral;  Arrive @ 1030 (requested 10); No ATC or DM; Vacc.    ESOPHAGOGASTRODUODENOSCOPY N/A 2/18/2022    Procedure: EGD (ESOPHAGOGASTRODUODENOSCOPY);  Surgeon: Lowell Robles MD;  Location: [facility] ([facility] FLR);  Service: Endoscopy;  Laterality: N/A;  fully vaccinated  Pt requesting earlier date with any scoping MD - ERW  RAPID - add on / prep ins. emailed and reviewed- ERW    HEMORRHOID SURGERY      HYSTERECTOMY      SPINE SURGERY      TUBAL LIGATION         Social History     Socioeconomic History    Marital status: Single   Tobacco Use    Smoking status: Never    Smokeless tobacco: Never   Substance and Sexual Activity    Alcohol use: Never    Drug use: Never    Sexual activity: Not Currently       Review of patient's allergies indicates:  No Known Allergies    Current Outpatient Medications on File Prior to Visit   Medication Sig Dispense Refill    alendronate (FOSAMAX) 70 MG tablet Take 1 tablet (70 mg total) by mouth every 7 days. 4 tablet 11    budesonide 1 mg/2 mL NbSp EMPTY CONTENTS OF 1 RESPULE INTO NASAL IRRIGATION SYSTEM, ADD DISTILLED WATER, SALT PACK, MIX & IRRIGATE. PERFORM TWICE DAILY      cholecalciferol, vitamin D3, capsule/tablet Take by mouth once daily.      clobetasoL  (TEMOVATE) 0.05 % external solution Apply TO SCALP in AREAS of HAIR loss AT betime nightly monday - FRIDAY      evolocumab (REPATHA SURECLICK) 140 mg/mL PnIj Inject 1 mL (140 mg total) into the skin every 14 (fourteen) days. 6 mL 3    finasteride (PROSCAR) 5 mg tablet Take 5 mg by mouth once daily.      fluocinonide (LIDEX) 0.05 % external solution apply 1ml TO SCALP ONCE TO twice daily AS DIRECTED      fluticasone propionate (FLONASE) 50 mcg/actuation nasal spray 1 spray (50 mcg total) by Each Nostril route once daily. 16 g 3    hydrocortisone (ANUSOL-HC) 2.5 % rectal cream Place rectally 2 (two) times daily. 30 g 1    hydroquinone 2 % Crea APPLY TO AFFECTED AREA(S) 1-2 TIMES DAILY      miscellaneous medical supply Kit 1 Package by Misc.(Non-Drug; Combo Route) route Daily. 1 kit 0    NEILMED SINUS RINSE COMPLETE pkdv use as directed      pantoprazole (PROTONIX) 40 MG tablet TAKE 1 TABLET BY MOUTH once DAILY 90 tablet 1    tobramycin, PF, (OPHELIA) 300 mg/5 mL nebulizer solution EMPTY CONTENTS OF 1 AMPULE INTO NASAL IRRIGATION SYSTEM, ADD DISTILLED WATER, SALT PACK, MIX & IRRIGATE. PERFORM 2 TIMES DAILY      triamcinolone acetonide 0.1% (KENALOG) 0.1 % ointment Apply TO SCALP in AREAS of HAIR loss AT bedtime       No current facility-administered medications on file prior to visit.       Objective:      /79 (BP Location: Right arm, Patient Position: Sitting, BP Method: Medium (Automatic))   Pulse 70   Resp 16   SpO2 100%     Exam:  GEN:  Well developed, well nourished.  No acute distress.   HEENT:  No trauma.  Mucous membranes moist.  Nares patent bilaterally.  PSYCH: Normal affect. Thought content appropriate.  CHEST:  Breathing symmetric.  No audible wheezing.  ABD: Soft, non-distended.  SKIN:  Warm, pink, dry.  No rash on exposed areas.    EXT:  No cyanosis, clubbing, or edema.  No color change or changes in nail or hair growth.  NEURO/MUSCULOSKELETAL:  Fully alert, oriented, and appropriate. Speech normal  marin. No cranial nerve deficits.   Gait:  Normal.  No focal motor deficits.                   Imaging:    Narrative & Impression    EXAMINATION:  MRI LUMBAR SPINE WITHOUT CONTRAST     CLINICAL HISTORY:  Lumbar radiculopathy, symptoms persist with conservative treatment; Spinal stenosis, lumbar region with neurogenic claudication     TECHNIQUE:  Multiplanar, multisequence MR images were acquired from the thoracolumbar junction to the sacrum without the administration of contrast.     COMPARISON:  Radiographs 09/22/2021..     FINDINGS:  Morphology: There is artifact from the patient's posterior interbody fusion changes spanning L4-L5.  Marrow signal is otherwise grossly within normal limits allowing for exam limitations and vertebral body heights are preserved noting minimal fatty marrow replacement at L5-S1 in the setting of degenerative disc height loss discussed in further detail below..     Alignment: Trace anterolisthesis of L3 on L4..  Mild levocurvature.     Cord: Normal in contour, caliber, and signal intensity.  Conus positioning is within normal limits.     Disc levels:     T12-L1: Within normal limits.  The spinal canal and foramina are patent.     L1-L2: Within normal limits.  The spinal canal and foramina are patent.     L2-L3: Evidence of previous left hemilaminectomy.  Mild degenerative disc height loss with disc desiccation and shallow disc bulging as well as moderate right-sided facet arthrosis and ligamentum flavum thickening producing overall mild to moderate narrowing of the spinal canal and encroachment of both subarticular recesses and moderate left/mild right foraminal narrowing.     L3-L4: Mild to moderate degenerative disc height loss and desiccation with shallow disc bulging and moderate bilateral facet arthrosis with ligamentum flavum thickening producing overall mild to moderate narrowing of the spinal canal and encroachment of both subarticular recesses as well as moderate left and  mild-to-moderate right foraminal narrowing.     L4-L5: Posterior interbody fusion changes and decompression of the spinal canal as well as decompression of the foramina allowing for regional artifact.     L5-S1: Moderate to severe degenerative disc height loss and desiccation with shallow disc bulging and moderate bilateral facet arthrosis producing mild narrowing of the spinal canal and encroachment of both subarticular recesses and mild to moderate bilateral foraminal narrowing.     Other: Visualized paraspinal soft tissues are within normal limits.     Impression:     1. Routine postoperative appearance of the lumbar spine at L2-L3 and L4-L5 without evidence of an acute process.  2. Mild to moderate multifactorial spinal canal narrowing at L2-L3 as well as moderate left foraminal narrowing.  3. Mild to moderate multifactorial spinal canal narrowing at L3-L4 as well as moderate left and mild-to-moderate right foraminal narrowing.  4. Moderate to severe disc height loss at L5-S1 with mild narrowing of the spinal canal and subarticular recesses and mild to moderate bilateral foraminal narrowing.        Electronically signed by: Otis Monzon  Date:                                            05/23/2023  Time:                                           14:09         Assessment:       Encounter Diagnoses   Name Primary?    Lumbar spondylosis Yes    DDD (degenerative disc disease), lumbar     Lumbar radiculopathy     History of lumbar fusion              Plan:       Jia Vila was seen today for follow-up.    Diagnoses and all orders for this visit:    Lumbar spondylosis    DDD (degenerative disc disease), lumbar    Lumbar radiculopathy    History of lumbar fusion      Jia Ugalde is a 62 y.o. female with chronic bilateral low back and lower extremity pain.  Symptoms fairly equal bilaterally at this time and are consistent with left L3 radicular pain.  Updated lumbar MRI does show bilateral foraminal stenosis at the L3-4  level.    Pertinent imaging studies reviewed by me. Imaging results were discussed with patient.  We discussed performing bilateral L4 transforaminal epidural steroid injections.  Patient is not sure if she would like to undergo this procedure.  She was consented for the procedure will call to schedule she decides to have it done.  Return to clinic as needed.

## 2023-05-26 NOTE — PROGRESS NOTES
CARDIOLOGY CLINIC VISIT        HISTORY OF PRESENT ILLNESS:     Jia Ugalde presents for continued care.  Last seen 02/25/2022. Seen 01/26/2022 for evaluation of chest pain.  Patient noted left-sided chest discomfort.  Episodes worse with inspiration.  Usually last less than 1 minute.  Exercise stress echocardiogram showed ejection fraction of 55%.  Normal pulmonary pressure.  No significant valvular abnormalities.  The patient exercised for 4 minutes 34 seconds.  Functional capacity 6 Mets.  Hypertensive response exercise 218/118.  History of statin myopathy.  Last . She has been approved for Repatha.  She states that she had no symptoms during the stress test.  Blood pressure is elevated today.  She says she has values of 120s to 130 systolic at home.    06/28/2022:  Feels good.  No complaints.  Has been on Repatha for 3 months.    12/16/2022: Latest . Prior 227.  No Complaints.    05/26/2023:  2 nights ago had an episode of left upper quadrant discomfort.  Awakened her from sleep.  Radiated to her back.  She ended uptake in a suppository.  Had a bowel movement and felt better.  Her EKG today shows normal sinus rhythm.  Last lipids similar to previous.  She wants exercise but because of her chronic back pain it limits her.    CARDIOVASCULAR HISTORY:     None    PAST MEDICAL HISTORY:     Past Medical History:   Diagnosis Date    GERD (gastroesophageal reflux disease)     Hyperlipidemia        PAST SURGICAL HISTORY:     Past Surgical History:   Procedure Laterality Date    BREAST BIOPSY      BREAST SURGERY      COLONOSCOPY N/A 12/8/2021    Procedure: COLONOSCOPY;  Surgeon: Caitlin Joseph MD;  Location: [facility];  Service: Endoscopy;  Laterality: N/A;  fully vaccinated-GT    pt states had polyps    EPIDURAL STEROID INJECTION Bilateral 11/8/2021    Procedure: Bilateral L5 Transforaminal Epidural Steroid Injection;  Surgeon: Keron Cristina Jr., MD;  Location: [facility];  Service: Pain Management;   Laterality: Bilateral;  Arrive @ 1030 (requested 10); No ATC or DM; Vacc.    ESOPHAGOGASTRODUODENOSCOPY N/A 2/18/2022    Procedure: EGD (ESOPHAGOGASTRODUODENOSCOPY);  Surgeon: Lowell Robles MD;  Location: [facility] ([address] [place] [address]);  Service: Endoscopy;  Laterality: N/A;  fully vaccinated  Pt requesting earlier date with any scoping MD - ERW  RAPID - add on / prep ins. emailed and reviewed- ERW    HEMORRHOID SURGERY      HYSTERECTOMY      SPINE SURGERY      TUBAL LIGATION         ALLERGIES AND MEDICATION:   Review of patient's allergies indicates:  No Known Allergies     Medication List            Accurate as of May 26, 2023 10:44 AM. If you have any questions, ask your nurse or doctor.                CONTINUE taking these medications      alendronate 70 MG tablet  Commonly known as: FOSAMAX  Take 1 tablet (70 mg total) by mouth every 7 days.     budesonide 1 mg/2 mL Nbsp     cholecalciferol (vitamin D3) capsule/tablet     clobetasoL 0.05 % external solution  Commonly known as: TEMOVATE     finasteride 5 mg tablet  Commonly known as: PROSCAR     fluocinonide 0.05 % external solution  Commonly known as: LIDEX     fluticasone propionate 50 mcg/actuation nasal spray  Commonly known as: FLONASE  1 spray (50 mcg total) by Each Nostril route once daily.     hydrocortisone 2.5 % rectal cream  Commonly known as: ANUSOL-HC  Place rectally 2 (two) times daily.     hydroquinone 2 % Crea     miscellaneous medical supply Kit  1 Package by Misc.(Non-Drug; Combo Route) route Daily.     NEILMED SINUS RINSE COMPLETE Pkdv  Generic drug: sod chlor-bicarb-squeez bottle     pantoprazole 40 MG tablet  Commonly known as: PROTONIX  TAKE 1 TABLET BY MOUTH once DAILY     REPATHA SURECLICK 140 mg/mL Pnij  Generic drug: evolocumab  Inject 1 mL (140 mg total) into the skin every 14 (fourteen) days.     tobramycin (PF) 300 mg/5 mL nebulizer solution  Commonly known as: OPHELIA     triamcinolone acetonide 0.1% 0.1 % ointment  Commonly known as: KENALOG         "      SOCIAL HISTORY:     Social History     Socioeconomic History    Marital status: Single   Tobacco Use    Smoking status: Never    Smokeless tobacco: Never   Substance and Sexual Activity    Alcohol use: Never    Drug use: Never    Sexual activity: Not Currently       FAMILY HISTORY:     Family History   Problem Relation Age of Onset    Pancreatic cancer Maternal Grandmother     Pancreatic cancer Paternal Grandmother        REVIEW OF SYSTEMS:   Review of Systems   Constitutional:  Negative for chills, diaphoresis, fever, malaise/fatigue and weight loss.   HENT:  Negative for congestion, hearing loss, sinus pain, sore throat and tinnitus.    Eyes:  Negative for blurred vision, double vision, photophobia and pain.   Respiratory:  Negative for cough, hemoptysis, sputum production, shortness of breath, wheezing and stridor.    Cardiovascular:  Negative for chest pain, palpitations, orthopnea, claudication, leg swelling and PND.   Gastrointestinal:  Positive for constipation. Negative for abdominal pain, blood in stool, heartburn, melena, nausea and vomiting.   Musculoskeletal:  Positive for back pain and joint pain. Negative for falls, myalgias and neck pain.   Neurological:  Negative for dizziness, tingling, tremors, sensory change, speech change, focal weakness, seizures, loss of consciousness, weakness and headaches.   Endo/Heme/Allergies:  Does not bruise/bleed easily.   Psychiatric/Behavioral:  Negative for depression, memory loss and substance abuse. The patient is not nervous/anxious.      PHYSICAL EXAM:     Vitals:    05/26/23 1006   BP: 126/74   Pulse: 98   Resp: 15    Body mass index is 33.49 kg/m².  Weight: 85.7 kg (189 lb 0.7 oz)   Height: 5' 3" (160 cm)     Physical Exam  Vitals reviewed.   Constitutional:       General: She is not in acute distress.     Appearance: She is well-developed. She is obese. She is not diaphoretic.   HENT:      Head: Normocephalic.   Neck:      Vascular: No carotid bruit or " JVD.   Cardiovascular:      Rate and Rhythm: Normal rate and regular rhythm.      Pulses: Normal pulses.      Heart sounds: Normal heart sounds.   Pulmonary:      Effort: Pulmonary effort is normal.      Breath sounds: Normal breath sounds.   Abdominal:      General: Bowel sounds are normal.      Palpations: Abdomen is soft.      Tenderness: There is no abdominal tenderness.   Skin:     General: Skin is warm and dry.   Neurological:      Mental Status: She is alert and oriented to person, place, and time.   Psychiatric:         Speech: Speech normal.         Behavior: Behavior normal.         Thought Content: Thought content normal.       DATA:   EKG: (personally reviewed tracing)  05/26/2023-normal sinus rhythm  Laboratory:  CBC:  Recent Labs   Lab 10/20/20  1211 12/02/21  0857 01/26/23  1120   WBC 5.31 5.49 5.38   Hemoglobin 13.1 12.8 13.4   Hematocrit 42.3 40.5 42.0   Platelets 272 255 313       CHEMISTRIES:  Recent Labs   Lab 03/12/21  0836 08/26/21  1005 12/02/21  0857 01/26/23  1120 02/28/23  0830   Glucose 98 99 92 95 98   Sodium 141 140 139 141 140   Potassium 4.4 4.1 4.2 4.7 4.2   BUN 14 12 13 11 16   Creatinine 1.0 0.9 0.9 1.2 1.2   eGFR if African American >60 >60.0 >60.0  --   --    eGFR if non African American >60 >60.0 >60.0  --   --    Calcium 9.1 9.9 9.9 10.0 9.7   Magnesium  --  1.9  --   --   --        CARDIAC BIOMARKERS:  Recent Labs   Lab 08/26/21  1005          COAGS:        LIPIDS/LFTS:  Recent Labs   Lab 03/12/21  0836 12/02/21  0857 06/28/22  1107 01/26/23  1120   Cholesterol 297 H 307 H 212 H 212 H   Triglycerides 112 88 124 73   HDL 65 62 62 68   LDL Cholesterol 209.6 H 227.4 H 125.2 129.4   Non-HDL Cholesterol 232 245 150 144   AST 21 22  --  21   ALT 14 17  --  14       Cardiovascular Testing:    Exercise stress echocardiogram 02/09/2022:    The estimated ejection fraction is 55%.  The patient's exercise capacity was below average.  The test was stopped because the patient  experienced fatigue.  There were no arrhythmias during stress.  The left ventricle is normal in size with normal systolic function.  Indeterminate left ventricular diastolic function.  Normal right ventricular size with normal right ventricular systolic function.  Moderate left atrial enlargement.  Mild tricuspid regurgitation.  Mild right atrial enlargement.  The estimated PA systolic pressure is 27 mmHg.  Normal central venous pressure (3 mmHg).  Mild mitral regurgitation.  The stress echo portion of this study is negative for myocardial ischemia.  The ECG portion of this study is negative for myocardial ischemia.      ASSESSMENT:     Abdominal discomfort  Hyperlipidemia  Statin myopathy  Obesity  Chronic back pain    PLAN:     Abdominal discomfort:  Appears to be related to constipation.  Hyperlipidemia: Continue current management.  Return to clinic 4 months.           Jc Rogers MD, MPH, FACC, [facility]

## 2023-06-05 ENCOUNTER — TELEPHONE (OUTPATIENT)
Dept: DERMATOLOGY | Facility: CLINIC | Age: 63
End: 2023-06-05
Payer: MEDICARE

## 2023-06-05 NOTE — TELEPHONE ENCOUNTER
Returned call. Pt inquiring if there is any african american dermatologist in [place]. Pt informed I would reach out to my supervisor and follow up.   ----- Message from Jacquelin Cruz sent at 6/5/2023 12:06 PM CDT -----  Pt is requesting a call back concerning an appt. Call back at [phone]

## 2023-06-06 ENCOUNTER — PES CALL (OUTPATIENT)
Dept: ADMINISTRATIVE | Facility: CLINIC | Age: 63
End: 2023-06-06
Payer: MEDICARE

## 2023-06-07 ENCOUNTER — SPECIALTY PHARMACY (OUTPATIENT)
Dept: PHARMACY | Facility: CLINIC | Age: 63
End: 2023-06-07
Payer: MEDICARE

## 2023-06-07 NOTE — TELEPHONE ENCOUNTER
Specialty Pharmacy - Refill Coordination    Specialty Medication Orders Linked to Encounter      Flowsheet Row Most Recent Value   Medication #1 evolocumab (REPATHA SURECLICK) 140 mg/mL PnIj (Order#[id number], Rx#[id number])        Patient prefers a 84 day supply.    Refill Questions - Documented Responses      Flowsheet Row Most Recent Value   Patient Availability and HIPAA Verification    Does patient want to proceed with activity? Yes   HIPAA/medical authority confirmed? Yes   Relationship to patient of person spoken to? Self   Refill Screening Questions    Would patient like to speak to a pharmacist? No   When does the patient need to receive the medication? 06/12/23   Refill Delivery Questions    How will the patient receive the medication? MEDRx   When does the patient need to receive the medication? 06/12/23   Shipping Address Home   Address in [facility] confirmed and updated if neccessary? Yes   Expected Copay ($) 0   Is the patient able to afford the medication copay? Yes   Payment Method zero copay   Days supply of Refill 84   Supplies needed? No supplies needed   Refill activity completed? Yes   Refill activity plan Refill scheduled   Shipment/Pickup Date: 06/08/23            Current Outpatient Medications   Medication Sig    alendronate (FOSAMAX) 70 MG tablet Take 1 tablet (70 mg total) by mouth every 7 days.    budesonide 1 mg/2 mL NbSp EMPTY CONTENTS OF 1 RESPULE INTO NASAL IRRIGATION SYSTEM, ADD DISTILLED WATER, SALT PACK, MIX & IRRIGATE. PERFORM TWICE DAILY    cholecalciferol, vitamin D3, capsule/tablet Take by mouth once daily.    clobetasoL (TEMOVATE) 0.05 % external solution Apply TO SCALP in AREAS of HAIR loss AT betime nightly monday - FRIDAY    evolocumab (REPATHA SURECLICK) 140 mg/mL PnIj Inject 1 mL (140 mg total) into the skin every 14 (fourteen) days.    finasteride (PROSCAR) 5 mg tablet Take 5 mg by mouth once daily.    fluocinonide (LIDEX) 0.05 % external solution apply 1ml TO  SCALP ONCE TO twice daily AS DIRECTED    fluticasone propionate (FLONASE) 50 mcg/actuation nasal spray 1 spray (50 mcg total) by Each Nostril route once daily.    hydrocortisone (ANUSOL-HC) 2.5 % rectal cream Place rectally 2 (two) times daily.    hydroquinone 2 % Crea APPLY TO AFFECTED AREA(S) 1-2 TIMES DAILY    [employer] medical supply Kit 1 Package by Misc.(Non-Drug; Combo Route) route Daily.    NEILMED SINUS RINSE COMPLETE pkdv use as directed    pantoprazole (PROTONIX) 40 MG tablet TAKE 1 TABLET BY MOUTH once DAILY    tobramycin, PF, (OPHELIA) 300 mg/5 mL nebulizer solution EMPTY CONTENTS OF 1 AMPULE INTO NASAL IRRIGATION SYSTEM, ADD DISTILLED WATER, SALT PACK, MIX & IRRIGATE. PERFORM 2 TIMES DAILY    triamcinolone acetonide 0.1% (KENALOG) 0.1 % ointment Apply TO SCALP in AREAS of HAIR loss AT bedtime   Last reviewed on 5/26/2023 10:33 AM by Jc Rogers MD    Review of patient's allergies indicates:  No Known Allergies Last reviewed on  5/26/2023 10:33 AM by Jc Rogers      Tasks added this encounter   No tasks added.   Tasks due within next 3 months   6/12/2023 - Refill Coordination Outreach (1 time occurrence)     Alisha Pruitt, PharmD  [facility]  [address] [place] [id number]  Phone: [phone]  Fax: [phone]

## 2023-06-09 ENCOUNTER — TELEPHONE (OUTPATIENT)
Dept: FAMILY MEDICINE | Facility: CLINIC | Age: 63
End: 2023-06-09
Payer: MEDICARE

## 2023-06-09 NOTE — TELEPHONE ENCOUNTER
----- Message from Veronica Aggarwal sent at 6/9/2023  9:59 AM CDT -----  Regarding: [phone]  Type: Patient Call Back    Who called:self    What is the request in detail: pt states she received a strange call from some one that stated they was calling from the main campus, she states she will like to speak with the nurse as soon as possible to clear up the matter. She states that the caller said the doctor put in a request for a annual exam.     Can the clinic reply by [facility]?no    Would the patient rather a call back or a response via [facility]? [facility]     Best call back number:[phone]      Additional Information:

## 2023-06-09 NOTE — TELEPHONE ENCOUNTER
Call returned. Patient informed a referral was placed for her Medicare AWV. Information provided for calling to schedule/advised appt may be scheduled at our [facility].

## 2023-06-15 ENCOUNTER — OFFICE VISIT (OUTPATIENT)
Dept: OBSTETRICS AND GYNECOLOGY | Facility: CLINIC | Age: 63
End: 2023-06-15
Payer: MEDICARE

## 2023-06-15 VITALS
BODY MASS INDEX: 34.87 KG/M2 | SYSTOLIC BLOOD PRESSURE: 120 MMHG | DIASTOLIC BLOOD PRESSURE: 80 MMHG | WEIGHT: 196.88 LBS

## 2023-06-15 DIAGNOSIS — Z01.419 WELL WOMAN EXAM WITH ROUTINE GYNECOLOGICAL EXAM: Primary | ICD-10-CM

## 2023-06-15 DIAGNOSIS — Z12.31 BREAST CANCER SCREENING BY MAMMOGRAM: ICD-10-CM

## 2023-06-15 PROCEDURE — 3074F SYST BP LT 130 MM HG: CPT | Mod: CPTII,S$GLB,, | Performed by: OBSTETRICS & GYNECOLOGY

## 2023-06-15 PROCEDURE — G0101 CA SCREEN;PELVIC/BREAST EXAM: HCPCS | Mod: S$GLB,,, | Performed by: OBSTETRICS & GYNECOLOGY

## 2023-06-15 PROCEDURE — 1159F PR MEDICATION LIST DOCUMENTED IN MEDICAL RECORD: ICD-10-PCS | Mod: CPTII,S$GLB,, | Performed by: OBSTETRICS & GYNECOLOGY

## 2023-06-15 PROCEDURE — G0101 PR CA SCREEN;PELVIC/BREAST EXAM: ICD-10-PCS | Mod: S$GLB,,, | Performed by: OBSTETRICS & GYNECOLOGY

## 2023-06-15 PROCEDURE — 3008F PR BODY MASS INDEX (BMI) DOCUMENTED: ICD-10-PCS | Mod: CPTII,S$GLB,, | Performed by: OBSTETRICS & GYNECOLOGY

## 2023-06-15 PROCEDURE — 99999 PR PBB SHADOW E&M-EST. PATIENT-LVL III: ICD-10-PCS | Mod: PBBFAC,,, | Performed by: OBSTETRICS & GYNECOLOGY

## 2023-06-15 PROCEDURE — 1159F MED LIST DOCD IN RCRD: CPT | Mod: CPTII,S$GLB,, | Performed by: OBSTETRICS & GYNECOLOGY

## 2023-06-15 PROCEDURE — 3074F PR MOST RECENT SYSTOLIC BLOOD PRESSURE < 130 MM HG: ICD-10-PCS | Mod: CPTII,S$GLB,, | Performed by: OBSTETRICS & GYNECOLOGY

## 2023-06-15 PROCEDURE — 3044F PR MOST RECENT HEMOGLOBIN A1C LEVEL <7.0%: ICD-10-PCS | Mod: CPTII,S$GLB,, | Performed by: OBSTETRICS & GYNECOLOGY

## 2023-06-15 PROCEDURE — 99999 PR PBB SHADOW E&M-EST. PATIENT-LVL III: CPT | Mod: PBBFAC,,, | Performed by: OBSTETRICS & GYNECOLOGY

## 2023-06-15 PROCEDURE — 3044F HG A1C LEVEL LT 7.0%: CPT | Mod: CPTII,S$GLB,, | Performed by: OBSTETRICS & GYNECOLOGY

## 2023-06-15 PROCEDURE — 3079F DIAST BP 80-89 MM HG: CPT | Mod: CPTII,S$GLB,, | Performed by: OBSTETRICS & GYNECOLOGY

## 2023-06-15 PROCEDURE — 3008F BODY MASS INDEX DOCD: CPT | Mod: CPTII,S$GLB,, | Performed by: OBSTETRICS & GYNECOLOGY

## 2023-06-15 PROCEDURE — 3079F PR MOST RECENT DIASTOLIC BLOOD PRESSURE 80-89 MM HG: ICD-10-PCS | Mod: CPTII,S$GLB,, | Performed by: OBSTETRICS & GYNECOLOGY

## 2023-06-15 NOTE — PROGRESS NOTES
SUBJECTIVE:   Jia Ugalde is a 62 y.o. female   for annual well woman exam. No LMP recorded. Patient has had a hysterectomy..  She has no unusual complaints.      S/p TLH in  for AUB/fibroids  Denies abnl pap  Denies HRT     Past Medical History:   Diagnosis Date    GERD (gastroesophageal reflux disease)     Hyperlipidemia      Past Surgical History:   Procedure Laterality Date    BREAST BIOPSY      BREAST SURGERY      COLONOSCOPY N/A 2021    Procedure: COLONOSCOPY;  Surgeon: Caitlin Joseph MD;  Location: [facility];  Service: Endoscopy;  Laterality: N/A;  fully vaccinated-GT    pt states had polyps    EPIDURAL STEROID INJECTION Bilateral 2021    Procedure: Bilateral L5 Transforaminal Epidural Steroid Injection;  Surgeon: Keron Cristina Jr., MD;  Location: [facility];  Service: Pain Management;  Laterality: Bilateral;  Arrive @ 1030 (requested 10); No ATC or DM; Vacc.    ESOPHAGOGASTRODUODENOSCOPY N/A 2022    Procedure: EGD (ESOPHAGOGASTRODUODENOSCOPY);  Surgeon: Lowell Robles MD;  [facility]  Service: Endoscopy;  Laterality: N/A;  fully vaccinated  Pt requesting earlier date with any scoping MD - ERW  RAPID - add on / prep ins. emailed and reviewed- ERW    HEMORRHOID SURGERY      HYSTERECTOMY      SPINE SURGERY      TUBAL LIGATION       Social History     Socioeconomic History    Marital status: Single   Tobacco Use    Smoking status: Never    Smokeless tobacco: Never   Substance and Sexual Activity    Alcohol use: Never    Drug use: Never    Sexual activity: Not Currently     Family History   Problem Relation Age of Onset    Pancreatic cancer Maternal Grandmother     Pancreatic cancer Paternal Grandmother      OB History    Para Term  AB Living   3 3 3         SAB IAB Ectopic Multiple Live Births                  # Outcome Date GA Lbr Jacky/2nd Weight Sex Delivery Anes PTL Lv   3 Term            2 Term            1 Term               Obstetric  Comments   S/p TL in 1998 for AUB/fibroids, Denies HRT   Denies abnl pap   MMG 10/2020 NEG         Current Outpatient Medications   Medication Sig Dispense Refill    alendronate (FOSAMAX) 70 MG tablet Take 1 tablet (70 mg total) by mouth every 7 days. 4 tablet 11    budesonide 1 mg/2 mL NbSp EMPTY CONTENTS OF 1 RESPULE INTO NASAL IRRIGATION SYSTEM, ADD DISTILLED WATER, SALT PACK, MIX & IRRIGATE. PERFORM TWICE DAILY      cholecalciferol, vitamin D3, capsule/tablet Take by mouth once daily.      clobetasoL (TEMOVATE) 0.05 % external solution Apply TO SCALP in AREAS of HAIR loss AT betime nightly monday - FRIDAY      evolocumab (REPATHA SURECLICK) 140 mg/mL PnIj Inject 1 mL (140 mg total) into the skin every 14 (fourteen) days. 6 mL 3    finasteride (PROSCAR) 5 mg tablet Take 5 mg by mouth once daily.      fluocinonide (LIDEX) 0.05 % external solution apply 1ml TO SCALP ONCE TO twice daily AS DIRECTED      fluticasone propionate (FLONASE) 50 mcg/actuation nasal spray 1 spray (50 mcg total) by Each Nostril route once daily. 16 g 3    hydrocortisone (ANUSOL-HC) 2.5 % rectal cream Place rectally 2 (two) times daily. 30 g 1    hydroquinone 2 % Crea APPLY TO AFFECTED AREA(S) 1-2 TIMES DAILY      miscellaneous medical supply Kit 1 Package by Misc.(Non-Drug; Combo Route) route Daily. 1 kit 0    NEILMED SINUS RINSE COMPLETE pkdv use as directed      pantoprazole (PROTONIX) 40 MG tablet TAKE 1 TABLET BY MOUTH once DAILY 90 tablet 1    tobramycin, PF, (OPHELIA) 300 mg/5 mL nebulizer solution EMPTY CONTENTS OF 1 AMPULE INTO NASAL IRRIGATION SYSTEM, ADD DISTILLED WATER, SALT PACK, MIX & IRRIGATE. PERFORM 2 TIMES DAILY      triamcinolone acetonide 0.1% (KENALOG) 0.1 % ointment Apply TO SCALP in AREAS of HAIR loss AT bedtime       No current facility-administered medications for this visit.     Allergies: Patient has no known allergies.       ROS:  GENERAL: Denies weight gain or weight loss. Feeling well overall.   SKIN: Denies rash  or lesions.   HEAD: Denies head injury or headache.   NODES: Denies enlarged lymph nodes.   CHEST: Denies chest pain or shortness of breath.   CARDIOVASCULAR: Denies palpitations or left sided chest pain.   ABDOMEN: No abdominal pain, constipation, diarrhea, nausea, vomiting or rectal bleeding.   URINARY: No frequency, dysuria, hematuria, or burning on urination.  REPRODUCTIVE: Denies vaginal discharge, abnormal vaginal bleeding, lesions, pelvic pain  BREASTS: The patient performs breast self-examination and denies pain, lumps, or nipple discharge.   HEMATOLOGIC: No easy bruisability or excessive bleeding.  MUSCULOSKELETAL: Denies joint pain or swelling.   NEUROLOGIC: Denies syncope or weakness.   PSYCHIATRIC: Denies depression, anxiety or mood swings.      OBJECTIVE:   /80   Wt 89.3 kg (196 lb 13.9 oz)   BMI 34.87 kg/m²   The patient appears well, alert, oriented x 3, in no distress.  PSYCH:  Normal, full range of affect  NECK: negative, no thyromegaly, trachea midline  SKIN: normal, good color, good turgor and no acne, striae, hirsutism  BREAST EXAM: breasts appear normal, no suspicious masses, no skin or nipple changes or axillary nodes  ABDOMEN: soft, non-tender; bowel sounds normal; no masses,  no organomegaly and no hernias, masses, or hepatosplenomegaly  GENITALIA: normal external genitalia, no erythema, no discharge  BLADDER: soft  URETHRA: normal appearing urethra with no masses, tenderness or lesions and normal urethra, normal urethral meatus  VAGINA: Normal  CERVIX: absent  UTERUS: uterus absent  ADNEXA: no mass, fullness, tenderness      ASSESSMENT:     1. Health maintenance  -pap not indicated  -screening MMG ordered  -counseled on exercise and healthy diet, weight loss  -bone health:  Discussed Vitamin D and Calcium supplementation, weight bearing exercises  2.  Discussed safety at home/school/work, healthy and balanced diet, sleep hygiene, as well as violence/weapons exposure.

## 2023-06-22 ENCOUNTER — PES CALL (OUTPATIENT)
Dept: ADMINISTRATIVE | Facility: CLINIC | Age: 63
End: 2023-06-22
Payer: MEDICARE

## 2023-06-22 ENCOUNTER — PATIENT MESSAGE (OUTPATIENT)
Dept: FAMILY MEDICINE | Facility: CLINIC | Age: 63
End: 2023-06-22
Payer: MEDICARE

## 2023-06-24 ENCOUNTER — PATIENT MESSAGE (OUTPATIENT)
Dept: PAIN MEDICINE | Facility: CLINIC | Age: 63
End: 2023-06-24
Payer: MEDICARE

## 2023-08-14 ENCOUNTER — TELEPHONE (OUTPATIENT)
Dept: ADMINISTRATIVE | Facility: CLINIC | Age: 63
End: 2023-08-14
Payer: MEDICARE

## 2023-08-15 ENCOUNTER — TELEPHONE (OUTPATIENT)
Dept: FAMILY MEDICINE | Facility: CLINIC | Age: 63
End: 2023-08-15

## 2023-08-15 ENCOUNTER — OFFICE VISIT (OUTPATIENT)
Dept: FAMILY MEDICINE | Facility: CLINIC | Age: 63
End: 2023-08-15
Payer: MEDICARE

## 2023-08-15 ENCOUNTER — LAB VISIT (OUTPATIENT)
Dept: LAB | Facility: HOSPITAL | Age: 63
End: 2023-08-15
Attending: NURSE PRACTITIONER
Payer: MEDICAID

## 2023-08-15 VITALS
HEART RATE: 69 BPM | TEMPERATURE: 99 F | DIASTOLIC BLOOD PRESSURE: 80 MMHG | RESPIRATION RATE: 16 BRPM | HEIGHT: 63 IN | BODY MASS INDEX: 35.32 KG/M2 | OXYGEN SATURATION: 97 % | SYSTOLIC BLOOD PRESSURE: 118 MMHG | WEIGHT: 199.31 LBS

## 2023-08-15 DIAGNOSIS — Z00.00 ENCOUNTER FOR MEDICARE ANNUAL WELLNESS EXAM: Primary | ICD-10-CM

## 2023-08-15 DIAGNOSIS — R82.71 BACTERIA IN URINE: ICD-10-CM

## 2023-08-15 DIAGNOSIS — G35 MULTIPLE SCLEROSIS: ICD-10-CM

## 2023-08-15 DIAGNOSIS — M85.89 OSTEOPENIA OF MULTIPLE SITES: ICD-10-CM

## 2023-08-15 DIAGNOSIS — R53.81 PHYSICAL DEBILITY: ICD-10-CM

## 2023-08-15 DIAGNOSIS — N18.31 STAGE 3A CHRONIC KIDNEY DISEASE: ICD-10-CM

## 2023-08-15 DIAGNOSIS — R82.71 BACTERIA IN URINE: Primary | ICD-10-CM

## 2023-08-15 DIAGNOSIS — Z78.0 POSTMENOPAUSAL STATE: ICD-10-CM

## 2023-08-15 DIAGNOSIS — Z23 NEED FOR DIPHTHERIA, TETANUS, PERTUSSIS, AND HIB VACCINATION: ICD-10-CM

## 2023-08-15 DIAGNOSIS — E66.01 SEVERE OBESITY (BMI 35.0-39.9) WITH COMORBIDITY: ICD-10-CM

## 2023-08-15 DIAGNOSIS — M96.1 POSTLAMINECTOMY SYNDROME OF LUMBAR REGION: ICD-10-CM

## 2023-08-15 PROBLEM — L65.9 ALOPECIA: Status: ACTIVE | Noted: 2018-03-02

## 2023-08-15 PROBLEM — E78.2: Status: ACTIVE | Noted: 2018-03-02

## 2023-08-15 PROBLEM — G89.4 CHRONIC PAIN SYNDROME: Status: ACTIVE | Noted: 2019-04-02

## 2023-08-15 LAB
BILIRUB UR QL STRIP: NEGATIVE
CLARITY UR: CLEAR
COLOR UR: YELLOW
GLUCOSE UR QL STRIP: NEGATIVE
HGB UR QL STRIP: NEGATIVE
KETONES UR QL STRIP: NEGATIVE
LEUKOCYTE ESTERASE UR QL STRIP: NEGATIVE
NITRITE UR QL STRIP: NEGATIVE
PH UR STRIP: 7 [PH] (ref 5–8)
PROT UR QL STRIP: NEGATIVE
SP GR UR STRIP: 1.01 (ref 1–1.03)
URN SPEC COLLECT METH UR: NORMAL
UROBILINOGEN UR STRIP-ACNC: NEGATIVE EU/DL

## 2023-08-15 PROCEDURE — 3074F PR MOST RECENT SYSTOLIC BLOOD PRESSURE < 130 MM HG: ICD-10-PCS | Mod: HCNC,CPTII,S$GLB, | Performed by: NURSE PRACTITIONER

## 2023-08-15 PROCEDURE — 99999 PR PBB SHADOW E&M-EST. PATIENT-LVL V: CPT | Mod: PBBFAC,HCNC,, | Performed by: NURSE PRACTITIONER

## 2023-08-15 PROCEDURE — 3044F PR MOST RECENT HEMOGLOBIN A1C LEVEL <7.0%: ICD-10-PCS | Mod: HCNC,CPTII,S$GLB, | Performed by: NURSE PRACTITIONER

## 2023-08-15 PROCEDURE — 3079F DIAST BP 80-89 MM HG: CPT | Mod: HCNC,CPTII,S$GLB, | Performed by: NURSE PRACTITIONER

## 2023-08-15 PROCEDURE — 3008F BODY MASS INDEX DOCD: CPT | Mod: HCNC,CPTII,S$GLB, | Performed by: NURSE PRACTITIONER

## 2023-08-15 PROCEDURE — 87086 URINE CULTURE/COLONY COUNT: CPT | Mod: HCNC | Performed by: NURSE PRACTITIONER

## 2023-08-15 PROCEDURE — G9919 PR SCREENING AND POSITIVE: ICD-10-PCS | Mod: HCNC,CPTII,S$GLB, | Performed by: NURSE PRACTITIONER

## 2023-08-15 PROCEDURE — 3044F HG A1C LEVEL LT 7.0%: CPT | Mod: HCNC,CPTII,S$GLB, | Performed by: NURSE PRACTITIONER

## 2023-08-15 PROCEDURE — 90715 TDAP VACCINE 7 YRS/> IM: CPT | Mod: HCNC,S$GLB,, | Performed by: NURSE PRACTITIONER

## 2023-08-15 PROCEDURE — 90715 TDAP VACCINE GREATER THAN OR EQUAL TO 7YO IM: ICD-10-PCS | Mod: HCNC,S$GLB,, | Performed by: NURSE PRACTITIONER

## 2023-08-15 PROCEDURE — 3008F PR BODY MASS INDEX (BMI) DOCUMENTED: ICD-10-PCS | Mod: HCNC,CPTII,S$GLB, | Performed by: NURSE PRACTITIONER

## 2023-08-15 PROCEDURE — G0439 PR MEDICARE ANNUAL WELLNESS SUBSEQUENT VISIT: ICD-10-PCS | Mod: HCNC,S$GLB,, | Performed by: NURSE PRACTITIONER

## 2023-08-15 PROCEDURE — 81003 URINALYSIS AUTO W/O SCOPE: CPT | Mod: HCNC | Performed by: NURSE PRACTITIONER

## 2023-08-15 PROCEDURE — 99999 PR PBB SHADOW E&M-EST. PATIENT-LVL V: ICD-10-PCS | Mod: PBBFAC,HCNC,, | Performed by: NURSE PRACTITIONER

## 2023-08-15 PROCEDURE — 90471 IMMUNIZATION ADMIN: CPT | Mod: HCNC,S$GLB,, | Performed by: NURSE PRACTITIONER

## 2023-08-15 PROCEDURE — G0439 PPPS, SUBSEQ VISIT: HCPCS | Mod: HCNC,S$GLB,, | Performed by: NURSE PRACTITIONER

## 2023-08-15 PROCEDURE — G9919 SCRN ND POS ND PROV OF REC: HCPCS | Mod: HCNC,CPTII,S$GLB, | Performed by: NURSE PRACTITIONER

## 2023-08-15 PROCEDURE — 90471 TDAP VACCINE GREATER THAN OR EQUAL TO 7YO IM: ICD-10-PCS | Mod: HCNC,S$GLB,, | Performed by: NURSE PRACTITIONER

## 2023-08-15 PROCEDURE — 3079F PR MOST RECENT DIASTOLIC BLOOD PRESSURE 80-89 MM HG: ICD-10-PCS | Mod: HCNC,CPTII,S$GLB, | Performed by: NURSE PRACTITIONER

## 2023-08-15 PROCEDURE — 3074F SYST BP LT 130 MM HG: CPT | Mod: HCNC,CPTII,S$GLB, | Performed by: NURSE PRACTITIONER

## 2023-08-15 NOTE — PATIENT INSTRUCTIONS
Counseling and Referral of Other Preventative  (Italic type indicates deductible and co-insurance are waived)    Patient Name: Jia Ugalde  Today's Date: 8/15/2023    Health Maintenance       Date Due Completion Date    TETANUS VACCINE Never done ---    Mammogram 11/11/2023 11/11/2022    Influenza Vaccine (1) 09/01/2023 11/11/2022    DEXA Scan 12/02/2023 12/2/2021    Hemoglobin A1c (Prediabetes) 01/26/2024 1/26/2023    Lipid Panel 01/26/2028 1/26/2023    Colorectal Cancer Screening 12/08/2028 12/8/2021        Orders Placed This Encounter   Procedures    DXA Bone Density Axial Skeleton 1 or more sites       The following information is provided to all patients.  This information is to help you find resources for any of the problems found today that may be affecting your health:                Living healthy guide: www.[facility].[place].gov      Understanding Diabetes: www.diabetes.org      Eating healthy: www.cdc.gov/healthyweight      [facility] home safety checklist: www.cdc.gov/steadi/patient.html      Agency on Aging: www.goea.[place].[facility]      Alcoholics anonymous (AA): www.aa.org      Physical Activity: www.gomez.nih.gov/kl7jblw      Tobacco use: www.quitwithusla.org

## 2023-08-15 NOTE — PROGRESS NOTES
"  Jia Ugalde presented for a  Medicare AWV and comprehensive Health Risk Assessment today. The following components were reviewed and updated:    Medical history  Family History  Social history  Allergies and Current Medications  Health Risk Assessment  Health Maintenance  Care Team     Patient screened moderate and/or high risk for one or more social determinants of health (SDOH). Patient connected to community resources through the ED Navigator.      ** See Completed Assessments for Annual Wellness Visit within the encounter summary.**         The following assessments were completed:  Living Situation  CAGE  Depression Screening  Timed Get Up and Go  Whisper Test  Cognitive Function Screening  Nutrition Screening  ADL Screening  PAQ Screening        Vitals:    08/15/23 1045   BP: 118/80   Pulse: 69   Resp: 16   Temp: 98.7 °F (37.1 °C)   TempSrc: Oral   SpO2: 97%   Weight: 90.4 kg (199 lb 4.7 oz)   Height: 5' 3" (1.6 m)     Body mass index is 35.3 kg/m².  Physical Exam  Vitals and nursing note reviewed.   Constitutional:       General: She is not in acute distress.     Appearance: Normal appearance. She is well-developed and well-groomed. She is not ill-appearing.   HENT:      Head: Normocephalic and atraumatic.      Right Ear: External ear normal.      Left Ear: External ear normal.      Nose: Nose normal.      Mouth/Throat:      Lips: Pink.      Mouth: Mucous membranes are moist.   Eyes:      General: Lids are normal. Vision grossly intact. Gaze aligned appropriately.      Conjunctiva/sclera: Conjunctivae normal.   Neck:      Trachea: Phonation normal.   Pulmonary:      Effort: Pulmonary effort is normal. No accessory muscle usage or respiratory distress.   Abdominal:      General: Abdomen is flat. There is no distension.   Musculoskeletal:      Cervical back: Neck supple.   Skin:     General: Skin is warm and dry.      Findings: No rash.   Neurological:      General: No focal deficit present.      Mental " Status: She is alert and oriented to person, place, and time. Mental status is at baseline.      Motor: No abnormal muscle tone.      Gait: Gait normal.   Psychiatric:         Attention and Perception: Attention and perception normal.         Mood and Affect: Mood and affect normal.         Speech: Speech normal.         Behavior: Behavior normal. Behavior is cooperative.         Thought Content: Thought content normal.         Cognition and Memory: Cognition and memory normal.         Judgment: Judgment normal.               Diagnoses and health risks identified today and associated recommendations/orders:    1. Encounter for Medicare annual wellness exam  Health maintenance reviewed and up to date, will f/u with PCP for routine preventative care  Review for Opioid Screening: Pt does not have Rx for Opioids   Review for Substance Use Disorders: Patient does not use substance   - Ambulatory Referral/Consult to Enhanced Annual Wellness Visit (eAWV)    2. Multiple sclerosis  Pt with chronic pain, neuro stable and under the care of pain management     3. Stage 3a chronic kidney disease  Stable, BP controlled without medication, decrease nephrotoxic agents and maintain adequate hydration     4. Severe obesity (BMI 35.0-39.9) with comorbidity  The patient is asked to make an attempt to improve diet and exercise patterns to aid in medical management of this problem.    5. Postlaminectomy syndrome of lumbar region  Chronic pain under the care of pain management     6. Physical debility  2/2 chronic back pain with previous spinal fusion     7. Osteopenia of multiple sites  Without acute fracture, on fosamax weekly    8. Postmenopausal state  Hx of hysterectomy without HRT  - DXA Bone Density Axial Skeleton 1 or more sites; Future    9. Need for diphtheria, tetanus, pertussis, and Hib vaccination  - Tdap Vaccine      Provided Jia Vila with a 5-10 year written screening schedule and personal prevention plan. Recommendations  were developed using the USPSTF age appropriate recommendations. Education, counseling, and referrals were provided as needed. After Visit Summary printed and given to patient which includes a list of additional screenings\tests needed.    Follow up in about 1 year (around 8/15/2024).    Humaira Rivera NP  I offered to discuss advanced care planning, including how to pick a person who would make decisions for you if you were unable to make them for yourself, called a health care power of , and what kind of decisions you might make such as use of life sustaining treatments such as ventilators and tube feeding when faced with a life limiting illness recorded on a living will that they will need to know. (How you want to be cared for as you near the end of your natural life)     X Patient is interested in learning more about how to make advanced directives.  I provided them paperwork and offered to discuss this with them.

## 2023-08-16 ENCOUNTER — TELEPHONE (OUTPATIENT)
Dept: FAMILY MEDICINE | Facility: CLINIC | Age: 63
End: 2023-08-16
Payer: MEDICARE

## 2023-08-16 DIAGNOSIS — R73.03 PREDIABETES: Primary | ICD-10-CM

## 2023-08-16 DIAGNOSIS — N18.31 STAGE 3A CHRONIC KIDNEY DISEASE: ICD-10-CM

## 2023-08-16 NOTE — TELEPHONE ENCOUNTER
Pt did an annual exam on 1/26/23. She'll just be coming for an established visit for f/u for prediabetes. Labs are ordered and will not require fasting.

## 2023-08-16 NOTE — TELEPHONE ENCOUNTER
----- Message from Omar Santos sent at 2023  1:24 PM CDT -----  Regarding: Jia  Type: Bone Density          Caller is requesting to schedule their Lab appointment prior to annual appointment.    Order is not listed in EPIC.  Please enter order and contact patient to schedule.         Name of Caller:Jia            Date of EPP Appointment:          Where would they like the lab performed? [facility]          Would the patient rather a call back or a response via [facility]? Callback          Best Call Back Number:.[phone]           Additional Information:Pt needs new orders because the other orders . Please contact the patient once done so that she can schedule appointment

## 2023-08-17 ENCOUNTER — PATIENT MESSAGE (OUTPATIENT)
Dept: FAMILY MEDICINE | Facility: CLINIC | Age: 63
End: 2023-08-17
Payer: MEDICARE

## 2023-08-17 LAB — BACTERIA UR CULT: NORMAL

## 2023-08-22 ENCOUNTER — LAB VISIT (OUTPATIENT)
Dept: LAB | Facility: HOSPITAL | Age: 63
End: 2023-08-22
Attending: INTERNAL MEDICINE
Payer: MEDICARE

## 2023-08-22 DIAGNOSIS — R73.03 PREDIABETES: ICD-10-CM

## 2023-08-22 DIAGNOSIS — N18.31 STAGE 3A CHRONIC KIDNEY DISEASE: ICD-10-CM

## 2023-08-22 LAB
ALBUMIN SERPL BCP-MCNC: 3.7 G/DL (ref 3.5–5.2)
ALP SERPL-CCNC: 59 U/L (ref 55–135)
ALT SERPL W/O P-5'-P-CCNC: 12 U/L (ref 10–44)
ANION GAP SERPL CALC-SCNC: 7 MMOL/L (ref 8–16)
AST SERPL-CCNC: 17 U/L (ref 10–40)
BASOPHILS # BLD AUTO: 0.09 K/UL (ref 0–0.2)
BASOPHILS NFR BLD: 1.4 % (ref 0–1.9)
BILIRUB SERPL-MCNC: 0.5 MG/DL (ref 0.1–1)
BUN SERPL-MCNC: 16 MG/DL (ref 6–20)
CALCIUM SERPL-MCNC: 9.6 MG/DL (ref 8.7–10.5)
CHLORIDE SERPL-SCNC: 104 MMOL/L (ref 95–110)
CO2 SERPL-SCNC: 28 MMOL/L (ref 23–29)
CREAT SERPL-MCNC: 1.2 MG/DL (ref 0.5–1.4)
DIFFERENTIAL METHOD: ABNORMAL
EOSINOPHIL # BLD AUTO: 0.4 K/UL (ref 0–0.5)
EOSINOPHIL NFR BLD: 5.4 % (ref 0–8)
ERYTHROCYTE [DISTWIDTH] IN BLOOD BY AUTOMATED COUNT: 13.6 % (ref 11.5–14.5)
EST. GFR  (NO RACE VARIABLE): 51.8 ML/MIN/1.73 M^2
ESTIMATED AVG GLUCOSE: 114 MG/DL (ref 68–131)
GLUCOSE SERPL-MCNC: 100 MG/DL (ref 70–110)
HBA1C MFR BLD: 5.6 % (ref 4–5.6)
HCT VFR BLD AUTO: 40 % (ref 37–48.5)
HGB BLD-MCNC: 12.9 G/DL (ref 12–16)
IMM GRANULOCYTES # BLD AUTO: 0.01 K/UL (ref 0–0.04)
IMM GRANULOCYTES NFR BLD AUTO: 0.2 % (ref 0–0.5)
LYMPHOCYTES # BLD AUTO: 3.4 K/UL (ref 1–4.8)
LYMPHOCYTES NFR BLD: 51.4 % (ref 18–48)
MCH RBC QN AUTO: 29.4 PG (ref 27–31)
MCHC RBC AUTO-ENTMCNC: 32.3 G/DL (ref 32–36)
MCV RBC AUTO: 91 FL (ref 82–98)
MONOCYTES # BLD AUTO: 0.5 K/UL (ref 0.3–1)
MONOCYTES NFR BLD: 7.2 % (ref 4–15)
NEUTROPHILS # BLD AUTO: 2.3 K/UL (ref 1.8–7.7)
NEUTROPHILS NFR BLD: 34.4 % (ref 38–73)
NRBC BLD-RTO: 0 /100 WBC
PLATELET # BLD AUTO: 267 K/UL (ref 150–450)
PMV BLD AUTO: 11.6 FL (ref 9.2–12.9)
POTASSIUM SERPL-SCNC: 4.1 MMOL/L (ref 3.5–5.1)
PROT SERPL-MCNC: 7.5 G/DL (ref 6–8.4)
RBC # BLD AUTO: 4.39 M/UL (ref 4–5.4)
SODIUM SERPL-SCNC: 139 MMOL/L (ref 136–145)
WBC # BLD AUTO: 6.64 K/UL (ref 3.9–12.7)

## 2023-08-22 PROCEDURE — 83036 HEMOGLOBIN GLYCOSYLATED A1C: CPT | Mod: HCNC | Performed by: INTERNAL MEDICINE

## 2023-08-22 PROCEDURE — 85025 COMPLETE CBC W/AUTO DIFF WBC: CPT | Mod: HCNC | Performed by: INTERNAL MEDICINE

## 2023-08-22 PROCEDURE — 36415 COLL VENOUS BLD VENIPUNCTURE: CPT | Mod: HCNC,PO | Performed by: INTERNAL MEDICINE

## 2023-08-22 PROCEDURE — 80053 COMPREHEN METABOLIC PANEL: CPT | Mod: HCNC | Performed by: INTERNAL MEDICINE

## 2023-08-24 PROBLEM — G35 MULTIPLE SCLEROSIS: Status: ACTIVE | Noted: 2023-08-24

## 2023-08-30 ENCOUNTER — OFFICE VISIT (OUTPATIENT)
Dept: FAMILY MEDICINE | Facility: CLINIC | Age: 63
End: 2023-08-30
Payer: MEDICARE

## 2023-08-30 VITALS
TEMPERATURE: 99 F | BODY MASS INDEX: 35.12 KG/M2 | OXYGEN SATURATION: 97 % | HEART RATE: 72 BPM | DIASTOLIC BLOOD PRESSURE: 82 MMHG | WEIGHT: 198.19 LBS | HEIGHT: 63 IN | SYSTOLIC BLOOD PRESSURE: 118 MMHG

## 2023-08-30 DIAGNOSIS — N18.31 STAGE 3A CHRONIC KIDNEY DISEASE: ICD-10-CM

## 2023-08-30 DIAGNOSIS — K64.8 INTERNAL HEMORRHOIDS: Primary | ICD-10-CM

## 2023-08-30 DIAGNOSIS — Z71.2 ENCOUNTER TO DISCUSS TEST RESULTS: ICD-10-CM

## 2023-08-30 DIAGNOSIS — E66.01 SEVERE OBESITY (BMI 35.0-39.9) WITH COMORBIDITY: ICD-10-CM

## 2023-08-30 DIAGNOSIS — M85.89 OSTEOPENIA OF MULTIPLE SITES: ICD-10-CM

## 2023-08-30 DIAGNOSIS — M54.16 LUMBAR RADICULOPATHY: ICD-10-CM

## 2023-08-30 PROBLEM — R73.03 PREDIABETES: Status: RESOLVED | Noted: 2023-01-27 | Resolved: 2023-08-30

## 2023-08-30 PROCEDURE — 3044F PR MOST RECENT HEMOGLOBIN A1C LEVEL <7.0%: ICD-10-PCS | Mod: HCNC,CPTII,S$GLB, | Performed by: INTERNAL MEDICINE

## 2023-08-30 PROCEDURE — 3074F PR MOST RECENT SYSTOLIC BLOOD PRESSURE < 130 MM HG: ICD-10-PCS | Mod: HCNC,CPTII,S$GLB, | Performed by: INTERNAL MEDICINE

## 2023-08-30 PROCEDURE — 99999 PR PBB SHADOW E&M-EST. PATIENT-LVL V: CPT | Mod: PBBFAC,HCNC,, | Performed by: INTERNAL MEDICINE

## 2023-08-30 PROCEDURE — 1160F PR REVIEW ALL MEDS BY PRESCRIBER/CLIN PHARMACIST DOCUMENTED: ICD-10-PCS | Mod: HCNC,CPTII,S$GLB, | Performed by: INTERNAL MEDICINE

## 2023-08-30 PROCEDURE — 99214 PR OFFICE/OUTPT VISIT, EST, LEVL IV, 30-39 MIN: ICD-10-PCS | Mod: HCNC,S$GLB,, | Performed by: INTERNAL MEDICINE

## 2023-08-30 PROCEDURE — 3008F PR BODY MASS INDEX (BMI) DOCUMENTED: ICD-10-PCS | Mod: HCNC,CPTII,S$GLB, | Performed by: INTERNAL MEDICINE

## 2023-08-30 PROCEDURE — 1160F RVW MEDS BY RX/DR IN RCRD: CPT | Mod: HCNC,CPTII,S$GLB, | Performed by: INTERNAL MEDICINE

## 2023-08-30 PROCEDURE — 3079F DIAST BP 80-89 MM HG: CPT | Mod: HCNC,CPTII,S$GLB, | Performed by: INTERNAL MEDICINE

## 2023-08-30 PROCEDURE — 1159F MED LIST DOCD IN RCRD: CPT | Mod: HCNC,CPTII,S$GLB, | Performed by: INTERNAL MEDICINE

## 2023-08-30 PROCEDURE — 3008F BODY MASS INDEX DOCD: CPT | Mod: HCNC,CPTII,S$GLB, | Performed by: INTERNAL MEDICINE

## 2023-08-30 PROCEDURE — 99999 PR PBB SHADOW E&M-EST. PATIENT-LVL V: ICD-10-PCS | Mod: PBBFAC,HCNC,, | Performed by: INTERNAL MEDICINE

## 2023-08-30 PROCEDURE — 3074F SYST BP LT 130 MM HG: CPT | Mod: HCNC,CPTII,S$GLB, | Performed by: INTERNAL MEDICINE

## 2023-08-30 PROCEDURE — 1159F PR MEDICATION LIST DOCUMENTED IN MEDICAL RECORD: ICD-10-PCS | Mod: HCNC,CPTII,S$GLB, | Performed by: INTERNAL MEDICINE

## 2023-08-30 PROCEDURE — 3079F PR MOST RECENT DIASTOLIC BLOOD PRESSURE 80-89 MM HG: ICD-10-PCS | Mod: HCNC,CPTII,S$GLB, | Performed by: INTERNAL MEDICINE

## 2023-08-30 PROCEDURE — 3044F HG A1C LEVEL LT 7.0%: CPT | Mod: HCNC,CPTII,S$GLB, | Performed by: INTERNAL MEDICINE

## 2023-08-30 PROCEDURE — 99214 OFFICE O/P EST MOD 30 MIN: CPT | Mod: HCNC,S$GLB,, | Performed by: INTERNAL MEDICINE

## 2023-08-30 NOTE — PROGRESS NOTES
SUBJECTIVE     Chief Complaint   Patient presents with    Follow-up       HPI  Jia Ugalde is a 62 y.o. female with multiple medical diagnoses as listed in the medical history and problem list that presents for follow-up for HLD. Pt has problem with recurrent hemorrhoids since . She has required surgical removal twice. Pt does not allow herself to get constipated. She does a warm bath every night for 30 minutes. She has resumed suppositories, but the hemorrhoids are still bleeding with some noted streaking on the toilet paper. Otherwise, pt is fully compliant with meds, but self-discontinued Fosamax 2/2 jaw locking and dry mouth.    PAST MEDICAL HISTORY:  Past Medical History:   Diagnosis Date    GERD (gastroesophageal reflux disease)     Hyperlipidemia        PAST SURGICAL HISTORY:  Past Surgical History:   Procedure Laterality Date    BREAST BIOPSY      BREAST SURGERY       SECTION  1997    COLONOSCOPY N/A 2021    Procedure: COLONOSCOPY;  Surgeon: Caitlin Joseph MD;  Location: [facility];  Service: Endoscopy;  Laterality: N/A;  fully vaccinated-GT    pt states had polyps    EPIDURAL STEROID INJECTION Bilateral 2021    Procedure: Bilateral L5 Transforaminal Epidural Steroid Injection;  Surgeon: Keron Cristina Jr., MD;  Location: [facility];  Service: Pain Management;  Laterality: Bilateral;  Arrive @ 1030 (requested 10); No ATC or DM; Vacc.    ESOPHAGOGASTRODUODENOSCOPY N/A 2022    Procedure: EGD (ESOPHAGOGASTRODUODENOSCOPY);  Surgeon: Lowell Robles MD;  Location: [facility] ([address] [place] [address]);  Service: Endoscopy;  Laterality: N/A;  fully vaccinated  Pt requesting earlier date with any scoping MD - ERW  RAPID - add on / prep ins. emailed and reviewed- ERW    HEMORRHOID SURGERY      HYSTERECTOMY      SPINE SURGERY      TONSILLECTOMY  1965    TUBAL LIGATION         SOCIAL HISTORY:  Social History     Socioeconomic History    Marital status: Single   Tobacco Use    Smoking status:  Never    Smokeless tobacco: Never   Substance and Sexual Activity    Alcohol use: Never    Drug use: Never    Sexual activity: Not Currently     Social Determinants of Health     Financial Resource Strain: High Risk (8/15/2023)    Overall Financial Resource Strain (CARDIA)     Difficulty of Paying Living Expenses: Very hard   Food Insecurity: Food Insecurity Present (8/15/2023)    Hunger Vital Sign     Worried About Running Out of Food in the Last Year: Sometimes true     Ran Out of Food in the Last Year: Sometimes true   Transportation Needs: No Transportation Needs (8/15/2023)    PRAPARE - Transportation     Lack of Transportation (Medical): No     Lack of Transportation (Non-Medical): No   Physical Activity: Inactive (8/15/2023)    Exercise Vital Sign     Days of Exercise per Week: 0 days     Minutes of Exercise per Session: 0 min   Stress: No Stress Concern Present (8/15/2023)    [employer] [place] [employer] - Occupational Stress Questionnaire     Feeling of Stress : Not at all   Social Connections: Moderately Isolated (8/15/2023)    Social Connection and Isolation Panel [NHANES]     Frequency of Communication with Friends and Family: More than three times a week     Frequency of Social Gatherings with Friends and Family: Twice a week     Attends Scientologist Services: More than 4 times per year     Active Member of Clubs or Organizations: No     Attends Club or Organization Meetings: Never     Marital Status:    Housing Stability: High Risk (8/15/2023)    Housing Stability Vital Sign     Unable to Pay for Housing in the Last Year: Yes     Number of Places Lived in the Last Year: 1     Unstable Housing in the Last Year: No       FAMILY HISTORY:  Family History   Problem Relation Age of Onset    Pancreatic cancer Maternal Grandmother     Pancreatic cancer Paternal Grandmother     Arthritis Paternal Grandfather     Asthma Son        ALLERGIES AND MEDICATIONS: updated and reviewed.  Review of  patient's allergies indicates:  No Known Allergies  Current Outpatient Medications   Medication Sig Dispense Refill    budesonide 1 mg/2 mL NbSp EMPTY CONTENTS OF 1 RESPULE INTO NASAL IRRIGATION SYSTEM, ADD DISTILLED WATER, SALT PACK, MIX & IRRIGATE. PERFORM TWICE DAILY      cholecalciferol, vitamin D3, capsule/tablet Take by mouth once daily.      clobetasoL (TEMOVATE) 0.05 % external solution Apply TO SCALP in AREAS of HAIR loss AT betime nightly monday - FRIDAY      evolocumab (REPATHA SURECLICK) 140 mg/mL PnIj Inject 1 mL (140 mg total) into the skin every 14 (fourteen) days. 6 mL 3    finasteride (PROSCAR) 5 mg tablet Take 5 mg by mouth once daily.      fluocinonide (LIDEX) 0.05 % external solution apply 1ml TO SCALP ONCE TO twice daily AS DIRECTED      fluticasone propionate (FLONASE) 50 mcg/actuation nasal spray 1 spray (50 mcg total) by Each Nostril route once daily. 16 g 3    hydrocortisone (ANUSOL-HC) 2.5 % rectal cream Place rectally 2 (two) times daily. 30 g 1    hydroquinone 2 % Crea APPLY TO AFFECTED AREA(S) 1-2 TIMES DAILY      miscellaneous medical supply Kit 1 Package by Misc.(Non-Drug; Combo Route) route Daily. 1 kit 0    NEILMED SINUS RINSE COMPLETE pkdv use as directed      pantoprazole (PROTONIX) 40 MG tablet TAKE 1 TABLET BY MOUTH once DAILY 90 tablet 1    tobramycin, PF, (OPHELIA) 300 mg/5 mL nebulizer solution EMPTY CONTENTS OF 1 AMPULE INTO NASAL IRRIGATION SYSTEM, ADD DISTILLED WATER, SALT PACK, MIX & IRRIGATE. PERFORM 2 TIMES DAILY      triamcinolone acetonide 0.1% (KENALOG) 0.1 % ointment Apply TO SCALP in AREAS of HAIR loss AT bedtime       No current facility-administered medications for this visit.       ROS  Review of Systems   Constitutional:  Negative for chills and fever.   HENT:  Negative for hearing loss and sore throat.         Dry mouth and jaw locking   Eyes:  Negative for visual disturbance.   Respiratory:  Negative for cough and shortness of breath.    Cardiovascular:  Negative  "for chest pain, palpitations and leg swelling.   Gastrointestinal:  Negative for abdominal pain, constipation, diarrhea, nausea and vomiting.   Genitourinary:  Negative for dysuria, frequency and urgency.   Musculoskeletal:  Negative for arthralgias, joint swelling and myalgias.   Skin:  Negative for rash and wound.   Neurological:  Negative for headaches.   Psychiatric/Behavioral:  Negative for agitation and confusion. The patient is not nervous/anxious.          OBJECTIVE     Physical Exam  Vitals:    08/30/23 0947   BP: 118/82   Pulse: 72   Temp: 98.6 °F (37 °C)    Body mass index is 35.11 kg/m².  Weight: 89.9 kg (198 lb 3.1 oz)   Height: 5' 3" (160 cm)     Physical Exam  Constitutional:       General: She is not in acute distress.     Appearance: She is well-developed.   HENT:      Head: Normocephalic and atraumatic.      Right Ear: External ear normal.      Left Ear: External ear normal.      Nose: Nose normal.   Eyes:      General: No scleral icterus.        Right eye: No discharge.         Left eye: No discharge.      Conjunctiva/sclera: Conjunctivae normal.   Neck:      Vascular: No JVD.      Trachea: No tracheal deviation.   Cardiovascular:      Rate and Rhythm: Normal rate and regular rhythm.      Heart sounds: No murmur heard.     No friction rub. No gallop.   Pulmonary:      Effort: Pulmonary effort is normal. No respiratory distress.      Breath sounds: Normal breath sounds. No wheezing.   Abdominal:      General: Bowel sounds are normal. There is no distension.      Palpations: Abdomen is soft. There is no mass.      Tenderness: There is no abdominal tenderness. There is no guarding or rebound.   Musculoskeletal:         General: No tenderness or deformity. Normal range of motion.      Cervical back: Normal range of motion and neck supple.   Skin:     General: Skin is warm and dry.      Findings: No erythema or rash.   Neurological:      Mental Status: She is alert and oriented to person, place, and " time.      Motor: No abnormal muscle tone.      Coordination: Coordination normal.   Psychiatric:         Behavior: Behavior normal.         Thought Content: Thought content normal.         Judgment: Judgment normal.           Health Maintenance         Date Due Completion Date    Mammogram 11/11/2023 11/11/2022    Influenza Vaccine (1) 09/01/2023 11/11/2022    DEXA Scan 12/02/2023 12/2/2021    Hemoglobin A1c (Prediabetes) 08/22/2024 8/22/2023    Lipid Panel 01/26/2028 1/26/2023    Colorectal Cancer Screening 12/08/2028 12/8/2021    TETANUS VACCINE 08/15/2033 8/15/2023              ASSESSMENT     62 y.o. female with     1. Internal hemorrhoids    2. Osteopenia of multiple sites    3. Lumbar radiculopathy    4. Stage 3a chronic kidney disease    5. Encounter to discuss test results    6. Severe obesity (BMI 35.0-39.9) with comorbidity        PLAN:     1. Internal hemorrhoids  - Stable; no acute issues  - patient encouraged to avoid constipation and continue Sitz baths along with suppositories as previously ordered    2. Osteopenia of multiple sites  - reviewed past bone density scan results with patient and patient encouraged to keep upcoming appointment for repeat scan  - will discontinue Fosamax given jaw discomfort and patient encouraged to take calcium and vitamin-D along with weight-bearing exercises; patient voiced understanding    3. Lumbar radiculopathy  - Ambulatory referral/consult to Physical/Occupational Therapy; Future    4. Stage 3a chronic kidney disease  - Stable; no acute issues  - Monitor    5. Encounter to discuss test results  - Discussed recent lab results  - All questions/concerns addressed  - Pt voiced understanding    6. Severe obesity (BMI 35.0-39.9) with comorbidity  - patient aware of importance of eating a prudent diet and exercising        RTC in 6 months     Reyna Umanzor MD  08/30/2023 10:07 AM        No follow-ups on file.

## 2023-08-31 ENCOUNTER — PATIENT MESSAGE (OUTPATIENT)
Dept: CARDIOLOGY | Facility: CLINIC | Age: 63
End: 2023-08-31
Payer: MEDICARE

## 2023-09-16 DIAGNOSIS — M85.89 OSTEOPENIA OF MULTIPLE SITES: ICD-10-CM

## 2023-09-18 RX ORDER — ALENDRONATE SODIUM 70 MG/1
TABLET ORAL
Qty: 4 TABLET | Refills: 11 | OUTPATIENT
Start: 2023-09-18

## 2023-10-05 ENCOUNTER — OFFICE VISIT (OUTPATIENT)
Dept: NEUROLOGY | Facility: CLINIC | Age: 63
End: 2023-10-05
Payer: MEDICARE

## 2023-10-05 VITALS
HEIGHT: 63 IN | SYSTOLIC BLOOD PRESSURE: 129 MMHG | DIASTOLIC BLOOD PRESSURE: 76 MMHG | HEART RATE: 65 BPM | WEIGHT: 197.06 LBS | BODY MASS INDEX: 34.91 KG/M2

## 2023-10-05 DIAGNOSIS — M54.16 LUMBAR RADICULOPATHY: Primary | ICD-10-CM

## 2023-10-05 DIAGNOSIS — M48.061 SPINAL STENOSIS OF LUMBAR REGION, UNSPECIFIED WHETHER NEUROGENIC CLAUDICATION PRESENT: ICD-10-CM

## 2023-10-05 PROCEDURE — 3044F HG A1C LEVEL LT 7.0%: CPT | Mod: HCNC,CPTII,S$GLB, | Performed by: STUDENT IN AN ORGANIZED HEALTH CARE EDUCATION/TRAINING PROGRAM

## 2023-10-05 PROCEDURE — 3008F BODY MASS INDEX DOCD: CPT | Mod: HCNC,CPTII,S$GLB, | Performed by: STUDENT IN AN ORGANIZED HEALTH CARE EDUCATION/TRAINING PROGRAM

## 2023-10-05 PROCEDURE — 1159F PR MEDICATION LIST DOCUMENTED IN MEDICAL RECORD: ICD-10-PCS | Mod: HCNC,CPTII,S$GLB, | Performed by: STUDENT IN AN ORGANIZED HEALTH CARE EDUCATION/TRAINING PROGRAM

## 2023-10-05 PROCEDURE — 3078F PR MOST RECENT DIASTOLIC BLOOD PRESSURE < 80 MM HG: ICD-10-PCS | Mod: HCNC,CPTII,S$GLB, | Performed by: STUDENT IN AN ORGANIZED HEALTH CARE EDUCATION/TRAINING PROGRAM

## 2023-10-05 PROCEDURE — 3044F PR MOST RECENT HEMOGLOBIN A1C LEVEL <7.0%: ICD-10-PCS | Mod: HCNC,CPTII,S$GLB, | Performed by: STUDENT IN AN ORGANIZED HEALTH CARE EDUCATION/TRAINING PROGRAM

## 2023-10-05 PROCEDURE — 3074F SYST BP LT 130 MM HG: CPT | Mod: HCNC,CPTII,S$GLB, | Performed by: STUDENT IN AN ORGANIZED HEALTH CARE EDUCATION/TRAINING PROGRAM

## 2023-10-05 PROCEDURE — 3074F PR MOST RECENT SYSTOLIC BLOOD PRESSURE < 130 MM HG: ICD-10-PCS | Mod: HCNC,CPTII,S$GLB, | Performed by: STUDENT IN AN ORGANIZED HEALTH CARE EDUCATION/TRAINING PROGRAM

## 2023-10-05 PROCEDURE — 99215 OFFICE O/P EST HI 40 MIN: CPT | Mod: HCNC,S$GLB,, | Performed by: STUDENT IN AN ORGANIZED HEALTH CARE EDUCATION/TRAINING PROGRAM

## 2023-10-05 PROCEDURE — 99999 PR PBB SHADOW E&M-EST. PATIENT-LVL IV: ICD-10-PCS | Mod: PBBFAC,HCNC,, | Performed by: STUDENT IN AN ORGANIZED HEALTH CARE EDUCATION/TRAINING PROGRAM

## 2023-10-05 PROCEDURE — 99999 PR PBB SHADOW E&M-EST. PATIENT-LVL IV: CPT | Mod: PBBFAC,HCNC,, | Performed by: STUDENT IN AN ORGANIZED HEALTH CARE EDUCATION/TRAINING PROGRAM

## 2023-10-05 PROCEDURE — 99215 PR OFFICE/OUTPT VISIT, EST, LEVL V, 40-54 MIN: ICD-10-PCS | Mod: HCNC,S$GLB,, | Performed by: STUDENT IN AN ORGANIZED HEALTH CARE EDUCATION/TRAINING PROGRAM

## 2023-10-05 PROCEDURE — 1159F MED LIST DOCD IN RCRD: CPT | Mod: HCNC,CPTII,S$GLB, | Performed by: STUDENT IN AN ORGANIZED HEALTH CARE EDUCATION/TRAINING PROGRAM

## 2023-10-05 PROCEDURE — 3078F DIAST BP <80 MM HG: CPT | Mod: HCNC,CPTII,S$GLB, | Performed by: STUDENT IN AN ORGANIZED HEALTH CARE EDUCATION/TRAINING PROGRAM

## 2023-10-05 PROCEDURE — 3008F PR BODY MASS INDEX (BMI) DOCUMENTED: ICD-10-PCS | Mod: HCNC,CPTII,S$GLB, | Performed by: STUDENT IN AN ORGANIZED HEALTH CARE EDUCATION/TRAINING PROGRAM

## 2023-10-05 NOTE — PROGRESS NOTES
Chief Complaint and Duration     Chronic lower back pain and sciatica    History of Present Illness     Jia Ugalde is a 62 y.o. with chronic lower back    Patient saw MS specialists in March of 2023, has had low back pain since 1998 after she bent over.  Most recent MRI in May of 2022 showed multilevel lumbar stenosis, she is associated sciatica pain down her bilateral legs upon standing.  Status post 2 spinal fusions in her lumbar spine in May of 2018 and November 2020.  Continues to have trouble walking long distances.  She is completed the healthy back program, used to be on gabapentin and meloxicam but they made her feel confused and out of it.  Upon neurology evaluation at that time, patient with mild weakness in the left hip flexor and left knee flexor, did have patellar and Achilles reflex.  Return to clinic p.r.n..    Patient saw pain management in May of 2023.  For her continued lower back pain.  Plan was for bilateral L4 transforaminal epidural steroid injections.  Patient was unsure if she wanted to get it done.    Patient is undergoing physical therapy right now.  Here to discuss any further options.  Does not want surgery.  Does not want any further injections.  Did not tolerate neuropathic pain medications.    Review of patient's allergies indicates:  No Known Allergies  Current Outpatient Medications   Medication Sig Dispense Refill    budesonide 1 mg/2 mL NbSp EMPTY CONTENTS OF 1 RESPULE INTO NASAL IRRIGATION SYSTEM, ADD DISTILLED WATER, SALT PACK, MIX & IRRIGATE. PERFORM TWICE DAILY      cholecalciferol, vitamin D3, capsule/tablet Take by mouth once daily.      clobetasoL (TEMOVATE) 0.05 % external solution Apply TO SCALP in AREAS of HAIR loss AT betime nightly monday - FRIDAY      evolocumab (REPATHA SURECLICK) 140 mg/mL PnIj Inject 1 mL (140 mg total) into the skin every 14 (fourteen) days. 6 mL 3    finasteride (PROSCAR) 5 mg tablet Take 5 mg by mouth once daily.      fluocinonide (LIDEX)  0.05 % external solution apply 1ml TO SCALP ONCE TO twice daily AS DIRECTED      fluticasone propionate (FLONASE) 50 mcg/actuation nasal spray 1 spray (50 mcg total) by Each Nostril route once daily. 16 g 3    hydrocortisone (ANUSOL-HC) 2.5 % rectal cream Place rectally 2 (two) times daily. 30 g 1    hydroquinone 2 % Crea APPLY TO AFFECTED AREA(S) 1-2 TIMES DAILY      NEILMED SINUS RINSE COMPLETE pkdv use as directed      pantoprazole (PROTONIX) 40 MG tablet TAKE 1 TABLET BY MOUTH once DAILY 90 tablet 1    tobramycin, PF, (OPHELIA) 300 mg/5 mL nebulizer solution EMPTY CONTENTS OF 1 AMPULE INTO NASAL IRRIGATION SYSTEM, ADD DISTILLED WATER, SALT PACK, MIX & IRRIGATE. PERFORM 2 TIMES DAILY      triamcinolone acetonide 0.1% (KENALOG) 0.1 % ointment Apply TO SCALP in AREAS of HAIR loss AT bedtime      miscellaneous medical supply Kit 1 Package by Misc.(Non-Drug; Combo Route) route Daily. 1 kit 0     No current facility-administered medications for this visit.       Medical History     Past Medical History:   Diagnosis Date    GERD (gastroesophageal reflux disease)     Hyperlipidemia      Past Surgical History:   Procedure Laterality Date    BREAST BIOPSY      BREAST SURGERY       SECTION  1997    COLONOSCOPY N/A 2021    Procedure: COLONOSCOPY;  Surgeon: Caitlin Joseph MD;  Location: [facility];  Service: Endoscopy;  Laterality: N/A;  fully vaccinated-GT    pt states had polyps    EPIDURAL STEROID INJECTION Bilateral 2021    Procedure: Bilateral L5 Transforaminal Epidural Steroid Injection;  Surgeon: Keron Cristina Jr., MD;  Location: [facility];  Service: Pain Management;  Laterality: Bilateral;  Arrive @ 1030 (requested 10); No ATC or DM; Vacc.    ESOPHAGOGASTRODUODENOSCOPY N/A 2022    Procedure: EGD (ESOPHAGOGASTRODUODENOSCOPY);  Surgeon: Lowell Robles MD;  [facility]  Service: Endoscopy;  Laterality: N/A;  fully vaccinated  Pt requesting earlier date with any scoping  MD Regine AMAYA  RAPID - add on / prep ins. emailed and reviewed- SUSANW    HEMORRHOID SURGERY      HYSTERECTOMY      SPINE SURGERY      TONSILLECTOMY  1965    TUBAL LIGATION       Family History   Problem Relation Age of Onset    Pancreatic cancer Maternal Grandmother     Pancreatic cancer Paternal Grandmother     Arthritis Paternal Grandfather     Asthma Son      Social History     Socioeconomic History    Marital status: Single   Tobacco Use    Smoking status: Never    Smokeless tobacco: Never   Substance and Sexual Activity    Alcohol use: Never    Drug use: Never    Sexual activity: Not Currently     Social Determinants of Health     Financial Resource Strain: High Risk (8/15/2023)    Overall Financial Resource Strain (CARDIA)     Difficulty of Paying Living Expenses: Very hard   Food Insecurity: Food Insecurity Present (8/15/2023)    Hunger Vital Sign     Worried About Running Out of Food in the Last Year: Sometimes true     Ran Out of Food in the Last Year: Sometimes true   Transportation Needs: No Transportation Needs (8/15/2023)    PRAPARE - Transportation     Lack of Transportation (Medical): No     Lack of Transportation (Non-Medical): No   Physical Activity: Inactive (8/15/2023)    Exercise Vital Sign     Days of Exercise per Week: 0 days     Minutes of Exercise per Session: 0 min   Stress: No Stress Concern Present (8/15/2023)    [employer] - Occupational Stress Questionnaire     Feeling of Stress : Not at all   Social Connections: Moderately Isolated (8/15/2023)    Social Connection and Isolation Panel [NHANES]     Frequency of Communication with Friends and Family: More than three times a week     Frequency of Social Gatherings with Friends and Family: Twice a week     Attends Jewish Services: More than 4 times per year     Active Member of Clubs or Organizations: No     Attends Club or Organization Meetings: Never     Marital Status:    Housing Stability: High Risk  (8/15/2023)    Housing Stability Vital Sign     Unable to Pay for Housing in the Last Year: Yes     Number of Places Lived in the Last Year: 1     Unstable Housing in the Last Year: No       Exam     Vitals:    10/05/23 0904   BP: 129/76   Pulse: 65      Physical Exam:  General: Not in acute distress. Not ill-appearing.   HENT: Normocephalic and atraumatic. Moist mucous membranes.  Eyes: Conjunctivae normal.   Pulmonary: Pulmonary effort is normal.   Abdominal: Abdomen is soft and flat.   Skin: Skin is warm and dry. No rashes.   Psychiatric: Mood normal.        Neurologic Exam   Mental status: oriented to person, place, and time  Attention: Normal. Concentration: normal.  Speech: speech is normal.  Cranial Nerves: PERRL, EOMI intact, V1-V3 Facial sensation intact. Symmetric facies. Hearing grossly intact. Palate and uvula midline, symmetric. No tongue deviation. Trapezius strength intact.     Motor exam: bulk and tone normal.  Strength is 5/5 in upper and lower extremities    Reflexes: 2+ in bilateral upper extremities: biceps and brachiaradialis, 2+ in bilateral lower extremities: patellar and achilles      Sensory exam: light touch intact    Gait exam: normal    Tremor: none  Cogwheel rigidity: none    Labs and Imaging     Labs: reviewed  No results found for this or any previous visit (from the past 24 hour(s)).    Thyroid normal  HgA1C%:  5.6  LDL:  129    Imaging:   I have personally reviewed the images performed.   MRI of the lumbar spine done in May of 2023, has significant multilevel changes as well as postop at L2-L3 and L4-L5.    Assessment and Plan     Problem List Items Addressed This Visit          Neuro    Spinal stenosis of lumbar region    Relevant Orders    Ambulatory consult to Neurosurgery    Lumbar radiculopathy - Primary    Relevant Orders    Ambulatory consult to Neurosurgery     This is a patient new to me, has chronic lower back pain in which she saw Neurology back in early 2023 as well as  pain management.  Plans were for physical therapy as well as L4 injections.  Patient currently going to physical therapy, does not want any injections further.  Referral placed to Neurosurgery for discussion for surgery, patient unclear if she wants to go down that route.    Follow-up:  With Neurosurgery    Time spent on this encounter:  40 minutes. This includes face to face time and non-face to face time preparing to see the patient (eg, review of tests), obtaining and/or reviewing separately obtained history, documenting clinical information in the electronic or other health record, independently interpreting results and communicating results to the patient/family/caregiver, or care coordinator.     This note was created by combination of typed  and M-Modal dictation. Transcription and phonetic errors may be present.  If there are any questions, please contact me.

## 2023-10-16 ENCOUNTER — OFFICE VISIT (OUTPATIENT)
Dept: CARDIOLOGY | Facility: CLINIC | Age: 63
End: 2023-10-16
Payer: MEDICARE

## 2023-10-16 VITALS
DIASTOLIC BLOOD PRESSURE: 68 MMHG | WEIGHT: 190 LBS | HEIGHT: 64 IN | OXYGEN SATURATION: 99 % | HEART RATE: 94 BPM | BODY MASS INDEX: 32.44 KG/M2 | SYSTOLIC BLOOD PRESSURE: 110 MMHG

## 2023-10-16 DIAGNOSIS — R06.09 DOE (DYSPNEA ON EXERTION): ICD-10-CM

## 2023-10-16 DIAGNOSIS — R07.89 CHEST PAIN, ATYPICAL: ICD-10-CM

## 2023-10-16 DIAGNOSIS — E78.2 MIXED HYPERLIPIDEMIA: Primary | ICD-10-CM

## 2023-10-16 PROCEDURE — 3008F PR BODY MASS INDEX (BMI) DOCUMENTED: ICD-10-PCS | Mod: CPTII,S$GLB,, | Performed by: INTERNAL MEDICINE

## 2023-10-16 PROCEDURE — 3078F PR MOST RECENT DIASTOLIC BLOOD PRESSURE < 80 MM HG: ICD-10-PCS | Mod: CPTII,S$GLB,, | Performed by: INTERNAL MEDICINE

## 2023-10-16 PROCEDURE — 1159F MED LIST DOCD IN RCRD: CPT | Mod: CPTII,S$GLB,, | Performed by: INTERNAL MEDICINE

## 2023-10-16 PROCEDURE — 3074F PR MOST RECENT SYSTOLIC BLOOD PRESSURE < 130 MM HG: ICD-10-PCS | Mod: CPTII,S$GLB,, | Performed by: INTERNAL MEDICINE

## 2023-10-16 PROCEDURE — 99214 OFFICE O/P EST MOD 30 MIN: CPT | Mod: PBBFAC | Performed by: INTERNAL MEDICINE

## 2023-10-16 PROCEDURE — 99213 PR OFFICE/OUTPT VISIT, EST, LEVL III, 20-29 MIN: ICD-10-PCS | Mod: S$GLB,,, | Performed by: INTERNAL MEDICINE

## 2023-10-16 PROCEDURE — 3008F BODY MASS INDEX DOCD: CPT | Mod: CPTII,S$GLB,, | Performed by: INTERNAL MEDICINE

## 2023-10-16 PROCEDURE — 3044F PR MOST RECENT HEMOGLOBIN A1C LEVEL <7.0%: ICD-10-PCS | Mod: CPTII,S$GLB,, | Performed by: INTERNAL MEDICINE

## 2023-10-16 PROCEDURE — 99999 PR PBB SHADOW E&M-EST. PATIENT-LVL IV: ICD-10-PCS | Mod: PBBFAC,,, | Performed by: INTERNAL MEDICINE

## 2023-10-16 PROCEDURE — 3044F HG A1C LEVEL LT 7.0%: CPT | Mod: CPTII,S$GLB,, | Performed by: INTERNAL MEDICINE

## 2023-10-16 PROCEDURE — 99213 OFFICE O/P EST LOW 20 MIN: CPT | Mod: S$GLB,,, | Performed by: INTERNAL MEDICINE

## 2023-10-16 PROCEDURE — 1159F PR MEDICATION LIST DOCUMENTED IN MEDICAL RECORD: ICD-10-PCS | Mod: CPTII,S$GLB,, | Performed by: INTERNAL MEDICINE

## 2023-10-16 PROCEDURE — 99999 PR PBB SHADOW E&M-EST. PATIENT-LVL IV: CPT | Mod: PBBFAC,,, | Performed by: INTERNAL MEDICINE

## 2023-10-16 PROCEDURE — 3074F SYST BP LT 130 MM HG: CPT | Mod: CPTII,S$GLB,, | Performed by: INTERNAL MEDICINE

## 2023-10-16 PROCEDURE — 3078F DIAST BP <80 MM HG: CPT | Mod: CPTII,S$GLB,, | Performed by: INTERNAL MEDICINE

## 2023-10-16 NOTE — PROGRESS NOTES
Subjective:   Patient ID:  Jia Ugalde is a 62 y.o. female who presents for follow-up of Follow-up      HPI    Followed by Dr Rogers  Abdominal discomfort:  Appears to be related to constipation.  Hyperlipidemia: Continue current management.  Return to clinic 4 months.    Exercise stress echocardiogram 02/09/2022:     The estimated ejection fraction is 55%.  The patient's exercise capacity was below average.  The test was stopped because the patient experienced fatigue.  There were no arrhythmias during stress.  The left ventricle is normal in size with normal systolic function.  Indeterminate left ventricular diastolic function.  Normal right ventricular size with normal right ventricular systolic function.  Moderate left atrial enlargement.  Mild tricuspid regurgitation.  Mild right atrial enlargement.  The estimated PA systolic pressure is 27 mmHg.  Normal central venous pressure (3 mmHg).  Mild mitral regurgitation.  The stress echo portion of this study is negative for myocardial ischemia.  The ECG portion of this study is negative for myocardial ischemia.     10/16/23 Denies CP or SOB  BP controlled  Tolerates repatha for HLD    Review of Systems   Constitutional: Negative for decreased appetite.   HENT:  Negative for ear discharge.    Eyes:  Negative for blurred vision.   Respiratory:  Negative for hemoptysis.    Endocrine: Negative for polyphagia.   Hematologic/Lymphatic: Negative for adenopathy.   Skin:  Negative for color change.   Musculoskeletal:  Negative for joint swelling.   Genitourinary:  Negative for bladder incontinence.   Neurological:  Negative for brief paralysis.   Psychiatric/Behavioral:  Negative for hallucinations.    Allergic/Immunologic: Negative for hives.       Objective:   Physical Exam  Constitutional:       Appearance: She is well-developed.   HENT:      Head: Normocephalic and atraumatic.   Eyes:      Conjunctiva/sclera: Conjunctivae normal.      Pupils: Pupils are equal, round,  and reactive to light.   Cardiovascular:      Rate and Rhythm: Normal rate.      Pulses: Intact distal pulses.      Heart sounds: Normal heart sounds.   Pulmonary:      Effort: Pulmonary effort is normal.      Breath sounds: Normal breath sounds.   Abdominal:      General: Bowel sounds are normal.      Palpations: Abdomen is soft.   Musculoskeletal:         General: Normal range of motion.      Cervical back: Normal range of motion and neck supple.   Skin:     General: Skin is warm and dry.   Neurological:      Mental Status: She is alert and oriented to person, place, and time.         Assessment:      1. Mixed hyperlipidemia    2. HOLLAND (dyspnea on exertion)    3. Chest pain, atypical        Plan:     Continue Rx for HLD  OV 1 year with Dr Rogers

## 2023-10-25 ENCOUNTER — OFFICE VISIT (OUTPATIENT)
Dept: NEUROSURGERY | Facility: CLINIC | Age: 63
End: 2023-10-25
Payer: MEDICARE

## 2023-10-25 VITALS
OXYGEN SATURATION: 99 % | DIASTOLIC BLOOD PRESSURE: 80 MMHG | SYSTOLIC BLOOD PRESSURE: 120 MMHG | BODY MASS INDEX: 33.84 KG/M2 | HEART RATE: 70 BPM | WEIGHT: 198.19 LBS | HEIGHT: 64 IN

## 2023-10-25 DIAGNOSIS — M48.061 SPINAL STENOSIS OF LUMBAR REGION, UNSPECIFIED WHETHER NEUROGENIC CLAUDICATION PRESENT: ICD-10-CM

## 2023-10-25 DIAGNOSIS — M54.16 LUMBAR RADICULOPATHY: ICD-10-CM

## 2023-10-25 PROCEDURE — 3074F PR MOST RECENT SYSTOLIC BLOOD PRESSURE < 130 MM HG: ICD-10-PCS | Mod: CPTII,S$GLB,, | Performed by: STUDENT IN AN ORGANIZED HEALTH CARE EDUCATION/TRAINING PROGRAM

## 2023-10-25 PROCEDURE — 1159F MED LIST DOCD IN RCRD: CPT | Mod: CPTII,S$GLB,, | Performed by: STUDENT IN AN ORGANIZED HEALTH CARE EDUCATION/TRAINING PROGRAM

## 2023-10-25 PROCEDURE — 3079F DIAST BP 80-89 MM HG: CPT | Mod: CPTII,S$GLB,, | Performed by: STUDENT IN AN ORGANIZED HEALTH CARE EDUCATION/TRAINING PROGRAM

## 2023-10-25 PROCEDURE — 3074F SYST BP LT 130 MM HG: CPT | Mod: CPTII,S$GLB,, | Performed by: STUDENT IN AN ORGANIZED HEALTH CARE EDUCATION/TRAINING PROGRAM

## 2023-10-25 PROCEDURE — 3044F HG A1C LEVEL LT 7.0%: CPT | Mod: CPTII,S$GLB,, | Performed by: STUDENT IN AN ORGANIZED HEALTH CARE EDUCATION/TRAINING PROGRAM

## 2023-10-25 PROCEDURE — 3079F PR MOST RECENT DIASTOLIC BLOOD PRESSURE 80-89 MM HG: ICD-10-PCS | Mod: CPTII,S$GLB,, | Performed by: STUDENT IN AN ORGANIZED HEALTH CARE EDUCATION/TRAINING PROGRAM

## 2023-10-25 PROCEDURE — 3008F BODY MASS INDEX DOCD: CPT | Mod: CPTII,S$GLB,, | Performed by: STUDENT IN AN ORGANIZED HEALTH CARE EDUCATION/TRAINING PROGRAM

## 2023-10-25 PROCEDURE — 99205 PR OFFICE/OUTPT VISIT, NEW, LEVL V, 60-74 MIN: ICD-10-PCS | Mod: S$GLB,,, | Performed by: STUDENT IN AN ORGANIZED HEALTH CARE EDUCATION/TRAINING PROGRAM

## 2023-10-25 PROCEDURE — 99205 OFFICE O/P NEW HI 60 MIN: CPT | Mod: S$GLB,,, | Performed by: STUDENT IN AN ORGANIZED HEALTH CARE EDUCATION/TRAINING PROGRAM

## 2023-10-25 PROCEDURE — 3008F PR BODY MASS INDEX (BMI) DOCUMENTED: ICD-10-PCS | Mod: CPTII,S$GLB,, | Performed by: STUDENT IN AN ORGANIZED HEALTH CARE EDUCATION/TRAINING PROGRAM

## 2023-10-25 PROCEDURE — 1159F PR MEDICATION LIST DOCUMENTED IN MEDICAL RECORD: ICD-10-PCS | Mod: CPTII,S$GLB,, | Performed by: STUDENT IN AN ORGANIZED HEALTH CARE EDUCATION/TRAINING PROGRAM

## 2023-10-25 PROCEDURE — 3044F PR MOST RECENT HEMOGLOBIN A1C LEVEL <7.0%: ICD-10-PCS | Mod: CPTII,S$GLB,, | Performed by: STUDENT IN AN ORGANIZED HEALTH CARE EDUCATION/TRAINING PROGRAM

## 2023-10-25 NOTE — PROGRESS NOTES
[facility]  Neurosurgery    SUBJECTIVE:     History of Present Illness:  Jia Ugalde is a 62 y.o. female past medical history significant for 2 prior lumbar fusions who presents ongoing low back pain and radicular symptoms.  She has a history of 2 prior spinal surgeries- an L4/5 TLIF performed in May 2018 (Dr. Miller) and and what appears on imaging to have been a left L2/3 laminectomy in November 2020 (Dr. Varela).    Has had years and years of back pain and leg pain issues.  What bothers her the most is leg pain much more so than back pain.  The left leg is always worse than the right.  She describes two dermatomal distributions.  She has pain in the bilateral anterior thighs, with the extension around to the medial portion of the knee.  Again, this is worse in the left leg than the right leg.  She also has pain in the buttocks and posterior thigh, but this does not go below the knee.  Therefore, she describes pain in the L2, L3, and S1 distributions bilaterally left side worse than the right.  She also describes weakness in the left thigh but is able to get around independently.    She underwent an epidural steroid injection 11/08/2021- bilateral L5 transforaminal injection. This did not help much.    Patient saw Dr. May with Neurology on 10/05/2023 and was referred here after that for evaluation.    Patient is actively enrolled in physical therapy right now.  She does feel like this is helping with her symptoms.  She is enrolled in the healthy back program.  She is currently not taking any medications for her pain.  She had previously tried meloxicam and gabapentin but these did not make her feel very good and she discontinued them.    She is interested in learning about options to help with her pain, but she is not particularly excited about further injections, spinal cord stimulator, or surgery.    Review of patient's allergies indicates:  No Known Allergies    Past Medical History:    Diagnosis Date    GERD (gastroesophageal reflux disease)     Hyperlipidemia      Past Surgical History:   Procedure Laterality Date    BREAST BIOPSY      BREAST SURGERY       SECTION  1997    COLONOSCOPY N/A 2021    Procedure: COLONOSCOPY;  Surgeon: Caitlin Joseph MD;  Location: [facility];  Service: Endoscopy;  Laterality: N/A;  fully vaccinated-GT    pt states had polyps    EPIDURAL STEROID INJECTION Bilateral 2021    Procedure: Bilateral L5 Transforaminal Epidural Steroid Injection;  Surgeon: Keron Cristina Jr., MD;  Location: [facility];  Service: Pain Management;  Laterality: Bilateral;  Arrive @ 1030 (requested 10); No ATC or DM; Vacc.    ESOPHAGOGASTRODUODENOSCOPY N/A 2022    Procedure: EGD (ESOPHAGOGASTRODUODENOSCOPY);  Surgeon: Lowell Robles MD;  [facility]  Service: Endoscopy;  Laterality: N/A;  fully vaccinated  Pt requesting earlier date with any scoping MD - ERW  RAPID - add on / prep ins. emailed and reviewed- ERW    HEMORRHOID SURGERY      HYSTERECTOMY      SPINE SURGERY      TONSILLECTOMY  1965    TUBAL LIGATION       Family History   Problem Relation Age of Onset    Pancreatic cancer Maternal Grandmother     Pancreatic cancer Paternal Grandmother     Arthritis Paternal Grandfather     Asthma Son      Social History     Tobacco Use    Smoking status: Never    Smokeless tobacco: Never   Substance Use Topics    Alcohol use: Never    Drug use: Never        Review of Systems:  As noted in HPI    OBJECTIVE:     Physical Exam:  General: well developed, well nourished, no distress  Head: normocephalic, atraumatic  Neurologic: Alert and oriented. Thought content appropriate  GCS: Motor: 6/Verbal: 5/Eyes: 4 GCS Total: 15  Language: No aphasia  Speech: No dysarthria  Cranial nerves: face symmetric, tongue midline, CN II-XII grossly intact.   Eyes: pupils equal, round, reactive to light with accommodation, EOMI.   Pulmonary: normal respirations, not  labored, no accessory muscles used  Sensory: intact to light touch throughout  Motor Strength: Moves all extremities spontaneously with good tone.  Full strength upper and lower extremities. No abnormal movements seen.     Strength  Deltoids Triceps Biceps Wrist Extension Wrist Flexion Hand  FA   Upper: R 5/5 5/5 5/5 5/5 5/5 5/5 5/5    L 5/5 5/5 5/5 5/5 5/5 5/5 5/5     Iliopsoas Quadriceps Knee  Flexion Tibialis  anterior Gastro- cnemius EHL    Lower: R 5/5 5/5 5/5 5/5 5/5 5/5     L 4+/5 5/5 5/5 5/5 5/5 5/5      DTR's - 2 patellar on right, 1+ on left  [place]: absent  Clonus: absent    Gait: normal    Lumbar ROM: full   SI Joint tenderness: Negative   Pain on Hip ROM: Negative  Midline Bony Tenderness: none  Paraspinous muscle tenderness: none  Diagnostic Results:  I have personally reviewed imaging. My impression is as follows.    MRI of the lumbar spine performed 05/23/2023 shows prior L4/5 fusion, prior left L2/3 laminectomy with lateral recess decompression.  Patient has multilevel spondylosis.  She has degenerative disc disease with disc bulging at L5/S1, but this does not appear to compress either the L5 or the S1 nerve roots in my opinion.  She also has adjacent level disc degeneration with bulging at L2/3 and L3/4.  There is foraminal stenosis bilaterally compressing the exiting L2 and L3 nerve roots, this is worse on the left side than it is on the right side.      ASSESSMENT/PLAN:     Jia Ugalde is a 62 y.o. female who presents with continued back pain and leg pain, mostly in L2 and L3 distributions left side worse than right side, in the setting of prior lumbar surgeries.  Plan is as follows:  -no acute intervention indicated.  Patient's neurological examination is stable  -patient is scheduled for a bone density scan December 5th.  We would like her to proceed with getting this done.  Any spinal fusion in the future would require an adequate bone density.  -will order standing AP and lateral  scoliosis x-rays to better understand patient's global spine alignment and spinal pelvic parameters  -patient is not sure she wants further injections, but if she were to return to interventional pain I would recommend considering transforaminal injections bilaterally at L2-3 and L3-4  -if patient were to get to the point of requiring surgery, I described to her an L2-3 as well as an L3-4 lateral lumbar interbody fusion.  This would restore her foraminal height and decompress her L2, L3, and likely L4 nerves.  We would also need to do percutaneous pedicle screws from L2-L4, connecting to the prior screws at L4.   -patient is going to consider this option and she will return to see us in December after her bone density scan is completed.    Please feel free to call with any further questions.    Time spent on this encounter: 60 minutes. This includes face-to-face time and non-face to face time preparing to see the patient (eg, review of tests), obtaining and/or reviewing separately obtained history, documenting clinical information in the electronic or other health record, independently interpreting results and communicating results to the patient/family/caregiver, or care coordinator.      Kerwin BROWN Mangham [facility]  Department of Neurosurgery  [phone]    Disclaimer: This note was dictated by speech recognition. Minor errors in transcription may be present.  Please call with any questions.

## 2023-10-27 ENCOUNTER — PATIENT MESSAGE (OUTPATIENT)
Dept: NEUROSURGERY | Facility: CLINIC | Age: 63
End: 2023-10-27
Payer: MEDICARE

## 2023-10-27 ENCOUNTER — IMMUNIZATION (OUTPATIENT)
Dept: FAMILY MEDICINE | Facility: CLINIC | Age: 63
End: 2023-10-27
Payer: MEDICARE

## 2023-10-27 ENCOUNTER — HOSPITAL ENCOUNTER (OUTPATIENT)
Dept: RADIOLOGY | Facility: HOSPITAL | Age: 63
Discharge: HOME | End: 2023-10-27
Attending: STUDENT IN AN ORGANIZED HEALTH CARE EDUCATION/TRAINING PROGRAM
Payer: MEDICARE

## 2023-10-27 DIAGNOSIS — M54.16 LUMBAR RADICULOPATHY: Primary | ICD-10-CM

## 2023-10-27 DIAGNOSIS — M48.061 SPINAL STENOSIS OF LUMBAR REGION, UNSPECIFIED WHETHER NEUROGENIC CLAUDICATION PRESENT: ICD-10-CM

## 2023-10-27 DIAGNOSIS — M54.16 LUMBAR RADICULOPATHY: ICD-10-CM

## 2023-10-27 PROCEDURE — 72082 XR SCOLIOSIS COMPLETE: ICD-10-PCS | Mod: 26,HCNC,, | Performed by: RADIOLOGY

## 2023-10-27 PROCEDURE — 72082 X-RAY EXAM ENTIRE SPI 2/3 VW: CPT | Mod: 26,HCNC,, | Performed by: RADIOLOGY

## 2023-10-27 PROCEDURE — 72082 X-RAY EXAM ENTIRE SPI 2/3 VW: CPT | Mod: TC,HCNC,FY

## 2023-10-27 PROCEDURE — 90686 IIV4 VACC NO PRSV 0.5 ML IM: CPT | Mod: HCNC,S$GLB,, | Performed by: INTERNAL MEDICINE

## 2023-10-27 PROCEDURE — G0008 FLU VACCINE (QUAD) GREATER THAN OR EQUAL TO 3YO PRESERVATIVE FREE IM: ICD-10-PCS | Mod: HCNC,S$GLB,, | Performed by: INTERNAL MEDICINE

## 2023-10-27 PROCEDURE — 90686 FLU VACCINE (QUAD) GREATER THAN OR EQUAL TO 3YO PRESERVATIVE FREE IM: ICD-10-PCS | Mod: HCNC,S$GLB,, | Performed by: INTERNAL MEDICINE

## 2023-10-27 PROCEDURE — G0008 ADMIN INFLUENZA VIRUS VAC: HCPCS | Mod: HCNC,S$GLB,, | Performed by: INTERNAL MEDICINE

## 2023-10-30 ENCOUNTER — HOSPITAL ENCOUNTER (OUTPATIENT)
Dept: RADIOLOGY | Facility: HOSPITAL | Age: 63
Discharge: HOME | End: 2023-10-30
Attending: STUDENT IN AN ORGANIZED HEALTH CARE EDUCATION/TRAINING PROGRAM
Payer: MEDICARE

## 2023-10-30 DIAGNOSIS — M54.16 LUMBAR RADICULOPATHY: ICD-10-CM

## 2023-10-30 PROCEDURE — 72082 X-RAY EXAM ENTIRE SPI 2/3 VW: CPT | Mod: 26,HCNC,, | Performed by: RADIOLOGY

## 2023-10-30 PROCEDURE — 72082 X-RAY EXAM ENTIRE SPI 2/3 VW: CPT | Mod: TC,HCNC

## 2023-10-30 PROCEDURE — 72082 XR SCOLIOSIS COMPLETE: ICD-10-PCS | Mod: 26,HCNC,, | Performed by: RADIOLOGY

## 2023-11-09 ENCOUNTER — PATIENT MESSAGE (OUTPATIENT)
Dept: PAIN MEDICINE | Facility: CLINIC | Age: 63
End: 2023-11-09
Payer: MEDICARE

## 2023-11-09 ENCOUNTER — PATIENT MESSAGE (OUTPATIENT)
Dept: FAMILY MEDICINE | Facility: CLINIC | Age: 63
End: 2023-11-09
Payer: MEDICARE

## 2023-11-09 ENCOUNTER — PATIENT MESSAGE (OUTPATIENT)
Dept: ADMINISTRATIVE | Facility: OTHER | Age: 63
End: 2023-11-09
Payer: MEDICARE

## 2023-11-09 ENCOUNTER — PATIENT MESSAGE (OUTPATIENT)
Dept: NEUROSURGERY | Facility: CLINIC | Age: 63
End: 2023-11-09
Payer: MEDICARE

## 2023-11-15 ENCOUNTER — HOSPITAL ENCOUNTER (OUTPATIENT)
Dept: RADIOLOGY | Facility: HOSPITAL | Age: 63
Discharge: HOME | End: 2023-11-15
Attending: OBSTETRICS & GYNECOLOGY
Payer: MEDICARE

## 2023-11-15 DIAGNOSIS — Z12.31 BREAST CANCER SCREENING BY MAMMOGRAM: ICD-10-CM

## 2023-11-15 PROCEDURE — 77063 MAMMO DIGITAL SCREENING BILAT WITH TOMO: ICD-10-PCS | Mod: 26,,, | Performed by: RADIOLOGY

## 2023-11-15 PROCEDURE — 77067 SCR MAMMO BI INCL CAD: CPT | Mod: 26,,, | Performed by: RADIOLOGY

## 2023-11-15 PROCEDURE — 77067 MAMMO DIGITAL SCREENING BILAT WITH TOMO: ICD-10-PCS | Mod: 26,,, | Performed by: RADIOLOGY

## 2023-11-15 PROCEDURE — 77063 BREAST TOMOSYNTHESIS BI: CPT | Mod: 26,,, | Performed by: RADIOLOGY

## 2023-11-15 PROCEDURE — 77067 SCR MAMMO BI INCL CAD: CPT | Mod: TC

## 2023-11-21 DIAGNOSIS — E78.2 MIXED HYPERLIPIDEMIA: ICD-10-CM

## 2023-11-22 RX ORDER — EVOLOCUMAB 140 MG/ML
140 INJECTION, SOLUTION SUBCUTANEOUS
Qty: 6 ML | Refills: 3 | Status: ACTIVE | OUTPATIENT
Start: 2023-11-22 | End: 2024-10-23

## 2023-11-28 ENCOUNTER — TELEPHONE (OUTPATIENT)
Dept: CARDIOLOGY | Facility: HOSPITAL | Age: 63
End: 2023-11-28
Payer: MEDICARE

## 2023-11-28 DIAGNOSIS — E78.2 MIXED HYPERLIPIDEMIA: Primary | ICD-10-CM

## 2023-11-28 NOTE — TELEPHONE ENCOUNTER
----- Message from Nickie Rivera PharmD sent at 11/28/2023 11:06 AM CST -----  Regarding: Darnell Alarcon and Staff,      The patient called in requesting a refill of Repatha, however she has new insurance. Her insurance plan requires an updated lipid panel within the past 120 days. It appears her last lipid panel was in 01/23. Please order an updated lipid panel for the patient if you feel this is appropriate.       Thank you,  Nickie Rivera, PharmD  [facility]  Ph: [phone]

## 2023-11-29 ENCOUNTER — LAB VISIT (OUTPATIENT)
Dept: LAB | Facility: HOSPITAL | Age: 63
End: 2023-11-29
Attending: INTERNAL MEDICINE
Payer: MEDICARE

## 2023-11-29 DIAGNOSIS — E78.2 MIXED HYPERLIPIDEMIA: ICD-10-CM

## 2023-11-29 LAB
CHOLEST SERPL-MCNC: 196 MG/DL (ref 120–199)
CHOLEST/HDLC SERPL: 3.5 {RATIO} (ref 2–5)
HDLC SERPL-MCNC: 56 MG/DL (ref 40–75)
HDLC SERPL: 28.6 % (ref 20–50)
LDLC SERPL CALC-MCNC: 119.4 MG/DL (ref 63–159)
NONHDLC SERPL-MCNC: 140 MG/DL
TRIGL SERPL-MCNC: 103 MG/DL (ref 30–150)

## 2023-11-29 PROCEDURE — 36415 COLL VENOUS BLD VENIPUNCTURE: CPT | Mod: PO | Performed by: INTERNAL MEDICINE

## 2023-11-29 PROCEDURE — 80061 LIPID PANEL: CPT | Performed by: INTERNAL MEDICINE

## 2023-12-05 ENCOUNTER — HOSPITAL ENCOUNTER (OUTPATIENT)
Dept: RADIOLOGY | Facility: CLINIC | Age: 63
Discharge: HOME | End: 2023-12-05
Attending: NURSE PRACTITIONER
Payer: MEDICARE

## 2023-12-05 DIAGNOSIS — Z78.0 POSTMENOPAUSAL STATE: ICD-10-CM

## 2023-12-05 PROCEDURE — 77080 DXA BONE DENSITY AXIAL SKELETON 1 OR MORE SITES: ICD-10-PCS | Mod: 26,,, | Performed by: INTERNAL MEDICINE

## 2023-12-05 PROCEDURE — 77080 DXA BONE DENSITY AXIAL: CPT | Mod: TC,PO

## 2023-12-05 PROCEDURE — 77080 DXA BONE DENSITY AXIAL: CPT | Mod: 26,,, | Performed by: INTERNAL MEDICINE

## 2023-12-05 NOTE — PROGRESS NOTES
Your bone density test is consistent with a diagnosis of osteopenia.  Please start a daily over-the-counter vitamin-D supplement of 2000 units, calcium 1200 mg, and start some weight-bearing exercises to help strengthen your bones to prevent any fractures.

## 2023-12-07 ENCOUNTER — OFFICE VISIT (OUTPATIENT)
Dept: NEUROSURGERY | Facility: CLINIC | Age: 63
End: 2023-12-07
Payer: MEDICARE

## 2023-12-07 VITALS
OXYGEN SATURATION: 98 % | BODY MASS INDEX: 33.64 KG/M2 | WEIGHT: 197.06 LBS | HEIGHT: 64 IN | SYSTOLIC BLOOD PRESSURE: 129 MMHG | HEART RATE: 65 BPM | DIASTOLIC BLOOD PRESSURE: 76 MMHG

## 2023-12-07 DIAGNOSIS — M54.16 LUMBAR RADICULOPATHY: Primary | ICD-10-CM

## 2023-12-07 PROCEDURE — 3044F PR MOST RECENT HEMOGLOBIN A1C LEVEL <7.0%: ICD-10-PCS | Mod: CPTII,S$GLB,, | Performed by: STUDENT IN AN ORGANIZED HEALTH CARE EDUCATION/TRAINING PROGRAM

## 2023-12-07 PROCEDURE — 99214 PR OFFICE/OUTPT VISIT, EST, LEVL IV, 30-39 MIN: ICD-10-PCS | Mod: S$GLB,,, | Performed by: STUDENT IN AN ORGANIZED HEALTH CARE EDUCATION/TRAINING PROGRAM

## 2023-12-07 PROCEDURE — 1159F MED LIST DOCD IN RCRD: CPT | Mod: CPTII,S$GLB,, | Performed by: STUDENT IN AN ORGANIZED HEALTH CARE EDUCATION/TRAINING PROGRAM

## 2023-12-07 PROCEDURE — 3078F DIAST BP <80 MM HG: CPT | Mod: CPTII,S$GLB,, | Performed by: STUDENT IN AN ORGANIZED HEALTH CARE EDUCATION/TRAINING PROGRAM

## 2023-12-07 PROCEDURE — 3074F PR MOST RECENT SYSTOLIC BLOOD PRESSURE < 130 MM HG: ICD-10-PCS | Mod: CPTII,S$GLB,, | Performed by: STUDENT IN AN ORGANIZED HEALTH CARE EDUCATION/TRAINING PROGRAM

## 2023-12-07 PROCEDURE — 3008F PR BODY MASS INDEX (BMI) DOCUMENTED: ICD-10-PCS | Mod: CPTII,S$GLB,, | Performed by: STUDENT IN AN ORGANIZED HEALTH CARE EDUCATION/TRAINING PROGRAM

## 2023-12-07 PROCEDURE — 99214 OFFICE O/P EST MOD 30 MIN: CPT | Mod: S$GLB,,, | Performed by: STUDENT IN AN ORGANIZED HEALTH CARE EDUCATION/TRAINING PROGRAM

## 2023-12-07 PROCEDURE — 1159F PR MEDICATION LIST DOCUMENTED IN MEDICAL RECORD: ICD-10-PCS | Mod: CPTII,S$GLB,, | Performed by: STUDENT IN AN ORGANIZED HEALTH CARE EDUCATION/TRAINING PROGRAM

## 2023-12-07 PROCEDURE — 3008F BODY MASS INDEX DOCD: CPT | Mod: CPTII,S$GLB,, | Performed by: STUDENT IN AN ORGANIZED HEALTH CARE EDUCATION/TRAINING PROGRAM

## 2023-12-07 PROCEDURE — 3044F HG A1C LEVEL LT 7.0%: CPT | Mod: CPTII,S$GLB,, | Performed by: STUDENT IN AN ORGANIZED HEALTH CARE EDUCATION/TRAINING PROGRAM

## 2023-12-07 PROCEDURE — 3078F PR MOST RECENT DIASTOLIC BLOOD PRESSURE < 80 MM HG: ICD-10-PCS | Mod: CPTII,S$GLB,, | Performed by: STUDENT IN AN ORGANIZED HEALTH CARE EDUCATION/TRAINING PROGRAM

## 2023-12-07 PROCEDURE — 3074F SYST BP LT 130 MM HG: CPT | Mod: CPTII,S$GLB,, | Performed by: STUDENT IN AN ORGANIZED HEALTH CARE EDUCATION/TRAINING PROGRAM

## 2023-12-07 RX ORDER — ERGOCALCIFEROL 1.25 MG/1
50000 CAPSULE ORAL
Status: CANCELLED | OUTPATIENT
Start: 2023-12-07

## 2023-12-22 ENCOUNTER — PATIENT MESSAGE (OUTPATIENT)
Dept: ADMINISTRATIVE | Facility: OTHER | Age: 63
End: 2023-12-22
Payer: MEDICARE

## 2024-01-22 ENCOUNTER — PATIENT MESSAGE (OUTPATIENT)
Dept: PSYCHIATRY | Facility: CLINIC | Age: 64
End: 2024-01-22
Payer: MEDICARE

## 2024-01-26 ENCOUNTER — OFFICE VISIT (OUTPATIENT)
Dept: FAMILY MEDICINE | Facility: CLINIC | Age: 64
End: 2024-01-26
Payer: MEDICARE

## 2024-01-26 VITALS
OXYGEN SATURATION: 98 % | RESPIRATION RATE: 18 BRPM | BODY MASS INDEX: 33.46 KG/M2 | HEART RATE: 87 BPM | SYSTOLIC BLOOD PRESSURE: 112 MMHG | HEIGHT: 64 IN | DIASTOLIC BLOOD PRESSURE: 80 MMHG | WEIGHT: 196 LBS | TEMPERATURE: 98 F

## 2024-01-26 DIAGNOSIS — M12.9 ARTHRITIS/ARTHROPATHY OF MULTIPLE JOINTS: ICD-10-CM

## 2024-01-26 DIAGNOSIS — K64.8 INTERNAL HEMORRHOIDS: Primary | ICD-10-CM

## 2024-01-26 DIAGNOSIS — N18.31 STAGE 3A CHRONIC KIDNEY DISEASE: ICD-10-CM

## 2024-01-26 PROBLEM — R06.09 DOE (DYSPNEA ON EXERTION): Status: RESOLVED | Noted: 2023-10-16 | Resolved: 2024-01-26

## 2024-01-26 PROBLEM — E66.01 SEVERE OBESITY (BMI 35.0-39.9) WITH COMORBIDITY: Status: RESOLVED | Noted: 2022-02-25 | Resolved: 2024-01-26

## 2024-01-26 PROBLEM — G35 MULTIPLE SCLEROSIS: Status: RESOLVED | Noted: 2023-08-24 | Resolved: 2024-01-26

## 2024-01-26 PROCEDURE — 99999 PR PBB SHADOW E&M-EST. PATIENT-LVL IV: CPT | Mod: PBBFAC,,, | Performed by: NURSE PRACTITIONER

## 2024-01-26 PROCEDURE — 99214 OFFICE O/P EST MOD 30 MIN: CPT | Mod: S$GLB,,, | Performed by: NURSE PRACTITIONER

## 2024-01-26 RX ORDER — INFLUENZA VIRUS VACCINE 15; 15; 15; 15 UG/.5ML; UG/.5ML; UG/.5ML; UG/.5ML
SUSPENSION INTRAMUSCULAR
COMMUNITY
Start: 2023-10-27 | End: 2024-01-26 | Stop reason: ALTCHOICE

## 2024-01-26 NOTE — PROGRESS NOTES
Health Maintenance Due   Topic     COVID-19 Vaccine (6 - 2023-24 season) Not offered at this office

## 2024-01-30 NOTE — PROGRESS NOTES
Subjective:      Patient ID: Jia Ugalde is a 63 y.o. female.  Pt returns to clinic with multiple complaints. She has long hx of hemorrhoids with recurrent blood in stools that are worsened with periods on constipation. Denies abdominal or rectal pain. Also reports chronic knee pain and weakness for which she wears a brace constant, now having worsening left hip pain. She did complete PT a few months ago and continues home exercises. She denies new trauma, injury or fall.     Rectal Bleeding  This is a chronic problem. The current episode started more than 1 year ago. The problem occurs intermittently. The problem has been waxing and waning. Associated symptoms include arthralgias, myalgias and weakness. Pertinent negatives include no abdominal pain, anorexia, change in bowel habit, chest pain, chills, congestion, coughing, diaphoresis, fatigue, fever, headaches, joint swelling, nausea, neck pain, numbness, rash, sore throat, swollen glands, urinary symptoms, vertigo, visual change or vomiting. Exacerbated by: constipation. She has tried nothing for the symptoms.     Review of Systems   Constitutional:  Negative for activity change, appetite change, chills, diaphoresis, fatigue, fever and unexpected weight change.   HENT:  Negative for nasal congestion, hearing loss, rhinorrhea, sore throat and trouble swallowing.    Eyes:  Negative for pain, discharge and visual disturbance.   Respiratory:  Negative for cough, chest tightness, shortness of breath and wheezing.    Cardiovascular:  Positive for leg swelling. Negative for chest pain, palpitations and claudication.   Gastrointestinal:  Positive for abdominal distention, anal bleeding, blood in stool and hematochezia. Negative for abdominal pain, anorexia, change in bowel habit, constipation, diarrhea, nausea, rectal pain, vomiting, reflux and fecal incontinence.   Endocrine: Negative for polydipsia and polyuria.   Genitourinary:  Negative for difficulty urinating,  "dysuria, hematuria and menstrual problem.   Musculoskeletal:  Positive for arthralgias, gait problem, leg pain, myalgias and joint deformity. Negative for back pain, joint swelling, neck pain and neck stiffness.   Integumentary:  Negative for rash.   Allergic/Immunologic: Negative.    Neurological:  Positive for weakness. Negative for dizziness, vertigo, tremors, seizures, syncope, facial asymmetry, speech difficulty, light-headedness, numbness, headaches, memory loss and coordination difficulties.   Psychiatric/Behavioral:  Negative for confusion and dysphoric mood.    All other systems reviewed and are negative.        Objective:     Vitals:    01/26/24 0920   BP: 112/80   BP Location: Right arm   Patient Position: Sitting   BP Method: Small (Manual)   Pulse: 87   Resp: 18   Temp: 98.4 °F (36.9 °C)   TempSrc: Oral   SpO2: 98%   Weight: 88.9 kg (195 lb 15.8 oz)   Height: 5' 4" (1.626 m)     Physical Exam  Vitals and nursing note reviewed.   Constitutional:       General: She is not in acute distress.     Appearance: Normal appearance. She is well-developed and well-groomed. She is not ill-appearing.   HENT:      Head: Normocephalic and atraumatic.      Right Ear: External ear normal.      Left Ear: External ear normal.      Nose: Nose normal.      Mouth/Throat:      Lips: Pink.      Mouth: Mucous membranes are moist.   Eyes:      General: Lids are normal. Vision grossly intact. Gaze aligned appropriately.      Conjunctiva/sclera: Conjunctivae normal.      Pupils: Pupils are equal, round, and reactive to light.   Neck:      Trachea: Phonation normal.   Cardiovascular:      Rate and Rhythm: Normal rate and regular rhythm.      Heart sounds: Normal heart sounds.   Pulmonary:      Effort: Pulmonary effort is normal. No accessory muscle usage or respiratory distress.      Breath sounds: Normal breath sounds and air entry.   Abdominal:      General: Abdomen is flat. Bowel sounds are normal. There is no distension.      " Palpations: Abdomen is soft.      Tenderness: There is no abdominal tenderness. There is no right CVA tenderness, left CVA tenderness, guarding or rebound.   Musculoskeletal:      Cervical back: Neck supple.      Lumbar back: Normal.      Right hip: Normal. No tenderness or crepitus.      Left hip: Tenderness and crepitus present. No deformity or bony tenderness. Normal range of motion. Normal strength.      Right upper leg: Normal.      Left upper leg: Normal.      Right knee: Crepitus present. No swelling, deformity, effusion or bony tenderness. Decreased range of motion. No tenderness.      Left knee: Crepitus present. No swelling, deformity, effusion or bony tenderness. Decreased range of motion. No tenderness. Normal patellar mobility.      Right lower leg: Normal. No tenderness. No edema.      Left lower leg: Normal. No tenderness. No edema.   Skin:     General: Skin is warm and dry.      Findings: No rash.   Neurological:      General: No focal deficit present.      Mental Status: She is alert and oriented to person, place, and time. Mental status is at baseline.      Sensory: Sensation is intact.      Motor: Motor function is intact.      Coordination: Coordination is intact.      Gait: Gait is intact.   Psychiatric:         Attention and Perception: Attention and perception normal.         Mood and Affect: Mood and affect normal.         Speech: Speech normal.         Behavior: Behavior normal. Behavior is cooperative.         Thought Content: Thought content normal.         Cognition and Memory: Cognition and memory normal.         Judgment: Judgment normal.       Assessment and Plan:     1. Internal hemorrhoids  Discussed the diagnosis with the patient, including plans for treatment and expected course.  Discussed symptomatic and preventative measures regarding hemorrhoidal disease.  Recommended fiber supplementation.  Topical corticosteroid per medication orders.  Follow up as needed.  Told to seek care  immediately if bleeding becomes heavy.  - hydrocortisone-pramoxine (PROCTOFOAM-HS) rectal foam; Place 1 applicator rectally 3 (three) times daily.  Dispense: 10 g; Refill: 2    2. Arthritis/arthropathy of multiple joints  Discussed DJD and its various treatment modalities.  Home exercises discussed.  OTC analgesics as needed.  F/u with ortho if fails to improve or worsen     3. Stage 3a chronic kidney disease  BP controlled without medication, maintain adequate hydration and decrease use of nephrotoxic agents           KARLIE Garcia, FNP-C  Family/Internal Medicine  [facility]

## 2024-02-09 NOTE — PROGRESS NOTES
Airway  Date/Time: 2/9/2024 1:20 PM  Urgency: elective    Difficult airway    Staffing  Performed: CRNA   Authorized by: EDWARD Olivia    Performed by: EDWARD Olivia  Patient location during procedure: OR    Indications and Patient Condition  Indications for airway management: anesthesia and airway protection  Spontaneous Ventilation: absent  Sedation level: deep  Preoxygenated: yes  Patient position: sniffing  MILS not maintained throughout  Mask difficulty assessment: 1 - vent by mask  Planned trial extubation    Final Airway Details  Final airway type: endotracheal airway      Successful airway: ETT     Successful intubation technique: video laryngoscopy  Endotracheal tube insertion site: oral  Blade: Shiraz  Blade size: #4  ETT size (mm): 7.0  Cormack-Lehane Classification: grade I - full view of glottis  Placement verified by: chest auscultation and capnometry   Measured from: lips  ETT to lips (cm): 21  Number of attempts at approach: 1  Ventilation between attempts: BVM  Number of other approaches attempted: 0    Additional Comments  Neck neutral, ett taped in place, min air to cuff for seal         [facility]  Neurosurgery    SUBJECTIVE:     History of Present Illness:  Jia Ugalde is a 63 y.o. female last seen on 10/25/23. She thinks her thigh pain has eased up a bit since the last visit, but it is still present. Her left hip is bothering her recently. She recently underwent DXA which showed osteopenia, and she is hopeful Ca and Vit D will help her feel better.     She expresses interest in pain reprocessing therapy and other counseling related modalities to help with pain relief. She expresses interest in nutritional supplements to help with pain relief.    She is hopeful to avoid surgery on her back.    Review of patient's allergies indicates:  No Known Allergies    Past Medical History:   Diagnosis Date    GERD (gastroesophageal reflux disease)     Hyperlipidemia      Past Surgical History:   Procedure Laterality Date    BREAST BIOPSY      BREAST SURGERY       SECTION  1997    COLONOSCOPY N/A 2021    Procedure: COLONOSCOPY;  Surgeon: Caitlin Joseph MD;  Location: [facility];  Service: Endoscopy;  Laterality: N/A;  fully vaccinated-GT    pt states had polyps    EPIDURAL STEROID INJECTION Bilateral 2021    Procedure: Bilateral L5 Transforaminal Epidural Steroid Injection;  Surgeon: Keron Cristina Jr., MD;  Location: [facility];  Service: Pain Management;  Laterality: Bilateral;  Arrive @ 1030 (requested 10); No ATC or DM; Vacc.    ESOPHAGOGASTRODUODENOSCOPY N/A 2022    Procedure: EGD (ESOPHAGOGASTRODUODENOSCOPY);  Surgeon: Lowell Robles MD;  Location: [facility] ([address] [place] [address]);  Service: Endoscopy;  Laterality: N/A;  fully vaccinated  Pt requesting earlier date with any scoping MD - ERW  RAPID - add on / prep ins. emailed and reviewed- ERW    HEMORRHOID SURGERY      HYSTERECTOMY      SPINE SURGERY      TONSILLECTOMY  1965    TUBAL LIGATION       Family History   Problem Relation Age of Onset    Pancreatic cancer Maternal Grandmother     Pancreatic cancer Paternal  Grandmother     Arthritis Paternal Grandfather     Asthma Son      Social History     Tobacco Use    Smoking status: Never    Smokeless tobacco: Never   Substance Use Topics    Alcohol use: Never    Drug use: Never      Review of Systems:  As noted in HPI    OBJECTIVE:     Vital Signs (Most Recent):  Pulse: 65 (12/07/23 0854)  BP: 129/76 (12/07/23 0854)  SpO2: 98 % (12/07/23 0854)    Physical Exam:  General: well developed, well nourished, no distress  Head: normocephalic, atraumatic  Neurologic: Alert and oriented. Thought content appropriate  GCS: Motor: 6/Verbal: 5/Eyes: 4 GCS Total: 15  Language: No aphasia  Speech: No dysarthria  Cranial nerves: face symmetric   Eyes: pupils equal  Pulmonary: normal respirations, not labored  Sensory: intact to light touch throughout  Motor Strength: Moves all extremities spontaneously with good tone     Strength   Deltoids Triceps Biceps Wrist Extension  Hand  FA   Upper: R 5/5 5/5 5/5 5/5  5/5 5/5     L 5/5 5/5 5/5 5/5  5/5 5/5       Iliopsoas Quadriceps  Tibialis  anterior Gastro- cnemius EHL     Lower: R 5/5 5/5  5/5 5/5 5/5       L 4+/5 5/5  5/5 5/5 5/5        DTR's - 2 patellar on right, 1+ on left. 2+ ankle jerk b/l     Gait: normal                    Diagnostic Results:  I have personally reviewed imaging. My impression is as follows.    Scoliosis x rays were performed 10/30/23, and these show the patient has a positive SVA of about 6cm    DXA scan was performed 12/5/23 and this showed osteopenia, t score -1.2 at the hip    MRI of the lumbar spine performed 05/23/2023 shows prior L4/5 fusion, prior left L2/3 laminectomy with lateral recess decompression.  Patient has multilevel spondylosis.  She has degenerative disc disease with disc bulging at L5/S1, but this does not appear to compress either the L5 or the S1 nerve roots in my opinion.  She also has adjacent level disc degeneration with bulging at L2/3 and L3/4.  There is foraminal stenosis bilaterally  compressing the exiting L2 and L3 nerve roots, this is worse on the left side than it is on the right side. There is also a far lateral disc herniation on the left at L2/3 compressing the L2 root.    ASSESSMENT/PLAN:     Jia Ugalde is a 62 y.o. female who presents with continued back pain and leg pain, mostly in L2 and L3 distributions left side worse than right side, in the setting of prior lumbar surgeries.  Plan is as follows:   -Patient is more interested in medical management and different therapy modalities at this time.    -If she were to undergo surgery, she feels like she wants a 100% guarantee that this would make her pain free.  I counseled her that this is probably not a realistic expectation; most realistically surgery would help with her anterior thigh pain.  -if we were to proceed with surgery, L2-3 and L3-4 lateral lumbar interbody fusions would restore foraminal height and take pressure off of her L2 and L3 nerve roots.  We would then need to back this up with percutaneous pedicle screws and tie them into her previous fusion at L4. This would be a two stage surgery (two different days). I reviewed the details of this tentative procedure with the patient.  She understands that it is purely up to her if she wants to go forward with this at any point.  I offered to make her another appointment but she says she will be in touch with us if she decides she wants to move forward with surgery in the future.     Please feel free to call with any further questions.    Time spent on this encounter: 30 minutes. This includes face-to-face time and non-face to face time preparing to see the patient (eg, review of tests), obtaining and/or reviewing separately obtained history, documenting clinical information in the electronic or other health record, independently interpreting results and communicating results to the patient/family/caregiver, or care coordinator.      Kerwin BROWN [place]  [facility]  [facility]  Department of Neurosurgery  [phone]    Disclaimer: This note was dictated by speech recognition. Minor errors in transcription may be present.  Please call with any questions.

## 2024-02-28 ENCOUNTER — PATIENT MESSAGE (OUTPATIENT)
Dept: FAMILY MEDICINE | Facility: CLINIC | Age: 64
End: 2024-02-28
Payer: MEDICARE

## 2024-02-28 DIAGNOSIS — K64.8 INTERNAL HEMORRHOIDS: ICD-10-CM

## 2024-02-28 DIAGNOSIS — R10.84 GENERALIZED ABDOMINAL PAIN: Primary | ICD-10-CM

## 2024-02-28 NOTE — TELEPHONE ENCOUNTER
Scheduled CT scan at [facility] on 1 Mar 2024 at 1120.    Scheduled labs at [facility] lab at 29 Feb  at 0900.     Patient agreed to both appointments.

## 2024-02-29 ENCOUNTER — LAB VISIT (OUTPATIENT)
Dept: LAB | Facility: HOSPITAL | Age: 64
End: 2024-02-29
Attending: NURSE PRACTITIONER
Payer: MEDICARE

## 2024-02-29 DIAGNOSIS — R10.84 GENERALIZED ABDOMINAL PAIN: ICD-10-CM

## 2024-02-29 DIAGNOSIS — K64.8 INTERNAL HEMORRHOIDS: ICD-10-CM

## 2024-02-29 LAB
ALBUMIN SERPL BCP-MCNC: 3.9 G/DL (ref 3.5–5.2)
ALP SERPL-CCNC: 50 U/L (ref 55–135)
ALT SERPL W/O P-5'-P-CCNC: 15 U/L (ref 10–44)
ANION GAP SERPL CALC-SCNC: 7 MMOL/L (ref 8–16)
AST SERPL-CCNC: 21 U/L (ref 10–40)
BASOPHILS # BLD AUTO: 0.08 K/UL (ref 0–0.2)
BASOPHILS NFR BLD: 1.6 % (ref 0–1.9)
BILIRUB SERPL-MCNC: 0.6 MG/DL (ref 0.1–1)
BILIRUB UR QL STRIP: NEGATIVE
BUN SERPL-MCNC: 9 MG/DL (ref 8–23)
CALCIUM SERPL-MCNC: 10.3 MG/DL (ref 8.7–10.5)
CHLORIDE SERPL-SCNC: 107 MMOL/L (ref 95–110)
CLARITY UR: CLEAR
CO2 SERPL-SCNC: 27 MMOL/L (ref 23–29)
COLOR UR: YELLOW
CREAT SERPL-MCNC: 1.2 MG/DL (ref 0.5–1.4)
DIFFERENTIAL METHOD BLD: ABNORMAL
EOSINOPHIL # BLD AUTO: 0.3 K/UL (ref 0–0.5)
EOSINOPHIL NFR BLD: 6.6 % (ref 0–8)
ERYTHROCYTE [DISTWIDTH] IN BLOOD BY AUTOMATED COUNT: 14 % (ref 11.5–14.5)
EST. GFR  (NO RACE VARIABLE): 52 ML/MIN/1.73 M^2
GLUCOSE SERPL-MCNC: 91 MG/DL (ref 70–110)
GLUCOSE UR QL STRIP: NEGATIVE
HCT VFR BLD AUTO: 42.3 % (ref 37–48.5)
HGB BLD-MCNC: 13.3 G/DL (ref 12–16)
HGB UR QL STRIP: NEGATIVE
IMM GRANULOCYTES # BLD AUTO: 0.01 K/UL (ref 0–0.04)
IMM GRANULOCYTES NFR BLD AUTO: 0.2 % (ref 0–0.5)
KETONES UR QL STRIP: NEGATIVE
LEUKOCYTE ESTERASE UR QL STRIP: NEGATIVE
LYMPHOCYTES # BLD AUTO: 2.6 K/UL (ref 1–4.8)
LYMPHOCYTES NFR BLD: 51.4 % (ref 18–48)
MCH RBC QN AUTO: 28.5 PG (ref 27–31)
MCHC RBC AUTO-ENTMCNC: 31.4 G/DL (ref 32–36)
MCV RBC AUTO: 91 FL (ref 82–98)
MONOCYTES # BLD AUTO: 0.4 K/UL (ref 0.3–1)
MONOCYTES NFR BLD: 7.8 % (ref 4–15)
NEUTROPHILS # BLD AUTO: 1.7 K/UL (ref 1.8–7.7)
NEUTROPHILS NFR BLD: 32.4 % (ref 38–73)
NITRITE UR QL STRIP: NEGATIVE
NRBC BLD-RTO: 0 /100 WBC
PH UR STRIP: 6 [PH] (ref 5–8)
PLATELET # BLD AUTO: 242 K/UL (ref 150–450)
PMV BLD AUTO: 11 FL (ref 9.2–12.9)
POTASSIUM SERPL-SCNC: 4.4 MMOL/L (ref 3.5–5.1)
PROT SERPL-MCNC: 7.8 G/DL (ref 6–8.4)
PROT UR QL STRIP: NEGATIVE
RBC # BLD AUTO: 4.67 M/UL (ref 4–5.4)
SODIUM SERPL-SCNC: 141 MMOL/L (ref 136–145)
SP GR UR STRIP: 1.01 (ref 1–1.03)
URN SPEC COLLECT METH UR: NORMAL
UROBILINOGEN UR STRIP-ACNC: NEGATIVE EU/DL
WBC # BLD AUTO: 5.12 K/UL (ref 3.9–12.7)

## 2024-02-29 PROCEDURE — 80053 COMPREHEN METABOLIC PANEL: CPT | Performed by: NURSE PRACTITIONER

## 2024-02-29 PROCEDURE — 36415 COLL VENOUS BLD VENIPUNCTURE: CPT | Mod: PO | Performed by: NURSE PRACTITIONER

## 2024-02-29 PROCEDURE — 85025 COMPLETE CBC W/AUTO DIFF WBC: CPT | Performed by: NURSE PRACTITIONER

## 2024-02-29 PROCEDURE — 81003 URINALYSIS AUTO W/O SCOPE: CPT | Performed by: NURSE PRACTITIONER

## 2024-03-01 ENCOUNTER — PATIENT MESSAGE (OUTPATIENT)
Dept: FAMILY MEDICINE | Facility: CLINIC | Age: 64
End: 2024-03-01
Payer: MEDICARE

## 2024-03-01 ENCOUNTER — TELEPHONE (OUTPATIENT)
Dept: FAMILY MEDICINE | Facility: CLINIC | Age: 64
End: 2024-03-01
Payer: MEDICARE

## 2024-03-01 ENCOUNTER — HOSPITAL ENCOUNTER (OUTPATIENT)
Dept: RADIOLOGY | Facility: HOSPITAL | Age: 64
Discharge: HOME | End: 2024-03-01
Attending: NURSE PRACTITIONER
Payer: MEDICARE

## 2024-03-01 DIAGNOSIS — R10.84 GENERALIZED ABDOMINAL PAIN: ICD-10-CM

## 2024-03-01 DIAGNOSIS — K64.8 INTERNAL HEMORRHOIDS: ICD-10-CM

## 2024-03-01 DIAGNOSIS — K64.8 INTERNAL HEMORRHOIDS: Primary | ICD-10-CM

## 2024-03-01 PROCEDURE — 74177 CT ABD & PELVIS W/CONTRAST: CPT | Mod: 26,,, | Performed by: RADIOLOGY

## 2024-03-01 PROCEDURE — 74177 CT ABD & PELVIS W/CONTRAST: CPT | Mod: TC

## 2024-03-01 PROCEDURE — 25500020 PHARM REV CODE 255: Performed by: NURSE PRACTITIONER

## 2024-03-01 RX ORDER — HYDROQUINONE 40 MG/G
CREAM TOPICAL
COMMUNITY
Start: 2024-02-02

## 2024-03-01 RX ORDER — HYDROCORTISONE 25 MG/G
CREAM TOPICAL
COMMUNITY
Start: 2024-02-02

## 2024-03-01 RX ORDER — TRETINOIN 0.25 MG/G
CREAM TOPICAL
COMMUNITY
Start: 2024-02-06

## 2024-03-01 RX ADMIN — IOHEXOL 80 ML: 350 INJECTION, SOLUTION INTRAVENOUS at 12:03

## 2024-03-04 ENCOUNTER — TELEPHONE (OUTPATIENT)
Dept: GASTROENTEROLOGY | Facility: CLINIC | Age: 64
End: 2024-03-04
Payer: MEDICARE

## 2024-03-04 NOTE — TELEPHONE ENCOUNTER
MA returned patient's call, but she did not answer. A voicemail was left informing patient she will need to see a CRS provider to address her hemorrhoids.

## 2024-03-04 NOTE — TELEPHONE ENCOUNTER
----- Message from Veronica Aggarwal sent at 3/1/2024  4:55 PM CST -----  Regarding: [phone]  Type: Patient Call Back    Who called:self     What is the request in detail: pt states its not letting her schedule her referral with [facility], I tried to schedule pt but I have barriers on my end. She was referred by a [facility] doctor , please reach let you know what going on regards to scheduling     Can the clinic reply by [facility]?no     Would the patient rather a call back or a response via [facility]? Call back     Best call back number:[phone]       Additional Information:

## 2024-03-07 ENCOUNTER — OFFICE VISIT (OUTPATIENT)
Dept: SURGERY | Facility: CLINIC | Age: 64
End: 2024-03-07
Payer: MEDICARE

## 2024-03-07 VITALS
HEIGHT: 64 IN | HEART RATE: 67 BPM | BODY MASS INDEX: 33.2 KG/M2 | SYSTOLIC BLOOD PRESSURE: 134 MMHG | DIASTOLIC BLOOD PRESSURE: 96 MMHG | WEIGHT: 194.44 LBS

## 2024-03-07 DIAGNOSIS — K64.8 INTERNAL HEMORRHOIDS: ICD-10-CM

## 2024-03-07 PROCEDURE — 99204 OFFICE O/P NEW MOD 45 MIN: CPT | Mod: 25,S$GLB,, | Performed by: NURSE PRACTITIONER

## 2024-03-07 PROCEDURE — 46600 DIAGNOSTIC ANOSCOPY SPX: CPT | Mod: S$GLB,,, | Performed by: NURSE PRACTITIONER

## 2024-03-07 PROCEDURE — 99999 PR PBB SHADOW E&M-EST. PATIENT-LVL IV: CPT | Mod: PBBFAC,,, | Performed by: NURSE PRACTITIONER

## 2024-03-07 RX ORDER — HYDROCORTISONE 25 MG/G
CREAM TOPICAL 2 TIMES DAILY
Qty: 28 G | Refills: 1 | Status: SHIPPED | OUTPATIENT
Start: 2024-03-07

## 2024-03-07 NOTE — PROGRESS NOTES
CRS Office Visit History and Physical    Referring Md:   Humaira Rivera, Np  [address]  [address]  [place] [address]    SUBJECTIVE:     Chief Complaint: hemorrhoids    History of Present Illness:  The patient is hemorrhoids patient to this practice.   Course is as follows:  Patient is a 63 y.o. female presents with bleeding  She reports she has had two hemorrhoid surgeries in the past. Last one performed 20 years ago.  Bleedign described as dripping in the toilet and BRB on tp with wiping. Last noticed about a week ago.   Denies rectal pain.   Has a BM 2-3x a day. She drinks a lot of water.  Constipation is not an issue.  She reports the colonoscopy prep flared her hemorrhoids.   She reports she always has external tissue on the anus  Proctofoam tried for about 5 days and didn't help  Used OTC hemorrhoid pads      Last Colonoscopy: 2021 Hemorrhoids were found on perianal exam.        The colon (entire examined portion) appeared normal.        Internal hemorrhoids were found during retroflexion. The hemorrhoids        were Grade I (internal hemorrhoids that do not prolapse).   Family history of colorectal cancer or IBD: daughter has crohns disease.No UC in the family to her knowledge.    Review of patient's allergies indicates:  No Known Allergies    Past Medical History:   Diagnosis Date    GERD (gastroesophageal reflux disease)     Hyperlipidemia      Past Surgical History:   Procedure Laterality Date    BREAST BIOPSY      BREAST SURGERY       SECTION  1997    COLONOSCOPY N/A 2021    Procedure: COLONOSCOPY;  Surgeon: Caitlin Joseph MD;  Location: [facility];  Service: Endoscopy;  Laterality: N/A;  fully vaccinated-GT    pt states had polyps    EPIDURAL STEROID INJECTION Bilateral 2021    Procedure: Bilateral L5 Transforaminal Epidural Steroid Injection;  Surgeon: Keron Cristina Jr., MD;  Location: [facility];  Service: Pain Management;  Laterality: Bilateral;   "Arrive @ 1030 (requested 10); No ATC or DM; Vacc.    ESOPHAGOGASTRODUODENOSCOPY N/A 02/18/2022    Procedure: EGD (ESOPHAGOGASTRODUODENOSCOPY);  Surgeon: Lowell Robles MD;  Location: [facility] ([address] [place] [address]);  Service: Endoscopy;  Laterality: N/A;  fully vaccinated  Pt requesting earlier date with any scoping MD - ERW  RAPID - add on / prep ins. emailed and reviewed- ERW    HEMORRHOID SURGERY      HYSTERECTOMY      SPINE SURGERY      TONSILLECTOMY  1965    TUBAL LIGATION       Family History   Problem Relation Age of Onset    Pancreatic cancer Maternal Grandmother     Pancreatic cancer Paternal Grandmother     Arthritis Paternal Grandfather     Asthma Son      Social History     Tobacco Use    Smoking status: Never    Smokeless tobacco: Never   Substance Use Topics    Alcohol use: Never    Drug use: Never        Review of Systems:  ROS    OBJECTIVE:     Vital Signs (Most Recent)  BP (!) 134/96 (BP Location: Left arm, Patient Position: Sitting, BP Method: Medium (Manual))   Pulse 67   Ht 5' 4" (1.626 m)   Wt 88.2 kg (194 lb 7.1 oz)   BMI 33.38 kg/m²     Physical Exam:  General: Black or  female in no distress   Neuro: Alert and oriented to person, place, and time.  Moves all extremities.     HEENT: No icterus.  Trachea midline  Respiratory: Respirations are even and unlabored, no cough or audible wheezing  Skin: Warm dry and intact, No visible rashes, no jaundice    Labs reviewed today:  Lab Results   Component Value Date    WBC 5.12 02/29/2024    HGB 13.3 02/29/2024    HCT 42.3 02/29/2024     02/29/2024    CHOL 196 11/29/2023    TRIG 103 11/29/2023    HDL 56 11/29/2023    ALT 15 02/29/2024    AST 21 02/29/2024     02/29/2024    K 4.4 02/29/2024     02/29/2024    CREATININE 1.2 02/29/2024    BUN 9 02/29/2024    CO2 27 02/29/2024    TSH 2.178 01/26/2023    HGBA1C 5.6 08/22/2023         Endoscopy reviewed today:  See HPI    Anorectal Exam:    Anal Skin: External hemorrhoids    Digital " Rectal Exam:  Resting Tone normal  Mass bouncy, nontender hemorrhoids  Tenderness  absent    Anoscopy:  Verbal consent was obtained.   Clear plastic anoscope inserted.    Hemorrhoids  present  Stigmata of bleeding  present  Stigmata of prolapsed  present  Distal rectal mucosa normal  Mucous on hemorrhoids        ASSESSMENT/PLAN:     Diagnoses and all orders for this visit:    Internal hemorrhoids  -     Ambulatory referral/consult to Gastroenterology  -     hydrocortisone (ANUSOL-HC) 2.5 % rectal cream; Place rectally 2 (two) times daily.      63 y.o. F here for bleeding hemorrhoids. Chronic. Hemorrhoidectomy x2 20+ years ago. External disease today along with internal. We discussed hemorrhoid therapies in detail. Given extent of external disease a hemorrhoidectomy would be her only option as she is not a candidate for RBL.   She is not interested in surgery so topical steroids and good bathroom habits would be key to managing these.    F/u PRN    MARKEL Brar  Colon and Rectal Surgery

## 2024-03-12 ENCOUNTER — TELEPHONE (OUTPATIENT)
Dept: GASTROENTEROLOGY | Facility: CLINIC | Age: 64
End: 2024-03-12
Payer: MEDICARE

## 2024-03-12 ENCOUNTER — PATIENT MESSAGE (OUTPATIENT)
Dept: SURGERY | Facility: CLINIC | Age: 64
End: 2024-03-12
Payer: MEDICARE

## 2024-03-12 NOTE — TELEPHONE ENCOUNTER
----- Message from Veronica Aggarwal sent at 3/1/2024  4:55 PM CST -----  Regarding: [id number]  Type: Patient Call Back    Who called:self     What is the request in detail: pt states its not letting her [facility], I tried to schedule pt but I have barriers on my end. She was referred by a [facility] doctor , please reach let you know what going on regards to scheduling     Can the clinic reply by [facility]?no     Would the patient rather a call back or a response via [facility]? Call back     Best call back number:[phone]       Additional Information:

## 2024-03-13 ENCOUNTER — TELEPHONE (OUTPATIENT)
Dept: GASTROENTEROLOGY | Facility: CLINIC | Age: 64
End: 2024-03-13
Payer: MEDICARE

## 2024-03-13 ENCOUNTER — PATIENT MESSAGE (OUTPATIENT)
Dept: FAMILY MEDICINE | Facility: CLINIC | Age: 64
End: 2024-03-13
Payer: MEDICARE

## 2024-03-13 NOTE — TELEPHONE ENCOUNTER
----- Message from Marian Valadez NP sent at 3/7/2024  9:24 AM CST -----  Please call pt with an appt. I see where she was wanting to establish care with GI on the WB. She has abd bloating, gerd, gas, stomach pain.

## 2024-03-13 NOTE — TELEPHONE ENCOUNTER
MA spoke with patient. Mrs. Ugalde was offered a same day appointment, but patient declined. Mrs. Ugalde is on wait list.

## 2024-03-13 NOTE — TELEPHONE ENCOUNTER
----- Message from Elisa Pacheco RN sent at 3/13/2024  2:01 PM CDT -----  Regarding: FW: Appt Access  Contact: pt [phone]  This is a Gastro referral  ----- Message -----  From: Sharon Weinstein MA  Sent: 3/13/2024   1:50 PM CDT  To: Elisa Pacheco RN  Subject: FW: Appt Access                                    ----- Message -----  From: Mireya Weinstein  Sent: 3/13/2024   1:36 PM CDT  To: Corey Lawton Staff  Subject: Appt Access                                      Pt calling to schedule appt for bloating and gas, hemorrhoids. Pls call

## 2024-03-14 ENCOUNTER — TELEPHONE (OUTPATIENT)
Dept: GASTROENTEROLOGY | Facility: CLINIC | Age: 64
End: 2024-03-14
Payer: MEDICARE

## 2024-03-14 NOTE — TELEPHONE ENCOUNTER
----- Message from Gloria Ricci MA sent at 3/13/2024  4:56 PM CDT -----  Regarding: pt call back 3/13/24  Trena De La Cruz Staff  Caller: Unspecified (Today,  4:39 PM)  Type:  Needs Medical Advice    Who Called:  Pt  Symptoms (please be specific):  bloated, stomach pains, bleeding  How long has patient had these symptoms:  feb  Would the patient rather a call back or a response via [facility]? Callback  Best Call Back Number:  [phone]  Additional Information: Pt is requesting a callback from this provider office in regards to appt

## 2024-05-06 ENCOUNTER — OFFICE VISIT (OUTPATIENT)
Dept: GASTROENTEROLOGY | Facility: CLINIC | Age: 64
End: 2024-05-06
Payer: MEDICARE

## 2024-05-06 VITALS
DIASTOLIC BLOOD PRESSURE: 80 MMHG | WEIGHT: 195.88 LBS | SYSTOLIC BLOOD PRESSURE: 120 MMHG | HEIGHT: 64 IN | HEART RATE: 74 BPM | BODY MASS INDEX: 33.44 KG/M2

## 2024-05-06 DIAGNOSIS — K21.9 GASTROESOPHAGEAL REFLUX DISEASE, UNSPECIFIED WHETHER ESOPHAGITIS PRESENT: Primary | ICD-10-CM

## 2024-05-06 DIAGNOSIS — K58.1 IRRITABLE BOWEL SYNDROME WITH CONSTIPATION: ICD-10-CM

## 2024-05-06 PROCEDURE — 3008F BODY MASS INDEX DOCD: CPT | Mod: CPTII,S$GLB,, | Performed by: INTERNAL MEDICINE

## 2024-05-06 PROCEDURE — 1159F MED LIST DOCD IN RCRD: CPT | Mod: CPTII,S$GLB,, | Performed by: INTERNAL MEDICINE

## 2024-05-06 PROCEDURE — 3074F SYST BP LT 130 MM HG: CPT | Mod: CPTII,S$GLB,, | Performed by: INTERNAL MEDICINE

## 2024-05-06 PROCEDURE — 99215 OFFICE O/P EST HI 40 MIN: CPT | Mod: S$GLB,,, | Performed by: INTERNAL MEDICINE

## 2024-05-06 PROCEDURE — 3079F DIAST BP 80-89 MM HG: CPT | Mod: CPTII,S$GLB,, | Performed by: INTERNAL MEDICINE

## 2024-05-06 PROCEDURE — 99999 PR PBB SHADOW E&M-EST. PATIENT-LVL IV: CPT | Mod: PBBFAC,,, | Performed by: INTERNAL MEDICINE

## 2024-05-06 PROCEDURE — 1160F RVW MEDS BY RX/DR IN RCRD: CPT | Mod: CPTII,S$GLB,, | Performed by: INTERNAL MEDICINE

## 2024-05-06 NOTE — PROGRESS NOTES
Subjective:       Patient ID: Jia Ugalde is a 63 y.o. female.    Chief Complaint: Bloated, Gas, gel like stools, Heartburn, and Hemorrhoids      HPI:   Heartburn  She complains of abdominal pain and heartburn. She reports no coughing. The current episode started more than 1 year ago. The problem has been unchanged. The heartburn is located in the substernum. The heartburn wakes her from sleep. The heartburn changes with position. The symptoms are aggravated by certain foods. Risk factors include obesity. She has tried a PPI for the symptoms. The treatment provided no relief. Past procedures include an EGD.   Abdominal Pain  This is a chronic problem. The current episode started more than 1 year ago. The pain is located in the generalized abdominal region. Quality: bloating. The abdominal pain does not radiate. Pertinent negatives include no frequency, headaches or hematuria. Relieved by: farting. She has tried nothing for the symptoms.   Was on Pantoprazole but discontinued 3 years ago because it was ineffective. Taking Rolaids every night with some relief.    Gas and bloating all day, worse in the morning.    Bowel movements are normal to constipation.    Review of Systems   Constitutional:  Negative for appetite change.   HENT:  Negative for trouble swallowing.    Eyes:  Negative for photophobia.   Respiratory:  Negative for cough and shortness of breath.    Cardiovascular:  Negative for palpitations.   Gastrointestinal:  Positive for abdominal pain and heartburn.        See HPI for details   Genitourinary:  Negative for frequency and hematuria.   Integumentary:  Negative for rash.   Neurological:  Negative for weakness and headaches.   Psychiatric/Behavioral:  Negative for behavioral problems and suicidal ideas.          Objective:      Physical Exam  Eyes:      Pupils: Pupils are equal, round, and reactive to light.   Neck:      Thyroid: No thyromegaly.   Cardiovascular:      Rate and Rhythm: Normal rate  "and regular rhythm.      Heart sounds: Normal heart sounds.   Pulmonary:      Effort: Pulmonary effort is normal.      Breath sounds: Normal breath sounds.   Abdominal:      Palpations: Abdomen is soft. There is no mass.      Tenderness: There is no abdominal tenderness. There is no guarding or rebound.   Musculoskeletal:         General: No tenderness.   Psychiatric:         Behavior: Behavior normal.         Thought Content: Thought content normal.         Assessment:       1. Gastroesophageal reflux disease, unspecified whether esophagitis present    2. Irritable bowel syndrome with constipation        Plan:       Jia Pagan" was seen today for bloated, gas, gel like stools, heartburn and hemorrhoids.    Diagnoses and all orders for this visit:    Gastroesophageal reflux disease, unspecified whether esophagitis present  -     Case Request Endoscopy: ESOPHAGOGASTRODUODENOSCOPY (EGD)    Irritable bowel syndrome with constipation       Follow an antireflux regimen.  This includes:       - Do not lie down for at least 3 to 4 hours after meals.        - Raise the head of the bed 4 to 6 inches.        - Decrease excess weight.        - Avoid citrus juices and other acidic foods, alcohol, chocolate, mints, coffee and other caffeinated beverages, carbonated beverages, fatty and fried foods.        - Avoid tight-fitting clothing.        - Avoid cigarettes and other tobacco products.     Patient was to put the Bravo study on hold at this time, She will call to schedule when ready.    Probiotics.  Elimination diet.      "

## 2024-06-24 ENCOUNTER — PATIENT MESSAGE (OUTPATIENT)
Dept: FAMILY MEDICINE | Facility: CLINIC | Age: 64
End: 2024-06-24
Payer: MEDICARE

## 2024-06-28 ENCOUNTER — PATIENT OUTREACH (OUTPATIENT)
Dept: ADMINISTRATIVE | Facility: HOSPITAL | Age: 64
End: 2024-06-28
Payer: MEDICARE

## 2024-06-28 NOTE — PROGRESS NOTES
Health Maintenance Due   Topic Date Due    Annual UACr  Never done    COVID-19 Vaccine (6 - 2023-24 season) 09/01/2023   Chart review done. HM updated. Immunizations reviewed & updated. Care Everywhere updated.  OVERDUE HM ADDED TO APPOINTMENT NOTE

## 2024-07-01 ENCOUNTER — OFFICE VISIT (OUTPATIENT)
Dept: FAMILY MEDICINE | Facility: CLINIC | Age: 64
End: 2024-07-01
Payer: MEDICARE

## 2024-07-01 VITALS
WEIGHT: 196.19 LBS | SYSTOLIC BLOOD PRESSURE: 126 MMHG | HEIGHT: 64 IN | HEART RATE: 62 BPM | BODY MASS INDEX: 33.49 KG/M2 | TEMPERATURE: 98 F | DIASTOLIC BLOOD PRESSURE: 82 MMHG | OXYGEN SATURATION: 97 %

## 2024-07-01 DIAGNOSIS — N18.31 STAGE 3A CHRONIC KIDNEY DISEASE: ICD-10-CM

## 2024-07-01 DIAGNOSIS — R82.90 CLOUDY URINE: ICD-10-CM

## 2024-07-01 DIAGNOSIS — K44.9 HIATAL HERNIA: ICD-10-CM

## 2024-07-01 DIAGNOSIS — K21.9 GASTROESOPHAGEAL REFLUX DISEASE WITHOUT ESOPHAGITIS: Primary | ICD-10-CM

## 2024-07-01 DIAGNOSIS — M12.9 ARTHRITIS/ARTHROPATHY OF MULTIPLE JOINTS: ICD-10-CM

## 2024-07-01 LAB
ALBUMIN/CREAT UR: 3.2 UG/MG (ref 0–30)
BILIRUB UR QL STRIP: NEGATIVE
CLARITY UR: CLEAR
COLOR UR: YELLOW
CREAT UR-MCNC: 189 MG/DL (ref 15–325)
GLUCOSE UR QL STRIP: NEGATIVE
HGB UR QL STRIP: NEGATIVE
KETONES UR QL STRIP: NEGATIVE
LEUKOCYTE ESTERASE UR QL STRIP: NEGATIVE
MICROALBUMIN UR DL<=1MG/L-MCNC: 6 UG/ML
NITRITE UR QL STRIP: NEGATIVE
PH UR STRIP: 7 [PH] (ref 5–8)
PROT UR QL STRIP: NEGATIVE
SP GR UR STRIP: 1.02 (ref 1–1.03)
URN SPEC COLLECT METH UR: NORMAL
UROBILINOGEN UR STRIP-ACNC: NEGATIVE EU/DL

## 2024-07-01 PROCEDURE — 82570 ASSAY OF URINE CREATININE: CPT | Performed by: INTERNAL MEDICINE

## 2024-07-01 PROCEDURE — 3008F BODY MASS INDEX DOCD: CPT | Mod: CPTII,S$GLB,, | Performed by: INTERNAL MEDICINE

## 2024-07-01 PROCEDURE — 87086 URINE CULTURE/COLONY COUNT: CPT | Performed by: INTERNAL MEDICINE

## 2024-07-01 PROCEDURE — 1159F MED LIST DOCD IN RCRD: CPT | Mod: CPTII,S$GLB,, | Performed by: INTERNAL MEDICINE

## 2024-07-01 PROCEDURE — 81003 URINALYSIS AUTO W/O SCOPE: CPT | Performed by: INTERNAL MEDICINE

## 2024-07-01 PROCEDURE — 82043 UR ALBUMIN QUANTITATIVE: CPT | Performed by: INTERNAL MEDICINE

## 2024-07-01 PROCEDURE — 3079F DIAST BP 80-89 MM HG: CPT | Mod: CPTII,S$GLB,, | Performed by: INTERNAL MEDICINE

## 2024-07-01 PROCEDURE — 99999 PR PBB SHADOW E&M-EST. PATIENT-LVL IV: CPT | Mod: PBBFAC,,, | Performed by: INTERNAL MEDICINE

## 2024-07-01 PROCEDURE — G2211 COMPLEX E/M VISIT ADD ON: HCPCS | Mod: S$GLB,,, | Performed by: INTERNAL MEDICINE

## 2024-07-01 PROCEDURE — 3074F SYST BP LT 130 MM HG: CPT | Mod: CPTII,S$GLB,, | Performed by: INTERNAL MEDICINE

## 2024-07-01 PROCEDURE — 1160F RVW MEDS BY RX/DR IN RCRD: CPT | Mod: CPTII,S$GLB,, | Performed by: INTERNAL MEDICINE

## 2024-07-01 PROCEDURE — 99214 OFFICE O/P EST MOD 30 MIN: CPT | Mod: S$GLB,,, | Performed by: INTERNAL MEDICINE

## 2024-07-01 RX ORDER — FAMOTIDINE 20 MG/1
20 TABLET, FILM COATED ORAL 2 TIMES DAILY PRN
Qty: 60 TABLET | Refills: 3 | Status: SHIPPED | OUTPATIENT
Start: 2024-07-01 | End: 2024-07-01 | Stop reason: SDUPTHER

## 2024-07-01 RX ORDER — DICLOFENAC SODIUM 10 MG/G
2 GEL TOPICAL 4 TIMES DAILY PRN
Qty: 100 G | Refills: 3 | Status: SHIPPED | OUTPATIENT
Start: 2024-07-01

## 2024-07-01 RX ORDER — FAMOTIDINE 20 MG/1
20 TABLET, FILM COATED ORAL 2 TIMES DAILY PRN
Qty: 180 TABLET | Refills: 1 | Status: SHIPPED | OUTPATIENT
Start: 2024-07-01 | End: 2025-07-01

## 2024-07-03 ENCOUNTER — PATIENT MESSAGE (OUTPATIENT)
Dept: GASTROENTEROLOGY | Facility: CLINIC | Age: 64
End: 2024-07-03
Payer: MEDICARE

## 2024-07-03 ENCOUNTER — TELEPHONE (OUTPATIENT)
Dept: GASTROENTEROLOGY | Facility: CLINIC | Age: 64
End: 2024-07-03
Payer: MEDICARE

## 2024-07-03 LAB — BACTERIA UR CULT: NORMAL

## 2024-07-03 NOTE — TELEPHONE ENCOUNTER
----- Message from Renita Herron sent at 7/3/2024  9:33 AM CDT -----   Name of Who is Calling:     What is the request in detail:  patient request call back in reference to schedule appointment Please contact to further discuss and advise      Can the clinic reply by [facility]:     What Number to Call Back if not in [facility]:  [phone]

## 2024-07-03 NOTE — TELEPHONE ENCOUNTER
----- Message from Renita Herron sent at 7/3/2024  9:33 AM CDT -----   Name of Who is Calling:     What is the request in detail:  patient request call back in reference to schedule appointment Please contact to further discuss and advise      Can the clinic reply by [facility]:     What Number to Call Back if not in [facility]:  [phone]

## 2024-08-05 ENCOUNTER — PATIENT MESSAGE (OUTPATIENT)
Dept: FAMILY MEDICINE | Facility: CLINIC | Age: 64
End: 2024-08-05
Payer: MEDICARE

## 2024-08-14 ENCOUNTER — OFFICE VISIT (OUTPATIENT)
Dept: OBSTETRICS AND GYNECOLOGY | Facility: CLINIC | Age: 64
End: 2024-08-14
Payer: MEDICARE

## 2024-08-14 VITALS — SYSTOLIC BLOOD PRESSURE: 128 MMHG | BODY MASS INDEX: 33.64 KG/M2 | DIASTOLIC BLOOD PRESSURE: 80 MMHG | WEIGHT: 196 LBS

## 2024-08-14 DIAGNOSIS — Z12.31 BREAST CANCER SCREENING BY MAMMOGRAM: ICD-10-CM

## 2024-08-14 DIAGNOSIS — Z01.419 WELL WOMAN EXAM WITH ROUTINE GYNECOLOGICAL EXAM: Primary | ICD-10-CM

## 2024-08-14 PROCEDURE — 3008F BODY MASS INDEX DOCD: CPT | Mod: CPTII,S$GLB,, | Performed by: OBSTETRICS & GYNECOLOGY

## 2024-08-14 PROCEDURE — 3074F SYST BP LT 130 MM HG: CPT | Mod: CPTII,S$GLB,, | Performed by: OBSTETRICS & GYNECOLOGY

## 2024-08-14 PROCEDURE — 1159F MED LIST DOCD IN RCRD: CPT | Mod: CPTII,S$GLB,, | Performed by: OBSTETRICS & GYNECOLOGY

## 2024-08-14 PROCEDURE — 3079F DIAST BP 80-89 MM HG: CPT | Mod: CPTII,S$GLB,, | Performed by: OBSTETRICS & GYNECOLOGY

## 2024-08-14 PROCEDURE — 99396 PREV VISIT EST AGE 40-64: CPT | Mod: S$GLB,,, | Performed by: OBSTETRICS & GYNECOLOGY

## 2024-08-14 PROCEDURE — 99999 PR PBB SHADOW E&M-EST. PATIENT-LVL III: CPT | Mod: PBBFAC,,, | Performed by: OBSTETRICS & GYNECOLOGY

## 2024-08-14 NOTE — PROGRESS NOTES
SUBJECTIVE:   Jia Ugalde is a 63 y.o. female   for annual well woman exam. No LMP recorded. Patient has had a hysterectomy..  She has no unusual complaints.      S/p TLH in  for AUB/fibroids  Denies abnl pap  Denies HRT   Has not been SA > 15 years    Past Medical History:   Diagnosis Date    GERD (gastroesophageal reflux disease)     Hyperlipidemia      Past Surgical History:   Procedure Laterality Date    BREAST BIOPSY      BREAST SURGERY       SECTION  1997    COLONOSCOPY N/A 2021    Procedure: COLONOSCOPY;  Surgeon: Caitlin Joseph MD;  Location: [facility];  Service: Endoscopy;  Laterality: N/A;  fully vaccinated-GT    pt states had polyps    EPIDURAL STEROID INJECTION Bilateral 2021    Procedure: Bilateral L5 Transforaminal Epidural Steroid Injection;  Surgeon: Keron Cristina Jr., MD;  Location: [facility];  Service: Pain Management;  Laterality: Bilateral;  Arrive @ 1030 (requested 10); No ATC or DM; Vacc.    ESOPHAGOGASTRODUODENOSCOPY N/A 2022    Procedure: EGD (ESOPHAGOGASTRODUODENOSCOPY);  Surgeon: Lowell Robles MD;  Location: [facility] (4TH FLR);  Service: Endoscopy;  Laterality: N/A;  fully vaccinated  Pt requesting earlier date with any scoping MD - ERW  RAPID - add on / prep ins. emailed and reviewed- ERW    HEMORRHOID SURGERY      HYSTERECTOMY      SPINE SURGERY      TONSILLECTOMY  1965    TUBAL LIGATION       Social History     Socioeconomic History    Marital status: Single   Tobacco Use    Smoking status: Never    Smokeless tobacco: Never   Substance and Sexual Activity    Alcohol use: Never    Drug use: Never    Sexual activity: Not Currently     Social Determinants of Health     Financial Resource Strain: High Risk (2024)    Overall Financial Resource Strain (CARDIA)     Difficulty of Paying Living Expenses: Hard   Food Insecurity: Food Insecurity Present (2024)    Hunger Vital Sign     Worried About Running Out of Food in the Last Year:  Sometimes true     Ran Out of Food in the Last Year: Sometimes true   Transportation Needs: No Transportation Needs (2024)    PRAPARE - Transportation     Lack of Transportation (Medical): No     Lack of Transportation (Non-Medical): No   Physical Activity: Insufficiently Active (2024)    Exercise Vital Sign     Days of Exercise per Week: 2 days     Minutes of Exercise per Session: 60 min   Stress: No Stress Concern Present (2024)    [employer] - Occupational Stress Questionnaire     Feeling of Stress : Not at all   Housing Stability: Low Risk  (2024)    Housing Stability Vital Sign     Unable to Pay for Housing in the Last Year: No     Number of Places Lived in the Last Year: 1     Unstable Housing in the Last Year: No     Family History   Problem Relation Name Age of Onset    Pancreatic cancer Maternal Grandmother      Pancreatic cancer Paternal Grandmother      Arthritis Paternal Grandfather Jc Santacruz     Asthma Son Eric Ugalde Jr.     Colon cancer Neg Hx      Esophageal cancer Neg Hx       OB History    Para Term  AB Living   3 3 3         SAB IAB Ectopic Multiple Live Births                  # Outcome Date GA Lbr Jacky/2nd Weight Sex Type Anes PTL Lv   3 Term            2 Term            1 Term               Obstetric Comments   S/p TLH in  for AUB/fibroids, Denies HRT   Denies abnl pap   MMG 2023 NEG         Current Outpatient Medications   Medication Sig Dispense Refill    budesonide 1 mg/2 mL NbSp       cholecalciferol, vitamin D3, capsule/tablet Take by mouth once daily.      clobetasoL (TEMOVATE) 0.05 % external solution Apply TO SCALP in AREAS of HAIR loss AT betime nightly monday - FRIDAY      diclofenac sodium (VOLTAREN) 1 % Gel Apply 2 g topically 4 (four) times daily as needed. 100 g 3    evolocumab (REPATHA SURECLICK) 140 mg/mL PnIj Inject 1 mL (140 mg total) into the skin every 14 (fourteen) days. 6 mL 3    famotidine  (PEPCID) 20 MG tablet Take 1 tablet (20 mg total) by mouth 2 (two) times daily as needed for Heartburn. 180 tablet 1    finasteride (PROSCAR) 5 mg tablet Take 5 mg by mouth once daily.      fluocinonide (LIDEX) 0.05 % external solution apply 1ml TO SCALP ONCE TO twice daily AS DIRECTED      fluticasone propionate (FLONASE) 50 mcg/actuation nasal spray 1 spray (50 mcg total) by Each Nostril route once daily. 16 g 3    hydrocortisone (ANUSOL-HC) 2.5 % rectal cream Place rectally 2 (two) times daily. 28 g 1    hydrocortisone 2.5 % cream Apply topically.      hydrocortisone-pramoxine (PROCTOFOAM-HS) rectal foam Place 1 applicator rectally 3 (three) times daily. 10 g 2    hydroquinone 4 % Crea Apply topically.      NEILMED SINUS RINSE COMPLETE pkdv use as directed      RETIN-A 0.025 % cream Apply topically.      tobramycin, PF, (OPHELIA) 300 mg/5 mL nebulizer solution EMPTY CONTENTS OF 1 AMPULE INTO NASAL IRRIGATION SYSTEM, ADD DISTILLED WATER, SALT PACK, MIX & IRRIGATE. PERFORM 2 TIMES DAILY      triamcinolone acetonide 0.1% (KENALOG) 0.1 % ointment Apply TO SCALP in AREAS of HAIR loss AT bedtime       No current facility-administered medications for this visit.     Allergies: Patient has no known allergies.       ROS:  GENERAL: Denies weight gain or weight loss. Feeling well overall.   SKIN: Denies rash or lesions.   HEAD: Denies head injury or headache.   NODES: Denies enlarged lymph nodes.   CHEST: Denies chest pain or shortness of breath.   CARDIOVASCULAR: Denies palpitations or left sided chest pain.   ABDOMEN: No abdominal pain, constipation, diarrhea, nausea, vomiting or rectal bleeding.   URINARY: No frequency, dysuria, hematuria, or burning on urination.  REPRODUCTIVE: Denies vaginal discharge, abnormal vaginal bleeding, lesions, pelvic pain  BREASTS: The patient performs breast self-examination and denies pain, lumps, or nipple discharge.   HEMATOLOGIC: No easy bruisability or excessive  bleeding.  MUSCULOSKELETAL: Denies joint pain or swelling.   NEUROLOGIC: Denies syncope or weakness.   PSYCHIATRIC: Denies depression, anxiety or mood swings.      OBJECTIVE:   /80   Wt 88.9 kg (195 lb 15.8 oz)   BMI 33.64 kg/m²   The patient appears well, alert, oriented x 3, in no distress.  PSYCH:  Normal, full range of affect  NECK: negative, no thyromegaly, trachea midline  SKIN: normal, good color, good turgor and no acne, striae, hirsutism  BREAST EXAM: breasts appear normal, no suspicious masses, no skin or nipple changes or axillary nodes  ABDOMEN: soft, non-tender; bowel sounds normal; no masses,  no organomegaly and no hernias, masses, or hepatosplenomegaly  GENITALIA: normal external genitalia, no erythema, no discharge  BLADDER: soft  URETHRA: normal appearing urethra with no masses, tenderness or lesions and normal urethra, normal urethral meatus  VAGINA: Normal, well rugated  CERVIX: absent  UTERUS: uterus absent  ADNEXA: no mass, fullness, tenderness      ASSESSMENT:     1. Health maintenance  -pap not indicated  -screening MMG ordered  -counseled on exercise and healthy diet, weight loss  -bone health/osteopenia:  Discussed Vitamin D and Calcium supplementation, weight bearing exercises. Vit D and Calcium  2.  Discussed safety at home/school/work, healthy and balanced diet, sleep hygiene, as well as violence/weapons exposure.

## 2024-08-19 ENCOUNTER — OFFICE VISIT (OUTPATIENT)
Dept: GASTROENTEROLOGY | Facility: CLINIC | Age: 64
End: 2024-08-19
Payer: MEDICARE

## 2024-08-19 VITALS
HEIGHT: 64 IN | WEIGHT: 194.56 LBS | HEART RATE: 71 BPM | SYSTOLIC BLOOD PRESSURE: 126 MMHG | DIASTOLIC BLOOD PRESSURE: 84 MMHG | BODY MASS INDEX: 33.22 KG/M2

## 2024-08-19 DIAGNOSIS — K21.9 GASTROESOPHAGEAL REFLUX DISEASE, UNSPECIFIED WHETHER ESOPHAGITIS PRESENT: Primary | ICD-10-CM

## 2024-08-19 PROCEDURE — 1160F RVW MEDS BY RX/DR IN RCRD: CPT | Mod: CPTII,S$GLB,, | Performed by: INTERNAL MEDICINE

## 2024-08-19 PROCEDURE — 3074F SYST BP LT 130 MM HG: CPT | Mod: CPTII,S$GLB,, | Performed by: INTERNAL MEDICINE

## 2024-08-19 PROCEDURE — 99214 OFFICE O/P EST MOD 30 MIN: CPT | Mod: S$GLB,,, | Performed by: INTERNAL MEDICINE

## 2024-08-19 PROCEDURE — 3008F BODY MASS INDEX DOCD: CPT | Mod: CPTII,S$GLB,, | Performed by: INTERNAL MEDICINE

## 2024-08-19 PROCEDURE — 3079F DIAST BP 80-89 MM HG: CPT | Mod: CPTII,S$GLB,, | Performed by: INTERNAL MEDICINE

## 2024-08-19 PROCEDURE — 1159F MED LIST DOCD IN RCRD: CPT | Mod: CPTII,S$GLB,, | Performed by: INTERNAL MEDICINE

## 2024-08-19 PROCEDURE — 99999 PR PBB SHADOW E&M-EST. PATIENT-LVL V: CPT | Mod: PBBFAC,,, | Performed by: INTERNAL MEDICINE

## 2024-08-19 NOTE — PROGRESS NOTES
"Subjective:       Patient ID: Jia Ugalde is a 63 y.o. female.    Chief Complaint: Gastroesophageal Reflux and Gas      HPI:   Gastroesophageal Reflux  She complains of heartburn. She reports no coughing. This is a chronic problem. The current episode started more than 1 year ago. The problem has been unchanged. The heartburn is located in the substernum. The heartburn wakes her from sleep. Past procedures include an EGD.       Review of Systems   Constitutional:  Negative for appetite change.   HENT:  Negative for trouble swallowing.    Eyes:  Negative for photophobia.   Respiratory:  Negative for cough and shortness of breath.    Cardiovascular:  Negative for palpitations.   Gastrointestinal:  Positive for heartburn.        See HPI for details   Genitourinary:  Negative for frequency and hematuria.   Integumentary:  Negative for rash.   Neurological:  Negative for weakness and headaches.   Psychiatric/Behavioral:  Negative for behavioral problems and suicidal ideas.          Objective:      Physical Exam  Eyes:      Pupils: Pupils are equal, round, and reactive to light.   Neck:      Thyroid: No thyromegaly.   Cardiovascular:      Rate and Rhythm: Normal rate and regular rhythm.      Heart sounds: Normal heart sounds.   Pulmonary:      Effort: Pulmonary effort is normal.      Breath sounds: Normal breath sounds.   Abdominal:      Palpations: Abdomen is soft. There is no mass.      Tenderness: There is no abdominal tenderness. There is no guarding or rebound.   Musculoskeletal:         General: No tenderness.   Psychiatric:         Behavior: Behavior normal.         Thought Content: Thought content normal.         Assessment:       1. Gastroesophageal reflux disease, unspecified whether esophagitis present        Plan:       Jia Pagan" was seen today for gastroesophageal reflux and gas.    Diagnoses and all orders for this visit:    Gastroesophageal reflux disease, unspecified whether esophagitis " present  -     Case Request Endoscopy: ESOPHAGOGASTRODUODENOSCOPY (EGD), PH MONITORING, ESOPHAGUS, WIRELESS, (OFF REFLUX MEDS)

## 2024-08-28 DIAGNOSIS — E78.2 MIXED HYPERLIPIDEMIA: ICD-10-CM

## 2024-08-28 RX ORDER — EVOLOCUMAB 140 MG/ML
140 INJECTION, SOLUTION SUBCUTANEOUS
Qty: 6 ML | Refills: 3 | Status: ACTIVE | OUTPATIENT
Start: 2024-08-28 | End: 2025-07-30

## 2024-10-16 RX ORDER — HYDROQUINONE 40 MG/G
CREAM TOPICAL
Qty: 28.35 G | Refills: 3 | Status: SHIPPED | OUTPATIENT
Start: 2024-10-16

## 2024-10-16 NOTE — TELEPHONE ENCOUNTER
Refill Routing Note   Medication(s) are not appropriate for processing by [facility] for the following reason(s):        Outside of protocol    [facility] action(s):  Route               Appointments  past 12m or future 3m with PCP    Date Provider   Last Visit   7/1/2024 Reyna Umanzor MD   Next Visit   Visit date not found Reyna Umanzor MD   ED visits in past 90 days: 0        Note composed:5:19 PM 10/16/2024

## 2024-10-22 ENCOUNTER — LAB VISIT (OUTPATIENT)
Dept: LAB | Facility: HOSPITAL | Age: 64
End: 2024-10-22
Attending: INTERNAL MEDICINE
Payer: MEDICARE

## 2024-10-22 ENCOUNTER — OFFICE VISIT (OUTPATIENT)
Dept: CARDIOLOGY | Facility: CLINIC | Age: 64
End: 2024-10-22
Payer: MEDICARE

## 2024-10-22 VITALS
HEIGHT: 64 IN | SYSTOLIC BLOOD PRESSURE: 138 MMHG | BODY MASS INDEX: 33.12 KG/M2 | OXYGEN SATURATION: 99 % | WEIGHT: 194 LBS | HEART RATE: 62 BPM | RESPIRATION RATE: 15 BRPM | DIASTOLIC BLOOD PRESSURE: 85 MMHG

## 2024-10-22 DIAGNOSIS — M79.605 PAIN IN BOTH LOWER EXTREMITIES: ICD-10-CM

## 2024-10-22 DIAGNOSIS — E66.09 CLASS 1 OBESITY DUE TO EXCESS CALORIES WITH SERIOUS COMORBIDITY AND BODY MASS INDEX (BMI) OF 33.0 TO 33.9 IN ADULT: ICD-10-CM

## 2024-10-22 DIAGNOSIS — M51.369 DEGENERATION OF INTERVERTEBRAL DISC OF LUMBAR REGION, UNSPECIFIED WHETHER PAIN PRESENT: ICD-10-CM

## 2024-10-22 DIAGNOSIS — M79.605 PAIN IN BOTH LOWER EXTREMITIES: Primary | ICD-10-CM

## 2024-10-22 DIAGNOSIS — M79.604 PAIN IN BOTH LOWER EXTREMITIES: ICD-10-CM

## 2024-10-22 DIAGNOSIS — E66.811 CLASS 1 OBESITY DUE TO EXCESS CALORIES WITH SERIOUS COMORBIDITY AND BODY MASS INDEX (BMI) OF 33.0 TO 33.9 IN ADULT: ICD-10-CM

## 2024-10-22 DIAGNOSIS — E78.2 MIXED HYPERLIPIDEMIA: ICD-10-CM

## 2024-10-22 DIAGNOSIS — G89.4 CHRONIC PAIN SYNDROME: ICD-10-CM

## 2024-10-22 DIAGNOSIS — M79.604 PAIN IN BOTH LOWER EXTREMITIES: Primary | ICD-10-CM

## 2024-10-22 DIAGNOSIS — Z98.1 HISTORY OF LUMBAR FUSION: ICD-10-CM

## 2024-10-22 LAB
ANION GAP SERPL CALC-SCNC: 7 MMOL/L (ref 8–16)
BUN SERPL-MCNC: 13 MG/DL (ref 8–23)
CALCIUM SERPL-MCNC: 10 MG/DL (ref 8.7–10.5)
CHLORIDE SERPL-SCNC: 106 MMOL/L (ref 95–110)
CO2 SERPL-SCNC: 27 MMOL/L (ref 23–29)
CREAT SERPL-MCNC: 1 MG/DL (ref 0.5–1.4)
EST. GFR  (NO RACE VARIABLE): >60 ML/MIN/1.73 M^2
GLUCOSE SERPL-MCNC: 86 MG/DL (ref 70–110)
MAGNESIUM SERPL-MCNC: 2 MG/DL (ref 1.6–2.6)
OHS QRS DURATION: 64 MS
OHS QTC CALCULATION: 421 MS
POTASSIUM SERPL-SCNC: 4.5 MMOL/L (ref 3.5–5.1)
SODIUM SERPL-SCNC: 140 MMOL/L (ref 136–145)

## 2024-10-22 PROCEDURE — 99213 OFFICE O/P EST LOW 20 MIN: CPT | Mod: S$GLB,,, | Performed by: INTERNAL MEDICINE

## 2024-10-22 PROCEDURE — 99999 PR PBB SHADOW E&M-EST. PATIENT-LVL IV: CPT | Mod: PBBFAC,,, | Performed by: INTERNAL MEDICINE

## 2024-10-22 PROCEDURE — 93000 ELECTROCARDIOGRAM COMPLETE: CPT | Mod: S$GLB,,, | Performed by: INTERNAL MEDICINE

## 2024-10-22 PROCEDURE — 83735 ASSAY OF MAGNESIUM: CPT | Performed by: INTERNAL MEDICINE

## 2024-10-22 PROCEDURE — 3008F BODY MASS INDEX DOCD: CPT | Mod: CPTII,S$GLB,, | Performed by: INTERNAL MEDICINE

## 2024-10-22 PROCEDURE — 36415 COLL VENOUS BLD VENIPUNCTURE: CPT | Mod: PO | Performed by: INTERNAL MEDICINE

## 2024-10-22 PROCEDURE — 80048 BASIC METABOLIC PNL TOTAL CA: CPT | Performed by: INTERNAL MEDICINE

## 2024-10-22 PROCEDURE — 3075F SYST BP GE 130 - 139MM HG: CPT | Mod: CPTII,S$GLB,, | Performed by: INTERNAL MEDICINE

## 2024-10-22 PROCEDURE — 1159F MED LIST DOCD IN RCRD: CPT | Mod: CPTII,S$GLB,, | Performed by: INTERNAL MEDICINE

## 2024-10-22 PROCEDURE — 3079F DIAST BP 80-89 MM HG: CPT | Mod: CPTII,S$GLB,, | Performed by: INTERNAL MEDICINE

## 2024-10-22 NOTE — PROGRESS NOTES
CARDIOLOGY CLINIC VISIT        HISTORY OF PRESENT ILLNESS:     Jia Ugalde presents for continued care.  Last seen 2022. Seen 2022 for evaluation of chest pain.  Patient noted left-sided chest discomfort.  Episodes worse with inspiration.  Usually last less than 1 minute.  Exercise stress echocardiogram showed ejection fraction of 55%.  Normal pulmonary pressure.  No significant valvular abnormalities.  The patient exercised for 4 minutes 34 seconds.  Functional capacity 6 Mets.  Hypertensive response exercise 218/118.  History of statin myopathy.  Last . She has been approved for Repatha.  She states that she had no symptoms during the stress test.  Blood pressure is elevated today.  She says she has values of 120s to 130 systolic at home.    2022:  Feels good.  No complaints.  Has been on Repatha for 3 months.    2022: Latest . Prior 227.  No Complaints.    2023:  2 nights ago had an episode of left upper quadrant discomfort.  Awakened her from sleep.  Radiated to her back.  She ended uptake in a suppository.  Had a bowel movement and felt better.  Her EKG today shows normal sinus rhythm.  Last lipids similar to previous.  She wants exercise but because of her chronic back pain it limits her.    10/16/2023: (Dr. Alarcon)    10/22/2024: Complaints of leg pain yesterday. Lasted from afternoon to bedtime. She was able to go to sleep. No lower extremity/calf swelling. History of DDD, lumbar. EKG today shows sinus bradycardia. Low voltage. Last lipids show   HDL 56     CARDIOVASCULAR HISTORY:     None    PAST MEDICAL HISTORY:     Past Medical History:   Diagnosis Date    GERD (gastroesophageal reflux disease)     Hyperlipidemia        PAST SURGICAL HISTORY:     Past Surgical History:   Procedure Laterality Date    BREAST BIOPSY      BREAST SURGERY       SECTION  1997    COLONOSCOPY N/A 2021    Procedure: COLONOSCOPY;  Surgeon: Caitlin ENGLISH  MD Jake;  Location: [facility] ENDO;  Service: Endoscopy;  Laterality: N/A;  fully vaccinated-GT    pt states had polyps    EPIDURAL STEROID INJECTION Bilateral 11/08/2021    Procedure: Bilateral L5 Transforaminal Epidural Steroid Injection;  Surgeon: Keron Critsina Jr., MD;  Location: [facility];  Service: Pain Management;  Laterality: Bilateral;  Arrive @ 1030 (requested 10); No ATC or DM; Vacc.    ESOPHAGOGASTRODUODENOSCOPY N/A 02/18/2022    Procedure: EGD (ESOPHAGOGASTRODUODENOSCOPY);  Surgeon: Lowell Robles MD;  Location: [facility] (4TH FLR);  Service: Endoscopy;  Laterality: N/A;  fully vaccinated  Pt requesting earlier date with any scoping MD - ERW  RAPID - add on / prep ins. emailed and reviewed- ERW    HEMORRHOID SURGERY      HYSTERECTOMY      SPINE SURGERY      TONSILLECTOMY  1965    TUBAL LIGATION         ALLERGIES AND MEDICATION:   Review of patient's allergies indicates:  No Known Allergies     Medication List            Accurate as of October 22, 2024  9:31 AM. If you have any questions, ask your nurse or doctor.                CONTINUE taking these medications      budesonide 1 mg/2 mL Nbsp     cholecalciferol (vitamin D3) capsule/tablet     clobetasoL 0.05 % external solution  Commonly known as: TEMOVATE     diclofenac sodium 1 % Gel  Commonly known as: VOLTAREN  Apply 2 g topically 4 (four) times daily as needed.     famotidine 20 MG tablet  Commonly known as: PEPCID  Take 1 tablet (20 mg total) by mouth 2 (two) times daily as needed for Heartburn.     finasteride 5 mg tablet  Commonly known as: PROSCAR     fluocinonide 0.05 % external solution  Commonly known as: LIDEX     fluticasone propionate 50 mcg/actuation nasal spray  Commonly known as: FLONASE  1 spray (50 mcg total) by Each Nostril route once daily.     * hydrocortisone 2.5 % cream     * hydrocortisone 2.5 % rectal cream  Commonly known as: ANUSOL-HC  Place rectally 2 (two) times daily.     hydrocortisone-pramoxine rectal foam  Commonly  known as: PROCTOFOAM-HS  Place 1 applicator rectally 3 (three) times daily.     hydroquinone 4 % Crea  apply TO AREAS of discoloration ON SKIN     NEILMED SINUS RINSE COMPLETE Pkdv  Generic drug: sod chlor-bicarb-squeez bottle     REPATHA SURECLICK 140 mg/mL Pnij  Generic drug: evolocumab  Inject 1 mL (140 mg total) into the skin every 14 (fourteen) days.     RETIN-A 0.025 % cream  Generic drug: tretinoin     tobramycin (PF) 300 mg/5 mL nebulizer solution  Commonly known as: OPHELIA     triamcinolone acetonide 0.1% 0.1 % ointment  Commonly known as: KENALOG           * This list has 2 medication(s) that are the same as other medications prescribed for you. Read the directions carefully, and ask your doctor or other care provider to review them with you.                  SOCIAL HISTORY:     Social History     Socioeconomic History    Marital status: Single   Tobacco Use    Smoking status: Never    Smokeless tobacco: Never   Substance and Sexual Activity    Alcohol use: Never    Drug use: Never    Sexual activity: Not Currently     Social Drivers of Health     Financial Resource Strain: High Risk (1/22/2024)    Overall Financial Resource Strain (CARDIA)     Difficulty of Paying Living Expenses: Hard   Food Insecurity: Food Insecurity Present (1/22/2024)    Hunger Vital Sign     Worried About Running Out of Food in the Last Year: Sometimes true     Ran Out of Food in the Last Year: Sometimes true   Transportation Needs: No Transportation Needs (1/22/2024)    PRAPARE - Transportation     Lack of Transportation (Medical): No     Lack of Transportation (Non-Medical): No   Physical Activity: Insufficiently Active (1/22/2024)    Exercise Vital Sign     Days of Exercise per Week: 2 days     Minutes of Exercise per Session: 60 min   Stress: No Stress Concern Present (1/22/2024)    [employer] [place] [employer] - Occupational Stress Questionnaire     Feeling of Stress : Not at all   Housing Stability: Low Risk   "(1/22/2024)    Housing Stability Vital Sign     Unable to Pay for Housing in the Last Year: No     Number of Places Lived in the Last Year: 1     Unstable Housing in the Last Year: No       FAMILY HISTORY:     Family History   Problem Relation Name Age of Onset    Pancreatic cancer Maternal Grandmother      Pancreatic cancer Paternal Grandmother      Arthritis Paternal Grandfather Jc Santacruz     Asthma Son Eric Ugalde Jr.     Colon cancer Neg Hx      Esophageal cancer Neg Hx         REVIEW OF SYSTEMS:   Review of Systems   Constitutional:  Negative for chills, diaphoresis, fever, malaise/fatigue and weight loss.   HENT:  Negative for congestion, hearing loss, sinus pain, sore throat and tinnitus.    Eyes:  Negative for blurred vision, double vision, photophobia and pain.   Respiratory:  Negative for cough, hemoptysis, sputum production, shortness of breath, wheezing and stridor.    Cardiovascular:  Negative for chest pain, palpitations, orthopnea, claudication, leg swelling and PND.   Gastrointestinal:  Negative for abdominal pain, blood in stool, constipation, heartburn, melena, nausea and vomiting.   Musculoskeletal:  Positive for back pain. Negative for falls, joint pain, myalgias and neck pain.   Neurological:  Negative for dizziness, tingling, tremors, sensory change, speech change, focal weakness, seizures, loss of consciousness, weakness and headaches.   Endo/Heme/Allergies:  Does not bruise/bleed easily.   Psychiatric/Behavioral:  Negative for depression, memory loss and substance abuse. The patient is not nervous/anxious.        PHYSICAL EXAM:     Vitals:    10/22/24 0909   BP: 138/85   Pulse: 62   Resp: 15      Body mass index is 33.3 kg/m².  Weight: 88 kg (194 lb 0.1 oz)   Height: 5' 4" (162.6 cm)     Physical Exam  Vitals reviewed.   Constitutional:       General: She is not in acute distress.     Appearance: She is well-developed. She is obese. She is not diaphoretic.   HENT:      Head: " Normocephalic.   Neck:      Vascular: No carotid bruit or JVD.   Cardiovascular:      Rate and Rhythm: Normal rate and regular rhythm.      Pulses: Normal pulses.      Heart sounds: Normal heart sounds.   Pulmonary:      Effort: Pulmonary effort is normal.      Breath sounds: Normal breath sounds.   Abdominal:      General: Bowel sounds are normal.      Palpations: Abdomen is soft.      Tenderness: There is no abdominal tenderness.   Skin:     General: Skin is warm and dry.   Neurological:      Mental Status: She is alert and oriented to person, place, and time.   Psychiatric:         Speech: Speech normal.         Behavior: Behavior normal.         Thought Content: Thought content normal.         DATA:   EKG: (personally reviewed tracing)  10/22/2024-sinus bradycardia, low voltage  05/26/2023-normal sinus rhythm  Laboratory:  CBC:  Recent Labs   Lab 01/26/23  1120 08/22/23  0803 02/29/24  0900   WBC 5.38 6.64 5.12   Hemoglobin 13.4 12.9 13.3   Hematocrit 42.0 40.0 42.3   Platelets 313 267 242       CHEMISTRIES:  Recent Labs   Lab 12/02/21  0857 01/26/23  1120 02/28/23  0830 08/22/23  0803 02/29/24  0900   Glucose 92   < > 98 100 91   Sodium 139   < > 140 139 141   Potassium 4.2   < > 4.2 4.1 4.4   BUN 13   < > 16 16 9   Creatinine 0.9   < > 1.2 1.2 1.2   eGFR if  >60.0  --   --   --   --    eGFR if non  >60.0  --   --   --   --    Calcium 9.9   < > 9.7 9.6 10.3    < > = values in this interval not displayed.       CARDIAC BIOMARKERS:          COAGS:        LIPIDS/LFTS:  Recent Labs   Lab 06/28/22  1107 01/26/23  1120 08/22/23  0803 11/29/23  0952 02/29/24  0900   Cholesterol 212 H 212 H  --  196  --    Triglycerides 124 73  --  103  --    HDL 62 68  --  56  --    LDL Cholesterol 125.2 129.4  --  119.4  --    Non-HDL Cholesterol 150 144  --  140  --    AST  --  21 17  --  21   ALT  --  14 12  --  15       Cardiovascular Testing:    Exercise stress echocardiogram 02/09/2022:    The  estimated ejection fraction is 55%.  The patient's exercise capacity was below average.  The test was stopped because the patient experienced fatigue.  There were no arrhythmias during stress.  The left ventricle is normal in size with normal systolic function.  Indeterminate left ventricular diastolic function.  Normal right ventricular size with normal right ventricular systolic function.  Moderate left atrial enlargement.  Mild tricuspid regurgitation.  Mild right atrial enlargement.  The estimated PA systolic pressure is 27 mmHg.  Normal central venous pressure (3 mmHg).  Mild mitral regurgitation.  The stress echo portion of this study is negative for myocardial ischemia.  The ECG portion of this study is negative for myocardial ischemia.      ASSESSMENT:     Leg pain  Chronic back pain  Hyperlipidemia  Statin myopathy  Obesity    PLAN:     Leg pain: no swelling. No abnormalities noted. Check bmp, mg.  Hyperlipidemia: Continue current management.  Return to clinic 6 months.           Jc Rogers MD, MPH, FACC, [facility]

## 2024-10-24 ENCOUNTER — PATIENT MESSAGE (OUTPATIENT)
Dept: FAMILY MEDICINE | Facility: CLINIC | Age: 64
End: 2024-10-24
Payer: MEDICARE

## 2024-10-25 ENCOUNTER — OFFICE VISIT (OUTPATIENT)
Dept: FAMILY MEDICINE | Facility: CLINIC | Age: 64
End: 2024-10-25
Payer: MEDICARE

## 2024-10-25 VITALS
OXYGEN SATURATION: 98 % | DIASTOLIC BLOOD PRESSURE: 70 MMHG | HEIGHT: 64 IN | TEMPERATURE: 99 F | HEART RATE: 87 BPM | SYSTOLIC BLOOD PRESSURE: 106 MMHG | WEIGHT: 194.69 LBS | BODY MASS INDEX: 33.24 KG/M2 | RESPIRATION RATE: 16 BRPM

## 2024-10-25 DIAGNOSIS — M79.605 BILATERAL LEG PAIN: Primary | ICD-10-CM

## 2024-10-25 DIAGNOSIS — M79.604 BILATERAL LEG PAIN: Primary | ICD-10-CM

## 2024-10-25 PROCEDURE — 3074F SYST BP LT 130 MM HG: CPT | Mod: CPTII,S$GLB,, | Performed by: NURSE PRACTITIONER

## 2024-10-25 PROCEDURE — 99999 PR PBB SHADOW E&M-EST. PATIENT-LVL V: CPT | Mod: PBBFAC,,, | Performed by: NURSE PRACTITIONER

## 2024-10-25 PROCEDURE — 3008F BODY MASS INDEX DOCD: CPT | Mod: CPTII,S$GLB,, | Performed by: NURSE PRACTITIONER

## 2024-10-25 PROCEDURE — 1159F MED LIST DOCD IN RCRD: CPT | Mod: CPTII,S$GLB,, | Performed by: NURSE PRACTITIONER

## 2024-10-25 PROCEDURE — 3078F DIAST BP <80 MM HG: CPT | Mod: CPTII,S$GLB,, | Performed by: NURSE PRACTITIONER

## 2024-10-25 PROCEDURE — 99214 OFFICE O/P EST MOD 30 MIN: CPT | Mod: S$GLB,,, | Performed by: NURSE PRACTITIONER

## 2024-10-25 PROCEDURE — 1160F RVW MEDS BY RX/DR IN RCRD: CPT | Mod: CPTII,S$GLB,, | Performed by: NURSE PRACTITIONER

## 2024-10-28 ENCOUNTER — HOSPITAL ENCOUNTER (OUTPATIENT)
Dept: RADIOLOGY | Facility: HOSPITAL | Age: 64
Discharge: HOME | End: 2024-10-28
Attending: NURSE PRACTITIONER
Payer: MEDICARE

## 2024-10-28 ENCOUNTER — PATIENT MESSAGE (OUTPATIENT)
Dept: FAMILY MEDICINE | Facility: CLINIC | Age: 64
End: 2024-10-28
Payer: MEDICARE

## 2024-10-28 DIAGNOSIS — M11.20 CHONDROCALCINOSIS: Primary | ICD-10-CM

## 2024-10-28 DIAGNOSIS — M79.604 BILATERAL LEG PAIN: ICD-10-CM

## 2024-10-28 DIAGNOSIS — M79.605 BILATERAL LEG PAIN: ICD-10-CM

## 2024-10-28 DIAGNOSIS — M25.562 ARTHRALGIA OF LEFT KNEE: ICD-10-CM

## 2024-10-28 PROCEDURE — 93970 EXTREMITY STUDY: CPT | Mod: TC

## 2024-10-28 PROCEDURE — 93970 EXTREMITY STUDY: CPT | Mod: 26,,, | Performed by: STUDENT IN AN ORGANIZED HEALTH CARE EDUCATION/TRAINING PROGRAM

## 2024-10-28 PROCEDURE — 73564 X-RAY EXAM KNEE 4 OR MORE: CPT | Mod: TC,FY,LT

## 2024-10-28 PROCEDURE — 73564 X-RAY EXAM KNEE 4 OR MORE: CPT | Mod: 26,LT,, | Performed by: STUDENT IN AN ORGANIZED HEALTH CARE EDUCATION/TRAINING PROGRAM

## 2024-10-30 ENCOUNTER — TELEPHONE (OUTPATIENT)
Dept: FAMILY MEDICINE | Facility: CLINIC | Age: 64
End: 2024-10-30
Payer: MEDICARE

## 2024-11-06 DIAGNOSIS — M25.561 RIGHT KNEE PAIN, UNSPECIFIED CHRONICITY: Primary | ICD-10-CM

## 2024-11-10 NOTE — PROGRESS NOTES
Subjective:      Patient ID: Jia Ugalde is a 63 y.o. female.  Pt returns to clinic with acute left knee pain and bilateral leg pain and swelling without new trauma, injury or fall. She did see cardiology in the last week without acute CV change. She reports no recent change in medications, activity, diet nor recent illness.     Leg Pain   The incident occurred more than 1 week ago. There was no injury mechanism. The pain is present in the left leg and right leg. The pain is severe. The pain has been Constant since onset. Associated symptoms include a loss of motion, a loss of sensation and tingling. Pertinent negatives include no inability to bear weight, muscle weakness or numbness. She reports no foreign bodies present. The symptoms are aggravated by movement, weight bearing and palpation. She has tried elevation, acetaminophen, NSAIDs and rest for the symptoms. The treatment provided no relief.     Review of Systems   Constitutional:  Negative for activity change, appetite change, fatigue, fever, night sweats and unexpected weight change.   Respiratory:  Negative for chest tightness and shortness of breath.    Cardiovascular:  Positive for leg swelling and claudication. Negative for chest pain and palpitations.   Gastrointestinal:  Negative for abdominal pain, constipation, diarrhea, nausea and vomiting.   Genitourinary:  Negative for difficulty urinating and dysuria.   Musculoskeletal:  Positive for arthralgias, gait problem, joint swelling, leg pain and joint deformity. Negative for back pain, myalgias, neck pain and neck stiffness.   Integumentary:  Positive for color change (bilateral lower leg erythema). Negative for rash.   Neurological:  Positive for tingling and weakness. Negative for dizziness, vertigo, tremors, seizures, syncope, facial asymmetry, speech difficulty, light-headedness, numbness, headaches, memory loss and coordination difficulties.   Hematological:  Does not bruise/bleed easily.  "  Psychiatric/Behavioral: Negative.     All other systems reviewed and are negative.        Objective:     Vitals:    10/25/24 1136   BP: 106/70   Pulse: 87   Resp: 16   Temp: 98.7 °F (37.1 °C)   TempSrc: Oral   SpO2: 98%   Weight: 88.3 kg (194 lb 10.7 oz)   Height: 5' 4" (1.626 m)     Physical Exam  Vitals and nursing note reviewed.   Constitutional:       General: She is not in acute distress.     Appearance: Normal appearance. She is well-developed and well-groomed. She is not ill-appearing.   HENT:      Head: Normocephalic and atraumatic.      Right Ear: External ear normal.      Left Ear: External ear normal.      Nose: Nose normal.      Mouth/Throat:      Lips: Pink.      Mouth: Mucous membranes are moist.   Eyes:      General: Lids are normal. Vision grossly intact. Gaze aligned appropriately.      Conjunctiva/sclera: Conjunctivae normal.      Pupils: Pupils are equal, round, and reactive to light.   Neck:      Trachea: Phonation normal.   Cardiovascular:      Rate and Rhythm: Normal rate and regular rhythm.      Heart sounds: Normal heart sounds.   Pulmonary:      Effort: Pulmonary effort is normal. No accessory muscle usage or respiratory distress.      Breath sounds: Normal breath sounds and air entry.   Abdominal:      General: Abdomen is flat. Bowel sounds are normal. There is no distension.      Palpations: Abdomen is soft.      Tenderness: There is no abdominal tenderness.   Musculoskeletal:      Cervical back: Neck supple.      Right upper leg: Normal.      Left upper leg: Normal.      Right knee: Normal.      Left knee: Swelling, effusion and crepitus present. No deformity, erythema, ecchymosis or bony tenderness. Decreased range of motion. Tenderness present over the medial joint line.      Right lower leg: Tenderness present. No deformity, lacerations or bony tenderness. 2+ Edema present.      Left lower leg: Tenderness present. No deformity, lacerations or bony tenderness. 2+ Edema present.      " Right ankle: Swelling present. No deformity, ecchymosis or lacerations. No tenderness. Normal range of motion. Anterior drawer test negative. Normal pulse.      Right Achilles Tendon: Normal.      Left ankle: Swelling present. No deformity, ecchymosis or lacerations. No tenderness. Normal range of motion. Anterior drawer test negative. Normal pulse.      Left Achilles Tendon: Normal.   Skin:     General: Skin is warm and dry.      Findings: No rash.   Neurological:      General: No focal deficit present.      Mental Status: She is alert and oriented to person, place, and time. Mental status is at baseline.      Sensory: Sensation is intact.      Motor: Motor function is intact.      Coordination: Coordination is intact.      Gait: Gait is intact.   Psychiatric:         Attention and Perception: Attention and perception normal.         Mood and Affect: Mood and affect normal.         Speech: Speech normal.         Behavior: Behavior normal. Behavior is cooperative.         Thought Content: Thought content normal.         Cognition and Memory: Cognition and memory normal.         Judgment: Judgment normal.       EXAMINATION: XR KNEE COMP 4 OR MORE VIEWS LEFT  FINDINGS:  No acute fracture or dislocation.  No large joint effusion.  Mild tricompartmental joint space narrowing and marginal osteophytosis most notably about the medial tibiofemoral and patellofemoral joint spaces.  Chondrocalcinosis.  Soft tissues are unremarkable.    Electronically signed by:Vinay Chong  Date:                                            10/28/2024  Time:                                           13:45    EXAMINATION: US LOWER EXTREMITY VEINS BILATERAL  FINDINGS:  Right thigh veins: The common femoral, femoral, popliteal, and upper greater saphenous veins are patent and free of thrombus. The veins are normally compressible and have normal phasic flow and augmentation response.  Right calf veins: The visualized calf veins are patent.  Left  thigh veins: The common femoral, femoral, popliteal, and upper greater saphenous veins are patent and free of thrombus. The veins are normally compressible and have normal phasic flow and augmentation response.  Left calf veins: The visualized calf veins are patent.  Miscellaneous: None  No evidence of deep venous thrombosis in either lower extremity.     Electronically signed by:Vinay Chong  Date:                                            10/28/2024  Time:                                           14:32  Assessment and Plan:     1. Bilateral leg pain (Primary)  DVT vs PVD vs PAD vs OA  F/u with ortho for knee pain and vascular for leg swelling   No evidence of DVT.  Recommendations: decrease sodium in the diet, elevate feet above the level of the heart whenever possible, increase physical activity, use of compression stockings, and weight loss.  The patient was also instructed to call IMMEDIATELY (i.e., day or night) if any cardiopulmonary symptoms occur, especially chest pain, shortness of breath, dyspnea on exertion, paroxysmal nocturnal dyspnea, or orthopnea, and these were explained.  - US Lower Extremity Veins Bilateral; Future  - X-Ray Knee Complete 4 or More Views Left; Future           KARLIE Garcia, FNP-C  Family/Internal Medicine  [facility]

## 2024-11-15 ENCOUNTER — HOSPITAL ENCOUNTER (OUTPATIENT)
Dept: RADIOLOGY | Facility: HOSPITAL | Age: 64
Discharge: HOME | End: 2024-11-15
Attending: OBSTETRICS & GYNECOLOGY
Payer: MEDICARE

## 2024-11-15 DIAGNOSIS — Z12.31 BREAST CANCER SCREENING BY MAMMOGRAM: ICD-10-CM

## 2024-11-15 PROCEDURE — 77067 SCR MAMMO BI INCL CAD: CPT | Mod: 26,,, | Performed by: RADIOLOGY

## 2024-11-15 PROCEDURE — 77067 SCR MAMMO BI INCL CAD: CPT | Mod: TC

## 2024-11-15 PROCEDURE — 77063 BREAST TOMOSYNTHESIS BI: CPT | Mod: 26,,, | Performed by: RADIOLOGY

## 2024-11-18 ENCOUNTER — TELEPHONE (OUTPATIENT)
Dept: ORTHOPEDICS | Facility: CLINIC | Age: 64
End: 2024-11-18
Payer: MEDICARE

## 2024-11-18 NOTE — TELEPHONE ENCOUNTER
----- Message from Med Assistant Gaurav sent at 11/18/2024  8:45 AM CST -----  Type: Patient Call Back    Who called: Self    What is the request in detail: pt. Is asking if her appt. Can be moved to today due to the weather we're supposed to have tomorrow..     Can the clinic reply by [facility]?No    Would the patient rather a call back or a response via [facility]? Yes, call     Best call back number: [phone] (home)

## 2024-11-19 ENCOUNTER — CLINICAL SUPPORT (OUTPATIENT)
Dept: FAMILY MEDICINE | Facility: CLINIC | Age: 64
End: 2024-11-19
Payer: MEDICARE

## 2024-11-19 ENCOUNTER — OFFICE VISIT (OUTPATIENT)
Dept: ORTHOPEDICS | Facility: CLINIC | Age: 64
End: 2024-11-19
Payer: MEDICARE

## 2024-11-19 ENCOUNTER — APPOINTMENT (OUTPATIENT)
Dept: RADIOLOGY | Facility: HOSPITAL | Age: 64
End: 2024-11-19
Attending: ORTHOPAEDIC SURGERY
Payer: MEDICARE

## 2024-11-19 VITALS — BODY MASS INDEX: 33.24 KG/M2 | HEIGHT: 64 IN | WEIGHT: 194.69 LBS

## 2024-11-19 DIAGNOSIS — M17.12 PRIMARY OSTEOARTHRITIS OF LEFT KNEE: ICD-10-CM

## 2024-11-19 DIAGNOSIS — M25.561 RIGHT KNEE PAIN, UNSPECIFIED CHRONICITY: Primary | ICD-10-CM

## 2024-11-19 DIAGNOSIS — M25.562 LEFT KNEE PAIN, UNSPECIFIED CHRONICITY: ICD-10-CM

## 2024-11-19 DIAGNOSIS — Z23 NEED FOR INFLUENZA VACCINATION: Primary | ICD-10-CM

## 2024-11-19 DIAGNOSIS — M25.562 LEFT KNEE PAIN, UNSPECIFIED CHRONICITY: Primary | ICD-10-CM

## 2024-11-19 DIAGNOSIS — Z23 NEED FOR COVID-19 VACCINE: Primary | ICD-10-CM

## 2024-11-19 DIAGNOSIS — M25.561 RIGHT KNEE PAIN, UNSPECIFIED CHRONICITY: ICD-10-CM

## 2024-11-19 DIAGNOSIS — Z23 NEED FOR INFLUENZA VACCINATION: ICD-10-CM

## 2024-11-19 DIAGNOSIS — M11.20 CHONDROCALCINOSIS: ICD-10-CM

## 2024-11-19 PROCEDURE — 73564 X-RAY EXAM KNEE 4 OR MORE: CPT | Mod: TC,FY,PN,LT

## 2024-11-19 PROCEDURE — 99204 OFFICE O/P NEW MOD 45 MIN: CPT | Mod: S$GLB,,, | Performed by: ORTHOPAEDIC SURGERY

## 2024-11-19 PROCEDURE — 3008F BODY MASS INDEX DOCD: CPT | Mod: CPTII,S$GLB,, | Performed by: ORTHOPAEDIC SURGERY

## 2024-11-19 PROCEDURE — 99999 PR PBB SHADOW E&M-EST. PATIENT-LVL III: CPT | Mod: PBBFAC,,, | Performed by: ORTHOPAEDIC SURGERY

## 2024-11-19 PROCEDURE — 1159F MED LIST DOCD IN RCRD: CPT | Mod: CPTII,S$GLB,, | Performed by: ORTHOPAEDIC SURGERY

## 2024-11-19 PROCEDURE — 73564 X-RAY EXAM KNEE 4 OR MORE: CPT | Mod: 26,LT,, | Performed by: STUDENT IN AN ORGANIZED HEALTH CARE EDUCATION/TRAINING PROGRAM

## 2024-11-19 RX ORDER — METHYLPREDNISOLONE 4 MG/1
TABLET ORAL
Qty: 1 EACH | Refills: 0 | Status: SHIPPED | OUTPATIENT
Start: 2024-11-19

## 2024-11-19 NOTE — PROGRESS NOTES
NEW PATIENT ORTHOPAEDIC: Knee    PRIMARY CARE PHYSICIAN: Reyna Umanzor MD   REFERRING PROVIDER: Self, Aaareferral  No address on file     ASSESSMENT & PLAN:    Impression:  Left Knee Mild Osteoarthritis, Primary    Left Knee chondrocalcinosis  Lumbar Radiculopathy    Follow Up Plan: PRN      Non operative care:    Jia Ugalde has physical exam evidence of above and wishes to pursue an non-operative care. I am recommending the following: medrol dosepack    Patient comes in with a two-month history of having atraumatic onset left knee pain.  She has been using a soft knee brace as her only symptomatic treatment to date.  She reports a medial based pain.  Some of her pain is confounded by history of lower back in lumbar radiculopathy that it has undergone 2 surgeries at this point.  She is a previous pain management patient.  She reports ongoing pain and numbness in her leg.  But with regard to this medial pain it appears new.  Again without an injury that she can recall.  She reports intermittent swelling in this knee.  She has x-rays that demonstrate some mild medial compartment arthritis along with both medial and lateral chondrocalcinosis of her knee.  She clinically has medial joint line pain and a positive Leeanne's test.  She does not report instability but does have new mechanical symptoms in her knee.  I think that her pain is likely secondary to chondrocalcinosis or this mild arthritis.  We discussed treatment options including therapy, injections, oral medications.  She would like to try a Medrol Dosepak 1st prior to considering injection based treatment.  If no significant improvement in her pain in 2 weeks can see her back for consideration of the knee injection.  If she would like a 2nd opinion regarding her lower back happy to facilitate that as well in the future.    The patient has been ordered:  Pain Prescription    CONSULTS:   None    ACTIVE PROBLEM LIST  Patient Active Problem List    Diagnosis    Mixed hyperlipidemia    Spinal stenosis of lumbar region    Lumbar spondylosis    DDD (degenerative disc disease), lumbar    Postlaminectomy syndrome of lumbar region    Post-hysterectomy menopause    Internal hemorrhoids    Breast lump or mass    Lumbar radiculopathy    Gastroesophageal reflux disease without esophagitis    Esophageal dysmotility    Hiatal hernia    History of lumbar fusion    Multiple environmental allergies    Osteopenia of multiple sites    Physical debility    Stage 3a chronic kidney disease    Xanthoma tuberosum    Chronic pain syndrome    Alopecia    Chest pain, atypical    Arthritis/arthropathy of multiple joints           SUBJECTIVE    CHIEF COMPLAINT: Knee Pain    HPI:   Jia Ugalde is a 63 y.o. female here for evaluation and management of left knee pain. There is not a specific incident that brought about this pain. she has had progressive problems with the knee(s) starting 2 months ago but is now progressing to interfere with activities which include: walking, enjoying hobbies, and rising from a sitting position    Currently the pain in the joint is rated at moderate with activity. The pain is intermittent and is located in the knee, located medially. The pain is described as aching, sharp, stiffness, and throbbing. Relieving factors include repositioning.     There is associated Catching and Clicking.     Jia Ugalde has no additional complaints.     PROGRESSIVE SYMPTOMS:  Pain worsened by weight bearing  Pain effecting living situation    FUNCTIONAL STATUS:   Climb a flight of stairs or walk up a hill     PREVIOUS TREATMENTS:  Medical: None  Physical Therapy: Braces  Previous Orthopaedic Surgery: None to lower extremities     REVIEW OF SYSTEMS:  PAIN ASSESSMENT:  See HPI.  MUSCULOSKELETAL: See HPI.  OTHER 10 point review of systems is negative except as stated in HPI above    PAST MEDICAL HISTORY   has a past medical history of GERD (gastroesophageal reflux  disease) and Hyperlipidemia.     PAST SURGICAL HISTORY   has a past surgical history that includes Spine surgery; Hysterectomy; Tubal ligation; Breast surgery; Hemorrhoid surgery; Breast biopsy; Epidural steroid injection (Bilateral, 2021); Colonoscopy (N/A, 2021); Esophagogastroduodenoscopy (N/A, 2022);  section (1997); and Tonsillectomy (1965).     FAMILY HISTORY  family history includes Arthritis in her paternal grandfather; Asthma in her son; Breast cancer (age of onset: 40) in her maternal aunt; Pancreatic cancer in her maternal grandmother and paternal grandmother.     SOCIAL HISTORY   reports that she has never smoked. She has never used smokeless tobacco. She reports that she does not drink alcohol and does not use drugs.     ALLERGIES   Review of patient's allergies indicates:  No Known Allergies     MEDICATIONS  Current Outpatient Medications on File Prior to Visit   Medication Sig Dispense Refill    budesonide 1 mg/2 mL NbSp       cholecalciferol, vitamin D3, capsule/tablet Take by mouth once daily.      clobetasoL (TEMOVATE) 0.05 % external solution Apply TO SCALP in AREAS of HAIR loss AT betime nightly monday - FRIDAY      diclofenac sodium (VOLTAREN) 1 % Gel Apply 2 g topically 4 (four) times daily as needed. 100 g 3    evolocumab (REPATHA SURECLICK) 140 mg/mL PnIj Inject 1 mL (140 mg total) into the skin every 14 (fourteen) days. 6 mL 3    famotidine (PEPCID) 20 MG tablet Take 1 tablet (20 mg total) by mouth 2 (two) times daily as needed for Heartburn. 180 tablet 1    finasteride (PROSCAR) 5 mg tablet Take 5 mg by mouth once daily.      fluocinonide (LIDEX) 0.05 % external solution apply 1ml TO SCALP ONCE TO twice daily AS DIRECTED      fluticasone propionate (FLONASE) 50 mcg/actuation nasal spray 1 spray (50 mcg total) by Each Nostril route once daily. 16 g 3    hydrocortisone (ANUSOL-HC) 2.5 % rectal cream Place rectally 2 (two) times daily. 28 g 1    hydrocortisone  "2.5 % cream Apply topically.      hydrocortisone-pramoxine (PROCTOFOAM-HS) rectal foam Place 1 applicator rectally 3 (three) times daily. 10 g 2    hydroquinone 4 % Crea apply TO AREAS of discoloration ON SKIN 28.35 g 3    NEILMED SINUS RINSE COMPLETE pkdv use as directed      RETIN-A 0.025 % cream Apply topically.      tobramycin, PF, (OPHELIA) 300 mg/5 mL nebulizer solution EMPTY CONTENTS OF 1 AMPULE INTO NASAL IRRIGATION SYSTEM, ADD DISTILLED WATER, SALT PACK, MIX & IRRIGATE. PERFORM 2 TIMES DAILY      triamcinolone acetonide 0.1% (KENALOG) 0.1 % ointment Apply TO SCALP in AREAS of HAIR loss AT bedtime       No current facility-administered medications on file prior to visit.          PHYSICAL EXAM   height is 5' 4" (1.626 m) and weight is 88.3 kg (194 lb 10.7 oz).   Body mass index is 33.41 kg/m².      All other systems deferred.  GENERAL:  No acute distress  HABITUS: Obese  GAIT: Non-antalgic  SKIN: Normal     KNEE EXAM:    left:   Effusion: Minimal joint effusion  TTP: Yes over Medial Joint Line   No crepitus with passive knee ROM  Passive ROM: Extension 0, Flexion 130  No pain with manipulation of patella  Stable to varus/valgus stress. No increased laxity to anterior/posterior drawer testing  positive Leeanne's test  No pain with IR/ER rotation of the hip  5/5 strength in knee flexion and extension, ankle plantarflexion and dorsiflexion  Neurovascular Status: Sensation intact to light touch in Sural, Saphenous, SPN, DPN, Tibial nerve distribution  2+ pulse DP/PT, normal capillary refill, foot has normal warmth    DATA:  Diagnostic tests reviewed for today's visit:     4v of the knee reveal Mild degenerative changes of the Medial compartment. There is evidence of advanced osteoarthritis changes with Osteophyte formation and Joint space narrowing. The limb is in netural alignment. The patella is tracking midline.  There is evidence of chondrocalcinosis of her medial and lateral meniscus  "

## 2024-11-19 NOTE — PROGRESS NOTES
Pt received Covid and Influenza vaccinations to left deltoid, VIS given, and instructed to wait 15 min to leave to assure no reaction

## 2024-11-22 ENCOUNTER — PATIENT MESSAGE (OUTPATIENT)
Dept: OBSTETRICS AND GYNECOLOGY | Facility: CLINIC | Age: 64
End: 2024-11-22
Payer: MEDICARE

## 2024-12-12 ENCOUNTER — TELEPHONE (OUTPATIENT)
Dept: OTOLARYNGOLOGY | Facility: CLINIC | Age: 64
End: 2024-12-12
Payer: MEDICARE

## 2024-12-12 NOTE — TELEPHONE ENCOUNTER
----- Message from Pita sent at 12/12/2024 12:31 PM CST -----  Patient is requesting a sooner appointment.  Patient declined first available appointment listed as well as another facility and provider .  Patient will not accept being placed on the waitlist and is requesting a message be sent to doctor.     Name of Caller: Self       When is the first available appointment? Jan 9     Symptoms:ear      Would the patient rather a call back or a response via [facility]? Call Back      Best Call Back Number: [phone]       Additional Information:

## 2024-12-13 NOTE — TELEPHONE ENCOUNTER
RN left VM for Christa from [facility] as to the status of pt's wheelchair order  RN left VM for patient stating what has been done and that Espino[facility] is waiting on approval from [facility] for wheelchair   Results faxed to number below; pt informed

## 2024-12-16 ENCOUNTER — OFFICE VISIT (OUTPATIENT)
Dept: OTOLARYNGOLOGY | Facility: CLINIC | Age: 64
End: 2024-12-16
Payer: MEDICARE

## 2024-12-16 VITALS
DIASTOLIC BLOOD PRESSURE: 80 MMHG | SYSTOLIC BLOOD PRESSURE: 145 MMHG | BODY MASS INDEX: 33.41 KG/M2 | HEART RATE: 82 BPM | HEIGHT: 64 IN

## 2024-12-16 DIAGNOSIS — H61.22 IMPACTED CERUMEN OF LEFT EAR: ICD-10-CM

## 2024-12-16 DIAGNOSIS — Q18.1 CYST OF EAR CANAL: ICD-10-CM

## 2024-12-16 DIAGNOSIS — H69.93 DYSFUNCTION OF BOTH EUSTACHIAN TUBES: Primary | ICD-10-CM

## 2024-12-16 DIAGNOSIS — J30.2 SEASONAL ALLERGIC RHINITIS, UNSPECIFIED TRIGGER: ICD-10-CM

## 2024-12-16 DIAGNOSIS — J34.3 HYPERTROPHY OF INFERIOR NASAL TURBINATE: ICD-10-CM

## 2024-12-16 PROCEDURE — 3079F DIAST BP 80-89 MM HG: CPT | Mod: CPTII,S$GLB,, | Performed by: OTOLARYNGOLOGY

## 2024-12-16 PROCEDURE — 3077F SYST BP >= 140 MM HG: CPT | Mod: CPTII,S$GLB,, | Performed by: OTOLARYNGOLOGY

## 2024-12-16 PROCEDURE — 69210 REMOVE IMPACTED EAR WAX UNI: CPT | Mod: S$GLB,,, | Performed by: OTOLARYNGOLOGY

## 2024-12-16 PROCEDURE — 99204 OFFICE O/P NEW MOD 45 MIN: CPT | Mod: 25,S$GLB,, | Performed by: OTOLARYNGOLOGY

## 2024-12-16 PROCEDURE — 1159F MED LIST DOCD IN RCRD: CPT | Mod: CPTII,S$GLB,, | Performed by: OTOLARYNGOLOGY

## 2024-12-16 PROCEDURE — 3008F BODY MASS INDEX DOCD: CPT | Mod: CPTII,S$GLB,, | Performed by: OTOLARYNGOLOGY

## 2024-12-16 RX ORDER — AZELASTINE 1 MG/ML
1 SPRAY, METERED NASAL 2 TIMES DAILY
Qty: 30 ML | Refills: 3 | Status: SHIPPED | OUTPATIENT
Start: 2024-12-16

## 2024-12-16 RX ORDER — FLUTICASONE PROPIONATE 50 MCG
2 SPRAY, SUSPENSION (ML) NASAL 2 TIMES DAILY
Qty: 18.2 ML | Refills: 3 | Status: SHIPPED | OUTPATIENT
Start: 2024-12-16

## 2024-12-16 NOTE — PROGRESS NOTES
OTOLARYNGOLOGY CLINIC NOTE  Date:  2024     Chief complaint:  Chief Complaint   Patient presents with    Follow-up     Ear cleaning       History of Present Illness  Jia Ugalde is a 64 y.o. female  presenting today for a new evaluation and treatment of ear fullness.     Gets nasal congestion. Not worse since ears have been  bothering her   Has problem when flying on planes  Comes and goes regarding ear fullness   Started on debrox. debrox made ears worse on left  Had turbinate surgery in the past , no sinus surgery     Had ear cleaning 4-5 years ago     Past Medical History  Past Medical History:   Diagnosis Date    GERD (gastroesophageal reflux disease)     Hyperlipidemia         Past Surgical History  Past Surgical History:   Procedure Laterality Date    BREAST BIOPSY      BREAST SURGERY       SECTION  1997    COLONOSCOPY N/A 2021    Procedure: COLONOSCOPY;  Surgeon: Caitlin Joseph MD;  Location: [facility];  Service: Endoscopy;  Laterality: N/A;  fully vaccinated-GT    pt states had polyps    EPIDURAL STEROID INJECTION Bilateral 2021    Procedure: Bilateral L5 Transforaminal Epidural Steroid Injection;  Surgeon: Keron Cristina Jr., MD;  Location: [facility];  Service: Pain Management;  Laterality: Bilateral;  Arrive @ 1030 (requested 10); No ATC or DM; Vacc.    ESOPHAGOGASTRODUODENOSCOPY N/A 2022    Procedure: EGD (ESOPHAGOGASTRODUODENOSCOPY);  Surgeon: Lowell Robles MD;  Location: [facility] ([facility]);  Service: Endoscopy;  Laterality: N/A;  fully vaccinated  Pt requesting earlier date with any scoping MD - ERW  RAPID - add on / prep ins. emailed and reviewed- ERW    HEMORRHOID SURGERY      HYSTERECTOMY      SPINE SURGERY      TONSILLECTOMY  1965    TUBAL LIGATION          Medications  Current Outpatient Medications on File Prior to Visit   Medication Sig Dispense Refill    budesonide 1 mg/2 mL NbSp       cholecalciferol, vitamin D3, capsule/tablet Take by mouth  once daily.      clobetasoL (TEMOVATE) 0.05 % external solution Apply TO SCALP in AREAS of HAIR loss AT betime nightly monday - FRIDAY      diclofenac sodium (VOLTAREN) 1 % Gel Apply 2 g topically 4 (four) times daily as needed. 100 g 3    evolocumab (REPATHA SURECLICK) 140 mg/mL PnIj Inject 1 mL (140 mg total) into the skin every 14 (fourteen) days. 6 mL 3    famotidine (PEPCID) 20 MG tablet Take 1 tablet (20 mg total) by mouth 2 (two) times daily as needed for Heartburn. 180 tablet 1    finasteride (PROSCAR) 5 mg tablet Take 5 mg by mouth once daily.      fluocinonide (LIDEX) 0.05 % external solution apply 1ml TO SCALP ONCE TO twice daily AS DIRECTED      fluticasone propionate (FLONASE) 50 mcg/actuation nasal spray 1 spray (50 mcg total) by Each Nostril route once daily. 16 g 3    hydrocortisone (ANUSOL-HC) 2.5 % rectal cream Place rectally 2 (two) times daily. 28 g 1    hydrocortisone 2.5 % cream Apply topically.      hydrocortisone-pramoxine (PROCTOFOAM-HS) rectal foam Place 1 applicator rectally 3 (three) times daily. 10 g 2    hydroquinone 4 % Crea apply TO AREAS of discoloration ON SKIN 28.35 g 3    NEILMED SINUS RINSE COMPLETE pkdv use as directed      RETIN-A 0.025 % cream Apply topically.      tobramycin, PF, (OPHELIA) 300 mg/5 mL nebulizer solution EMPTY CONTENTS OF 1 AMPULE INTO NASAL IRRIGATION SYSTEM, ADD DISTILLED WATER, SALT PACK, MIX & IRRIGATE. PERFORM 2 TIMES DAILY      triamcinolone acetonide 0.1% (KENALOG) 0.1 % ointment Apply TO SCALP in AREAS of HAIR loss AT bedtime      methylPREDNISolone (MEDROL DOSEPACK) 4 mg tablet use as directed 1 each 0     No current facility-administered medications on file prior to visit.       Review of Systems  Review of Systems   Constitutional: Negative.    HENT: Negative.     Cardiovascular: Negative.    Gastrointestinal:  Positive for heartburn.   Genitourinary: Negative.    Musculoskeletal:  Positive for back pain.   Skin: Negative.    Neurological: Negative.   "  Psychiatric/Behavioral: Negative.      Answers submitted by the patient for this visit:  Review of Symptoms Questionnaire  (Submitted on 12/12/2024)  eye itching: Yes  Snoring?: Yes  Acid Reflux?: Yes  Muscle aches / pain?: Yes  Seasonal Allergies?: Yes    Social History   reports that she has never smoked. She has never used smokeless tobacco. She reports that she does not drink alcohol and does not use drugs.     Family History  Family History   Problem Relation Name Age of Onset    Breast cancer Maternal Aunt  40    Pancreatic cancer Maternal Grandmother      Pancreatic cancer Paternal Grandmother      Arthritis Paternal Grandfather Jc Santacruz     Asthma Son Eric Ugalde Jr.     Colon cancer Neg Hx      Esophageal cancer Neg Hx          Physical Exam   Vitals:    12/16/24 1548   BP: (!) 145/80   Pulse: 82    Body mass index is 33.41 kg/m².      Height: 5' 4" (162.6 cm)     GENERAL: no acute distress.  HEAD: normocephalic.   EYES: lids and lashes normal. No scleral icterus  EARS: external ear without lesion, normal pinna shape and position.  External auditory canal with normal cerumen, tympanic membrane fully visible, no perforation , no retraction. No middle ear effusion. Ossicles intact. Left cerumen near impaction   NOSE: external nose without significant bony abnormality ; turbinate hypertrophy on anterior rhinoscopy  ORAL CAVITY/OROPHARYNX: tongue mobile. Symmetric palate rise. Uvula midline.   NECK: trachea midline.   RESPIRATORY: no stridor, no stertor. Voice normal. Respirations nonlabored.  NEURO: alert, responds to questions appropriately.   PSYCH:mood appropriate    Procedure Note   Procedure performed: microscopic examination of ears with cerumen disimpaction    Indication for procedure: unilateral cerumen impaction     Description of procedure:  After verbal consent was obtained, the patient was positioned in semi recumbent position and speculum was placed in the  left  ear and microscope " brought into the field.  The microscope was positioned and magnification adjusted for appropriate visualization. Otologic instruments including various size otologic suctions and curette were used to remove the cerumen from theleft external auditory canals under visualization with the operating microscope. After cleaning, visualization was again performed and at various levels of higher magnification to optimize views of the ear canal, tympanic membrane, ossicles and middle ear space. Findings as indicated below. All portions of the procedure and examination by otomicroscopy were tolerated well without complication.     Findings:   Complete cerumen impaction removed entirely revealing normal external auditory canal; tympanic membrane without bulging, retraction, or perforation; no evidence of middle ear fluid or effusion.     Keratin maddie ear canal left side complete cerumen impaction     Imaging:  The patient does not have any pertinent and/or recent imaging of the head and neck.     Labs:  CBC  Recent Labs   Lab 01/26/23  1120 08/22/23  0803 02/29/24  0900   WBC 5.38 6.64 5.12   Hemoglobin 13.4 12.9 13.3   Hematocrit 42.0 40.0 42.3   MCV 91 91 91   Platelets 313 267 242     BMP  Recent Labs   Lab 08/22/23  0803 02/29/24  0900 10/22/24  1010   Glucose 100 91 86   Sodium 139 141 140   Potassium 4.1 4.4 4.5   Chloride 104 107 106   CO2 28 27 27   BUN 16 9 13   Creatinine 1.2 1.2 1.0   Calcium 9.6 10.3 10.0   Magnesium  --   --  2.0     COAGS        Assessment  1. Impacted cerumen of left ear [H61.22]    2. Dysfunction of both eustachian tubes    3. Seasonal allergic rhinitis, unspecified trigger    4. Hypertrophy of inferior nasal turbinate    5. Cyst of ear canal       Plan:  Discussed plan of care with patient in detail and all questions answered. Patient reported understanding of plan of care. I gave the patient the opportunity to ask questions and patient confirmed all questions answered to satisfaction.      Allergic rhinitis(AR):  discussed about pathophysiology of allergic disease and sinus disease. We discussed about treatment options for this including but not limited to medications and potential surgeries as well as when surgery is indicated.  - I recommend dual nasal spray therapy as combo of steroid and antihistamine nasal spray has been shown in evidence based studies to be better than either alone.   -Discussed medication administration technique ( point toward outer corner of eye and not towards nasal septum) and use nasal saline prior to medication sprays.   -We discussed importance of saline use prior to medication sprays to help sprays work better and to clean the nose.   -Counseled on risk of bleeding, risk of increased intraocular pressure/cataract with long term use.   -Discussed how to increase and decrease nasal spray regimen based on symptoms and that sprays can be used on prn basis but work best when used daily and take about 2-3 weeks to get to max level . If patient using sprays on a prn basis and symptoms not controlled should go back to daily /bid use  -discussed as well as gave patient physical documentation with photos  about the saline and other medication sprays ( paperwork summarized discussion topics)    Eustachian tube dysfunction (ETD) : ETD can be idiopathic, due to anatomic obstruction,  or caused by  Inflammation from either allergic rhinitis (AR ) or laryngopharyngo reflux (LPR). Sometimes this can lead to development of a middle ear effusion (VINAY) which may or may not get secondarily infected. Usually the fluid will resolve without intervention however in some cases it can take 8-12 weeks for fluid to resolve completely. Occasionally a tympanostomy tube (PET) needs to be placed for this ETD treatment in certain conditions (persistent VINAY >2-3 months or if severe pain associated with or without VINAY, significant retraction of tympanic membrane that appears to be starting to cause  conductive hearing changes). In addition to treatment trials for either AR or LPR, the patient can gently autoinsufflate daily to intermittently equalize middle ear pressure . If unsuccessful, pt should not  continue with more frequent or more forceful attempts with this maneuver. Intermittent use of  afrin can also help however I advise to only use if having  severe pain given risk of rhinitis medicamentosa (pt counseled extensively about  risk of physical addiction and not to use for prolonged time period).     Can try earplanes with flying. Can use afrin pre ascent and descent - counseled on rhinitis medicamentosa    Cerumen impaction: cleaned    Keratin cyst of of left ear canal: monitor , no drainage nor pain , no audiologist today, can get hearing test at f/u but cyst small and suspect symptoms related to etd and cerumen impaction     F/u 3 months sooner if issue        Please be aware that this note has been generated with the assistance of MModal voice-to-text.  Please excuse any spelling or grammatical errors.

## 2024-12-16 NOTE — PATIENT INSTRUCTIONS
Information and instructions from your visit with me today:  Always use saline every time before a medication spray. You can also use saline on its own. If you are using saline and/or the medication sprays on an as needed basis and you have symptoms use the regimen daily for at least 2 weeks. You can use the flonase and astelin together, or if you prefer to start with just one medication spray, the flonase works better by itself compared to astelin by itself. You can try doing the saline and flonase and if still congested, add on the astelin again doing this regimen daily for up to two weeks when congestion. There may be times of the year that you only need saline and there may be times of the year that you need saline, flonase and astelin to control symptoms.     Start using the following medication nasal sprays:   Fluticasone spray:    This medication is a steroid spray. It stays within the nose and does not have absorption into the body that leads to side effects that one has with oral steroid medication. Fluticasone nasal spray is the same as the Flonase brand nasal spray. Discuss with your pharmacist if the price is lower over the counter or with a prescription ( this varies depending on insurance). The medication that is over the counter is the same as the prescription medication. Use this medication as instructed on the prescription, 1-2 sprays on each side of your nose twice daily.     Azelastine  spray:  This medication is an antihistamine used to treat nasal symptoms of allergy, which works specifically in the nose unlike antihistamine pills which have more of an effect on the whole body. Use this medication as instructed on the prescription, 1 spray on each side of your nose twice daily.     Additional instructions for medication sprays  Place the tip of the medication bottle in your nose and aim slightly up and out on each side to get medication high and deep into your nose and sinuses, and not have it  "all deposit in the very front of your nose. Aim the tip of the nozzle towards the outer corner of your eye . You can imagine aiming towards the back of your eyeball on each side for this, as opposed to straight back to the center of your nose and head.     You need to use this medication every day regardless of symptoms, as it takes time ( a few weeks) to work and get the benefits. It does not work on an "as needed" basis like taking a decongestant. If your symptoms only occur in a particular season, then the medication can be used seasonally instead of year long. For seasonal symptoms, you should start using the spray twice daily a month before when you normally have symptoms ( for example, if symptoms start in August, should start at the end of June).     Start nasal irrigations with saline solution- you can either use a rinse or a mist spray:    NASAL SALINE SPRAY ( simply saline and arm and hammer are examples) There are several different brands found in the cold and flu aisle of the pharmacy. You can use any brand of saline spray - this will deliver the saline by a gentle mist ( if you have difficulty or discomfort with nasal rinse/ a lot of fluid in the nose, this will be more comfortable).       Always rinse your nose with saline prior to using medication sprays and wait a couple of hours before using again. You can use the saline throughout the day to help with stuffy nose or dry nose.    Do not use nasal decongestant sprays such as Afrin or similar products long term ( over 3 days) .  This can cause long term physical nasal addiction. Afrin should only be used if having nose bleeds, severe nasal congestion , or severe ear pain/fullness and should not be used for more than 2-3 days in a row . It is a not a medication that should be used for a long period of time.    Can do afrin 15 minutes before take off and landing  when flying and can try earplanes    It was nice meeting you today, and I look forward to " helping you feel better soon. Please don't hesitate to call if you have any other questions or concerns, or if I can be of any assistance in the meantime.      Kathya Maki MD    [facility]     Phone  [phone]    Fax      [phone]        Kathya Maki MD  Otorhinolaryngology

## 2025-01-27 ENCOUNTER — OFFICE VISIT (OUTPATIENT)
Dept: FAMILY MEDICINE | Facility: CLINIC | Age: 65
End: 2025-01-27
Payer: MEDICARE

## 2025-01-27 VITALS
DIASTOLIC BLOOD PRESSURE: 82 MMHG | SYSTOLIC BLOOD PRESSURE: 120 MMHG | WEIGHT: 199.94 LBS | TEMPERATURE: 98 F | BODY MASS INDEX: 34.13 KG/M2 | OXYGEN SATURATION: 99 % | HEIGHT: 64 IN | HEART RATE: 62 BPM | RESPIRATION RATE: 18 BRPM

## 2025-01-27 DIAGNOSIS — Z00.00 ANNUAL PHYSICAL EXAM: Primary | ICD-10-CM

## 2025-01-27 DIAGNOSIS — R79.9 ABNORMAL FINDING OF BLOOD CHEMISTRY, UNSPECIFIED: ICD-10-CM

## 2025-01-27 DIAGNOSIS — Z23 NEED FOR PNEUMOCOCCAL VACCINATION: ICD-10-CM

## 2025-01-27 DIAGNOSIS — N18.31 STAGE 3A CHRONIC KIDNEY DISEASE: ICD-10-CM

## 2025-01-27 DIAGNOSIS — E78.2 MIXED HYPERLIPIDEMIA: ICD-10-CM

## 2025-01-27 PROCEDURE — 3079F DIAST BP 80-89 MM HG: CPT | Mod: CPTII,S$GLB,, | Performed by: INTERNAL MEDICINE

## 2025-01-27 PROCEDURE — 3074F SYST BP LT 130 MM HG: CPT | Mod: CPTII,S$GLB,, | Performed by: INTERNAL MEDICINE

## 2025-01-27 PROCEDURE — G0009 ADMIN PNEUMOCOCCAL VACCINE: HCPCS | Mod: S$GLB,,, | Performed by: INTERNAL MEDICINE

## 2025-01-27 PROCEDURE — 1159F MED LIST DOCD IN RCRD: CPT | Mod: CPTII,S$GLB,, | Performed by: INTERNAL MEDICINE

## 2025-01-27 PROCEDURE — 99396 PREV VISIT EST AGE 40-64: CPT | Mod: 25,S$GLB,, | Performed by: INTERNAL MEDICINE

## 2025-01-27 PROCEDURE — 90677 PCV20 VACCINE IM: CPT | Mod: S$GLB,,, | Performed by: INTERNAL MEDICINE

## 2025-01-27 PROCEDURE — 99999 PR PBB SHADOW E&M-EST. PATIENT-LVL IV: CPT | Mod: PBBFAC,,, | Performed by: INTERNAL MEDICINE

## 2025-01-27 PROCEDURE — 3008F BODY MASS INDEX DOCD: CPT | Mod: CPTII,S$GLB,, | Performed by: INTERNAL MEDICINE

## 2025-01-27 RX ORDER — LIFITEGRAST 50 MG/ML
1 SOLUTION/ DROPS OPHTHALMIC 2 TIMES DAILY
COMMUNITY
Start: 2024-11-05

## 2025-01-27 NOTE — PROGRESS NOTES
SUBJECTIVE     No chief complaint on file.      HPI  Jia Ugalde is a 64 y.o. female with multiple medical diagnoses as listed in the medical history and problem list that presents for annual exam. Pt has been doing well since her last visit. She has a good appetite and eats well. She does exercise by stretching. She sleeps for ~8+ hours nightly. Pt does take OTC supplements. She does not have any current stressors. Pt is UTD on age appropriate CA screening.    PAST MEDICAL HISTORY:  Past Medical History:   Diagnosis Date    GERD (gastroesophageal reflux disease)     Hyperlipidemia        PAST SURGICAL HISTORY:  Past Surgical History:   Procedure Laterality Date    BREAST BIOPSY      BREAST SURGERY       SECTION  1997    COLONOSCOPY N/A 2021    Procedure: COLONOSCOPY;  Surgeon: Caitlin Joseph MD;  Location: [facility];  Service: Endoscopy;  Laterality: N/A;  fully vaccinated-GT    pt states had polyps    EPIDURAL STEROID INJECTION Bilateral 2021    Procedure: Bilateral L5 Transforaminal Epidural Steroid Injection;  Surgeon: Keron Cristina Jr., MD;  Location: [facility];  Service: Pain Management;  Laterality: Bilateral;  Arrive @ 1030 (requested 10); No ATC or DM; Vacc.    ESOPHAGOGASTRODUODENOSCOPY N/A 2022    Procedure: EGD (ESOPHAGOGASTRODUODENOSCOPY);  Surgeon: Lowell Robles MD;  [facility]  Service: Endoscopy;  Laterality: N/A;  fully vaccinated  Pt requesting earlier date with any scoping MD - ERW  RAPID - add on / prep ins. emailed and reviewed- ERW    HEMORRHOID SURGERY      HYSTERECTOMY      SPINE SURGERY      TONSILLECTOMY  1965    TUBAL LIGATION         SOCIAL HISTORY:  Social History     Socioeconomic History    Marital status: Single   Tobacco Use    Smoking status: Never    Smokeless tobacco: Never   Substance and Sexual Activity    Alcohol use: Never    Drug use: Never    Sexual activity: Not Currently     Social Drivers of Health      Financial Resource Strain: High Risk (1/22/2024)    Overall Financial Resource Strain (CARDIA)     Difficulty of Paying Living Expenses: Hard   Food Insecurity: Food Insecurity Present (1/22/2024)    Hunger Vital Sign     Worried About Running Out of Food in the Last Year: Sometimes true     Ran Out of Food in the Last Year: Sometimes true   Transportation Needs: No Transportation Needs (1/22/2024)    PRAPARE - Transportation     Lack of Transportation (Medical): No     Lack of Transportation (Non-Medical): No   Physical Activity: Insufficiently Active (1/22/2024)    Exercise Vital Sign     Days of Exercise per Week: 2 days     Minutes of Exercise per Session: 60 min   Stress: No Stress Concern Present (1/22/2024)    [employer] [place] [employer] - Occupational Stress Questionnaire     Feeling of Stress : Not at all   Housing Stability: Low Risk  (1/22/2024)    Housing Stability Vital Sign     Unable to Pay for Housing in the Last Year: No     Number of Places Lived in the Last Year: 1     Unstable Housing in the Last Year: No       FAMILY HISTORY:  Family History   Problem Relation Name Age of Onset    Breast cancer Maternal Aunt  40    Pancreatic cancer Maternal Grandmother      Pancreatic cancer Paternal Grandmother      Arthritis Paternal Grandfather Jc Santacruz     Asthma Son Eric Ugalde Jr.     Colon cancer Neg Hx      Esophageal cancer Neg Hx         ALLERGIES AND MEDICATIONS: updated and reviewed.  Review of patient's allergies indicates:  No Known Allergies  Current Outpatient Medications   Medication Sig Dispense Refill    azelastine (ASTELIN) 137 mcg (0.1 %) nasal spray 1 spray (137 mcg total) by Nasal route 2 (two) times daily. 30 mL 3    budesonide 1 mg/2 mL NbSp       cholecalciferol, vitamin D3, capsule/tablet Take by mouth once daily.      clobetasoL (TEMOVATE) 0.05 % external solution Apply TO SCALP in AREAS of HAIR loss AT betime nightly monday - FRIDAY      diclofenac sodium  (VOLTAREN) 1 % Gel Apply 2 g topically 4 (four) times daily as needed. 100 g 3    evolocumab (REPATHA SURECLICK) 140 mg/mL PnIj Inject 1 mL (140 mg total) into the skin every 14 (fourteen) days. 6 mL 3    famotidine (PEPCID) 20 MG tablet Take 1 tablet (20 mg total) by mouth 2 (two) times daily as needed for Heartburn. 180 tablet 1    finasteride (PROSCAR) 5 mg tablet Take 5 mg by mouth once daily.      fluocinonide (LIDEX) 0.05 % external solution apply 1ml TO SCALP ONCE TO twice daily AS DIRECTED      fluticasone propionate (FLONASE) 50 mcg/actuation nasal spray 1 spray (50 mcg total) by Each Nostril route once daily. 16 g 3    fluticasone propionate (FLONASE) 50 mcg/actuation nasal spray 2 sprays (100 mcg total) by Each Nostril route 2 (two) times daily. 18.2 mL 3    hydrocortisone (ANUSOL-HC) 2.5 % rectal cream Place rectally 2 (two) times daily. 28 g 1    hydrocortisone 2.5 % cream Apply topically.      hydrocortisone-pramoxine (PROCTOFOAM-HS) rectal foam Place 1 applicator rectally 3 (three) times daily. 10 g 2    hydroquinone 4 % Crea apply TO AREAS of discoloration ON SKIN 28.35 g 3    NEILMED SINUS RINSE COMPLETE pkdv use as directed      RETIN-A 0.025 % cream Apply topically.      tobramycin, PF, (OPHELIA) 300 mg/5 mL nebulizer solution EMPTY CONTENTS OF 1 AMPULE INTO NASAL IRRIGATION SYSTEM, ADD DISTILLED WATER, SALT PACK, MIX & IRRIGATE. PERFORM 2 TIMES DAILY      triamcinolone acetonide 0.1% (KENALOG) 0.1 % ointment Apply TO SCALP in AREAS of HAIR loss AT bedtime      XIIDRA 5 % Dpet Place 1 drop into both eyes 2 (two) times daily.       No current facility-administered medications for this visit.       ROS  Review of Systems   Constitutional:  Negative for chills and fever.   HENT:  Positive for postnasal drip. Negative for hearing loss and sore throat.    Eyes:  Negative for visual disturbance.   Respiratory:  Positive for shortness of breath (HOLLAND). Negative for cough.    Cardiovascular:  Negative for  "chest pain, palpitations and leg swelling.   Gastrointestinal:  Negative for abdominal pain, constipation, diarrhea, nausea and vomiting.   Genitourinary:  Negative for dysuria, frequency and urgency.   Musculoskeletal:  Negative for arthralgias, joint swelling and myalgias.   Skin:  Negative for rash and wound.   Neurological:  Negative for headaches.   Psychiatric/Behavioral:  Negative for agitation and confusion. The patient is not nervous/anxious.          OBJECTIVE     Physical Exam  Vitals:    01/27/25 1125   BP: 120/82   Pulse: 62   Resp: 18   Temp: 98.3 °F (36.8 °C)    Body mass index is 34.32 kg/m².  Weight: 90.7 kg (199 lb 15.3 oz)   Height: 5' 4" (162.6 cm)     Physical Exam  Constitutional:       General: She is not in acute distress.     Appearance: She is well-developed.   HENT:      Head: Normocephalic and atraumatic.      Right Ear: External ear normal.      Left Ear: External ear normal.      Nose: Nose normal.   Eyes:      General: No scleral icterus.        Right eye: No discharge.         Left eye: No discharge.      Conjunctiva/sclera: Conjunctivae normal.   Neck:      Vascular: No JVD.      Trachea: No tracheal deviation.   Cardiovascular:      Rate and Rhythm: Normal rate and regular rhythm.      Heart sounds: No murmur heard.     No friction rub. No gallop.   Pulmonary:      Effort: Pulmonary effort is normal. No respiratory distress.      Breath sounds: Normal breath sounds. No wheezing.   Abdominal:      General: Bowel sounds are normal. There is no distension.      Palpations: Abdomen is soft. There is no mass.      Tenderness: There is no abdominal tenderness. There is no guarding or rebound.   Musculoskeletal:         General: No tenderness or deformity. Normal range of motion.      Cervical back: Normal range of motion and neck supple.   Skin:     General: Skin is warm and dry.      Findings: No erythema or rash.   Neurological:      Mental Status: She is alert and oriented to person, " place, and time.      Motor: No abnormal muscle tone.      Coordination: Coordination normal.   Psychiatric:         Behavior: Behavior normal.         Thought Content: Thought content normal.         Judgment: Judgment normal.           Health Maintenance         Date Due Completion Date    Lipid Panel 11/29/2024 11/29/2023    Annual UACr 07/01/2025 7/1/2024    Mammogram 11/15/2025 11/15/2024    DEXA Scan 12/05/2025 12/5/2023    Hemoglobin A1c (Diabetic Prevention Screening) 08/22/2026 8/22/2023    Colorectal Cancer Screening 12/08/2028 12/8/2021    TETANUS VACCINE 08/15/2033 8/15/2023              ASSESSMENT     64 y.o. female with     1. Annual physical exam    2. Stage 3a chronic kidney disease    3. Mixed hyperlipidemia    4. Abnormal finding of blood chemistry, unspecified    5. Need for pneumococcal vaccination        PLAN:     1. Annual physical exam  - Counseled on age appropriate medical preventative services, including age appropriate cancer screenings, over all nutritional health, need for a consistent exercise regimen and an over all push towards maintaining a vigorous and active lifestyle.  Counseled on age appropriate vaccines and discussed upcoming health care needs based on age/gender.  Spent time with patient counseling on need for a good patient/doctor relationship moving forward.  Discussed use of common OTC medications and supplements.  Discussed common dietary aids and use of caffeine and the need for good sleep hygiene and stress management.  - CBC Auto Differential; Future  - Comprehensive Metabolic Panel; Future  - Hemoglobin A1C; Future  - TSH; Future  - Lipid Panel; Future    2. Stage 3a chronic kidney disease  - CBC Auto Differential; Future  - Comprehensive Metabolic Panel; Future  - Hemoglobin A1C; Future  - TSH; Future    3. Mixed hyperlipidemia  - TSH; Future  - Lipid Panel; Future    4. Abnormal finding of blood chemistry, unspecified  - Hemoglobin A1C; Future    5. Need for  pneumococcal vaccination  - pneumoc 20-tommy conj-dip cr(PF) (PREVNAR-20 (PF)) injection Syrg 0.5 mL        RTC in 1 year     Reyna Umanzor MD  01/27/2025 11:40 AM        No follow-ups on file.

## 2025-01-28 ENCOUNTER — PATIENT MESSAGE (OUTPATIENT)
Dept: FAMILY MEDICINE | Facility: CLINIC | Age: 65
End: 2025-01-28
Payer: MEDICARE

## 2025-02-27 ENCOUNTER — TELEPHONE (OUTPATIENT)
Dept: CARDIOLOGY | Facility: CLINIC | Age: 65
End: 2025-02-27
Payer: MEDICARE

## 2025-02-27 NOTE — TELEPHONE ENCOUNTER
----- Message from Marleny sent at 2/27/2025  2:13 PM CST -----  Regarding: CORDELIA WOO [[id number]]  Type : Patient Call  Who Called :patient   Does the patient know what this is regarding?:#patient called and stated that she received a letter in the mail stating that it is time to schedule an appointment and would like to receive a call back regarding this letter. Thanks!!  Would the patient rather a call back or a response via [facility]?call back   Best Call Back Number:[phone]  Additional Information:

## 2025-03-13 ENCOUNTER — PATIENT MESSAGE (OUTPATIENT)
Dept: FAMILY MEDICINE | Facility: CLINIC | Age: 65
End: 2025-03-13
Payer: MEDICARE

## 2025-03-13 DIAGNOSIS — K44.9 HIATAL HERNIA: ICD-10-CM

## 2025-03-13 DIAGNOSIS — K21.9 GASTROESOPHAGEAL REFLUX DISEASE WITHOUT ESOPHAGITIS: ICD-10-CM

## 2025-03-13 RX ORDER — FAMOTIDINE 20 MG/1
TABLET, FILM COATED ORAL
Qty: 180 TABLET | Refills: 3 | Status: SHIPPED | OUTPATIENT
Start: 2025-03-13

## 2025-03-13 NOTE — TELEPHONE ENCOUNTER
Refill Decision Note   Jia Uaglde  is requesting a refill authorization.  Brief Assessment and Rationale for Refill:  Approve     Medication Therapy Plan:         Comments:     Note composed:3:48 PM 03/13/2025

## 2025-03-13 NOTE — TELEPHONE ENCOUNTER
No care due was identified.  [facility] Embedded Care Due Messages. Reference number: [id number].   3/13/2025 3:01:46 PM CDT

## 2025-03-18 ENCOUNTER — OFFICE VISIT (OUTPATIENT)
Dept: OTOLARYNGOLOGY | Facility: CLINIC | Age: 65
End: 2025-03-18
Payer: MEDICARE

## 2025-03-18 VITALS
DIASTOLIC BLOOD PRESSURE: 85 MMHG | WEIGHT: 199.94 LBS | HEIGHT: 64 IN | BODY MASS INDEX: 34.13 KG/M2 | SYSTOLIC BLOOD PRESSURE: 129 MMHG

## 2025-03-18 DIAGNOSIS — J30.2 SEASONAL ALLERGIC RHINITIS, UNSPECIFIED TRIGGER: ICD-10-CM

## 2025-03-18 DIAGNOSIS — R05.8 OTHER COUGH: ICD-10-CM

## 2025-03-18 DIAGNOSIS — J35.8 TONSIL STONE: ICD-10-CM

## 2025-03-18 DIAGNOSIS — H69.93 DYSFUNCTION OF BOTH EUSTACHIAN TUBES: Primary | ICD-10-CM

## 2025-03-18 PROCEDURE — 3074F SYST BP LT 130 MM HG: CPT | Mod: CPTII,S$GLB,, | Performed by: OTOLARYNGOLOGY

## 2025-03-18 PROCEDURE — 31575 DIAGNOSTIC LARYNGOSCOPY: CPT | Mod: S$GLB,,, | Performed by: OTOLARYNGOLOGY

## 2025-03-18 PROCEDURE — 3079F DIAST BP 80-89 MM HG: CPT | Mod: CPTII,S$GLB,, | Performed by: OTOLARYNGOLOGY

## 2025-03-18 PROCEDURE — 1159F MED LIST DOCD IN RCRD: CPT | Mod: CPTII,S$GLB,, | Performed by: OTOLARYNGOLOGY

## 2025-03-18 PROCEDURE — 99214 OFFICE O/P EST MOD 30 MIN: CPT | Mod: 25,S$GLB,, | Performed by: OTOLARYNGOLOGY

## 2025-03-18 PROCEDURE — 3008F BODY MASS INDEX DOCD: CPT | Mod: CPTII,S$GLB,, | Performed by: OTOLARYNGOLOGY

## 2025-03-18 PROCEDURE — 3044F HG A1C LEVEL LT 7.0%: CPT | Mod: CPTII,S$GLB,, | Performed by: OTOLARYNGOLOGY

## 2025-03-18 NOTE — PROGRESS NOTES
OTOLARYNGOLOGY CLINIC NOTE  Date:  2025     Chief complaint:  Chief Complaint   Patient presents with    ETD     Follow up     ear canal cyst     Left ear follow up       History of Present Illness  Jia Ugalde is a 64 y.o. female  presenting today for a followup.  She is doing sprays prn ; when she was doing sprays consistently   Has what sounds like tonsil stones but had tonsillectomy; no throat pain   Coughs up the stone and feels like it comes out around mouth upper or nose   Does not get sinus infections a lot.    No throat pain    I last saw the patient on . Given rec for saline, flonase and astelin   Below text is copied from  note on that date describing history of present illness at that time :  Gets nasal congestion. Not worse since ears have been  bothering her   Has problem when flying on planes  Comes and goes regarding ear fullness   Started on debrox. debrox made ears worse on left  Had turbinate surgery in the past , no sinus surgery      Had ear cleaning 4-5 years ago        Past Medical History  Past Medical History:   Diagnosis Date    GERD (gastroesophageal reflux disease)     Hyperlipidemia         Past Surgical History  Past Surgical History:   Procedure Laterality Date    BREAST BIOPSY      BREAST SURGERY       SECTION  1997    COLONOSCOPY N/A 2021    Procedure: COLONOSCOPY;  Surgeon: Caitlin Joseph MD;  Location: [facility];  Service: Endoscopy;  Laterality: N/A;  fully vaccinated-GT    pt states had polyps    EPIDURAL STEROID INJECTION Bilateral 2021    Procedure: Bilateral L5 Transforaminal Epidural Steroid Injection;  Surgeon: Keron Cristina Jr., MD;  Location: [facility];  Service: Pain Management;  Laterality: Bilateral;  Arrive @ 1030 (requested 10); No ATC or DM; Vacc.    ESOPHAGOGASTRODUODENOSCOPY N/A 2022    Procedure: EGD (ESOPHAGOGASTRODUODENOSCOPY);  Surgeon: Lowell Robles MD;  Location: [facility] ([address] [place] [address]);  Service:  "Endoscopy;  Laterality: N/A;  fully vaccinated  Pt requesting earlier date with any scoping MD - MONIQUE  RAPID - add on / prep ins. emailed and reviewed- ERW    HEMORRHOID SURGERY      HYSTERECTOMY      SPINE SURGERY      TONSILLECTOMY  1965    TUBAL LIGATION          Medications  Medications Ordered Prior to Encounter[1]    Review of Systems  Review of Systems   Constitutional: Negative.    Eyes: Negative.    Cardiovascular: Negative.    Gastrointestinal:  Positive for heartburn.   Genitourinary: Negative.    Musculoskeletal:  Positive for back pain.   Skin: Negative.    Neurological: Negative.    Psychiatric/Behavioral: Negative.          Answers submitted by the patient for this visit:  Review of Symptoms Questionnaire  (Submitted on 3/12/2025)  Snoring?: Yes  Acid Reflux?: Yes  Muscle aches / pain?: Yes    Social History   reports that she has never smoked. She has never used smokeless tobacco. She reports that she does not drink alcohol and does not use drugs.     Family History  Family History   Problem Relation Name Age of Onset    Breast cancer Maternal Aunt  40    Pancreatic cancer Maternal Grandmother      Pancreatic cancer Paternal Grandmother      Arthritis Paternal Grandfather Jc Santacruz     Asthma Son Eric Ugalde Jr.     Colon cancer Neg Hx      Esophageal cancer Neg Hx          Physical Exam   Vitals:    03/18/25 1047   BP: 129/85    Body mass index is 34.32 kg/m².  Weight: 90.7 kg (199 lb 15.3 oz)   Height: 5' 4" (162.6 cm)     GENERAL: no acute distress.  HEAD: normocephalic.   EYES: No scleral icterus  EARS: external ear without lesion, normal pinna shape and position.  External auditory canal with normal cerumen, tympanic membrane fully visible, no perforation , no retraction. No middle ear effusion. Ossicles intact. Cyst resolved   NOSE: external nose without significant bony abnormality  ORAL CAVITY/OROPHARYNX: tongue mobile. No tonsillar tags nor evidence of tonsil tissue, no stones "   NECK: trachea midline.   RESPIRATORY: no stridor, no stertor. Voice normal. Respirations nonlabored.  NEURO: alert, responds to questions appropriately.    PSYCH:mood appropriate    PROCEDURE NOTE  NAME OF PROCEDURE: Flexible Laryngoscopy, diagnostic  INDICATIONS: gag reflex precludes mirror exam, cough, possible tonsil stones but has had tonsillectomy, evaluate for residual tonsil tissue near base of tongue as none seen on intraoral exam as well as rule out adenoid stone or signs of allergic fungal sinus disease  FINDINGS: no adenoid tissue, no stones nor nasopharynx cyst. No overt residual palatine tonsil tissue. Mild symmetric lingual tonsil hypertrophy but no evidence of stones nor mucocele. Mild postcricoid edema, normal vocal fold motion, no nodules nor mucosal abnormality of vocal folds     Consent: After procedure was explained in detail and all questions answered, verbal consent was obtained for performing flexible laryngoscopy.  Anesthesia: topical 4% lidocaine and neosynephrine  Procedure: With patient in seated position, the scope was inserted into the bilateral nasal passageway and advanced atraumatically into the nasopharynx to examine the following structures:    Nasal cavity: Turbinates with mild hypertrophy. Synechaie between right inferior turbinate and septum mild middle meatal edema. No purulent drainage. No crusting. No polyps  Nasopharynx: no mass or lesion noted in nasopharynx.   Oropharynx: base of tongue without  mass or ulceration. Lingual tonsils mild symmetric hypertrophy  Hypopharynx: posterior pharyngeal wall without mass or lesion. No pooling of secretions. Pyriform sinuses visible without mass or lesion  Larynx: epiglottis normal without lesion. False vocal folds without edema/erythema/lesion. True vocal folds mobile and without lesion. Mild interarytenoid edema no erythema . Postcricoid region with mild edema no lesion   Subglottis: visualized portion of subglottis normal in  appearance    After examination performed, the scope was removed atraumatically . The patient tolerated the procedure well. Photodocumentation obtained with representative images below, all images and/or videos uploaded in media section of epic.                                                Imaging:  The patient does not have any new imaging of the head and neck since last visit.     Labs:  CBC  Recent Labs   Lab 08/22/23  0803 02/29/24  0900 01/27/25  1218   WBC 6.64 5.12 5.24   Hemoglobin 12.9 13.3 13.6   Hematocrit 40.0 42.3 42.8   MCV 91 91 92   Platelets 267 242 259     BMP  Recent Labs   Lab 02/29/24  0900 10/22/24  1010 01/27/25  1208   Glucose 91 86 96   Sodium 141 140 141   Potassium 4.4 4.5 4.5   Chloride 107 106 106   CO2 27 27 23   BUN 9 13 16   Creatinine 1.2 1.0 1.2   Calcium 10.3 10.0 10.3   Magnesium  --  2.0  --      COAGS        Assessment  1. Dysfunction of both eustachian tubes    2. Seasonal allergic rhinitis, unspecified trigger    3. Tonsil stone    4. Other cough       Plan:  Discussed plan of care with patient in detail and all questions answered. Patient reported understanding of plan of care. I gave the patient the opportunity to ask questions and patient confirmed all questions answered to satisfaction.     Cyst resolved in ear  Etd symptoms not an issue currently , neither are allergies. Counseled can use sprays prn for now and then if starts having more symptoms would recommend daily use if prn usage is not working  Has had tonsillectomy and appearance of adenoidectomy ; did not see any residual tissues in this area although her description sounds like tonsil stone. Did discuss with her ddx of allergic fungal sinus disease and eosinophilic cast from lung but suspect may have small piece of residual tonsil tissue that is making the stone. Can notify me if seems worse with nasal sprays when having sinus issues as could indicate AFS but did not see any polyps or crusting/drainage in sinus  area today   Discussed that if stone , salt water gargle may help  Overall doing better   Counseled not to use towel in ear canal   Has only had to have ears cleaned twice so do not think she needs regular scheduled cleanings at this time    F/u prn issue    I spent 31 minutes on the day of the visit for E/M portion as noted below (31 minutes - this does not include time for procedure which was additional time of 5 minutes for a total visit time of 36 minutes).    31 minutes : This includes face to face time and non-face to face time preparing to see the patient (eg, review of tests), obtaining and/or reviewing separately obtained history, documenting clinical information in the electronic or other health record, independently interpreting results and communicating results to the patient/family/caregiver, or care coordinator.   Please be aware that this note has been generated with the assistance of MModal voice-to-text.  Please excuse any spelling or grammatical errors.             [1]   Current Outpatient Medications on File Prior to Visit   Medication Sig Dispense Refill    azelastine (ASTELIN) 137 mcg (0.1 %) nasal spray 1 spray (137 mcg total) by Nasal route 2 (two) times daily. 30 mL 3    budesonide 1 mg/2 mL NbSp       cholecalciferol, vitamin D3, capsule/tablet Take by mouth once daily.      clobetasoL (TEMOVATE) 0.05 % external solution Apply TO SCALP in AREAS of HAIR loss AT betime nightly monday - FRIDAY      diclofenac sodium (VOLTAREN) 1 % Gel Apply 2 g topically 4 (four) times daily as needed. 100 g 3    evolocumab (REPATHA SURECLICK) 140 mg/mL PnIj Inject 1 mL (140 mg total) into the skin every 14 (fourteen) days. 6 mL 3    famotidine (PEPCID) 20 MG tablet TAKE ONE TABLET BY MOUTH TWICE DAILY as needed for heartburn 180 tablet 3    fluocinonide (LIDEX) 0.05 % external solution apply 1ml TO SCALP ONCE TO twice daily AS DIRECTED      fluticasone propionate (FLONASE) 50 mcg/actuation nasal spray 1 spray  (50 mcg total) by Each Nostril route once daily. 16 g 3    fluticasone propionate (FLONASE) 50 mcg/actuation nasal spray 2 sprays (100 mcg total) by Each Nostril route 2 (two) times daily. 18.2 mL 3    hydrocortisone (ANUSOL-HC) 2.5 % rectal cream Place rectally 2 (two) times daily. 28 g 1    hydrocortisone 2.5 % cream Apply topically.      hydrocortisone-pramoxine (PROCTOFOAM-HS) rectal foam Place 1 applicator rectally 3 (three) times daily. 10 g 2    hydroquinone 4 % Crea apply TO AREAS of discoloration ON SKIN 28.35 g 3    NEILMED SINUS RINSE COMPLETE pkdv use as directed      RETIN-A 0.025 % cream Apply topically.      tobramycin, PF, (OPHELIA) 300 mg/5 mL nebulizer solution EMPTY CONTENTS OF 1 AMPULE INTO NASAL IRRIGATION SYSTEM, ADD DISTILLED WATER, SALT PACK, MIX & IRRIGATE. PERFORM 2 TIMES DAILY      triamcinolone acetonide 0.1% (KENALOG) 0.1 % ointment Apply TO SCALP in AREAS of HAIR loss AT bedtime      XIIDRA 5 % Dpet Place 1 drop into both eyes 2 (two) times daily.      finasteride (PROSCAR) 5 mg tablet Take 5 mg by mouth once daily.       No current facility-administered medications on file prior to visit.

## 2025-03-21 ENCOUNTER — PATIENT MESSAGE (OUTPATIENT)
Dept: FAMILY MEDICINE | Facility: CLINIC | Age: 65
End: 2025-03-21
Payer: MEDICARE

## 2025-03-21 ENCOUNTER — OFFICE VISIT (OUTPATIENT)
Dept: FAMILY MEDICINE | Facility: CLINIC | Age: 65
End: 2025-03-21
Payer: MEDICARE

## 2025-03-21 VITALS
SYSTOLIC BLOOD PRESSURE: 118 MMHG | DIASTOLIC BLOOD PRESSURE: 74 MMHG | HEIGHT: 64 IN | WEIGHT: 196.44 LBS | HEART RATE: 79 BPM | TEMPERATURE: 99 F | BODY MASS INDEX: 33.54 KG/M2 | RESPIRATION RATE: 16 BRPM | OXYGEN SATURATION: 98 %

## 2025-03-21 DIAGNOSIS — N18.31 STAGE 3A CHRONIC KIDNEY DISEASE: ICD-10-CM

## 2025-03-21 DIAGNOSIS — M25.562 LEFT KNEE PAIN, UNSPECIFIED CHRONICITY: Primary | ICD-10-CM

## 2025-03-21 DIAGNOSIS — G89.4 CHRONIC PAIN SYNDROME: ICD-10-CM

## 2025-03-21 DIAGNOSIS — M12.9 ARTHRITIS/ARTHROPATHY OF MULTIPLE JOINTS: ICD-10-CM

## 2025-03-21 PROCEDURE — 3008F BODY MASS INDEX DOCD: CPT | Mod: CPTII,S$GLB,,

## 2025-03-21 PROCEDURE — G2211 COMPLEX E/M VISIT ADD ON: HCPCS | Mod: S$GLB,,,

## 2025-03-21 PROCEDURE — 3078F DIAST BP <80 MM HG: CPT | Mod: CPTII,S$GLB,,

## 2025-03-21 PROCEDURE — 99214 OFFICE O/P EST MOD 30 MIN: CPT | Mod: S$GLB,,,

## 2025-03-21 PROCEDURE — 1160F RVW MEDS BY RX/DR IN RCRD: CPT | Mod: CPTII,S$GLB,,

## 2025-03-21 PROCEDURE — 3074F SYST BP LT 130 MM HG: CPT | Mod: CPTII,S$GLB,,

## 2025-03-21 PROCEDURE — 99999 PR PBB SHADOW E&M-EST. PATIENT-LVL IV: CPT | Mod: PBBFAC,,,

## 2025-03-21 PROCEDURE — 3044F HG A1C LEVEL LT 7.0%: CPT | Mod: CPTII,S$GLB,,

## 2025-03-21 PROCEDURE — 1159F MED LIST DOCD IN RCRD: CPT | Mod: CPTII,S$GLB,,

## 2025-03-21 RX ORDER — METHYLPREDNISOLONE 4 MG/1
TABLET ORAL
Qty: 21 EACH | Refills: 0 | Status: SHIPPED | OUTPATIENT
Start: 2025-03-21

## 2025-03-21 NOTE — PROGRESS NOTES
HPI     Chief Complaint:  Chief Complaint   Patient presents with    Knee Pain       Jia Ugalde is a 64 y.o. female with multiple medical diagnoses as listed in the medical history and problem list that presents for knee pain     HPI    History of Present Illness    CHIEF COMPLAINT:  Jia presents today for left knee pain.    HISTORY OF PRESENT ILLNESS:  She presents with left knee pain that started earlier this week, extending from back down to left knee, primarily affecting the inside and front of the knee. She reports a similar episode in November. She denies any recent injury, fall, or trauma. She is currently wearing a knee brace which provides improvement in pain symptoms. She previously used methylprednisolone pack in November and has a prescription topical gel but has not used it recently. She has taken Tylenol for current pain with improvement in symptoms.    ASSOCIATED SYMPTOMS:  She denies fever, chills, nausea, headaches, urination problems, and visual changes.    MEDICAL HISTORY:  She denies history of gout, diabetes, and high blood pressure.    SOCIAL HISTORY:  She typically consumes 5 bottles of water daily but reports recent decrease in water intake.      ROS:  General: -fever, -chills, -fatigue, -weight gain, -weight loss  Eyes: -vision changes, -redness, -discharge  ENT: -ear pain, -nasal congestion, -sore throat  Cardiovascular: -chest pain, -palpitations, -lower extremity edema  Respiratory: -cough, -shortness of breath  Gastrointestinal: -abdominal pain, -nausea, -vomiting, -diarrhea, -constipation, -blood in stool  Genitourinary: -dysuria, -hematuria, -frequency  Musculoskeletal: +joint pain, -muscle pain, +limb pain, +pain with movement  Skin: -rash, -lesion  Neurological: -headache, -dizziness, -numbness, -tingling  Psychiatric: -anxiety, -depression, -sleep difficulty             Assessment & Plan     Assessment & Plan    Assessed left knee pain, noting recurrence from  November.  Ruled out gout based on history.  Evaluated renal function, noting chronic CKD with lower filtration rate.  Decided against NSAID injection due to potential kidney impact.      FOLLOW-UP:  - Instructed the patient to contact the office if knee pain persists or worsens after completing the steroid pack.  - Send a message through the patient portal if additional treatment is needed.         Problem List Items Addressed This Visit       Stage 3a chronic kidney disease  GENERAL MANAGEMENT AND EDUCATION:  - Advised the patient to watch for worsening symptoms, swelling, redness, or rashes.  - Recommend increasing water intake to previous levels (5 16-oz bottles per day).  - Educated on the systemic effects of oral medications vs. localized effects of topical treatments.  - Informed about the impact of NSAIDs on renal function.  - Advised against systemic NSAIDs (ibuprofen, Aleve, Motrin) due to potential renal function decline.    Chronic pain syndrome  Stable  Patient instructed to follow up  The pain discussed today is more isolated       Arthritis/arthropathy of multiple joints  GOUT AND HYPERURICEMIA:  - Explained gout as a build-up of uric acid in blood that can settle in joints, causing inflammation and pain.  - Clarified that for patients with darker skin complexions, redness may appear as darkening of the skin.  - Inquired about the patient's history of gout, which the patient denied.  - Assessed the knee pain and considered the possibility of gout, but ruled it out based on patient history.  - Inquired about the patient's history of gout, which is related to hyperuricemia.  - Explained gout and its relation to uric acid buildup, which is relevant to hyperuricemia.      Left knee pain - Primary    Relevant Medications    methylPREDNISolone (MEDROL DOSEPACK) 4 mg tablet  LEFT KNEE PAIN AND EFFUSION:  - Noted that the patient reports left knee pain that started earlier in the week, went away with Tylenol,  but returned last night.  - The pain extends from the back to the knee.  - Observed that the patient had a similar episode in November that was treated with a methylprednisolone pack.  - Examined the left knee and found it warm to touch, warmer than the right knee.  - Jia reported it was very hot earlier.  - Examined the knee and noted it is warmer than the right knee, which could be indicative of effusion.  - Assessed the knee condition and considered inflammation as a possible cause of the symptoms.  - Instructed the patient to use ice on the affected knee to help reduce inflammation.  - Advised the patient to elevate the knee.  - Prescribed a methylprednisolone pack (oral steroid) to be sent to [facility] in [place], as it was previously effective.  - Continued topical NSAID gel: Instructed to apply to affected areas of knee, use gloves or Q-tip for application to minimize systemic absorption, wash hands immediately after application if not using gloves.  - Continued Tylenol as needed for pain relief.  - Recommend using ice and elevation to help reduce inflammation.  - Approved the patient's use of a knee brace for pain management.  - Recorded the patient's temperature as 99.2°F, which is slightly elevated.         --------------------------------------------      Health Maintenance:  Health Maintenance         Date Due Completion Date    Annual UACr 07/01/2025 7/1/2024    Mammogram 11/15/2025 11/15/2024    DEXA Scan 12/05/2025 12/5/2023    Lipid Panel 01/27/2026 1/27/2025    Hemoglobin A1c (Diabetic Prevention Screening) 01/27/2028 1/27/2025    Colorectal Cancer Screening 12/08/2028 12/8/2021    TETANUS VACCINE 08/15/2033 8/15/2023            Health maintenance reviewed and Advised patient on the importance of completing overdue health maintenance items    Follow Up:  Follow up if symptoms worsen or fail to improve.    Exam     Review of Systems:  (as noted above)  Review of Systems    Physical Exam:   Physical  "Exam  Vitals:    25 1456   BP: 118/74   BP Location: Right arm   Patient Position: Sitting   Pulse: 79   Resp: 16   Temp: 99.2 °F (37.3 °C)   TempSrc: Oral   SpO2: 98%   Weight: 89.1 kg (196 lb 6.9 oz)   Height: 5' 4" (1.626 m)      Body mass index is 33.72 kg/m².    Physical Exam    Vitals: Temperature: 99.2.  General: No acute distress. Well-developed. Well-nourished.  Eyes: EOMI. Sclerae anicteric.  HENT: Normocephalic. Atraumatic. Nares patent.   Cardiovascular: Regular rate. Regular rhythm. No murmurs. No rubs. No gallops. Normal S1, S2.  Respiratory: Normal respiratory effort. Clear to auscultation bilaterally. No rales. No rhonchi. No wheezing.  Musculoskeletal: No  obvious deformity. Excessive warmth of left knee. Left knee warmth.  Range of motion intact  Extremities: No lower extremity edema.  Neurological: Alert & oriented x3. No slurred speech. Normal gait.  Psychiatric: Normal mood. Normal affect. Good insight. Good judgment.  Skin: Warm. Dry. No rash.           History     Past Medical History:  Past Medical History:   Diagnosis Date    GERD (gastroesophageal reflux disease)     Hyperlipidemia        Past Surgical History:  Past Surgical History:   Procedure Laterality Date    BREAST BIOPSY      BREAST SURGERY       SECTION  1997    COLONOSCOPY N/A 2021    Procedure: COLONOSCOPY;  Surgeon: Caitlin Joseph MD;  Location: [facility];  Service: Endoscopy;  Laterality: N/A;  fully vaccinated-GT    pt states had polyps    EPIDURAL STEROID INJECTION Bilateral 2021    Procedure: Bilateral L5 Transforaminal Epidural Steroid Injection;  Surgeon: Keron Cristina Jr., MD;  Location: [facility];  Service: Pain Management;  Laterality: Bilateral;  Arrive @ 1030 (requested 10); No ATC or DM; Vacc.    ESOPHAGOGASTRODUODENOSCOPY N/A 2022    Procedure: EGD (ESOPHAGOGASTRODUODENOSCOPY);  Surgeon: Lowell Robles MD;  Location: [address]);  Service: Endoscopy;  Laterality: " N/A;  fully vaccinated  Pt requesting earlier date with any scoping MD - SUSANW  RAPID - add on / prep ins. emailed and reviewed- ERW    HEMORRHOID SURGERY      HYSTERECTOMY      SPINE SURGERY      TONSILLECTOMY  1965    TUBAL LIGATION         Social History:  Social History[1]    Family History:  Family History   Problem Relation Name Age of Onset    Breast cancer Maternal Aunt  40    Pancreatic cancer Maternal Grandmother      Pancreatic cancer Paternal Grandmother      Arthritis Paternal Grandfather Jc Santacruz     Asthma Son Eric Ugalde Jr.     Colon cancer Neg Hx      Esophageal cancer Neg Hx         Allergies and Medications: (updated and reviewed)  Review of patient's allergies indicates:  No Known Allergies  Current Medications[2]    Patient Care Team:  Reyna Umanzor MD as PCP - General (Internal Medicine)  [facility], [facility] (Gastroenterology)  Mandi Park LPN as Licensed Practical Nurse  Sully Escobar PharmD as Pharmacist         - The patient is given an After Visit Summary that lists all medications with directions, allergies, education, orders placed during this encounter and follow-up instructions.      - I have reviewed the patient's medical information including past medical, family, and social history sections including the medications and allergies.      - We discussed the patient's current medications.     This note was created by combination of typed  and MModal dictation.  Transcription errors may be present.  If there are any questions, please contact me.     This note was generated with the assistance of ambient listening technology. Verbal consent was obtained by the patient and accompanying visitor(s) for the recording of patient appointment to facilitate this note. I attest to having reviewed and edited the generated note for accuracy, though some syntax or spelling errors may persist. Please contact the  author of this note for any clarification.          Kathi Salmon PA-C                      [1]   Social History  Socioeconomic History    Marital status: Single   Tobacco Use    Smoking status: Never    Smokeless tobacco: Never   Substance and Sexual Activity    Alcohol use: Never    Drug use: Never    Sexual activity: Not Currently     Social Drivers of Health     Financial Resource Strain: Low Risk  (3/12/2025)    Overall Financial Resource Strain (CARDIA)     Difficulty of Paying Living Expenses: Not very hard   Food Insecurity: No Food Insecurity (3/12/2025)    Hunger Vital Sign     Worried About Running Out of Food in the Last Year: Never true     Ran Out of Food in the Last Year: Never true   Transportation Needs: No Transportation Needs (3/12/2025)    PRAPARE - Transportation     Lack of Transportation (Medical): No     Lack of Transportation (Non-Medical): No   Physical Activity: Inactive (3/12/2025)    Exercise Vital Sign     Days of Exercise per Week: 0 days     Minutes of Exercise per Session: 30 min   Stress: No Stress Concern Present (3/12/2025)    [employer] [place] [employer] - Occupational Stress Questionnaire     Feeling of Stress : Not at all   Housing Stability: Low Risk  (3/12/2025)    Housing Stability Vital Sign     Unable to Pay for Housing in the Last Year: No     Number of Times Moved in the Last Year: 0     Homeless in the Last Year: No   [2]   Current Outpatient Medications   Medication Sig Dispense Refill    azelastine (ASTELIN) 137 mcg (0.1 %) nasal spray 1 spray (137 mcg total) by Nasal route 2 (two) times daily. 30 mL 3    budesonide 1 mg/2 mL NbSp       cholecalciferol, vitamin D3, capsule/tablet Take by mouth once daily.      clobetasoL (TEMOVATE) 0.05 % external solution Apply TO SCALP in AREAS of HAIR loss AT betime nightly monday - FRIDAY      diclofenac sodium (VOLTAREN) 1 % Gel Apply 2 g topically 4 (four) times daily as needed. 100 g 3    evolocumab  (REPATHA SURECLICK) 140 mg/mL PnIj Inject 1 mL (140 mg total) into the skin every 14 (fourteen) days. 6 mL 3    famotidine (PEPCID) 20 MG tablet TAKE ONE TABLET BY MOUTH TWICE DAILY as needed for heartburn 180 tablet 3    fluocinonide (LIDEX) 0.05 % external solution apply 1ml TO SCALP ONCE TO twice daily AS DIRECTED      fluticasone propionate (FLONASE) 50 mcg/actuation nasal spray 1 spray (50 mcg total) by Each Nostril route once daily. 16 g 3    fluticasone propionate (FLONASE) 50 mcg/actuation nasal spray 2 sprays (100 mcg total) by Each Nostril route 2 (two) times daily. 18.2 mL 3    hydrocortisone (ANUSOL-HC) 2.5 % rectal cream Place rectally 2 (two) times daily. 28 g 1    hydrocortisone 2.5 % cream Apply topically.      hydrocortisone-pramoxine (PROCTOFOAM-HS) rectal foam Place 1 applicator rectally 3 (three) times daily. 10 g 2    hydroquinone 4 % Crea apply TO AREAS of discoloration ON SKIN 28.35 g 3    NEILMED SINUS RINSE COMPLETE pkdv use as directed      RETIN-A 0.025 % cream Apply topically.      tobramycin, PF, (OPHELIA) 300 mg/5 mL nebulizer solution EMPTY CONTENTS OF 1 AMPULE INTO NASAL IRRIGATION SYSTEM, ADD DISTILLED WATER, SALT PACK, MIX & IRRIGATE. PERFORM 2 TIMES DAILY      triamcinolone acetonide 0.1% (KENALOG) 0.1 % ointment Apply TO SCALP in AREAS of HAIR loss AT bedtime      XIIDRA 5 % Dpet Place 1 drop into both eyes 2 (two) times daily.      methylPREDNISolone (MEDROL DOSEPACK) 4 mg tablet use as directed 21 each 0     No current facility-administered medications for this visit.

## 2025-04-23 ENCOUNTER — PATIENT MESSAGE (OUTPATIENT)
Dept: ADMINISTRATIVE | Facility: OTHER | Age: 65
End: 2025-04-23
Payer: MEDICARE

## 2025-04-30 ENCOUNTER — OFFICE VISIT (OUTPATIENT)
Dept: CARDIOLOGY | Facility: CLINIC | Age: 65
End: 2025-04-30
Payer: MEDICARE

## 2025-04-30 VITALS
RESPIRATION RATE: 17 BRPM | OXYGEN SATURATION: 96 % | WEIGHT: 195.44 LBS | HEIGHT: 63 IN | SYSTOLIC BLOOD PRESSURE: 120 MMHG | BODY MASS INDEX: 34.63 KG/M2 | HEART RATE: 81 BPM | DIASTOLIC BLOOD PRESSURE: 78 MMHG

## 2025-04-30 DIAGNOSIS — T46.6X5A STATIN MYOPATHY: ICD-10-CM

## 2025-04-30 DIAGNOSIS — R00.2 PALPITATIONS: Primary | ICD-10-CM

## 2025-04-30 DIAGNOSIS — E78.2 MIXED HYPERLIPIDEMIA: ICD-10-CM

## 2025-04-30 DIAGNOSIS — G89.4 CHRONIC PAIN SYNDROME: ICD-10-CM

## 2025-04-30 DIAGNOSIS — Z98.1 HISTORY OF LUMBAR FUSION: ICD-10-CM

## 2025-04-30 DIAGNOSIS — R06.02 SOBOE (SHORTNESS OF BREATH ON EXERTION): ICD-10-CM

## 2025-04-30 DIAGNOSIS — G72.0 STATIN MYOPATHY: ICD-10-CM

## 2025-04-30 PROCEDURE — 99999 PR PBB SHADOW E&M-EST. PATIENT-LVL V: CPT | Mod: PBBFAC,,, | Performed by: INTERNAL MEDICINE

## 2025-04-30 PROCEDURE — 3044F HG A1C LEVEL LT 7.0%: CPT | Mod: CPTII,S$GLB,, | Performed by: INTERNAL MEDICINE

## 2025-04-30 PROCEDURE — 99214 OFFICE O/P EST MOD 30 MIN: CPT | Mod: S$GLB,,, | Performed by: INTERNAL MEDICINE

## 2025-04-30 PROCEDURE — 1159F MED LIST DOCD IN RCRD: CPT | Mod: CPTII,S$GLB,, | Performed by: INTERNAL MEDICINE

## 2025-04-30 PROCEDURE — 3074F SYST BP LT 130 MM HG: CPT | Mod: CPTII,S$GLB,, | Performed by: INTERNAL MEDICINE

## 2025-04-30 PROCEDURE — 3008F BODY MASS INDEX DOCD: CPT | Mod: CPTII,S$GLB,, | Performed by: INTERNAL MEDICINE

## 2025-04-30 PROCEDURE — 3078F DIAST BP <80 MM HG: CPT | Mod: CPTII,S$GLB,, | Performed by: INTERNAL MEDICINE

## 2025-04-30 NOTE — PROGRESS NOTES
CARDIOLOGY CLINIC VISIT        HISTORY OF PRESENT ILLNESS:     Jia Ugalde presents for continued care.  Last seen 02/25/2022. Seen 01/26/2022 for evaluation of chest pain.  Patient noted left-sided chest discomfort.  Episodes worse with inspiration.  Usually last less than 1 minute.  Exercise stress echocardiogram showed ejection fraction of 55%.  Normal pulmonary pressure.  No significant valvular abnormalities.  The patient exercised for 4 minutes 34 seconds.  Functional capacity 6 Mets.  Hypertensive response exercise 218/118.  History of statin myopathy.  Last . She has been approved for Repatha.  She states that she had no symptoms during the stress test.  Blood pressure is elevated today.  She says she has values of 120s to 130 systolic at home.    06/28/2022:  Feels good.  No complaints.  Has been on Repatha for 3 months.    12/16/2022: Latest . Prior 227.  No Complaints.    05/26/2023:  2 nights ago had an episode of left upper quadrant discomfort.  Awakened her from sleep.  Radiated to her back.  She ended uptake in a suppository.  Had a bowel movement and felt better.  Her EKG today shows normal sinus rhythm.  Last lipids similar to previous.  She wants exercise but because of her chronic back pain it limits her.    10/16/2023: (Dr. Alarcon)    10/22/2024: Complaints of leg pain yesterday. Lasted from afternoon to bedtime. She was able to go to sleep. No lower extremity/calf swelling. History of DDD, lumbar. EKG today shows sinus bradycardia. Low voltage. Last lipids show   HDL 56     04/30/2025: Complaints of palpitations. Occur a few times a week. Last a few second With rest or exertion. Also notes shortness of breath on exertion. EKG today sinus rhythm with sinus arrhythmia.    CARDIOVASCULAR HISTORY:     None    PAST MEDICAL HISTORY:     Past Medical History:   Diagnosis Date    GERD (gastroesophageal reflux disease)     Hyperlipidemia        PAST SURGICAL HISTORY:      Past Surgical History:   Procedure Laterality Date    BREAST BIOPSY      BREAST SURGERY       SECTION  1997    COLONOSCOPY N/A 2021    Procedure: COLONOSCOPY;  Surgeon: Caitlin Joseph MD;  Location: [facility];  Service: Endoscopy;  Laterality: N/A;  fully vaccinated-GT    pt states had polyps    EPIDURAL STEROID INJECTION Bilateral 2021    Procedure: Bilateral L5 Transforaminal Epidural Steroid Injection;  Surgeon: Keron Cristina Jr., MD;  Location: [facility];  Service: Pain Management;  Laterality: Bilateral;  Arrive @ 1030 (requested 10); No ATC or DM; Vacc.    ESOPHAGOGASTRODUODENOSCOPY N/A 2022    Procedure: EGD (ESOPHAGOGASTRODUODENOSCOPY);  Surgeon: Lowell Robles MD;  [facility]  Service: Endoscopy;  Laterality: N/A;  fully vaccinated  Pt requesting earlier date with any scoping MD - ERW  RAPID - add on / prep ins. emailed and reviewed- ERW    HEMORRHOID SURGERY      HYSTERECTOMY      SPINE SURGERY      TONSILLECTOMY  1965    TUBAL LIGATION         ALLERGIES AND MEDICATION:   Review of patient's allergies indicates:  No Known Allergies     Medication List            Accurate as of 2025  9:33 AM. If you have any questions, ask your nurse or doctor.                CONTINUE taking these medications      azelastine 137 mcg (0.1 %) nasal spray  Commonly known as: ASTELIN  1 spray (137 mcg total) by Nasal route 2 (two) times daily.     budesonide 1 mg/2 mL Nbsp     cholecalciferol (vitamin D3) capsule/tablet     clobetasoL 0.05 % external solution  Commonly known as: TEMOVATE     diclofenac sodium 1 % Gel  Commonly known as: VOLTAREN  Apply 2 g topically 4 (four) times daily as needed.     famotidine 20 MG tablet  Commonly known as: PEPCID  TAKE ONE TABLET BY MOUTH TWICE DAILY as needed for heartburn     fluocinonide 0.05 % external solution  Commonly known as: LIDEX     * fluticasone propionate 50 mcg/actuation nasal spray  Commonly known as:  FLONASE  1 spray (50 mcg total) by Each Nostril route once daily.     * fluticasone propionate 50 mcg/actuation nasal spray  Commonly known as: FLONASE  2 sprays (100 mcg total) by Each Nostril route 2 (two) times daily.     * hydrocortisone 2.5 % cream     * hydrocortisone 2.5 % rectal cream  Commonly known as: ANUSOL-HC  Place rectally 2 (two) times daily.     hydrocortisone-pramoxine rectal foam  Commonly known as: PROCTOFOAM-HS  Place 1 applicator rectally 3 (three) times daily.     hydroquinone 4 % Crea  apply TO AREAS of discoloration ON SKIN     methylPREDNISolone 4 mg tablet  Commonly known as: MEDROL DOSEPACK  use as directed     NEILMED SINUS RINSE COMPLETE Pkdv  Generic drug: sod chlor-bicarb-squeez bottle     REPATHA SURECLICK 140 mg/mL Pnij  Generic drug: evolocumab  Inject 1 mL (140 mg total) into the skin every 14 (fourteen) days.     RETIN-A 0.025 % cream  Generic drug: tretinoin     tobramycin (PF) 300 mg/5 mL nebulizer solution  Commonly known as: OPHELIA     triamcinolone acetonide 0.1% 0.1 % ointment  Commonly known as: KENALOG     XIIDRA 5 % Dpet  Generic drug: lifitegrast           * This list has 4 medication(s) that are the same as other medications prescribed for you. Read the directions carefully, and ask your doctor or other care provider to review them with you.                  SOCIAL HISTORY:     Social History     Socioeconomic History    Marital status: Single   Tobacco Use    Smoking status: Never    Smokeless tobacco: Never   Substance and Sexual Activity    Alcohol use: Never    Drug use: Never    Sexual activity: Not Currently     Social Drivers of Health     Financial Resource Strain: Low Risk  (3/12/2025)    Overall Financial Resource Strain (CARDIA)     Difficulty of Paying Living Expenses: Not very hard   Food Insecurity: No Food Insecurity (3/12/2025)    Hunger Vital Sign     Worried About Running Out of Food in the Last Year: Never true     Ran Out of Food in the Last Year:  Never true   Transportation Needs: No Transportation Needs (3/12/2025)    PRAPARE - Transportation     Lack of Transportation (Medical): No     Lack of Transportation (Non-Medical): No   Physical Activity: Inactive (3/12/2025)    Exercise Vital Sign     Days of Exercise per Week: 0 days     Minutes of Exercise per Session: 30 min   Stress: No Stress Concern Present (3/12/2025)    [employer] [place] [employer] - Occupational Stress Questionnaire     Feeling of Stress : Not at all   Housing Stability: Low Risk  (3/12/2025)    Housing Stability Vital Sign     Unable to Pay for Housing in the Last Year: No     Number of Times Moved in the Last Year: 0     Homeless in the Last Year: No       FAMILY HISTORY:     Family History   Problem Relation Name Age of Onset    Breast cancer Maternal Aunt  40    Pancreatic cancer Maternal Grandmother      Pancreatic cancer Paternal Grandmother      Arthritis Paternal Grandfather Jc Santacruz     Asthma Son Eric Ugalde Jr.     Colon cancer Neg Hx      Esophageal cancer Neg Hx         REVIEW OF SYSTEMS:   Review of Systems   Constitutional:  Negative for chills, diaphoresis, fever, malaise/fatigue and weight loss.   HENT:  Negative for congestion, hearing loss, sinus pain, sore throat and tinnitus.    Eyes:  Negative for blurred vision, double vision, photophobia and pain.   Respiratory:  Positive for shortness of breath. Negative for cough, hemoptysis, sputum production, wheezing and stridor.    Cardiovascular:  Positive for palpitations. Negative for chest pain, orthopnea, claudication, leg swelling and PND.   Gastrointestinal:  Negative for abdominal pain, blood in stool, constipation, heartburn, melena, nausea and vomiting.   Musculoskeletal:  Positive for back pain. Negative for falls, joint pain, myalgias and neck pain.   Neurological:  Negative for dizziness, tingling, tremors, sensory change, speech change, focal weakness, seizures, loss of consciousness,  "weakness and headaches.   Endo/Heme/Allergies:  Does not bruise/bleed easily.   Psychiatric/Behavioral:  Negative for depression, memory loss and substance abuse. The patient is not nervous/anxious.        PHYSICAL EXAM:     Vitals:    04/30/25 0916   BP: 120/78   Pulse: 81   Resp: 17        Body mass index is 34.62 kg/m².  Weight: 88.6 kg (195 lb 7 oz)   Height: 5' 3" (160 cm)     Physical Exam  Vitals reviewed.   Constitutional:       General: She is not in acute distress.     Appearance: She is well-developed. She is obese. She is not diaphoretic.   HENT:      Head: Normocephalic.   Neck:      Vascular: No carotid bruit or JVD.   Cardiovascular:      Rate and Rhythm: Normal rate and regular rhythm.      Pulses: Normal pulses.      Heart sounds: Normal heart sounds.   Pulmonary:      Effort: Pulmonary effort is normal.      Breath sounds: Normal breath sounds.   Abdominal:      General: Bowel sounds are normal.      Palpations: Abdomen is soft.      Tenderness: There is no abdominal tenderness.   Skin:     General: Skin is warm and dry.   Neurological:      Mental Status: She is alert and oriented to person, place, and time.   Psychiatric:         Speech: Speech normal.         Behavior: Behavior normal.         Thought Content: Thought content normal.         DATA:   EKG: (personally reviewed tracing)  04/30/2025-sinus rhythm with sinus arrhythmia  10/22/2024-sinus bradycardia, low voltage  05/26/2023-normal sinus rhythm  Laboratory:  CBC:  Recent Labs   Lab 08/22/23  0803 02/29/24  0900 01/27/25  1218   WBC 6.64 5.12 5.24   Hemoglobin 12.9 13.3 13.6   Hematocrit 40.0 42.3 42.8   Platelets 267 242 259       CHEMISTRIES:  Recent Labs   Lab 02/29/24  0900 10/22/24  1010 01/27/25  1208   Glucose 91 86 96   Sodium 141 140 141   Potassium 4.4 4.5 4.5   BUN 9 13 16   Creatinine 1.2 1.0 1.2   Calcium 10.3 10.0 10.3   Magnesium  --  2.0  --        CARDIAC BIOMARKERS:          COAGS:        LIPIDS/LFTS:  Recent Labs   Lab " 01/26/23  1120 08/22/23  0803 11/29/23  0952 02/29/24  0900 01/27/25  1208   Cholesterol 212 H  --  196  --  215 H   Triglycerides 73  --  103  --  89   HDL 68  --  56  --  60   LDL Cholesterol 129.4  --  119.4  --  137.2   Non-HDL Cholesterol 144  --  140  --  155   AST 21 17  --  21 20   ALT 14 12  --  15 12       Cardiovascular Testing:    Exercise stress echocardiogram 02/09/2022:    The estimated ejection fraction is 55%.  The patient's exercise capacity was below average.  The test was stopped because the patient experienced fatigue.  There were no arrhythmias during stress.  The left ventricle is normal in size with normal systolic function.  Indeterminate left ventricular diastolic function.  Normal right ventricular size with normal right ventricular systolic function.  Moderate left atrial enlargement.  Mild tricuspid regurgitation.  Mild right atrial enlargement.  The estimated PA systolic pressure is 27 mmHg.  Normal central venous pressure (3 mmHg).  Mild mitral regurgitation.  The stress echo portion of this study is negative for myocardial ischemia.  The ECG portion of this study is negative for myocardial ischemia.      ASSESSMENT:     Palpitations  Shortness of breath on exertion  Hyperlipidemia:  Repatha  Statin myopathy  Chronic back pain  Knee pain  Obesity    PLAN:     Palpitations: Event monitor.  Shortness of breath on exertion: Nuclear stress. Echocardiogram.   Hyperlipidemia: Continue Repatha  Return to clinic 2 months.           Jc Rogers MD, MPH, FACC, [facility]

## 2025-05-27 ENCOUNTER — OFFICE VISIT (OUTPATIENT)
Dept: FAMILY MEDICINE | Facility: CLINIC | Age: 65
End: 2025-05-27
Payer: MEDICARE

## 2025-05-27 ENCOUNTER — LAB VISIT (OUTPATIENT)
Dept: LAB | Facility: HOSPITAL | Age: 65
End: 2025-05-27
Attending: INTERNAL MEDICINE
Payer: MEDICARE

## 2025-05-27 VITALS
DIASTOLIC BLOOD PRESSURE: 86 MMHG | SYSTOLIC BLOOD PRESSURE: 128 MMHG | TEMPERATURE: 98 F | OXYGEN SATURATION: 96 % | HEART RATE: 76 BPM | BODY MASS INDEX: 34.62 KG/M2 | HEIGHT: 63 IN

## 2025-05-27 DIAGNOSIS — M12.9 ARTHRITIS/ARTHROPATHY OF MULTIPLE JOINTS: Primary | ICD-10-CM

## 2025-05-27 DIAGNOSIS — R53.83 FATIGUE, UNSPECIFIED TYPE: ICD-10-CM

## 2025-05-27 DIAGNOSIS — E78.2 MIXED HYPERLIPIDEMIA: ICD-10-CM

## 2025-05-27 DIAGNOSIS — D53.9 NUTRITIONAL ANEMIA, UNSPECIFIED: ICD-10-CM

## 2025-05-27 DIAGNOSIS — M17.12 ARTHRITIS OF LEFT KNEE: ICD-10-CM

## 2025-05-27 DIAGNOSIS — B37.2 CANDIDAL INTERTRIGO: ICD-10-CM

## 2025-05-27 LAB
ABSOLUTE EOSINOPHIL (OHS): 0.25 K/UL
ABSOLUTE MONOCYTE (OHS): 0.38 K/UL (ref 0.3–1)
ABSOLUTE NEUTROPHIL COUNT (OHS): 2.85 K/UL (ref 1.8–7.7)
ALBUMIN SERPL BCP-MCNC: 3.9 G/DL (ref 3.5–5.2)
ALP SERPL-CCNC: 64 UNIT/L (ref 40–150)
ALT SERPL W/O P-5'-P-CCNC: 11 UNIT/L (ref 10–44)
ANION GAP (OHS): 8 MMOL/L (ref 8–16)
AST SERPL-CCNC: 20 UNIT/L (ref 11–45)
BASOPHILS # BLD AUTO: 0.09 K/UL
BASOPHILS NFR BLD AUTO: 1.5 %
BILIRUB SERPL-MCNC: 0.8 MG/DL (ref 0.1–1)
BUN SERPL-MCNC: 16 MG/DL (ref 8–23)
CALCIUM SERPL-MCNC: 10.3 MG/DL (ref 8.7–10.5)
CHLORIDE SERPL-SCNC: 108 MMOL/L (ref 95–110)
CO2 SERPL-SCNC: 26 MMOL/L (ref 23–29)
CREAT SERPL-MCNC: 1.1 MG/DL (ref 0.5–1.4)
ERYTHROCYTE [DISTWIDTH] IN BLOOD BY AUTOMATED COUNT: 14.1 % (ref 11.5–14.5)
GFR SERPLBLD CREATININE-BSD FMLA CKD-EPI: 57 ML/MIN/1.73/M2
GLUCOSE SERPL-MCNC: 100 MG/DL (ref 70–110)
HCT VFR BLD AUTO: 43.4 % (ref 37–48.5)
HGB BLD-MCNC: 13.5 GM/DL (ref 12–16)
IMM GRANULOCYTES # BLD AUTO: 0.01 K/UL (ref 0–0.04)
IMM GRANULOCYTES NFR BLD AUTO: 0.2 % (ref 0–0.5)
LYMPHOCYTES # BLD AUTO: 2.38 K/UL (ref 1–4.8)
MCH RBC QN AUTO: 29 PG (ref 27–31)
MCHC RBC AUTO-ENTMCNC: 31.1 G/DL (ref 32–36)
MCV RBC AUTO: 93 FL (ref 82–98)
NUCLEATED RBC (/100WBC) (OHS): 0 /100 WBC
PLATELET # BLD AUTO: 297 K/UL (ref 150–450)
PMV BLD AUTO: 11.7 FL (ref 9.2–12.9)
POTASSIUM SERPL-SCNC: 4.7 MMOL/L (ref 3.5–5.1)
PROT SERPL-MCNC: 8.6 GM/DL (ref 6–8.4)
RBC # BLD AUTO: 4.66 M/UL (ref 4–5.4)
RELATIVE EOSINOPHIL (OHS): 4.2 %
RELATIVE LYMPHOCYTE (OHS): 39.9 % (ref 18–48)
RELATIVE MONOCYTE (OHS): 6.4 % (ref 4–15)
RELATIVE NEUTROPHIL (OHS): 47.8 % (ref 38–73)
SODIUM SERPL-SCNC: 142 MMOL/L (ref 136–145)
WBC # BLD AUTO: 5.96 K/UL (ref 3.9–12.7)

## 2025-05-27 PROCEDURE — 82040 ASSAY OF SERUM ALBUMIN: CPT

## 2025-05-27 PROCEDURE — 82607 VITAMIN B-12: CPT

## 2025-05-27 PROCEDURE — 82746 ASSAY OF FOLIC ACID SERUM: CPT

## 2025-05-27 PROCEDURE — 36415 COLL VENOUS BLD VENIPUNCTURE: CPT | Mod: PO

## 2025-05-27 PROCEDURE — 99999 PR PBB SHADOW E&M-EST. PATIENT-LVL III: CPT | Mod: PBBFAC,,, | Performed by: INTERNAL MEDICINE

## 2025-05-27 PROCEDURE — 85025 COMPLETE CBC W/AUTO DIFF WBC: CPT

## 2025-05-27 RX ORDER — EVOLOCUMAB 140 MG/ML
140 INJECTION, SOLUTION SUBCUTANEOUS
Qty: 6 ML | Refills: 3 | Status: SHIPPED | OUTPATIENT
Start: 2025-05-27 | End: 2025-05-27 | Stop reason: SDUPTHER

## 2025-05-27 RX ORDER — METHYLPREDNISOLONE 4 MG/1
TABLET ORAL
Qty: 21 EACH | Refills: 0 | Status: SHIPPED | OUTPATIENT
Start: 2025-05-28

## 2025-05-27 RX ORDER — EVOLOCUMAB 140 MG/ML
140 INJECTION, SOLUTION SUBCUTANEOUS
Qty: 6 ML | Refills: 3 | Status: ACTIVE | OUTPATIENT
Start: 2025-05-27 | End: 2026-04-28

## 2025-05-27 RX ORDER — NYSTATIN 100000 U/G
CREAM TOPICAL 2 TIMES DAILY
Qty: 30 G | Refills: 2 | Status: SHIPPED | OUTPATIENT
Start: 2025-05-27

## 2025-05-27 NOTE — PROGRESS NOTES
SUBJECTIVE     Chief Complaint   Patient presents with    Knee Pain    Back Pain    Rash    Fatigue       HPI  Jia Ugalde is a 64 y.o. female with multiple medical diagnoses as listed in the medical history and problem list that presents for follow-up for L knee pain x 1 year. Pain is sharp, stabbing, aching, cramping, etc constantly at a 12/10 without radiation. Pt has been applying Diclofenac, Biofreeze, and cold/hot compresses, and took Tylenol without relief of pain.     PAST MEDICAL HISTORY:  Past Medical History:   Diagnosis Date    GERD (gastroesophageal reflux disease)     Hyperlipidemia        PAST SURGICAL HISTORY:  Past Surgical History:   Procedure Laterality Date    BREAST BIOPSY      BREAST SURGERY       SECTION  1997    COLONOSCOPY N/A 2021    Procedure: COLONOSCOPY;  Surgeon: Caitlin Joseph MD;  Location: [facility];  Service: Endoscopy;  Laterality: N/A;  fully vaccinated-GT    pt states had polyps    EPIDURAL STEROID INJECTION Bilateral 2021    Procedure: Bilateral L5 Transforaminal Epidural Steroid Injection;  Surgeon: Keron Cristina Jr., MD;  Location: [facility];  Service: Pain Management;  Laterality: Bilateral;  Arrive @ 1030 (requested 10); No ATC or DM; Vacc.    ESOPHAGOGASTRODUODENOSCOPY N/A 2022    Procedure: EGD (ESOPHAGOGASTRODUODENOSCOPY);  Surgeon: Lowell Robles MD;  [facility]  Service: Endoscopy;  Laterality: N/A;  fully vaccinated  Pt requesting earlier date with any scoping MD - ERW  RAPID - add on / prep ins. emailed and reviewed- ERW    HEMORRHOID SURGERY      HYSTERECTOMY      SPINE SURGERY      TONSILLECTOMY  1965    TUBAL LIGATION         SOCIAL HISTORY:  Social History[1]    FAMILY HISTORY:  Family History   Problem Relation Name Age of Onset    Breast cancer Maternal Aunt  40    Pancreatic cancer Maternal Grandmother      Pancreatic cancer Paternal Grandmother      Arthritis Paternal Grandfather Jc Santacruz      "Asthma Son Eric Ugalde Jr.     Colon cancer Neg Hx      Esophageal cancer Neg Hx         ALLERGIES AND MEDICATIONS: updated and reviewed.  Review of patient's allergies indicates:  No Known Allergies  Current Medications[2]    ROS  Review of Systems   Constitutional:  Positive for fatigue. Negative for chills and fever.   HENT:  Negative for hearing loss and sore throat.    Eyes:  Negative for visual disturbance.   Respiratory:  Negative for cough and shortness of breath.    Cardiovascular:  Negative for chest pain, palpitations and leg swelling.   Gastrointestinal:  Negative for abdominal pain, constipation, diarrhea, nausea and vomiting.   Genitourinary:  Negative for dysuria, frequency and urgency.   Musculoskeletal:  Positive for arthralgias and back pain. Negative for joint swelling and myalgias.   Skin:  Positive for rash (under the R breast). Negative for wound.   Neurological:  Negative for headaches.   Psychiatric/Behavioral:  Negative for agitation and confusion. The patient is not nervous/anxious.          OBJECTIVE     Physical Exam  Vitals:    05/27/25 1008   BP: 128/86   Pulse: 76   Temp: 98.3 °F (36.8 °C)    Body mass index is 34.62 kg/m².      Height: 5' 3" (160 cm)     Physical Exam  Constitutional:       General: She is not in acute distress.     Appearance: She is well-developed.   HENT:      Head: Normocephalic and atraumatic.      Right Ear: External ear normal.      Left Ear: External ear normal.      Nose: Nose normal.   Eyes:      General: No scleral icterus.        Right eye: No discharge.         Left eye: No discharge.      Conjunctiva/sclera: Conjunctivae normal.   Neck:      Vascular: No JVD.      Trachea: No tracheal deviation.   Cardiovascular:      Rate and Rhythm: Normal rate and regular rhythm.      Heart sounds: No murmur heard.     No friction rub. No gallop.   Pulmonary:      Effort: Pulmonary effort is normal. No respiratory distress.      Breath sounds: Normal breath sounds. " No wheezing.   Abdominal:      General: Bowel sounds are normal. There is no distension.      Palpations: Abdomen is soft. There is no mass.      Tenderness: There is no abdominal tenderness. There is no guarding or rebound.   Musculoskeletal:         General: No tenderness or deformity. Normal range of motion.      Cervical back: Normal range of motion and neck supple.   Skin:     General: Skin is warm and dry.      Findings: No erythema or rash.   Neurological:      Mental Status: She is alert and oriented to person, place, and time.      Motor: No abnormal muscle tone.      Coordination: Coordination normal.   Psychiatric:         Behavior: Behavior normal.         Thought Content: Thought content normal.         Judgment: Judgment normal.           Health Maintenance         Date Due Completion Date    Annual UACr 07/01/2025 7/1/2024    Mammogram 11/15/2025 11/15/2024    DEXA Scan 12/05/2025 12/5/2023    Lipid Panel 01/27/2026 1/27/2025    Hemoglobin A1c (Diabetic Prevention Screening) 01/27/2028 1/27/2025    Colorectal Cancer Screening 12/08/2028 12/8/2021    TETANUS VACCINE 08/15/2033 8/15/2023              ASSESSMENT     64 y.o. female with     1. Arthritis/arthropathy of multiple joints    2. Arthritis of left knee    3. Mixed hyperlipidemia    4. Fatigue, unspecified type    5. Nutritional anemia, unspecified    6. Candidal intertrigo        PLAN:     1. Arthritis/arthropathy of multiple joints  - Pt encouraged to apply ice packs 2-3 times daily at 10 minute intervals x 72 hours, then okay to change to heating compress with care not to burn her self; she  voiced understanding   - Pt okay to take Tylenol p.r.n. pain  - methylPREDNISolone (MEDROL DOSEPACK) 4 mg tablet; use as directed  Dispense: 21 each; Refill: 0    2. Arthritis of left knee  - as above  - pt advised to f/u with Ortho to be considered for gel injections voiced understanding  - methylPREDNISolone (MEDROL DOSEPACK) 4 mg tablet; use as directed   Dispense: 21 each; Refill: 0    3. Mixed hyperlipidemia  - Stable; no acute issues  - The current medical regimen is effective;  continue present plan and medications.  - evolocumab (REPATHA SURECLICK) 140 mg/mL PnIj; Inject 1 mL (140 mg total) into the skin every 14 (fourteen) days.  Dispense: 6 mL; Refill: 3    4. Fatigue, unspecified type  - Vitamin B12; Future  - Folate; Future  - CBC Auto Differential; Future  - Comprehensive Metabolic Panel; Future    5. Nutritional anemia, unspecified  - Vitamin B12; Future  - Folate; Future    6. Candidal intertrigo  - pt keep area dry  - nystatin (MYCOSTATIN) cream; Apply topically 2 (two) times daily.  Dispense: 30 g; Refill: 2        RTC in 1-2 weeks as needed for any acute worsening of current condition or failure to improve     Reyna Umanzor MD  05/27/2025 10:45 AM        No follow-ups on file.                     [1]   Social History  Socioeconomic History    Marital status: Single   Tobacco Use    Smoking status: Never    Smokeless tobacco: Never   Substance and Sexual Activity    Alcohol use: Never    Drug use: Never    Sexual activity: Not Currently     Social Drivers of Health     Financial Resource Strain: Low Risk  (3/12/2025)    Overall Financial Resource Strain (CARDIA)     Difficulty of Paying Living Expenses: Not very hard   Food Insecurity: No Food Insecurity (3/12/2025)    Hunger Vital Sign     Worried About Running Out of Food in the Last Year: Never true     Ran Out of Food in the Last Year: Never true   Transportation Needs: No Transportation Needs (3/12/2025)    PRAPARE - Transportation     Lack of Transportation (Medical): No     Lack of Transportation (Non-Medical): No   Physical Activity: Inactive (3/12/2025)    Exercise Vital Sign     Days of Exercise per Week: 0 days     Minutes of Exercise per Session: 30 min   Stress: No Stress Concern Present (3/12/2025)    [place] [place] [employer] - Occupational Stress Questionnaire      Feeling of Stress : Not at all   Housing Stability: Low Risk  (3/12/2025)    Housing Stability Vital Sign     Unable to Pay for Housing in the Last Year: No     Number of Times Moved in the Last Year: 0     Homeless in the Last Year: No   [2]   Current Outpatient Medications   Medication Sig Dispense Refill    azelastine (ASTELIN) 137 mcg (0.1 %) nasal spray 1 spray (137 mcg total) by Nasal route 2 (two) times daily. 30 mL 3    budesonide 1 mg/2 mL NbSp       cholecalciferol, vitamin D3, capsule/tablet Take by mouth once daily.      clobetasoL (TEMOVATE) 0.05 % external solution Apply TO SCALP in AREAS of HAIR loss AT betime nightly monday - FRIDAY      diclofenac sodium (VOLTAREN) 1 % Gel Apply 2 g topically 4 (four) times daily as needed. 100 g 3    famotidine (PEPCID) 20 MG tablet TAKE ONE TABLET BY MOUTH TWICE DAILY as needed for heartburn 180 tablet 3    fluocinonide (LIDEX) 0.05 % external solution apply 1ml TO SCALP ONCE TO twice daily AS DIRECTED      fluticasone propionate (FLONASE) 50 mcg/actuation nasal spray 1 spray (50 mcg total) by Each Nostril route once daily. 16 g 3    fluticasone propionate (FLONASE) 50 mcg/actuation nasal spray 2 sprays (100 mcg total) by Each Nostril route 2 (two) times daily. 18.2 mL 3    hydrocortisone (ANUSOL-HC) 2.5 % rectal cream Place rectally 2 (two) times daily. 28 g 1    hydrocortisone 2.5 % cream Apply topically.      hydrocortisone-pramoxine (PROCTOFOAM-HS) rectal foam Place 1 applicator rectally 3 (three) times daily. 10 g 2    hydroquinone 4 % Crea apply TO AREAS of discoloration ON SKIN 28.35 g 3    NEILMED SINUS RINSE COMPLETE pkdv use as directed      RETIN-A 0.025 % cream Apply topically.      tobramycin, PF, (OPHELIA) 300 mg/5 mL nebulizer solution EMPTY CONTENTS OF 1 AMPULE INTO NASAL IRRIGATION SYSTEM, ADD DISTILLED WATER, SALT PACK, MIX & IRRIGATE. PERFORM 2 TIMES DAILY      triamcinolone acetonide 0.1% (KENALOG) 0.1 % ointment Apply TO SCALP in AREAS of HAIR  loss AT bedtime      XIIDRA 5 % Dpet Place 1 drop into both eyes 2 (two) times daily.      evolocumab (REPATHA SURECLICK) 140 mg/mL PnIj Inject 1 mL (140 mg total) into the skin every 14 (fourteen) days. 6 mL 3    [START ON 5/28/2025] methylPREDNISolone (MEDROL DOSEPACK) 4 mg tablet use as directed 21 each 0    nystatin (MYCOSTATIN) cream Apply topically 2 (two) times daily. 30 g 2     No current facility-administered medications for this visit.

## 2025-05-28 ENCOUNTER — PATIENT MESSAGE (OUTPATIENT)
Dept: CARDIOLOGY | Facility: CLINIC | Age: 65
End: 2025-05-28
Payer: MEDICARE

## 2025-05-28 ENCOUNTER — RESULTS FOLLOW-UP (OUTPATIENT)
Dept: FAMILY MEDICINE | Facility: CLINIC | Age: 65
End: 2025-05-28

## 2025-05-28 DIAGNOSIS — M25.561 RIGHT KNEE PAIN, UNSPECIFIED CHRONICITY: Primary | ICD-10-CM

## 2025-05-28 LAB
FOLATE SERPL-MCNC: 9 NG/ML (ref 4–24)
VIT B12 SERPL-MCNC: 371 PG/ML (ref 210–950)

## 2025-05-29 DIAGNOSIS — M17.12 PRIMARY OSTEOARTHRITIS OF LEFT KNEE: Primary | ICD-10-CM

## 2025-05-29 NOTE — PROGRESS NOTES
Patient requesting viscosupplementation injection with failed steroid injection. KL grade 2 changes on x-ray.     Patient would like to wait until his head heals, he is currently finishing skin cancer therapy. He's thinking he'd call in 2-3 wks to schedule his lab and annual checkup.    Please note that PSR has placed in follow up 3wks.  Patient would like to call his wife back for scheduling.

## 2025-06-11 ENCOUNTER — PATIENT MESSAGE (OUTPATIENT)
Dept: FAMILY MEDICINE | Facility: CLINIC | Age: 65
End: 2025-06-11
Payer: MEDICARE

## 2025-06-11 DIAGNOSIS — J30.9 ALLERGIC RHINITIS, UNSPECIFIED SEASONALITY, UNSPECIFIED TRIGGER: ICD-10-CM

## 2025-06-11 DIAGNOSIS — K64.8 INTERNAL HEMORRHOIDS: ICD-10-CM

## 2025-06-11 NOTE — TELEPHONE ENCOUNTER
No care due was identified.  [facility] Embedded Care Due Messages. Reference number: [id number].   6/11/2025 4:18:45 PM CDT

## 2025-06-12 RX ORDER — HYDROCORTISONE 25 MG/G
CREAM TOPICAL 2 TIMES DAILY
Qty: 28 G | Refills: 1 | Status: SHIPPED | OUTPATIENT
Start: 2025-06-12

## 2025-06-12 RX ORDER — AZELASTINE 1 MG/ML
1 SPRAY, METERED NASAL 2 TIMES DAILY
Qty: 30 ML | Refills: 3 | Status: SHIPPED | OUTPATIENT
Start: 2025-06-12

## 2025-06-12 RX ORDER — FLUTICASONE PROPIONATE 50 MCG
2 SPRAY, SUSPENSION (ML) NASAL 2 TIMES DAILY
Qty: 18.2 ML | Refills: 3 | Status: SHIPPED | OUTPATIENT
Start: 2025-06-12

## 2025-06-13 ENCOUNTER — CLINICAL SUPPORT (OUTPATIENT)
Dept: CARDIOLOGY | Facility: HOSPITAL | Age: 65
End: 2025-06-13
Attending: INTERNAL MEDICINE
Payer: MEDICARE

## 2025-06-13 DIAGNOSIS — R06.02 SOBOE (SHORTNESS OF BREATH ON EXERTION): ICD-10-CM

## 2025-06-13 DIAGNOSIS — R00.2 PALPITATIONS: ICD-10-CM

## 2025-06-13 PROCEDURE — 93270 REMOTE 30 DAY ECG REV/REPORT: CPT

## 2025-06-13 RX ORDER — FLUTICASONE PROPIONATE 50 MCG
1 SPRAY, SUSPENSION (ML) NASAL DAILY
Qty: 16 G | Refills: 3 | Status: SHIPPED | OUTPATIENT
Start: 2025-06-13

## 2025-06-18 ENCOUNTER — RESULTS FOLLOW-UP (OUTPATIENT)
Dept: CARDIOLOGY | Facility: CLINIC | Age: 65
End: 2025-06-18

## 2025-06-18 ENCOUNTER — HOSPITAL ENCOUNTER (OUTPATIENT)
Dept: CARDIOLOGY | Facility: HOSPITAL | Age: 65
Discharge: HOME | End: 2025-06-18
Attending: INTERNAL MEDICINE
Payer: MEDICARE

## 2025-06-18 ENCOUNTER — HOSPITAL ENCOUNTER (OUTPATIENT)
Dept: RADIOLOGY | Facility: HOSPITAL | Age: 65
Discharge: HOME | End: 2025-06-18
Attending: INTERNAL MEDICINE
Payer: MEDICARE

## 2025-06-18 VITALS — BODY MASS INDEX: 34.61 KG/M2 | HEIGHT: 63 IN | WEIGHT: 195.31 LBS

## 2025-06-18 DIAGNOSIS — R06.02 SOBOE (SHORTNESS OF BREATH ON EXERTION): ICD-10-CM

## 2025-06-18 DIAGNOSIS — R00.2 PALPITATIONS: ICD-10-CM

## 2025-06-18 LAB
AORTIC SIZE INDEX (SOV): 1.4 CM/M2
AORTIC SIZE INDEX: 1.4 CM/M2
ASCENDING AORTA: 2.7 CM
AV INDEX (PROSTH): 0.85
AV MEAN GRADIENT: 5 MMHG
AV PEAK GRADIENT: 9 MMHG
AV VALVE AREA BY VELOCITY RATIO: 2.6 CM²
AV VALVE AREA: 2.4 CM²
AV VELOCITY RATIO: 0.93
BSA FOR ECHO PROCEDURE: 1.98 M2
CV ECHO LV RWT: 0.46 CM
CV STRESS BASE HR: 53 BPM
DIASTOLIC BLOOD PRESSURE: 77 MMHG
DOP CALC AO PEAK VEL: 1.5 M/S
DOP CALC AO VTI: 36.8 CM
DOP CALC LVOT AREA: 2.8 CM2
DOP CALC LVOT DIAMETER: 1.9 CM
DOP CALC LVOT PEAK VEL: 1.4 M/S
DOP CALC LVOT STROKE VOLUME: 88.7 CM3
DOP CALC MV VTI: 41.4 CM
DOP CALC RVOT PEAK VEL: 0.35 M/S
DOP CALCLVOT PEAK VEL VTI: 31.3 CM
E WAVE DECELERATION TIME: 219 MSEC
E/A RATIO: 0.92
E/E' RATIO: 13 M/S
ECHO LV POSTERIOR WALL: 0.9 CM (ref 0.6–1.1)
FRACTIONAL SHORTENING: 30.8 % (ref 28–44)
INTERVENTRICULAR SEPTUM: 0.9 CM (ref 0.6–1.1)
IVC DIAMETER: 1.79 CM
LA MAJOR: 5.1 CM
LA MINOR: 5.1 CM
LA WIDTH: 4.4 CM
LEFT ATRIUM SIZE: 3.5 CM
LEFT ATRIUM VOLUME INDEX: 35 ML/M2
LEFT ATRIUM VOLUME: 67 CM3
LEFT INTERNAL DIMENSION IN SYSTOLE: 2.7 CM (ref 2.1–4)
LEFT VENTRICLE DIASTOLIC VOLUME INDEX: 35.08 ML/M2
LEFT VENTRICLE DIASTOLIC VOLUME: 67 ML
LEFT VENTRICLE MASS INDEX: 55.1 G/M2
LEFT VENTRICLE SYSTOLIC VOLUME INDEX: 14.1 ML/M2
LEFT VENTRICLE SYSTOLIC VOLUME: 27 ML
LEFT VENTRICULAR INTERNAL DIMENSION IN DIASTOLE: 3.9 CM (ref 3.5–6)
LEFT VENTRICULAR MASS: 105.3 G
LV LATERAL E/E' RATIO: 10.2 M/S
LV SEPTAL E/E' RATIO: 17 M/S
LVED V (TEICH): 67.19 ML
LVES V (TEICH): 26.77 ML
LVOT MG: 3.88 MMHG
LVOT MV: 0.91 CM/S
MV MEAN GRADIENT: 2 MMHG
MV PEAK A VEL: 1.11 M/S
MV PEAK E VEL: 1.02 M/S
MV PEAK GRADIENT: 7 MMHG
MV STENOSIS PRESSURE HALF TIME: 63.6 MS
MV VALVE AREA BY CONTINUITY EQUATION: 2.14 CM2
MV VALVE AREA P 1/2 METHOD: 3.46 CM2
OHS CV CPX 85 PERCENT MAX PREDICTED HEART RATE MALE: 134
OHS CV CPX MAX PREDICTED HEART RATE: 158
OHS CV CPX PATIENT IS FEMALE: 1
OHS CV CPX PATIENT IS MALE: 0
OHS CV CPX PEAK DIASTOLIC BLOOD PRESSURE: 81 MMHG
OHS CV CPX PEAK HEAR RATE: 111 BPM
OHS CV CPX PEAK RATE PRESSURE PRODUCT: NORMAL
OHS CV CPX PEAK SYSTOLIC BLOOD PRESSURE: 131 MMHG
OHS CV CPX PERCENT MAX PREDICTED HEART RATE ACHIEVED: 73
OHS CV CPX RATE PRESSURE PRODUCT PRESENTING: 6837
OHS CV INITIAL DOSE: 10.8 MCG/KG/MIN
OHS CV PEAK DOSE: 30.5 MCG/KG/MIN
OHS CV RV/LV RATIO: 0.85 CM
PISA TR MAX VEL: 2.7 M/S
PV PEAK GRADIENT: 5 MMHG
PV PEAK VELOCITY: 1.15 M/S
RA MAJOR: 4.08 CM
RA PRESSURE ESTIMATED: 8 MMHG
RA WIDTH: 3.8 CM
RIGHT VENTRICLE DIASTOLIC BASEL DIMENSION: 3.3 CM
RIGHT VENTRICULAR END-DIASTOLIC DIMENSION: 3.34 CM
RV TB RVSP: 11 MMHG
RV TISSUE DOPPLER FREE WALL SYSTOLIC VELOCITY 1 (APICAL 4 CHAMBER VIEW): 10.08 CM/S
SINUS: 2.63 CM
STJ: 2.7 CM
SYSTOLIC BLOOD PRESSURE: 129 MMHG
TDI LATERAL: 0.1 M/S
TDI SEPTAL: 0.06 M/S
TDI: 0.08 M/S
TR MAX PG: 30 MMHG
TRICUSPID ANNULAR PLANE SYSTOLIC EXCURSION: 2.2 CM
TV REST PULMONARY ARTERY PRESSURE: 37 MMHG
Z-SCORE OF LEFT VENTRICULAR DIMENSION IN END DIASTOLE: -3.21
Z-SCORE OF LEFT VENTRICULAR DIMENSION IN END SYSTOLE: -1.61

## 2025-06-18 PROCEDURE — 63600175 PHARM REV CODE 636 W HCPCS: Performed by: INTERNAL MEDICINE

## 2025-06-18 PROCEDURE — 93016 CV STRESS TEST SUPVJ ONLY: CPT | Mod: ,,, | Performed by: INTERNAL MEDICINE

## 2025-06-18 PROCEDURE — 93306 TTE W/DOPPLER COMPLETE: CPT | Mod: 26,,, | Performed by: INTERNAL MEDICINE

## 2025-06-18 PROCEDURE — A9502 TC99M TETROFOSMIN: HCPCS | Performed by: INTERNAL MEDICINE

## 2025-06-18 PROCEDURE — 93017 CV STRESS TEST TRACING ONLY: CPT

## 2025-06-18 PROCEDURE — 78452 HT MUSCLE IMAGE SPECT MULT: CPT | Mod: 26,,, | Performed by: INTERNAL MEDICINE

## 2025-06-18 PROCEDURE — 93306 TTE W/DOPPLER COMPLETE: CPT

## 2025-06-18 PROCEDURE — 78452 HT MUSCLE IMAGE SPECT MULT: CPT

## 2025-06-18 PROCEDURE — 93018 CV STRESS TEST I&R ONLY: CPT | Mod: ,,, | Performed by: INTERNAL MEDICINE

## 2025-06-18 RX ORDER — REGADENOSON 0.08 MG/ML
0.4 INJECTION, SOLUTION INTRAVENOUS ONCE
Status: COMPLETED | OUTPATIENT
Start: 2025-06-18 | End: 2025-06-18

## 2025-06-18 RX ADMIN — TETROFOSMIN 10.8 MILLICURIE: 1.38 INJECTION, POWDER, LYOPHILIZED, FOR SOLUTION INTRAVENOUS at 07:06

## 2025-06-18 RX ADMIN — REGADENOSON 0.4 MG: 0.08 INJECTION, SOLUTION INTRAVENOUS at 08:06

## 2025-06-18 RX ADMIN — TETROFOSMIN 30.5 MILLICURIE: 1.38 INJECTION, POWDER, LYOPHILIZED, FOR SOLUTION INTRAVENOUS at 08:06

## 2025-06-19 ENCOUNTER — APPOINTMENT (OUTPATIENT)
Dept: RADIOLOGY | Facility: HOSPITAL | Age: 65
End: 2025-06-19
Attending: ORTHOPAEDIC SURGERY
Payer: MEDICARE

## 2025-06-19 ENCOUNTER — OFFICE VISIT (OUTPATIENT)
Dept: ORTHOPEDICS | Facility: CLINIC | Age: 65
End: 2025-06-19
Payer: MEDICARE

## 2025-06-19 DIAGNOSIS — M25.561 RIGHT KNEE PAIN, UNSPECIFIED CHRONICITY: ICD-10-CM

## 2025-06-19 DIAGNOSIS — M17.12 PRIMARY OSTEOARTHRITIS OF LEFT KNEE: Primary | ICD-10-CM

## 2025-06-19 PROCEDURE — 73564 X-RAY EXAM KNEE 4 OR MORE: CPT | Mod: TC,FY,PN,LT

## 2025-06-19 PROCEDURE — 99999 PR PBB SHADOW E&M-EST. PATIENT-LVL III: CPT | Mod: PBBFAC,,, | Performed by: ORTHOPAEDIC SURGERY

## 2025-06-19 PROCEDURE — 73564 X-RAY EXAM KNEE 4 OR MORE: CPT | Mod: 26,LT,, | Performed by: RADIOLOGY

## 2025-06-19 NOTE — PROCEDURES
Large Joint Aspiration/Injection: L knee    Date/Time: 6/19/2025 8:20 AM    Performed by: Otis Barton MD  Authorized by: Otis Barton MD    Consent Done?:  Yes (Verbal)  Indications:  Arthritis and pain  Prep: patient was prepped and draped in usual sterile fashion      Local anesthesia used?: Yes    Anesthesia:  Local infiltration  Local anesthetic:  Topical anesthetic    Details:  Needle Size:  22 G  Approach:  Anterolateral  Location:  Knee  Site:  L knee  Medications:  16 mg Hyaluronic acid/Hylan (SYNVISC) Injection  Patient tolerance:  Patient tolerated the procedure well with no immediate complications

## 2025-06-19 NOTE — PROGRESS NOTES
EST PATIENT ORTHOPAEDIC: Knee    PRIMARY CARE PHYSICIAN: Reyna Umanzor MD   REFERRING PROVIDER: Otis Barton MD  [address] [facility]  [place] [id number]     ASSESSMENT & PLAN:    Impression:  Left Knee Mild Osteoarthritis, Primary    Left Knee chondrocalcinosis  Lumbar Radiculopathy    Follow Up Plan: For synvisc     Non operative care:    Jia Ugalde has physical exam evidence of above and wishes to pursue an non-operative care. I am recommending the following: medrol dosepack    Patient comes in with a two-month history of having atraumatic onset left knee pain.  She has been using a soft knee brace as her only symptomatic treatment to date.  She reports a medial based pain.  Some of her pain is confounded by history of lower back in lumbar radiculopathy that it has undergone 2 surgeries at this point.  She is a previous pain management patient.  She reports ongoing pain and numbness in her leg.  But with regard to this medial pain it appears new.  Again without an injury that she can recall.  She reports intermittent swelling in this knee.  She has x-rays that demonstrate some mild medial compartment arthritis along with both medial and lateral chondrocalcinosis of her knee.  She clinically has medial joint line pain and a positive Leeanne's test.  She does not report instability but does have new mechanical symptoms in her knee.  I think that her pain is likely secondary to chondrocalcinosis or this mild arthritis.  We discussed treatment options including therapy, injections, oral medications.  Previously we did Medrol Dosepak.  This helped with her pain.  She has ongoing numbness in her medial thigh which I think may be related to pre-existing lumbar spine disease she has had previous surgery in this area.  She does have some ongoing knee pain for which she is requesting gel based injections.  We had a long discussion about steroids versus gel and how they work and what they are targeting.  She does  have this evidence of chondrocalcinosis which I think is playing into her overall condition to the typically I would treat with steroid injections 1st.  However she again would like to continue forward with the gel based injections and if those fail to provide long-lasting or long term relief then we would be more open to the idea of the steroid injection.     The patient has been ordered:  Synvisc    CONSULTS:   None    ACTIVE PROBLEM LIST  Patient Active Problem List   Diagnosis    Mixed hyperlipidemia    Spinal stenosis of lumbar region    Lumbar spondylosis    DDD (degenerative disc disease), lumbar    Postlaminectomy syndrome of lumbar region    Post-hysterectomy menopause    Internal hemorrhoids    Breast lump or mass    Lumbar radiculopathy    Gastroesophageal reflux disease without esophagitis    Esophageal dysmotility    Hiatal hernia    History of lumbar fusion    Multiple environmental allergies    Osteopenia of multiple sites    Physical debility    Stage 3a chronic kidney disease    Xanthoma tuberosum    Chronic pain syndrome    Alopecia    Chest pain, atypical    Arthritis/arthropathy of multiple joints    Arthritis of left knee    Statin myopathy           SUBJECTIVE    CHIEF COMPLAINT: Knee Pain    HPI:   Jia Ugalde is a 64 y.o. female here for evaluation and management of left knee pain. There is not a specific incident that brought about this pain. she has had progressive problems with the knee(s) starting 2 months ago but is now progressing to interfere with activities which include: walking, enjoying hobbies, and rising from a sitting position    Currently the pain in the joint is rated at moderate with activity. The pain is intermittent and is located in the knee, located medially. The pain is described as aching, sharp, stiffness, and throbbing. Relieving factors include repositioning.     There is associated Catching and Clicking.     Jia Ugalde has no additional complaints.      25:  Patient here today for start a Synvisc series    PROGRESSIVE SYMPTOMS:  Pain worsened by weight bearing  Pain effecting living situation    FUNCTIONAL STATUS:   Climb a flight of stairs or walk up a hill     PREVIOUS TREATMENTS:  Medical: None  Physical Therapy: Braces  Previous Orthopaedic Surgery: None to lower extremities     REVIEW OF SYSTEMS:  PAIN ASSESSMENT:  See HPI.  MUSCULOSKELETAL: See HPI.  OTHER 10 point review of systems is negative except as stated in HPI above    PAST MEDICAL HISTORY   has a past medical history of GERD (gastroesophageal reflux disease) and Hyperlipidemia.     PAST SURGICAL HISTORY   has a past surgical history that includes Spine surgery; Hysterectomy; Tubal ligation; Breast surgery; Hemorrhoid surgery; Breast biopsy; Epidural steroid injection (Bilateral, 2021); Colonoscopy (N/A, 2021); Esophagogastroduodenoscopy (N/A, 2022);  section (1997); and Tonsillectomy ().     FAMILY HISTORY  family history includes Arthritis in her paternal grandfather; Asthma in her son; Breast cancer (age of onset: 40) in her maternal aunt; Pancreatic cancer in her maternal grandmother and paternal grandmother.     SOCIAL HISTORY   reports that she has never smoked. She has never used smokeless tobacco. She reports that she does not drink alcohol and does not use drugs.     ALLERGIES   Review of patient's allergies indicates:  No Known Allergies     MEDICATIONS  Current Outpatient Medications on File Prior to Visit   Medication Sig Dispense Refill    azelastine (ASTELIN) 137 mcg (0.1 %) nasal spray 1 spray (137 mcg total) by Nasal route 2 (two) times daily. 30 mL 3    budesonide 1 mg/2 mL NbSp       cholecalciferol, vitamin D3, capsule/tablet Take by mouth once daily.      clobetasoL (TEMOVATE) 0.05 % external solution Apply TO SCALP in AREAS of HAIR loss AT betime nightly monday - FRIDAY      diclofenac sodium (VOLTAREN) 1 % Gel Apply 2 g topically 4  (four) times daily as needed. 100 g 3    evolocumab (REPATHA SURECLICK) 140 mg/mL PnIj Inject 1 mL (140 mg total) into the skin every 14 (fourteen) days. 6 mL 3    famotidine (PEPCID) 20 MG tablet TAKE ONE TABLET BY MOUTH TWICE DAILY as needed for heartburn 180 tablet 3    fluocinonide (LIDEX) 0.05 % external solution apply 1ml TO SCALP ONCE TO twice daily AS DIRECTED      fluticasone propionate (FLONASE) 50 mcg/actuation nasal spray 2 sprays (100 mcg total) by Each Nostril route 2 (two) times daily. 18.2 mL 3    fluticasone propionate (FLONASE) 50 mcg/actuation nasal spray 1 spray (50 mcg total) by Each Nostril route once daily. 16 g 3    hydrocortisone (ANUSOL-HC) 2.5 % rectal cream Place rectally 2 (two) times daily. 28 g 1    hydrocortisone 2.5 % cream Apply topically.      hydrocortisone-pramoxine (PROCTOFOAM-HS) rectal foam Place 1 applicator rectally 3 (three) times daily. 10 g 2    hydroquinone 4 % Crea apply TO AREAS of discoloration ON SKIN 28.35 g 3    methylPREDNISolone (MEDROL DOSEPACK) 4 mg tablet use as directed 21 each 0    NEILMED SINUS RINSE COMPLETE pkdv use as directed      nystatin (MYCOSTATIN) cream Apply topically 2 (two) times daily. 30 g 2    RETIN-A 0.025 % cream Apply topically.      tobramycin, PF, (OPHELIA) 300 mg/5 mL nebulizer solution EMPTY CONTENTS OF 1 AMPULE INTO NASAL IRRIGATION SYSTEM, ADD DISTILLED WATER, SALT PACK, MIX & IRRIGATE. PERFORM 2 TIMES DAILY      triamcinolone acetonide 0.1% (KENALOG) 0.1 % ointment Apply TO SCALP in AREAS of HAIR loss AT bedtime      XIIDRA 5 % Dpet Place 1 drop into both eyes 2 (two) times daily.       Current Facility-Administered Medications on File Prior to Visit   Medication Dose Route Frequency Provider Last Rate Last Admin    hyaluronate (SYNVISC) 16 mg/2 mL injection 16 mg  16 mg Intra-articular Weekly         [COMPLETED] kit prep of [id number] 1.38 mg SolR 30.5 millicurie  30.5 millicurie Intravenous ONCE PRN Jc Rogers MD    30.5 millicurie at 06/18/25 0807    [COMPLETED] regadenoson injection 0.4 mg  0.4 mg Intravenous Once Keron Kwon MD   0.4 mg at 06/18/25 0805          PHYSICAL EXAM   vitals were not taken for this visit.   There is no height or weight on file to calculate BMI.      All other systems deferred.  GENERAL:  No acute distress  HABITUS: Obese  GAIT: Non-antalgic  SKIN: Normal     KNEE EXAM:    left:   Effusion: Minimal joint effusion  TTP: Yes over Medial Joint Line   No crepitus with passive knee ROM  Passive ROM: Extension 0, Flexion 130  No pain with manipulation of patella  Stable to varus/valgus stress. No increased laxity to anterior/posterior drawer testing  positive Leeanne's test  No pain with IR/ER rotation of the hip  5/5 strength in knee flexion and extension, ankle plantarflexion and dorsiflexion  Neurovascular Status: Sensation intact to light touch in Sural, Saphenous, SPN, DPN, Tibial nerve distribution  2+ pulse DP/PT, normal capillary refill, foot has normal warmth    DATA:  Diagnostic tests reviewed for today's visit:     4v of the knee reveal Mild degenerative changes of the Medial compartment. There is evidence of advanced osteoarthritis changes with Osteophyte formation and Joint space narrowing. The limb is in netural alignment. The patella is tracking midline.  There is evidence of chondrocalcinosis of her medial and lateral meniscus

## 2025-06-26 ENCOUNTER — OFFICE VISIT (OUTPATIENT)
Dept: ORTHOPEDICS | Facility: CLINIC | Age: 65
End: 2025-06-26
Payer: MEDICARE

## 2025-06-26 VITALS — BODY MASS INDEX: 34.61 KG/M2 | WEIGHT: 195.31 LBS | HEIGHT: 63 IN

## 2025-06-26 DIAGNOSIS — M17.12 PRIMARY OSTEOARTHRITIS OF LEFT KNEE: Primary | ICD-10-CM

## 2025-06-26 PROCEDURE — 99999 PR PBB SHADOW E&M-EST. PATIENT-LVL III: CPT | Mod: PBBFAC,,, | Performed by: ORTHOPAEDIC SURGERY

## 2025-06-26 PROCEDURE — 20610 DRAIN/INJ JOINT/BURSA W/O US: CPT | Mod: LT,S$GLB,, | Performed by: ORTHOPAEDIC SURGERY

## 2025-06-26 PROCEDURE — 99499 UNLISTED E&M SERVICE: CPT | Mod: S$GLB,,, | Performed by: ORTHOPAEDIC SURGERY

## 2025-06-26 NOTE — PROGRESS NOTES
EST PATIENT ORTHOPAEDIC: Knee    PRIMARY CARE PHYSICIAN: Reyna Umanzor MD   REFERRING PROVIDER: Otis Barton MD  [address] [facility]  [place] [id number]     ASSESSMENT & PLAN:    Impression:  Left Knee Mild Osteoarthritis, Primary    Left Knee chondrocalcinosis  Lumbar Radiculopathy    Follow Up Plan: For synvisc     Non operative care:    Jia Ugalde has physical exam evidence of above and wishes to pursue an non-operative care. I am recommending the following: medrol dosepack    Patient comes in with a two-month history of having atraumatic onset left knee pain.  She has been using a soft knee brace as her only symptomatic treatment to date.  She reports a medial based pain.  Some of her pain is confounded by history of lower back in lumbar radiculopathy that it has undergone 2 surgeries at this point.  She is a previous pain management patient.  She reports ongoing pain and numbness in her leg.  But with regard to this medial pain it appears new.  Again without an injury that she can recall.  She reports intermittent swelling in this knee.  She has x-rays that demonstrate some mild medial compartment arthritis along with both medial and lateral chondrocalcinosis of her knee.  She clinically has medial joint line pain and a positive Leeanne's test.  She does not report instability but does have new mechanical symptoms in her knee.  I think that her pain is likely secondary to chondrocalcinosis or this mild arthritis.  We discussed treatment options including therapy, injections, oral medications.  Previously we did Medrol Dosepak.  This helped with her pain.  She has ongoing numbness in her medial thigh which I think may be related to pre-existing lumbar spine disease she has had previous surgery in this area.  She does have some ongoing knee pain for which she is requesting gel based injections.  We had a long discussion about steroids versus gel and how they work and what they are targeting.  She does  have this evidence of chondrocalcinosis which I think is playing into her overall condition to the typically I would treat with steroid injections 1st.  However she again would like to continue forward with the gel based injections and if those fail to provide long-lasting or long term relief then we would be more open to the idea of the steroid injection.     The patient has been ordered:  Synvisc    CONSULTS:   None    ACTIVE PROBLEM LIST  Patient Active Problem List   Diagnosis    Mixed hyperlipidemia    Spinal stenosis of lumbar region    Lumbar spondylosis    DDD (degenerative disc disease), lumbar    Postlaminectomy syndrome of lumbar region    Post-hysterectomy menopause    Internal hemorrhoids    Breast lump or mass    Lumbar radiculopathy    Gastroesophageal reflux disease without esophagitis    Esophageal dysmotility    Hiatal hernia    History of lumbar fusion    Multiple environmental allergies    Osteopenia of multiple sites    Physical debility    Stage 3a chronic kidney disease    Xanthoma tuberosum    Chronic pain syndrome    Alopecia    Chest pain, atypical    Arthritis/arthropathy of multiple joints    Arthritis of left knee    Statin myopathy           SUBJECTIVE    CHIEF COMPLAINT: Knee Pain    HPI:   Jia Ugalde is a 64 y.o. female here for evaluation and management of left knee pain. There is not a specific incident that brought about this pain. she has had progressive problems with the knee(s) starting 2 months ago but is now progressing to interfere with activities which include: walking, enjoying hobbies, and rising from a sitting position    Currently the pain in the joint is rated at moderate with activity. The pain is intermittent and is located in the knee, located medially. The pain is described as aching, sharp, stiffness, and throbbing. Relieving factors include repositioning.     There is associated Catching and Clicking.     Jia Ugalde has no additional complaints.      25:  Patient here today for start a Synvisc series  25: Here for 2-3 synvisc.  Complete relief of knee pain after 1st injection.    PROGRESSIVE SYMPTOMS:  Pain worsened by weight bearing  Pain effecting living situation    FUNCTIONAL STATUS:   Climb a flight of stairs or walk up a hill     PREVIOUS TREATMENTS:  Medical: None  Physical Therapy: Braces  Previous Orthopaedic Surgery: None to lower extremities     REVIEW OF SYSTEMS:  PAIN ASSESSMENT:  See HPI.  MUSCULOSKELETAL: See HPI.  OTHER 10 point review of systems is negative except as stated in HPI above    PAST MEDICAL HISTORY   has a past medical history of GERD (gastroesophageal reflux disease) and Hyperlipidemia.     PAST SURGICAL HISTORY   has a past surgical history that includes Spine surgery; Hysterectomy; Tubal ligation; Breast surgery; Hemorrhoid surgery; Breast biopsy; Epidural steroid injection (Bilateral, 2021); Colonoscopy (N/A, 2021); Esophagogastroduodenoscopy (N/A, 2022);  section (1997); and Tonsillectomy ().     FAMILY HISTORY  family history includes Arthritis in her paternal grandfather; Asthma in her son; Breast cancer (age of onset: 40) in her maternal aunt; Pancreatic cancer in her maternal grandmother and paternal grandmother.     SOCIAL HISTORY   reports that she has never smoked. She has never used smokeless tobacco. She reports that she does not drink alcohol and does not use drugs.     ALLERGIES   Review of patient's allergies indicates:  No Known Allergies     MEDICATIONS  Current Outpatient Medications on File Prior to Visit   Medication Sig Dispense Refill    azelastine (ASTELIN) 137 mcg (0.1 %) nasal spray 1 spray (137 mcg total) by Nasal route 2 (two) times daily. 30 mL 3    budesonide 1 mg/2 mL NbSp       cholecalciferol, vitamin D3, capsule/tablet Take by mouth once daily.      clobetasoL (TEMOVATE) 0.05 % external solution Apply TO SCALP in AREAS of HAIR loss AT betime nightly  "monday - FRIDAY      diclofenac sodium (VOLTAREN) 1 % Gel Apply 2 g topically 4 (four) times daily as needed. 100 g 3    evolocumab (REPATHA SURECLICK) 140 mg/mL PnIj Inject 1 mL (140 mg total) into the skin every 14 (fourteen) days. 6 mL 3    famotidine (PEPCID) 20 MG tablet TAKE ONE TABLET BY MOUTH TWICE DAILY as needed for heartburn 180 tablet 3    fluocinonide (LIDEX) 0.05 % external solution apply 1ml TO SCALP ONCE TO twice daily AS DIRECTED      fluticasone propionate (FLONASE) 50 mcg/actuation nasal spray 2 sprays (100 mcg total) by Each Nostril route 2 (two) times daily. 18.2 mL 3    fluticasone propionate (FLONASE) 50 mcg/actuation nasal spray 1 spray (50 mcg total) by Each Nostril route once daily. 16 g 3    hydrocortisone (ANUSOL-HC) 2.5 % rectal cream Place rectally 2 (two) times daily. 28 g 1    hydrocortisone 2.5 % cream Apply topically.      hydrocortisone-pramoxine (PROCTOFOAM-HS) rectal foam Place 1 applicator rectally 3 (three) times daily. 10 g 2    hydroquinone 4 % Crea apply TO AREAS of discoloration ON SKIN 28.35 g 3    methylPREDNISolone (MEDROL DOSEPACK) 4 mg tablet use as directed 21 each 0    NEILMED SINUS RINSE COMPLETE pkdv use as directed      nystatin (MYCOSTATIN) cream Apply topically 2 (two) times daily. 30 g 2    RETIN-A 0.025 % cream Apply topically.      tobramycin, PF, (OPHELIA) 300 mg/5 mL nebulizer solution EMPTY CONTENTS OF 1 AMPULE INTO NASAL IRRIGATION SYSTEM, ADD DISTILLED WATER, SALT PACK, MIX & IRRIGATE. PERFORM 2 TIMES DAILY      triamcinolone acetonide 0.1% (KENALOG) 0.1 % ointment Apply TO SCALP in AREAS of HAIR loss AT bedtime      XIIDRA 5 % Dpet Place 1 drop into both eyes 2 (two) times daily.       No current facility-administered medications on file prior to visit.          PHYSICAL EXAM   height is 5' 3" (1.6 m) and weight is 88.6 kg (195 lb 5.2 oz).   Body mass index is 34.6 kg/m².      All other systems deferred.  GENERAL:  No acute distress  HABITUS: Obese  GAIT: " Non-antalgic  SKIN: Normal     KNEE EXAM:    left:   Effusion: Minimal joint effusion  TTP: Yes over Medial Joint Line   No crepitus with passive knee ROM  Passive ROM: Extension 0, Flexion 130  No pain with manipulation of patella  Stable to varus/valgus stress. No increased laxity to anterior/posterior drawer testing  positive Leeanne's test  No pain with IR/ER rotation of the hip  5/5 strength in knee flexion and extension, ankle plantarflexion and dorsiflexion  Neurovascular Status: Sensation intact to light touch in Sural, Saphenous, SPN, DPN, Tibial nerve distribution  2+ pulse DP/PT, normal capillary refill, foot has normal warmth    DATA:  Diagnostic tests reviewed for today's visit:     4v of the knee reveal Mild degenerative changes of the Medial compartment. There is evidence of advanced osteoarthritis changes with Osteophyte formation and Joint space narrowing. The limb is in netural alignment. The patella is tracking midline.  There is evidence of chondrocalcinosis of her medial and lateral meniscus

## 2025-06-26 NOTE — PROCEDURES
Large Joint Aspiration/Injection: L knee    Date/Time: 6/26/2025 7:40 AM    Performed by: Otis Barton MD  Authorized by: Otis Barton MD    Consent Done?:  Yes (Verbal)  Indications:  Arthritis and pain  Prep: patient was prepped and draped in usual sterile fashion      Local anesthesia used?: Yes    Anesthesia:  Local infiltration  Local anesthetic:  Topical anesthetic    Details:  Needle Size:  22 G  Approach:  Anterolateral  Location:  Knee  Site:  L knee  Medications:  16 mg hyaluronate 16 mg/2 mL  Patient tolerance:  Patient tolerated the procedure well with no immediate complications

## 2025-07-03 ENCOUNTER — OFFICE VISIT (OUTPATIENT)
Dept: ORTHOPEDICS | Facility: CLINIC | Age: 65
End: 2025-07-03
Payer: MEDICARE

## 2025-07-03 VITALS — WEIGHT: 195.31 LBS | BODY MASS INDEX: 34.61 KG/M2 | HEIGHT: 63 IN

## 2025-07-03 DIAGNOSIS — M17.12 PRIMARY OSTEOARTHRITIS OF LEFT KNEE: Primary | ICD-10-CM

## 2025-07-03 PROCEDURE — 99999 PR PBB SHADOW E&M-EST. PATIENT-LVL III: CPT | Mod: PBBFAC,,, | Performed by: ORTHOPAEDIC SURGERY

## 2025-07-03 NOTE — PROCEDURES
Increased anxiety and unable to eat for the past 5 days. Struggling for the past 6-8 months. 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    Large Joint Aspiration/Injection: L knee    Date/Time: 7/3/2025 7:40 AM    Performed by: Otis Barton MD  Authorized by: Otis Barton MD    Consent Done?:  Yes (Verbal)  Indications:  Arthritis and pain  Prep: patient was prepped and draped in usual sterile fashion      Local anesthesia used?: Yes    Anesthesia:  Local infiltration  Local anesthetic:  Topical anesthetic    Details:  Needle Size:  22 G  Approach:  Anterolateral  Location:  Knee  Site:  L knee  Medications:  16 mg hyaluronate 16 mg/2 mL  Patient tolerance:  Patient tolerated the procedure well with no immediate complications

## 2025-07-03 NOTE — PROGRESS NOTES
EST PATIENT ORTHOPAEDIC: Knee    PRIMARY CARE PHYSICIAN: Reyna Umanzor MD   REFERRING PROVIDER: Otis Barton MD  [address] [facility]  [place] [id number]     ASSESSMENT & PLAN:    Impression:  Left Knee Mild Osteoarthritis, Primary    Left Knee chondrocalcinosis  Lumbar Radiculopathy    Follow Up Plan: PRN    Non operative care:    Jia Ugalde has physical exam evidence of above and wishes to pursue an non-operative care. I am recommending the following: synvisc    Patient comes in with a two-month history of having atraumatic onset left knee pain.  She has been using a soft knee brace as her only symptomatic treatment to date.  She reports a medial based pain.  Some of her pain is confounded by history of lower back in lumbar radiculopathy that it has undergone 2 surgeries at this point.  She is a previous pain management patient.  She reports ongoing pain and numbness in her leg.  But with regard to this medial pain it appears new.  Again without an injury that she can recall.  She reports intermittent swelling in this knee.  She has x-rays that demonstrate some mild medial compartment arthritis along with both medial and lateral chondrocalcinosis of her knee.  She clinically has medial joint line pain and a positive Leeanne's test.  She does not report instability but does have new mechanical symptoms in her knee.  I think that her pain is likely secondary to chondrocalcinosis or this mild arthritis.  We discussed treatment options including therapy, injections, oral medications.  Previously we did Medrol Dosepak.  This helped with her pain.  She has ongoing numbness in her medial thigh which I think may be related to pre-existing lumbar spine disease she has had previous surgery in this area.  She does have some ongoing knee pain for which she is requesting gel based injections.  We had a long discussion about steroids versus gel and how they work and what they are targeting.  She does have this  evidence of chondrocalcinosis which I think is playing into her overall condition to the typically I would treat with steroid injections 1st.  However she again would like to continue forward with the gel based injections and if those fail to provide long-lasting or long term relief then we would be more open to the idea of the steroid injection.     The patient has been ordered:  Synvisc    CONSULTS:   None    ACTIVE PROBLEM LIST  Patient Active Problem List   Diagnosis    Mixed hyperlipidemia    Spinal stenosis of lumbar region    Lumbar spondylosis    DDD (degenerative disc disease), lumbar    Postlaminectomy syndrome of lumbar region    Post-hysterectomy menopause    Internal hemorrhoids    Breast lump or mass    Lumbar radiculopathy    Gastroesophageal reflux disease without esophagitis    Esophageal dysmotility    Hiatal hernia    History of lumbar fusion    Multiple environmental allergies    Osteopenia of multiple sites    Physical debility    Stage 3a chronic kidney disease    Xanthoma tuberosum    Chronic pain syndrome    Alopecia    Chest pain, atypical    Arthritis/arthropathy of multiple joints    Arthritis of left knee    Statin myopathy           SUBJECTIVE    CHIEF COMPLAINT: Knee Pain    HPI:   Jia Ugalde is a 64 y.o. female here for evaluation and management of left knee pain. There is not a specific incident that brought about this pain. she has had progressive problems with the knee(s) starting 2 months ago but is now progressing to interfere with activities which include: walking, enjoying hobbies, and rising from a sitting position    Currently the pain in the joint is rated at moderate with activity. The pain is intermittent and is located in the knee, located medially. The pain is described as aching, sharp, stiffness, and throbbing. Relieving factors include repositioning.     There is associated Catching and Clicking.     Jia Ugalde has no additional complaints.     6/19/25:   Patient here today for start a Synvisc series  25: Here for 2-3 synvisc.  Complete relief of knee pain after 1st injection.  7/3/25:   Patient here today for 3rd Synvisc shot.  After the 2nd when she noticed some increased swelling and sensitivity to the lateral knee.  Otherwise sustained relief with ADLs.     PROGRESSIVE SYMPTOMS:  Pain worsened by weight bearing  Pain effecting living situation    FUNCTIONAL STATUS:   Climb a flight of stairs or walk up a hill     PREVIOUS TREATMENTS:  Medical: None  Physical Therapy: Braces  Previous Orthopaedic Surgery: None to lower extremities     REVIEW OF SYSTEMS:  PAIN ASSESSMENT:  See HPI.  MUSCULOSKELETAL: See HPI.  OTHER 10 point review of systems is negative except as stated in HPI above    PAST MEDICAL HISTORY   has a past medical history of GERD (gastroesophageal reflux disease) and Hyperlipidemia.     PAST SURGICAL HISTORY   has a past surgical history that includes Spine surgery; Hysterectomy; Tubal ligation; Breast surgery; Hemorrhoid surgery; Breast biopsy; Epidural steroid injection (Bilateral, 2021); Colonoscopy (N/A, 2021); Esophagogastroduodenoscopy (N/A, 2022);  section (1997); and Tonsillectomy ().     FAMILY HISTORY  family history includes Arthritis in her paternal grandfather; Asthma in her son; Breast cancer (age of onset: 40) in her maternal aunt; Pancreatic cancer in her maternal grandmother and paternal grandmother.     SOCIAL HISTORY   reports that she has never smoked. She has never used smokeless tobacco. She reports that she does not drink alcohol and does not use drugs.     ALLERGIES   Review of patient's allergies indicates:  No Known Allergies     MEDICATIONS  Current Outpatient Medications on File Prior to Visit   Medication Sig Dispense Refill    azelastine (ASTELIN) 137 mcg (0.1 %) nasal spray 1 spray (137 mcg total) by Nasal route 2 (two) times daily. 30 mL 3    budesonide 1 mg/2 mL NbSp        cholecalciferol, vitamin D3, capsule/tablet Take by mouth once daily.      clobetasoL (TEMOVATE) 0.05 % external solution Apply TO SCALP in AREAS of HAIR loss AT betime nightly monday - FRIDAY      diclofenac sodium (VOLTAREN) 1 % Gel Apply 2 g topically 4 (four) times daily as needed. 100 g 3    evolocumab (REPATHA SURECLICK) 140 mg/mL PnIj Inject 1 mL (140 mg total) into the skin every 14 (fourteen) days. 6 mL 3    famotidine (PEPCID) 20 MG tablet TAKE ONE TABLET BY MOUTH TWICE DAILY as needed for heartburn 180 tablet 3    fluocinonide (LIDEX) 0.05 % external solution apply 1ml TO SCALP ONCE TO twice daily AS DIRECTED      fluticasone propionate (FLONASE) 50 mcg/actuation nasal spray 2 sprays (100 mcg total) by Each Nostril route 2 (two) times daily. 18.2 mL 3    fluticasone propionate (FLONASE) 50 mcg/actuation nasal spray 1 spray (50 mcg total) by Each Nostril route once daily. 16 g 3    hydrocortisone (ANUSOL-HC) 2.5 % rectal cream Place rectally 2 (two) times daily. 28 g 1    hydrocortisone 2.5 % cream Apply topically.      hydrocortisone-pramoxine (PROCTOFOAM-HS) rectal foam Place 1 applicator rectally 3 (three) times daily. 10 g 2    hydroquinone 4 % Crea apply TO AREAS of discoloration ON SKIN 28.35 g 3    methylPREDNISolone (MEDROL DOSEPACK) 4 mg tablet use as directed 21 each 0    NEILMED SINUS RINSE COMPLETE pkdv use as directed      nystatin (MYCOSTATIN) cream Apply topically 2 (two) times daily. 30 g 2    RETIN-A 0.025 % cream Apply topically.      tobramycin, PF, (OPHELIA) 300 mg/5 mL nebulizer solution EMPTY CONTENTS OF 1 AMPULE INTO NASAL IRRIGATION SYSTEM, ADD DISTILLED WATER, SALT PACK, MIX & IRRIGATE. PERFORM 2 TIMES DAILY      triamcinolone acetonide 0.1% (KENALOG) 0.1 % ointment Apply TO SCALP in AREAS of HAIR loss AT bedtime      XIIDRA 5 % Dpet Place 1 drop into both eyes 2 (two) times daily.       No current facility-administered medications on file prior to visit.          PHYSICAL EXAM    "height is 5' 3" (1.6 m) and weight is 88.6 kg (195 lb 5.2 oz).   Body mass index is 34.6 kg/m².      All other systems deferred.  GENERAL:  No acute distress  HABITUS: Obese  GAIT: Non-antalgic  SKIN: Normal     KNEE EXAM:    left:   Effusion: Minimal joint effusion  TTP: Yes over Medial Joint Line   No crepitus with passive knee ROM  Passive ROM: Extension 0, Flexion 130  No pain with manipulation of patella  Stable to varus/valgus stress. No increased laxity to anterior/posterior drawer testing  positive Leeanne's test  No pain with IR/ER rotation of the hip  5/5 strength in knee flexion and extension, ankle plantarflexion and dorsiflexion  Neurovascular Status: Sensation intact to light touch in Sural, Saphenous, SPN, DPN, Tibial nerve distribution  2+ pulse DP/PT, normal capillary refill, foot has normal warmth    DATA:  Diagnostic tests reviewed for today's visit:     4v of the knee reveal Mild degenerative changes of the Medial compartment. There is evidence of advanced osteoarthritis changes with Osteophyte formation and Joint space narrowing. The limb is in netural alignment. The patella is tracking midline.  There is evidence of chondrocalcinosis of her medial and lateral meniscus      "

## 2025-07-16 ENCOUNTER — PATIENT MESSAGE (OUTPATIENT)
Dept: ADMINISTRATIVE | Facility: OTHER | Age: 65
End: 2025-07-16
Payer: MEDICARE

## 2025-07-22 ENCOUNTER — PATIENT MESSAGE (OUTPATIENT)
Dept: FAMILY MEDICINE | Facility: CLINIC | Age: 65
End: 2025-07-22
Payer: MEDICARE

## 2025-07-22 DIAGNOSIS — K21.9 GASTROESOPHAGEAL REFLUX DISEASE WITHOUT ESOPHAGITIS: ICD-10-CM

## 2025-07-22 DIAGNOSIS — K44.9 HIATAL HERNIA: ICD-10-CM

## 2025-07-22 RX ORDER — FAMOTIDINE 20 MG/1
20 TABLET, FILM COATED ORAL 2 TIMES DAILY
Qty: 180 TABLET | Refills: 2 | Status: SHIPPED | OUTPATIENT
Start: 2025-07-22

## 2025-07-22 NOTE — TELEPHONE ENCOUNTER
No care due was identified.  Health [facility] Embedded Care Due Messages. Reference number: [id number].   7/22/2025 4:18:28 PM CDT

## 2025-08-18 ENCOUNTER — PATIENT MESSAGE (OUTPATIENT)
Dept: PSYCHIATRY | Facility: CLINIC | Age: 65
End: 2025-08-18
Payer: MEDICARE